# Patient Record
Sex: MALE | Race: WHITE | NOT HISPANIC OR LATINO | URBAN - METROPOLITAN AREA
[De-identification: names, ages, dates, MRNs, and addresses within clinical notes are randomized per-mention and may not be internally consistent; named-entity substitution may affect disease eponyms.]

---

## 2017-02-24 ENCOUNTER — OUTPATIENT (OUTPATIENT)
Dept: OUTPATIENT SERVICES | Facility: HOSPITAL | Age: 72
LOS: 1 days | End: 2017-02-24
Payer: MEDICARE

## 2017-02-24 PROCEDURE — 74183 MRI ABD W/O CNTR FLWD CNTR: CPT

## 2017-02-24 PROCEDURE — 74183 MRI ABD W/O CNTR FLWD CNTR: CPT | Mod: 26

## 2017-02-24 PROCEDURE — A9585: CPT

## 2017-11-06 ENCOUNTER — INPATIENT (INPATIENT)
Facility: HOSPITAL | Age: 72
LOS: 4 days | Discharge: HOME | DRG: 315 | End: 2017-11-11
Attending: INTERNAL MEDICINE | Admitting: INTERNAL MEDICINE
Payer: MEDICARE

## 2017-11-06 VITALS
WEIGHT: 302.92 LBS | HEIGHT: 73 IN | TEMPERATURE: 98 F | OXYGEN SATURATION: 94 % | DIASTOLIC BLOOD PRESSURE: 76 MMHG | HEART RATE: 95 BPM | SYSTOLIC BLOOD PRESSURE: 124 MMHG | RESPIRATION RATE: 18 BRPM

## 2017-11-06 DIAGNOSIS — I31.8 OTHER SPECIFIED DISEASES OF PERICARDIUM: ICD-10-CM

## 2017-11-06 LAB
ALBUMIN SERPL ELPH-MCNC: 3.1 G/DL — LOW (ref 3.3–5)
ALP SERPL-CCNC: 82 U/L — SIGNIFICANT CHANGE UP (ref 40–120)
ALT FLD-CCNC: 12 U/L — SIGNIFICANT CHANGE UP (ref 10–45)
ANION GAP SERPL CALC-SCNC: 18 MMOL/L — HIGH (ref 5–17)
AST SERPL-CCNC: 10 U/L — SIGNIFICANT CHANGE UP (ref 10–40)
BASOPHILS NFR BLD AUTO: 0.1 % — SIGNIFICANT CHANGE UP (ref 0–2)
BILIRUB SERPL-MCNC: 1 MG/DL — SIGNIFICANT CHANGE UP (ref 0.2–1.2)
BUN SERPL-MCNC: 35 MG/DL — HIGH (ref 7–23)
CALCIUM SERPL-MCNC: 9.2 MG/DL — SIGNIFICANT CHANGE UP (ref 8.4–10.5)
CHLORIDE SERPL-SCNC: 95 MMOL/L — LOW (ref 96–108)
CK MB CFR SERPL CALC: 1.9 NG/ML — SIGNIFICANT CHANGE UP (ref 0–6.7)
CK SERPL-CCNC: 58 U/L — SIGNIFICANT CHANGE UP (ref 30–200)
CO2 SERPL-SCNC: 24 MMOL/L — SIGNIFICANT CHANGE UP (ref 22–31)
CREAT SERPL-MCNC: 1.39 MG/DL — HIGH (ref 0.5–1.3)
EOSINOPHIL NFR BLD AUTO: 1.1 % — SIGNIFICANT CHANGE UP (ref 0–6)
GLUCOSE SERPL-MCNC: 108 MG/DL — HIGH (ref 70–99)
HCT VFR BLD CALC: 41.8 % — SIGNIFICANT CHANGE UP (ref 39–50)
HGB BLD-MCNC: 14 G/DL — SIGNIFICANT CHANGE UP (ref 13–17)
LYMPHOCYTES # BLD AUTO: 9.2 % — LOW (ref 13–44)
MCHC RBC-ENTMCNC: 29.7 PG — SIGNIFICANT CHANGE UP (ref 27–34)
MCHC RBC-ENTMCNC: 33.5 G/DL — SIGNIFICANT CHANGE UP (ref 32–36)
MCV RBC AUTO: 88.7 FL — SIGNIFICANT CHANGE UP (ref 80–100)
MONOCYTES NFR BLD AUTO: 9.9 % — SIGNIFICANT CHANGE UP (ref 2–14)
NEUTROPHILS NFR BLD AUTO: 79.7 % — HIGH (ref 43–77)
NT-PROBNP SERPL-SCNC: 1114 PG/ML — HIGH (ref 0–300)
PLATELET # BLD AUTO: 415 K/UL — HIGH (ref 150–400)
POTASSIUM SERPL-MCNC: 4 MMOL/L — SIGNIFICANT CHANGE UP (ref 3.5–5.3)
POTASSIUM SERPL-SCNC: 4 MMOL/L — SIGNIFICANT CHANGE UP (ref 3.5–5.3)
PROT SERPL-MCNC: 6.6 G/DL — SIGNIFICANT CHANGE UP (ref 6–8.3)
RBC # BLD: 4.71 M/UL — SIGNIFICANT CHANGE UP (ref 4.2–5.8)
RBC # FLD: 13.8 % — SIGNIFICANT CHANGE UP (ref 10.3–16.9)
SODIUM SERPL-SCNC: 137 MMOL/L — SIGNIFICANT CHANGE UP (ref 135–145)
TROPONIN T SERPL-MCNC: <0.01 NG/ML — SIGNIFICANT CHANGE UP (ref 0–0.01)
WBC # BLD: 15 K/UL — HIGH (ref 3.8–10.5)
WBC # FLD AUTO: 15 K/UL — HIGH (ref 3.8–10.5)

## 2017-11-06 PROCEDURE — 71010: CPT | Mod: 26

## 2017-11-06 PROCEDURE — 93010 ELECTROCARDIOGRAM REPORT: CPT

## 2017-11-06 PROCEDURE — 99291 CRITICAL CARE FIRST HOUR: CPT | Mod: 25

## 2017-11-06 RX ORDER — APIXABAN 2.5 MG/1
2.5 TABLET, FILM COATED ORAL EVERY 12 HOURS
Qty: 0 | Refills: 0 | Status: DISCONTINUED | OUTPATIENT
Start: 2017-11-06 | End: 2017-11-06

## 2017-11-06 RX ORDER — SODIUM CHLORIDE 9 MG/ML
3 INJECTION INTRAMUSCULAR; INTRAVENOUS; SUBCUTANEOUS ONCE
Qty: 0 | Refills: 0 | Status: COMPLETED | OUTPATIENT
Start: 2017-11-06 | End: 2017-11-06

## 2017-11-06 RX ORDER — FUROSEMIDE 40 MG
40 TABLET ORAL DAILY
Qty: 0 | Refills: 0 | Status: DISCONTINUED | OUTPATIENT
Start: 2017-11-07 | End: 2017-11-08

## 2017-11-06 RX ORDER — CARVEDILOL PHOSPHATE 80 MG/1
12.5 CAPSULE, EXTENDED RELEASE ORAL EVERY 12 HOURS
Qty: 0 | Refills: 0 | Status: DISCONTINUED | OUTPATIENT
Start: 2017-11-06 | End: 2017-11-11

## 2017-11-06 RX ADMIN — SODIUM CHLORIDE 3 MILLILITER(S): 9 INJECTION INTRAMUSCULAR; INTRAVENOUS; SUBCUTANEOUS at 17:25

## 2017-11-06 RX ADMIN — CARVEDILOL PHOSPHATE 12.5 MILLIGRAM(S): 80 CAPSULE, EXTENDED RELEASE ORAL at 23:42

## 2017-11-06 NOTE — ED PROVIDER NOTE - OBJECTIVE STATEMENT
72 m w sob- hx of pericarditis- now w large pericardial effusion on echo- no signs of   tamponade on echo by Dr Ramirez- sent in for admission and removal of fluid by  Dr Sepulveda- some of the fluid was 'goopy' as per Dr Sepulveda  no f/c  no cp now, mild sob- no orthopnea  worse with exertion  no allev factors

## 2017-11-06 NOTE — CONSULT NOTE ADULT - SUBJECTIVE AND OBJECTIVE BOX
71 YO obese male on Eliquis 2.5mg BID for 2015 DVT/PE, hypertension, possible bout of mild CHF 4 mo's  ago resolving with PO furosemide, sleep apnea-had TTE in July in [place]-read as trace/small pericardial effusion.  He had chest pain 2 wks. ago with wbc 17K-sent for chest C-only finding a "significant"  pericardial effusion.  Echo on 10/25 showed moderate pericardial effusion(no tamponade), and exam/EKg -  not showing typical pericarditis.  In interim, effusion has gotten a little larger with more fibrin?blood?debris?, now with audible friction rub,  pulsus of 10mm, left pleural effusion, new atrial flutter with rate 100-but no echo signs for tamponade  Pericardirtis--?? infection??bloody-would like fluid or pericardial tissue  to decide how to treat.  have asked for chest CT report from 10/25 and data from my office to be scanned into his chart(in [facility]).  Dr. Sepulveda to help-already contacted  blood cultures obtained  needs tele bed 5th floor  ??TB -no Hx and denies prior PPD

## 2017-11-06 NOTE — ED PROVIDER NOTE - CRITICAL CARE PROVIDED
documentation/conducted a detailed discussion of DNR status/additional history taking/direct patient care (not related to procedure)/consultation with other physicians

## 2017-11-06 NOTE — ED PROVIDER NOTE - MEDICAL DECISION MAKING DETAILS
pericardial effusion on echo- large pleural effusion- admit to cards- Dr Ramirez has contacted Dr Sepulveda for probable pericardiocentesis

## 2017-11-06 NOTE — ED ADULT NURSE NOTE - CHPI ED SYMPTOMS NEG
no fever/no headache/no hemoptysis/no wheezing/no chills/no body aches/no edema/no shortness of breath/no cough/no diaphoresis/no chest pain

## 2017-11-06 NOTE — ED ADULT NURSE NOTE - OBJECTIVE STATEMENT
pt presents to ED A&Ox3 c/o weakness. pt states he was sent here by his doctor for fluid around the heart. denies cp/sob, fever, cough, n/v/d, dizziness, leg swelling. telemetry monitor in place. noted to be tracing in aflutter.

## 2017-11-06 NOTE — ED PROVIDER NOTE - DIAGNOSTIC INTERPRETATION
ER Physician: Brennan Griggs  CHEST XRAY INTERPRETATION: lungs Large L pleural effusion, heart shadow enlarged, bony structures intact

## 2017-11-06 NOTE — ED PROVIDER NOTE - CARE PLAN
Principal Discharge DX:	Acute pericarditis, unspecified type  Secondary Diagnosis:	Pericardial effusion  Secondary Diagnosis:	Pleural effusion

## 2017-11-06 NOTE — ED ADULT TRIAGE NOTE - CHIEF COMPLAINT QUOTE
Fluid was found around the heart at the MDs office. Patient states that they sent him in due to there being increased amounts of fluid around the heart. Fluid was first detected around 2.5 weeks ago.

## 2017-11-07 DIAGNOSIS — N28.9 DISORDER OF KIDNEY AND URETER, UNSPECIFIED: ICD-10-CM

## 2017-11-07 DIAGNOSIS — I10 ESSENTIAL (PRIMARY) HYPERTENSION: ICD-10-CM

## 2017-11-07 DIAGNOSIS — G47.33 OBSTRUCTIVE SLEEP APNEA (ADULT) (PEDIATRIC): ICD-10-CM

## 2017-11-07 DIAGNOSIS — Z96.649 PRESENCE OF UNSPECIFIED ARTIFICIAL HIP JOINT: Chronic | ICD-10-CM

## 2017-11-07 DIAGNOSIS — I27.82 CHRONIC PULMONARY EMBOLISM: ICD-10-CM

## 2017-11-07 DIAGNOSIS — Z29.9 ENCOUNTER FOR PROPHYLACTIC MEASURES, UNSPECIFIED: ICD-10-CM

## 2017-11-07 DIAGNOSIS — I82.91 CHRONIC EMBOLISM AND THROMBOSIS OF UNSPECIFIED VEIN: ICD-10-CM

## 2017-11-07 DIAGNOSIS — I31.3 PERICARDIAL EFFUSION (NONINFLAMMATORY): ICD-10-CM

## 2017-11-07 DIAGNOSIS — I48.92 UNSPECIFIED ATRIAL FLUTTER: ICD-10-CM

## 2017-11-07 DIAGNOSIS — J90 PLEURAL EFFUSION, NOT ELSEWHERE CLASSIFIED: ICD-10-CM

## 2017-11-07 DIAGNOSIS — R63.8 OTHER SYMPTOMS AND SIGNS CONCERNING FOOD AND FLUID INTAKE: ICD-10-CM

## 2017-11-07 DIAGNOSIS — Z02.9 ENCOUNTER FOR ADMINISTRATIVE EXAMINATIONS, UNSPECIFIED: ICD-10-CM

## 2017-11-07 LAB
ANION GAP SERPL CALC-SCNC: 14 MMOL/L — SIGNIFICANT CHANGE UP (ref 5–17)
APPEARANCE UR: CLEAR — SIGNIFICANT CHANGE UP
APTT BLD: 33.1 SEC — SIGNIFICANT CHANGE UP (ref 27.5–37.4)
APTT BLD: 36 SEC — SIGNIFICANT CHANGE UP (ref 27.5–37.4)
BILIRUB UR-MCNC: NEGATIVE — SIGNIFICANT CHANGE UP
BUN SERPL-MCNC: 32 MG/DL — HIGH (ref 7–23)
CALCIUM SERPL-MCNC: 9 MG/DL — SIGNIFICANT CHANGE UP (ref 8.4–10.5)
CHLORIDE SERPL-SCNC: 99 MMOL/L — SIGNIFICANT CHANGE UP (ref 96–108)
CO2 SERPL-SCNC: 25 MMOL/L — SIGNIFICANT CHANGE UP (ref 22–31)
COLOR SPEC: YELLOW — SIGNIFICANT CHANGE UP
CREAT ?TM UR-MCNC: 186 MG/DL — SIGNIFICANT CHANGE UP
CREAT SERPL-MCNC: 1.21 MG/DL — SIGNIFICANT CHANGE UP (ref 0.5–1.3)
CRP SERPL-MCNC: 9.5 MG/DL — HIGH (ref 0–0.4)
DIFF PNL FLD: NEGATIVE — SIGNIFICANT CHANGE UP
ERYTHROCYTE [SEDIMENTATION RATE] IN BLOOD: 55 MM/HR — HIGH
GLUCOSE SERPL-MCNC: 120 MG/DL — HIGH (ref 70–99)
GLUCOSE UR QL: NEGATIVE — SIGNIFICANT CHANGE UP
HCT VFR BLD CALC: 41.1 % — SIGNIFICANT CHANGE UP (ref 39–50)
HGB BLD-MCNC: 13.6 G/DL — SIGNIFICANT CHANGE UP (ref 13–17)
INR BLD: 1.55 — HIGH (ref 0.88–1.16)
KETONES UR-MCNC: NEGATIVE — SIGNIFICANT CHANGE UP
LEUKOCYTE ESTERASE UR-ACNC: NEGATIVE — SIGNIFICANT CHANGE UP
MAGNESIUM SERPL-MCNC: 2.3 MG/DL — SIGNIFICANT CHANGE UP (ref 1.6–2.6)
MCHC RBC-ENTMCNC: 29.7 PG — SIGNIFICANT CHANGE UP (ref 27–34)
MCHC RBC-ENTMCNC: 33.1 G/DL — SIGNIFICANT CHANGE UP (ref 32–36)
MCV RBC AUTO: 89.7 FL — SIGNIFICANT CHANGE UP (ref 80–100)
NITRITE UR-MCNC: NEGATIVE — SIGNIFICANT CHANGE UP
PH UR: 6 — SIGNIFICANT CHANGE UP (ref 5–8)
PLATELET # BLD AUTO: 341 K/UL — SIGNIFICANT CHANGE UP (ref 150–400)
POTASSIUM SERPL-MCNC: 3.6 MMOL/L — SIGNIFICANT CHANGE UP (ref 3.5–5.3)
POTASSIUM SERPL-SCNC: 3.6 MMOL/L — SIGNIFICANT CHANGE UP (ref 3.5–5.3)
PROT UR-MCNC: 30 MG/DL
PROTHROM AB SERPL-ACNC: 17.4 SEC — HIGH (ref 9.8–12.7)
RBC # BLD: 4.58 M/UL — SIGNIFICANT CHANGE UP (ref 4.2–5.8)
RBC # FLD: 13.9 % — SIGNIFICANT CHANGE UP (ref 10.3–16.9)
RHEUMATOID FACT SERPL-ACNC: 14 IU/ML — HIGH (ref 0–13.9)
SODIUM SERPL-SCNC: 138 MMOL/L — SIGNIFICANT CHANGE UP (ref 135–145)
SODIUM UR-SCNC: 20 MMOL/L — SIGNIFICANT CHANGE UP
SP GR SPEC: 1.02 — SIGNIFICANT CHANGE UP (ref 1–1.03)
TSH SERPL-MCNC: 1.72 UIU/ML — SIGNIFICANT CHANGE UP (ref 0.35–4.94)
UROBILINOGEN FLD QL: 0.2 E.U./DL — SIGNIFICANT CHANGE UP
UUN UR-MCNC: 1414 MG/DL — SIGNIFICANT CHANGE UP
WBC # BLD: 10.9 K/UL — HIGH (ref 3.8–10.5)
WBC # FLD AUTO: 10.9 K/UL — HIGH (ref 3.8–10.5)

## 2017-11-07 PROCEDURE — 73630 X-RAY EXAM OF FOOT: CPT | Mod: 26,RT

## 2017-11-07 PROCEDURE — 93306 TTE W/DOPPLER COMPLETE: CPT | Mod: 26

## 2017-11-07 PROCEDURE — 71260 CT THORAX DX C+: CPT | Mod: 26

## 2017-11-07 PROCEDURE — 74177 CT ABD & PELVIS W/CONTRAST: CPT | Mod: 26

## 2017-11-07 RX ORDER — ACETAMINOPHEN 500 MG
650 TABLET ORAL EVERY 6 HOURS
Qty: 0 | Refills: 0 | Status: DISCONTINUED | OUTPATIENT
Start: 2017-11-07 | End: 2017-11-11

## 2017-11-07 RX ORDER — HEPARIN SODIUM 5000 [USP'U]/ML
2000 INJECTION INTRAVENOUS; SUBCUTANEOUS
Qty: 25000 | Refills: 0 | Status: DISCONTINUED | OUTPATIENT
Start: 2017-11-07 | End: 2017-11-08

## 2017-11-07 RX ORDER — POTASSIUM CHLORIDE 20 MEQ
40 PACKET (EA) ORAL ONCE
Qty: 0 | Refills: 0 | Status: COMPLETED | OUTPATIENT
Start: 2017-11-07 | End: 2017-11-07

## 2017-11-07 RX ORDER — ACETAMINOPHEN 500 MG
650 TABLET ORAL ONCE
Qty: 0 | Refills: 0 | Status: COMPLETED | OUTPATIENT
Start: 2017-11-07 | End: 2017-11-07

## 2017-11-07 RX ORDER — HEPARIN SODIUM 5000 [USP'U]/ML
2400 INJECTION INTRAVENOUS; SUBCUTANEOUS
Qty: 25000 | Refills: 0 | Status: DISCONTINUED | OUTPATIENT
Start: 2017-11-07 | End: 2017-11-07

## 2017-11-07 RX ADMIN — Medication 650 MILLIGRAM(S): at 19:00

## 2017-11-07 RX ADMIN — CARVEDILOL PHOSPHATE 12.5 MILLIGRAM(S): 80 CAPSULE, EXTENDED RELEASE ORAL at 09:42

## 2017-11-07 RX ADMIN — Medication 650 MILLIGRAM(S): at 21:55

## 2017-11-07 RX ADMIN — CARVEDILOL PHOSPHATE 12.5 MILLIGRAM(S): 80 CAPSULE, EXTENDED RELEASE ORAL at 21:44

## 2017-11-07 RX ADMIN — Medication 650 MILLIGRAM(S): at 22:55

## 2017-11-07 RX ADMIN — HEPARIN SODIUM 20 UNIT(S)/HR: 5000 INJECTION INTRAVENOUS; SUBCUTANEOUS at 18:05

## 2017-11-07 RX ADMIN — Medication 650 MILLIGRAM(S): at 18:08

## 2017-11-07 RX ADMIN — Medication 40 MILLIEQUIVALENT(S): at 09:42

## 2017-11-07 RX ADMIN — Medication 40 MILLIGRAM(S): at 06:10

## 2017-11-07 NOTE — PROGRESS NOTE ADULT - PROBLEM SELECTOR PLAN 5
-holding eliquis 2/2 possible taps and concern for possible hemopericardium  -SCD's -holding Eliquis 2/2 possible pericardiocentesis tomorrow, started Heparin drip  -SCD's

## 2017-11-07 NOTE — CONSULT NOTE ADULT - PROBLEM SELECTOR RECOMMENDATION 9
-Per Dr. Sepulveda, will need to obtain images of his previous TTE and CT C/A/P that were done recently.  Once reviewed, he will make further recommendations. -Per Dr. Sepulveda, please obtain TTE and CT C/A/P with IV contrast.  Will make further recs after reviewing these tests. Currently he is hemodynamically stable, and took his last dose of Eliquis yesterday so would not rush to drain the pericardial effusion unless it looks increasingly large in size on today's TTE, or causing any tamponade physiology. Continue to hold Eliquis.  Start heparin gtt.

## 2017-11-07 NOTE — H&P ADULT - PMH
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

## 2017-11-07 NOTE — PROGRESS NOTE ADULT - PROBLEM SELECTOR PLAN 8
-NPO for possible procedures; DASH diet; replete lytes prn N: NPO for MN for possible procedures; DASH/TLC diet  E: replete lytes prn K<4, Mg<2  P: DVT PPX: on Heparin drip  C: FULL CODE

## 2017-11-07 NOTE — CONSULT NOTE ADULT - ASSESSMENT
71 y/o obese male w/ PMH CHF, small pericardial effusion on TTE in July (per Dr. Ramirez), DVT/PE in 2015 on Eliquis, HTN, ADAM (on CPAP at home) presenting w/ dyspnea, fatigue, low grade temp, and cough and found to have an enlargement of the previously seen pericardial effusion.  We are being consulted for possible intervention for his pericardial effusion.

## 2017-11-07 NOTE — H&P ADULT - HISTORY OF PRESENT ILLNESS
Patient is a 73 YO obese male w/ PMH ?CHF (as per Dr. Ramirez's note, possible bout of mild CHF in July; TTE in July w/ trace/small pericardial effusion), DVT/PE (2015), HTN, ADAM presenting w/ moderate pericardial effusion and L pleural effusion. Patient w/ episode of CP 2wks ago, centrally located, dull, lasting 1 day, subsiding w/o intervention. W/u at that time significant for leukocytosis to 17; chest CT w/ significant pericardial effusion. F/u echo on 10/25 w/ moderate pericardial effusion (no tamponade) and ECG/exam w/o indication of pericarditis. On f/u visit, pt now w/ enlargement of pericardial effusion, audible friction rub, L pleural effusion, and new aflutter. Patient denies CP, ab pain, n/v, f/c, d/c, dyspnea, recent illness, LE swelling, sick contacts, hx of trauma or autoimmune dz. In the ED, T: 98.4, P: 95, BP: 124/76, RR: 18, O2: 94% RA. Patient admitted for further w/u. Patient is a 71 YO obese male w/ PMH ?CHF (as per Dr. 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 Patient w/ episode of CP 2wks ago, centrally located, dull, lasting 1 day, subsiding w/o intervention. W/u on Oct 25 w/ leukocytosis to 17; chest CT w/ significant pericardial effusion 1.7x1.4cm. F/u echo on 10/25 w/ moderate pericardial effusion (no tamponade) and ECG w/ NSR and 1st degree AV block; ECG/exam w/o indication of pericarditis. No tx provided. On f/u visit 11/6, pt now w/ dyspnea, fatigue, low grade temp, and occasional dry cough. Pt found w/ enlargement of pericardial effusion on echo (more debris noted), audible friction rub, L pleural effusion, and new aflutter w/ tachy to the 100's. Patient denies CP, ab pain, n/v, f/c, d/c, dyspnea, recent illness, LE swelling, sick contacts, hx of trauma or autoimmune dz. In the ED, T: 98.4, P: 95, BP: 124/76, RR: 18, O2: 94% RA. Patient admitted for further w/u. Patient is a 71 YO obese male w/ PMH ?CHF (as per Dr. 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 Patient w/ episode of CP 2wks ago, centrally located, dull, lasting 1 day, subsiding w/o intervention. W/u on Oct 25 w/ leukocytosis to 17; chest CT w/ significant pericardial effusion 1.7x1.4cm. F/u echo on 10/25 w/ moderate pericardial effusion (no tamponade) and ECG w/ NSR and 1st degree AV block; ECG/exam w/o indication of pericarditis. No tx provided. On f/u visit 11/6, pt now w/ dyspnea, fatigue, low grade temp, and occasional dry cough. Pt found w/ enlargement of pericardial effusion on echo (more debris noted), audible friction rub, L pleural effusion, and new aflutter w/ tachy to the 100's. Patient denies CP, ab pain, n/v, d/c, LE swelling, sick contacts, hx of trauma or known autoimmune dz. In the ED, T: 98.4, P: 95, BP: 124/76, RR: 18, O2: 94% RA. Patient admitted for further w/u.

## 2017-11-07 NOTE — PROGRESS NOTE ADULT - PROBLEM SELECTOR PLAN 9
-SCDs, holding eliquis for now  #Dispo: pending stabilization  FULL CODE D/c pending clinical progress

## 2017-11-07 NOTE — PROGRESS NOTE ADULT - PROBLEM SELECTOR PLAN 3
New onset, likely 2/2 pericardial dz; variable 2:1 and 3:1 typical aflutter on ECG  -c/w coreg 12.5 bid New onset, likely 2/2 pericardial dz; variable 3:1 and 4:1 typical aflutter on ECG  -c/w coreg 12.5 bid  -Eliquis on hold  -Started Heparin drip, monitor PTT/CBC closely

## 2017-11-07 NOTE — PROGRESS NOTE ADULT - PROBLEM SELECTOR PLAN 2
new L pleural effusion  -c/w po lasix 40mg; no need for IV lasix at this time  -patient satting well on room air; titrate supplemental as needed  -AM CXR New L pleural effusion  -c/w po Lasix 40mg; no need for IV lasix at this time does not appear to be acutely fluid overloaded.  -patient satting well on room air  -CXR noted L pleural effusion

## 2017-11-07 NOTE — H&P ADULT - ASSESSMENT
Patient is a 71 YO obese male w/ PMH ?CHF (as per Dr. 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

## 2017-11-07 NOTE — H&P ADULT - PROBLEM SELECTOR PLAN 1
Patient w/ enlarging pericardial effusion over the course of 4 mos w/ assoc dyspnea, fatigue, fevers and cough; no current sx of CP, ECG w/ variable 2:1, 3:1 counterclockwise typical aflutter, diffuse TWI; no ANA; unclear etiology of pericardial dz, possibly viral, autoimmune, or malignant; elevated BUN however pericardial sx typically assoc at higher levels  -f/u blood cx  -patient w/o CP, if pain develops, avoid NSAIDs 2/2 renal insufficiency Patient w/ enlarging pericardial effusion over the course of 4 mos w/ assoc dyspnea, fatigue, fevers and cough; no current sx of CP, ECG w/ variable 2:1, 3:1 counterclockwise typical aflutter, diffuse TWI; no ANA; unclear etiology of pericardial dz, possibly viral, autoimmune, hematologic (2/2 recent change of pradaxa to eliquis), or malignant; elevated BUN however pericardial sx typically assoc at higher levels  -f/u blood cx  -patient w/o CP, if pain develops, avoid NSAIDs 2/2 renal insufficiency  -Dr. Ramirez and Dr. Sepulveda to reassess pt's echo findings; possible tap or surgical pericardial window/biopsy/fluid  -holding eliquis 2/2 possible tap and concern for possible hemopericardium Patient w/ enlarging pericardial effusion over the course of 4 mos w/ assoc dyspnea, fatigue, fevers and cough; no current sx of CP, ECG w/ variable 2:1, 3:1 counterclockwise typical aflutter, diffuse TWI; no ANA; unclear etiology of pericardial dz, possibly viral, autoimmune, hematologic (2/2 recent change of pradaxa to eliquis), or malignant; elevated BUN however pericardial sx typically assoc at higher levels  -f/u blood cx  -patient w/o CP, if pain develops, avoid NSAIDs 2/2 renal insufficiency  -Dr. Ramirez and Dr. Sepulveda to reassess pt's echo findings; possible tap or surgical pericardial window/biopsy/fluid  -holding eliquis 2/2 possible tap and concern for possible hemopericardium  -f/u echocardiogram

## 2017-11-07 NOTE — PROGRESS NOTE ADULT - SUBJECTIVE AND OBJECTIVE BOX
CARDIOLOGY NP PROGRESS NOTE    Subjective: Pt seen and examined at bedside. Reports feeling well. Denies chest pain, sob, lightheadedness or dizziness    Overnight Events: None    TELEMETRY: Heather 80-90s    EKG: Heather w/ variable 3:1 /4:1 block      VITAL SIGNS:  T(C): 36.7 (11-07-17 @ 13:48), Max: 37.2 (11-06-17 @ 18:39)  HR: 80 (11-07-17 @ 16:31) (74 - 94)  BP: 111/69 (11-07-17 @ 16:31) (111/69 - 145/76)  RR: 18 (11-07-17 @ 16:31) (16 - 18)  SpO2: 94% (11-07-17 @ 16:31) (92% - 97%)  Wt(kg): --    I&O's Summary    06 Nov 2017 07:01  -  07 Nov 2017 07:00  --------------------------------------------------------  IN: 100 mL / OUT: 350 mL / NET: -250 mL    07 Nov 2017 07:01  -  07 Nov 2017 18:07  --------------------------------------------------------  IN: 0 mL / OUT: 575 mL / NET: -575 mL          PHYSICAL EXAM:    General: A/ox 3, No acute Distress, Obese  Neck: Supple, NO JVD  Cardiac: S1 S2, No M/R/G, muffled heart sounds  Pulmonary: LLL decreased breath sounds, Breathing unlabored on RA, No Rhonchi/Rales/Wheezing  Abdomen: Soft, Non -tender, +BS x 4 quads  Extremities: No Rashes, No LE edema. C/o R great toe pain s/p slipping into a ditch 2-3 days ago, mildly tender on palpation, no ecchymosis/redness/swelling  Neuro: A/o x 3, No focal deficits          LABS:                          13.6   10.9  )-----------( 341      ( 07 Nov 2017 06:39 )             41.1                              11-07    138  |  99  |  32<H>  ----------------------------<  120<H>  3.6   |  25  |  1.21    Ca    9.0      07 Nov 2017 06:39  Mg     2.3     11-07    TPro  6.6  /  Alb  3.1<L>  /  TBili  1.0  /  DBili  x   /  AST  10  /  ALT  12  /  AlkPhos  82  11-06    LIVER FUNCTIONS - ( 06 Nov 2017 17:36 )  Alb: 3.1 g/dL / Pro: 6.6 g/dL / ALK PHOS: 82 U/L / ALT: 12 U/L / AST: 10 U/L / GGT: x         PT/INR - ( 07 Nov 2017 06:39 )   PT: 17.4 sec;   INR: 1.55          PTT - ( 07 Nov 2017 06:39 )  PTT:33.1 sec  CAPILLARY BLOOD GLUCOSE        CARDIAC MARKERS ( 06 Nov 2017 17:36 )  x     / <0.01 ng/mL / 58 U/L / x     / 1.9 ng/mL            No Known Allergies    MEDICATIONS  (STANDING):  carvedilol 12.5 milliGRAM(s) Oral every 12 hours  furosemide    Tablet 40 milliGRAM(s) Oral daily  heparin  Infusion 2000 Unit(s)/Hr (20 mL/Hr) IV Continuous <Continuous>    MEDICATIONS  (PRN):  acetaminophen   Tablet. 650 milliGRAM(s) Oral every 6 hours PRN Moderate Pain (4 - 6)        DIAGNOSTIC TESTS: CARDIOLOGY NP PROGRESS NOTE    Subjective: Pt seen and examined at bedside. Reports feeling well. Denies chest pain, sob, lightheadedness or dizziness    Overnight Events: None    TELEMETRY: Aflutter 80-90s    EKG: Aflutter w/ variable 3:1 and 4:1 block      VITAL SIGNS:  T(C): 36.7 (11-07-17 @ 13:48), Max: 37.2 (11-06-17 @ 18:39)  HR: 80 (11-07-17 @ 16:31) (74 - 94)  BP: 111/69 (11-07-17 @ 16:31) (111/69 - 145/76)  RR: 18 (11-07-17 @ 16:31) (16 - 18)  SpO2: 94% (11-07-17 @ 16:31) (92% - 97%)  Wt(kg): --    I&O's Summary    06 Nov 2017 07:01  -  07 Nov 2017 07:00  --------------------------------------------------------  IN: 100 mL / OUT: 350 mL / NET: -250 mL    07 Nov 2017 07:01  -  07 Nov 2017 18:07  --------------------------------------------------------  IN: 0 mL / OUT: 575 mL / NET: -575 mL          PHYSICAL EXAM:    General: A/ox 3, No acute Distress, Obese  Neck: Supple, NO JVD  Cardiac: S1 S2, No M/R/G, muffled heart sounds  Pulmonary: LLL decreased breath sounds, Breathing unlabored on RA, No Rhonchi/Rales/Wheezing  Abdomen: Soft, Non -tender, +BS x 4 quads  Extremities: No Rashes, No LE edema. C/o R great toe pain s/p slipping into a ditch 2-3 days ago, mildly tender on palpation, no ecchymosis/redness/swelling  Neuro: A/o x 3, No focal deficits          LABS:                          13.6   10.9  )-----------( 341      ( 07 Nov 2017 06:39 )             41.1                              11-07    138  |  99  |  32<H>  ----------------------------<  120<H>  3.6   |  25  |  1.21    Ca    9.0      07 Nov 2017 06:39  Mg     2.3     11-07    TPro  6.6  /  Alb  3.1<L>  /  TBili  1.0  /  DBili  x   /  AST  10  /  ALT  12  /  AlkPhos  82  11-06    LIVER FUNCTIONS - ( 06 Nov 2017 17:36 )  Alb: 3.1 g/dL / Pro: 6.6 g/dL / ALK PHOS: 82 U/L / ALT: 12 U/L / AST: 10 U/L / GGT: x         PT/INR - ( 07 Nov 2017 06:39 )   PT: 17.4 sec;   INR: 1.55          PTT - ( 07 Nov 2017 06:39 )  PTT:33.1 sec  CAPILLARY BLOOD GLUCOSE        CARDIAC MARKERS ( 06 Nov 2017 17:36 )  x     / <0.01 ng/mL / 58 U/L / x     / 1.9 ng/mL            No Known Allergies    MEDICATIONS  (STANDING):  carvedilol 12.5 milliGRAM(s) Oral every 12 hours  furosemide    Tablet 40 milliGRAM(s) Oral daily  heparin  Infusion 2000 Unit(s)/Hr (20 mL/Hr) IV Continuous <Continuous>    MEDICATIONS  (PRN):  acetaminophen   Tablet. 650 milliGRAM(s) Oral every 6 hours PRN Moderate Pain (4 - 6)        DIAGNOSTIC TESTS:

## 2017-11-07 NOTE — H&P ADULT - NSHPLABSRESULTS_GEN_ALL_CORE
LABS:                        14.0   15.0  )-----------( 415      ( 06 Nov 2017 17:36 )             41.8     11-06    137  |  95<L>  |  35<H>  ----------------------------<  108<H>  4.0   |  24  |  1.39<H>    Ca    9.2      06 Nov 2017 17:36    TPro  6.6  /  Alb  3.1<L>  /  TBili  1.0  /  DBili  x   /  AST  10  /  ALT  12  /  AlkPhos  82  11-06    CARDIAC MARKERS ( 06 Nov 2017 17:36 )  x     / <0.01 ng/mL / 58 U/L / x     / 1.9 ng/mL    Serum Pro-Brain Natriuretic Peptide (11.06.17 @ 17:36)    Serum Pro-Brain Natriuretic Peptide: 1114      RADIOLOGY & ADDITIONAL TESTS:

## 2017-11-07 NOTE — PROGRESS NOTE ADULT - PROBLEM SELECTOR PLAN 1
Patient w/ enlarging pericardial effusion over the course of 4 mos w/ assoc dyspnea, fatigue, fevers and cough; no current sx of CP, ECG w/ variable 2:1, 3:1 counterclockwise typical aflutter, diffuse TWI; no ANA; unclear etiology of pericardial dz, possibly viral, autoimmune, hematologic (2/2 recent change of pradaxa to eliquis), or malignant; elevated BUN however pericardial sx typically assoc at higher levels  -f/u blood cx  -patient w/o CP, if pain develops, avoid NSAIDs 2/2 renal insufficiency  -Dr. Ramirez and Dr. Sepulveda to reassess pt's echo findings; possible tap or surgical pericardial window/biopsy/fluid  -holding eliquis 2/2 possible tap and concern for possible hemopericardium  -f/u echocardiogram Patient w/ enlarging pericardial effusion over the course of 4 mos w/ assoc intermittent dyspnea, fatigue, fevers and cough; no current sx of CP, ECG w/ variable 3:1, 4:1 typical Aflutter, no ST Elevations or signs of pericarditis; unclear etiology of pericardial dz, possibly viral, autoimmune, hematologic (2/2 recent change of pradaxa to eliquis), or malignant; Trop neg. elevated BUN however pericardial sx typically assoc at higher levels  -f/u blood cx  -patient w/o CP, if pain develops, avoid NSAIDs 2/2 renal insufficiency  -Dr. Ramirez and Dr. Sepulveda to reassess pt's echo findings; possible tap or surgical pericardial window/biopsy/fluid  -Holding Eliquis 2/2 possible tap tomorrow (last dose 11/6/17 am). Started Heparin drip this evening. Monitor closely in setting of pericardial effusion, unlikely hemoperitoneum as pt maintaining Hgb >13, SBP >110s, HR 80-90s  -f/u echocardiogram  -Elevate ESR 55, CRP 9.5  -F/u MARCO, rheumatoid factor, PSA, Double strand DNA Ab

## 2017-11-07 NOTE — H&P ADULT - PROBLEM SELECTOR PLAN 3
New onset, likely 2/2 pericardial dz; variable 2:1 and 3:1 typical aflutter on ECG  -c/w coreg 12.5 bid

## 2017-11-07 NOTE — PROGRESS NOTE ADULT - SUBJECTIVE AND OBJECTIVE BOX
WBC count still not accounted for; also he had a renal tumor cryo-ablated here by Dr. Irving(IR)  back in ?2014-could this be mets to pericardium?  we have to decide how to make diagnosis of pericardial fluid-to rule out   infection or cancer  do we tap left pleural effusion via CT guidance?  do we get surgery to do a sub-xyphoid pericardial window-allowing for fluid and tissue  analysis?  Dr. Sepulveda is important here in helping to guide diagnostic approach

## 2017-11-07 NOTE — H&P ADULT - PROBLEM SELECTOR PLAN 2
new L pleural effusion  -c/w po lasix 40mg; no need for IV lasix at this time  -patient satting well on room air; titrate supplemental as needed  -AM CXR

## 2017-11-07 NOTE — DIETITIAN INITIAL EVALUATION ADULT. - OTHER INFO
Patient is a 71 YO obese male w/ PMH ?CHF (as per Dr. 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 Reports a good appetite both PTA & currently, despite NPO for test. No chewing/swallowing difficulty. No BM since admission, no n/v/d/c endorsed. States he cut back on bread, rice, pasta, etc to help him lose weight PTA. Down 14# at present, pt did not state UBW. No pain reported.

## 2017-11-07 NOTE — DIETITIAN INITIAL EVALUATION ADULT. - PROBLEM SELECTOR PLAN 1
Patient w/ enlarging pericardial effusion over the course of 4 mos w/ assoc dyspnea, fatigue, fevers and cough; no current sx of CP, ECG w/ variable 2:1, 3:1 counterclockwise typical aflutter, diffuse TWI; no ANA; unclear etiology of pericardial dz, possibly viral, autoimmune, hematologic (2/2 recent change of pradaxa to eliquis), or malignant; elevated BUN however pericardial sx typically assoc at higher levels  -f/u blood cx  -patient w/o CP, if pain develops, avoid NSAIDs 2/2 renal insufficiency  -Dr. Ramirez and Dr. Sepulveda to reassess pt's echo findings; possible tap or surgical pericardial window/biopsy/fluid  -holding eliquis 2/2 possible tap and concern for possible hemopericardium  -f/u echocardiogram

## 2017-11-07 NOTE — CONSULT NOTE ADULT - SUBJECTIVE AND OBJECTIVE BOX
Attending: Sin Warren    Requesting Physician: Dr. Ramirez    HISTORY OF PRESENT ILLNESS:  This is a 71 y/o obese male w/ PMH CHF, small pericardial effusion on TTE in July (per Dr. Ramirez), DVT/PE in  on Eliquis, HTN, ADAM (on CPAP at home) presenting w/ dyspnea, fatigue, low grade temp, and cough and found to have an enlargement of the previously seen pericardial effusion on out-pt echo done with Dr. Ramirez along with a new left pleural effusion, and new onset aflutter.  Patient reports an episode of CP 2 weeks ago, self-limiting.  On Oct 25 2017, he had WBC 17 and Chest CT showed a significant pericardial effusion 1.7x1.4cm.  F/u echo on 10/25/17 showed moderate pericardial effusion (no tamponade) and ECG revealed NSR with 1st degree AV block,  No indication of pericarditis at that time.  On F/U visit in the office on 17, he c/o dyspnea, fatigue, low grade temp, and occasional dry cough.  Pt found w/ enlargement of pericardial effusion on echo (more debris noted), audible friction rub, left pleural effusion, and new aflutter w/ RVR to rate of 100's.  Currently, he denies active chest pain, SOB, dizziness, fever/chills, N/V/D.     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 650 milliGRAM(s) Oral every 6 hours PRN Moderate Pain (4 - 6)      Allergies    No Known Allergies          SOCIAL HISTORY:  Smoker:  YES / NO        PACK YEARS:                         WHEN QUIT?  ETOH use:  YES / NO               FREQUENCY / QUANTITY:  Ilicit Drug use:  YES / NO  Occupation:  Assisted device use (Cane / Walker):  Live with:    FAMILY HISTORY:  No pertinent family history in first degree relatives      Review of Systems (current ROS)  CONSTITUTIONAL:  Fevers / chills, sweats, fatigue, weight loss, weight gain                                    NEGATIVE  NEURO:  parathesias, seizures, syncope, confusion                                                                               NEGATIVE  EYES:  Blurry vision, discharge, pain, loss of vision                                                                                  NEGATIVE  ENMT:  Difficulty hearing, vertigo, dysphagia, epistaxis, recent dental work                                     NEGATIVE  CV:  Chest pain, palpitations, SAMUEL, orthopnea                                                                                         NEGATIVE  RESPIRATORY:  Wheezing, SOB, cough / sputum, hemoptysis                                                              NEGATIVE  GI:  Nausea, vommiting, diarrhea, constipation, melena                                                                        NEGATIVE  : Hematuria, dysuria, urgency, incontinence                                                                                       NEGATIVE  MUSKULOSKELETAL:  arthritis, joint swelling, muscle weakness                                                           NEGATIVE  SKIN/BREAST:  rash, itching, alexandra loss, masses                                                                                            NEGATIVE  PSYCH:  depresion, anxiety, suicidal ideation                                                                                             NEGATIVE  HEME/LYMPH:  bruises easily, enlarged lymph nodes, tender lymph nodes                                        NEGATIVE  ENDOCRINE:  cold intolerance, heat intolerance, polydipsia                                                                   NEGATIVE  +Chest pain, +fever, +dyspnea, +fatigue, +cough  (previous symptoms)    PHYSICAL EXAM  Vital Signs Last 24 Hrs  T(C): 36.2 (2017 10:00), Max: 37.2 (2017 18:39)  T(F): 97.2 (2017 10:00), Max: 99 (2017 18:39)  HR: 87 (2017 09:27) (74 - 95)  BP: 115/68 (2017 08:37) (115/68 - 145/76)  BP(mean): 94 (2017 08:37) (85 - 107)  RR: 18 (2017 08:37) (16 - 18)  SpO2: 95% (2017 09:27) (92% - 97%)    CONSTITUTIONAL:                                                                          WNL  NEURO:                                                                                             WNL                      EYES:                                                                                                  WNL  ENMT:                                                                                                WNL  CV:                                                                                                      WNL  RESPIRATORY:                                                                                  WNL  GI:                                                                                                       WNL  : KWON + / -                                                                                 WNL  MUSKULOSKELETAL:                                                                       WNL  SKIN / BREAST:                                                                                 WNL                                                          LABS:                        13.6   10.9  )-----------( 341      ( 2017 06:39 )             41.1     11-    138  |  99  |  32<H>  ----------------------------<  120<H>  3.6   |  25  |  1.21    Ca    9.0      2017 06:39  Mg     2.3     11-    TPro  6.6  /  Alb  3.1<L>  /  TBili  1.0  /  DBili  x   /  AST  10  /  ALT  12  /  AlkPhos  82  11-    PT/INR - ( 2017 06:39 )   PT: 17.4 sec;   INR: 1.55          PTT - ( 2017 06:39 )  PTT:33.1 sec  Urinalysis Basic - ( 2017 07:19 )    Color: Yellow / Appearance: Clear / S.025 / pH: x  Gluc: x / Ketone: NEGATIVE  / Bili: Negative / Urobili: 0.2 E.U./dL   Blood: x / Protein: 30 mg/dL / Nitrite: NEGATIVE   Leuk Esterase: NEGATIVE / RBC: < 5 /HPF / WBC < 5 /HPF   Sq Epi: x / Non Sq Epi: 0-5 /HPF / Bacteria: Present /HPF      CARDIAC MARKERS ( 2017 17:36 )  x     / <0.01 ng/mL / 58 U/L / x     / 1.9 ng/mL          RADIOLOGY & ADDITIONAL STUDIES:  < from: Xray Chest 1 View AP/PA (17 @ 17:46) >  Impressions:    Left lower lung consolidation/infiltrate and/or effusion.    < end of copied text > Attending: Sin Warren    Requesting Physician: Dr. Ramirez    HISTORY OF PRESENT ILLNESS:  This is a 73 y/o obese male w/ PMH CHF, small pericardial effusion on TTE in July (per Dr. Ramirez), DVT/PE in  on Eliquis, HTN, ADAM (on CPAP at home) presenting w/ dyspnea, fatigue, low grade temp, and cough and found to have an enlargement of the previously seen pericardial effusion on out-pt echo done with Dr. Ramirez along with a new left pleural effusion, and new onset aflutter.  Patient reports an episode of CP 2 weeks ago, self-limiting.  On Oct 25 2017, he had WBC 17 and Chest CT showed a significant pericardial effusion 1.7x1.4cm.  F/u echo on 10/25/17 showed moderate pericardial effusion (no tamponade) and ECG revealed NSR with 1st degree AV block,  No indication of pericarditis at that time.  On F/U visit in the office on 17, he c/o dyspnea, fatigue, low grade temp, and occasional dry cough.  Pt found w/ enlargement of pericardial effusion on echo (more debris noted), audible friction rub, left pleural effusion, and new aflutter w/ RVR to rate of 100's.  Currently, he denies active chest pain, SOB, dizziness, fever/chills, N/V/D.     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 650 milliGRAM(s) Oral every 6 hours PRN Moderate Pain (4 - 6)      Allergies    No Known Allergies          SOCIAL HISTORY:  Smoker:  YES / NO        PACK YEARS:                         WHEN QUIT?  ETOH use:  YES / NO               FREQUENCY / QUANTITY:  Ilicit Drug use:  YES / NO  Occupation:  Assisted device use (Cane / Walker):  Live with:    FAMILY HISTORY:  No pertinent family history in first degree relatives      Review of Systems (current ROS)  CONSTITUTIONAL:  Fevers / chills, sweats, fatigue, weight loss, weight gain                                    NEGATIVE  NEURO:  parathesias, seizures, syncope, confusion                                                                               NEGATIVE  EYES:  Blurry vision, discharge, pain, loss of vision                                                                                  NEGATIVE  ENMT:  Difficulty hearing, vertigo, dysphagia, epistaxis, recent dental work                                     NEGATIVE  CV:  Chest pain, palpitations, SAMUEL, orthopnea                                                                                         NEGATIVE  RESPIRATORY:  Wheezing, SOB, cough / sputum, hemoptysis                                                              NEGATIVE  GI:  Nausea, vommiting, diarrhea, constipation, melena                                                                        NEGATIVE  : Hematuria, dysuria, urgency, incontinence                                                                                       NEGATIVE  MUSKULOSKELETAL:  arthritis, joint swelling, muscle weakness                                                           NEGATIVE  SKIN/BREAST:  rash, itching, alexandra loss, masses                                                                                            NEGATIVE  PSYCH:  depresion, anxiety, suicidal ideation                                                                                             NEGATIVE  HEME/LYMPH:  bruises easily, enlarged lymph nodes, tender lymph nodes                                        NEGATIVE  ENDOCRINE:  cold intolerance, heat intolerance, polydipsia                                                                   NEGATIVE  +Chest pain, +fever, +dyspnea, +fatigue, +cough  (previous symptoms)    PHYSICAL EXAM  Vital Signs Last 24 Hrs  T(C): 36.2 (2017 10:00), Max: 37.2 (2017 18:39)  T(F): 97.2 (2017 10:00), Max: 99 (2017 18:39)  HR: 87 (2017 09:27) (74 - 95)  BP: 115/68 (2017 08:37) (115/68 - 145/76)  BP(mean): 94 (2017 08:37) (85 - 107)  RR: 18 (2017 08:37) (16 - 18)  SpO2: 95% (2017 09:27) (92% - 97%)    Constitutional: NAD, breathing comfortably, able to converse normally. Obese  Neck: supple  Respiratory: CTA B/L, no wheezing or rales  Cardiac: +S1S2; irregular rhythm friction rub heard at LSB  Gastrointestinal: soft, non-distended.  +central obesity  Extremities: warm and well perfused.  No peripheral edema  Vascular: 2+ DP pulses B/L  Neurologic: AAOx3; No focal deficits    LABS:                        13.6   10.9  )-----------( 341      ( 2017 06:39 )             41.1     11-07    138  |  99  |  32<H>  ----------------------------<  120<H>  3.6   |  25  |  1.21    Ca    9.0      2017 06:39  Mg     2.3     11-    TPro  6.6  /  Alb  3.1<L>  /  TBili  1.0  /  DBili  x   /  AST  10  /  ALT  12  /  AlkPhos  82  11-06    PT/INR - ( 2017 06:39 )   PT: 17.4 sec;   INR: 1.55          PTT - ( 2017 06:39 )  PTT:33.1 sec  Urinalysis Basic - ( 2017 07:19 )    Color: Yellow / Appearance: Clear / S.025 / pH: x  Gluc: x / Ketone: NEGATIVE  / Bili: Negative / Urobili: 0.2 E.U./dL   Blood: x / Protein: 30 mg/dL / Nitrite: NEGATIVE   Leuk Esterase: NEGATIVE / RBC: < 5 /HPF / WBC < 5 /HPF   Sq Epi: x / Non Sq Epi: 0-5 /HPF / Bacteria: Present /HPF      CARDIAC MARKERS ( 2017 17:36 )  x     / <0.01 ng/mL / 58 U/L / x     / 1.9 ng/mL          RADIOLOGY & ADDITIONAL STUDIES:  < from: Xray Chest 1 View AP/PA (17 @ 17:46) >  Impressions:    Left lower lung consolidation/infiltrate and/or effusion.    < end of copied text > Attending: Sin Warren    Requesting Physician: Dr. Ramirez    HISTORY OF PRESENT ILLNESS:  This is a 73 y/o obese male w/ PMH CHF, small pericardial effusion on TTE in July (per Dr. Ramirez), DVT/PE in  on Eliquis, HTN, ADAM (on CPAP at home), BPH s/p prostatectomy, kidney tumor s/p cryoablation, presenting w/ dyspnea, fatigue, low grade temp, and cough and found to have an enlargement of the previously seen pericardial effusion on out-pt echo done with Dr. Ramirez along with a new left pleural effusion, and new onset aflutter.  Patient reports an episode of CP 2 weeks ago, worse when on his side and sitting up, better when lying down.  On Oct 25 2017, he had WBC 17 and Chest CT showed a significant pericardial effusion 1.7x1.4cm.  F/u echo on 10/25/17 showed moderate pericardial effusion (no tamponade) and ECG revealed NSR with 1st degree AV block,  No indication of pericarditis at that time.  On F/U visit in the office on 17, he c/o dyspnea, fatigue, low grade temp, and occasional dry cough.  Pt found w/ enlargement of pericardial effusion on echo (more debris noted), audible friction rub, left pleural effusion, and new aflutter w/ RVR to rate of 100's.  Currently, he denies active chest pain, SOB, dizziness, fever/chills, N/V/D.     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 650 milliGRAM(s) Oral every 6 hours PRN Moderate Pain (4 - 6)      Allergies    No Known Allergies          SOCIAL HISTORY:  Smoker:  YES / NO        PACK YEARS:                         WHEN QUIT?  ETOH use:  YES / NO               FREQUENCY / QUANTITY:  Ilicit Drug use:  YES / NO  Occupation:  Assisted device use (Cane / Walker):  Live with:    FAMILY HISTORY:  No pertinent family history in first degree relatives      Review of Systems (current ROS)  CONSTITUTIONAL:  Fevers / chills, sweats, fatigue, weight loss, weight gain                                    NEGATIVE  NEURO:  parathesias, seizures, syncope, confusion                                                                               NEGATIVE  EYES:  Blurry vision, discharge, pain, loss of vision                                                                                  NEGATIVE  ENMT:  Difficulty hearing, vertigo, dysphagia, epistaxis, recent dental work                                     NEGATIVE  CV:  Chest pain, palpitations, SAMUEL, orthopnea                                                                                         NEGATIVE  RESPIRATORY:  Wheezing, SOB, cough / sputum, hemoptysis                                                              NEGATIVE  GI:  Nausea, vommiting, diarrhea, constipation, melena                                                                        NEGATIVE  : Hematuria, dysuria, urgency, incontinence                                                                                       NEGATIVE  MUSKULOSKELETAL:  arthritis, joint swelling, muscle weakness                                                           NEGATIVE  SKIN/BREAST:  rash, itching, alexandra loss, masses                                                                                            NEGATIVE  PSYCH:  depresion, anxiety, suicidal ideation                                                                                             NEGATIVE  HEME/LYMPH:  bruises easily, enlarged lymph nodes, tender lymph nodes                                        NEGATIVE  ENDOCRINE:  cold intolerance, heat intolerance, polydipsia                                                                   NEGATIVE  +Chest pain, +fever, +dyspnea, +fatigue, +cough  (previous symptoms)    PHYSICAL EXAM  Vital Signs Last 24 Hrs  T(C): 36.2 (2017 10:00), Max: 37.2 (2017 18:39)  T(F): 97.2 (2017 10:00), Max: 99 (2017 18:39)  HR: 87 (2017 09:27) (74 - 95)  BP: 115/68 (2017 08:37) (115/68 - 145/76)  BP(mean): 94 (2017 08:37) (85 - 107)  RR: 18 (2017 08:37) (16 - 18)  SpO2: 95% (2017 09:27) (92% - 97%)    Constitutional: NAD, breathing comfortably, able to converse normally. Obese  Neck: supple  Respiratory: CTA B/L, no wheezing or rales  Cardiac: +S1S2; irregular rhythm friction rub heard at LSB  Gastrointestinal: soft, non-distended.  +central obesity  Extremities: warm and well perfused.  No peripheral edema  Vascular: 2+ DP pulses B/L  Neurologic: AAOx3; No focal deficits    LABS:                        13.6   10.9  )-----------( 341      ( 2017 06:39 )             41.1     11-07    138  |  99  |  32<H>  ----------------------------<  120<H>  3.6   |  25  |  1.21    Ca    9.0      2017 06:39  Mg     2.3         TPro  6.6  /  Alb  3.1<L>  /  TBili  1.0  /  DBili  x   /  AST  10  /  ALT  12  /  AlkPhos  82      PT/INR - ( 2017 06:39 )   PT: 17.4 sec;   INR: 1.55          PTT - ( 2017 06:39 )  PTT:33.1 sec  Urinalysis Basic - ( 2017 07:19 )    Color: Yellow / Appearance: Clear / S.025 / pH: x  Gluc: x / Ketone: NEGATIVE  / Bili: Negative / Urobili: 0.2 E.U./dL   Blood: x / Protein: 30 mg/dL / Nitrite: NEGATIVE   Leuk Esterase: NEGATIVE / RBC: < 5 /HPF / WBC < 5 /HPF   Sq Epi: x / Non Sq Epi: 0-5 /HPF / Bacteria: Present /HPF      CARDIAC MARKERS ( 2017 17:36 )  x     / <0.01 ng/mL / 58 U/L / x     / 1.9 ng/mL          RADIOLOGY & ADDITIONAL STUDIES:  < from: Xray Chest 1 View AP/PA (17 @ 17:46) >  Impressions:    Left lower lung consolidation/infiltrate and/or effusion.    < end of copied text > Attending: Sin Warren    Requesting Physician: Dr. Ramirez    HISTORY OF PRESENT ILLNESS:  This is a 73 y/o obese male w/ PMH CHF, small pericardial effusion on TTE in July (per Dr. Ramirez), DVT/PE in  on Eliquis, HTN, ADAM (on CPAP at home), BPH s/p prostatectomy, kidney tumor s/p cryoablation, presenting w/ dyspnea, fatigue, low grade temp, and cough and found to have an enlargement of the previously seen pericardial effusion on out-pt echo done with Dr. Ramirez along with a new left pleural effusion, and new onset aflutter.  Patient reports an episode of CP 2 weeks ago, worse when on his side and sitting up, better when lying down.  On Oct 25 2017, he had WBC 17 and Chest CT showed a significant pericardial effusion 1.7x1.4cm.  F/u echo on 10/25/17 showed moderate pericardial effusion (no tamponade) and ECG revealed NSR with 1st degree AV block,  No indication of pericarditis at that time.  On F/U visit in the office on 17, he c/o dyspnea, fatigue, low grade temp, and occasional dry cough.  Pt found w/ enlargement of pericardial effusion on echo (more debris noted), audible friction rub, left pleural effusion, and new aflutter w/ RVR to rate of 100's.  Currently, he denies active chest pain, SOB, dizziness, fever/chills, N/V/D.     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 650 milliGRAM(s) Oral every 6 hours PRN Moderate Pain (4 - 6)      Allergies    No Known Allergies          SOCIAL HISTORY:  Smoker:  YES / NO        PACK YEARS:                         WHEN QUIT?  ETOH use:  YES / NO               FREQUENCY / QUANTITY:  Ilicit Drug use:  YES / NO  Occupation:  Assisted device use (Cane / Walker):  Live with:    FAMILY HISTORY:  No pertinent family history in first degree relatives      Review of Systems (current ROS)  CONSTITUTIONAL:  Fevers / chills, sweats, fatigue, weight loss, weight gain                                    NEGATIVE  NEURO:  parathesias, seizures, syncope, confusion                                                                               NEGATIVE  EYES:  Blurry vision, discharge, pain, loss of vision                                                                                  NEGATIVE  ENMT:  Difficulty hearing, vertigo, dysphagia, epistaxis, recent dental work                                     NEGATIVE  CV:  Chest pain, palpitations, SAMUEL, orthopnea                                                                                         NEGATIVE  RESPIRATORY:  Wheezing, SOB, cough / sputum, hemoptysis                                                              NEGATIVE  GI:  Nausea, vommiting, diarrhea, constipation, melena                                                                        NEGATIVE  : Hematuria, dysuria, urgency, incontinence                                                                                       NEGATIVE  MUSKULOSKELETAL:  arthritis, joint swelling, muscle weakness                                                           NEGATIVE  SKIN/BREAST:  rash, itching, alexandra loss, masses                                                                                            NEGATIVE  PSYCH:  depresion, anxiety, suicidal ideation                                                                                             NEGATIVE  HEME/LYMPH:  bruises easily, enlarged lymph nodes, tender lymph nodes                                        NEGATIVE  ENDOCRINE:  cold intolerance, heat intolerance, polydipsia                                                                   NEGATIVE  +Chest pain, +fever, +dyspnea, +fatigue, +cough  (previous symptoms)    PHYSICAL EXAM  Vital Signs Last 24 Hrs  T(C): 36.2 (2017 10:00), Max: 37.2 (2017 18:39)  T(F): 97.2 (2017 10:00), Max: 99 (2017 18:39)  HR: 87 (2017 09:27) (74 - 95)  BP: 115/68 (2017 08:37) (115/68 - 145/76)  BP(mean): 94 (2017 08:37) (85 - 107)  RR: 18 (2017 08:37) (16 - 18)  SpO2: 95% (2017 09:27) (92% - 97%)    Constitutional: NAD, breathing comfortably, able to converse normally. Obese  Neck: supple  Respiratory: CTA B/L, no wheezing or rales  Cardiac: +S1S2; irregular rhythm.  I could not appreciate a friction rub or murmur  Gastrointestinal: soft, non-distended.  +central obesity  Extremities: warm and well perfused.  No peripheral edema  Vascular: 2+ DP pulses B/L  Neurologic: AAOx3; No focal deficits    LABS:                        13.6   10.9  )-----------( 341      ( 2017 06:39 )             41.1         138  |  99  |  32<H>  ----------------------------<  120<H>  3.6   |  25  |  1.21    Ca    9.0      2017 06:39  Mg     2.3         TPro  6.6  /  Alb  3.1<L>  /  TBili  1.0  /  DBili  x   /  AST  10  /  ALT  12  /  AlkPhos  82  11-06    PT/INR - ( 2017 06:39 )   PT: 17.4 sec;   INR: 1.55          PTT - ( 2017 06:39 )  PTT:33.1 sec  Urinalysis Basic - ( 2017 07:19 )    Color: Yellow / Appearance: Clear / S.025 / pH: x  Gluc: x / Ketone: NEGATIVE  / Bili: Negative / Urobili: 0.2 E.U./dL   Blood: x / Protein: 30 mg/dL / Nitrite: NEGATIVE   Leuk Esterase: NEGATIVE / RBC: < 5 /HPF / WBC < 5 /HPF   Sq Epi: x / Non Sq Epi: 0-5 /HPF / Bacteria: Present /HPF      CARDIAC MARKERS ( 2017 17:36 )  x     / <0.01 ng/mL / 58 U/L / x     / 1.9 ng/mL          RADIOLOGY & ADDITIONAL STUDIES:  < from: Xray Chest 1 View AP/PA (17 @ 17:46) >  Impressions:    Left lower lung consolidation/infiltrate and/or effusion.    < end of copied text >

## 2017-11-07 NOTE — H&P ADULT - NSHPPHYSICALEXAM_GEN_ALL_CORE
VITAL SIGNS:  T(C): 36.2 (11-06-17 @ 22:37), Max: 37.2 (11-06-17 @ 18:39)  T(F): 97.2 (11-06-17 @ 22:37), Max: 99 (11-06-17 @ 18:39)  HR: 92 (11-07-17 @ 00:01) (86 - 95)  BP: 137/73 (11-07-17 @ 00:01) (124/76 - 145/76)  BP(mean): 107 (11-07-17 @ 00:01) (95 - 107)  RR: 17 (11-07-17 @ 00:01) (16 - 18)  SpO2: 93% (11-07-17 @ 00:01) (93% - 97%)  Wt(kg): --    PHYSICAL EXAM:    Constitutional: obese, NAD  Head: NC/AT  Eyes: PERRL, EOMI, anicteric sclera  ENT: no nasal discharge; uvula midline, no oropharyngeal erythema or exudates; MMM  Neck: supple; no JVD or thyromegaly  Respiratory: CTA B/L; no W/R/R, no retractions  Cardiac: +S1/S2; RRR; no M/R/G; PMI non-displaced  Gastrointestinal: soft, NT/ND; no rebound or guarding; +BSx4  Genitourinary: normal external genitalia  Back: spine midline, no bony tenderness or step-offs; no CVAT B/L  Extremities: WWP, no clubbing or cyanosis; no peripheral edema  Musculoskeletal: NROM x4; no joint swelling, tenderness or erythema  Vascular: 2+ radial, femoral, DP/PT pulses B/L  Dermatologic: skin warm, dry and intact; no rashes, wounds, or scars  Lymphatic: no submandibular or cervical LAD  Neurologic: AAOx3; CNII-XII grossly intact; no focal deficits  Psychiatric: affect and characteristics of appearance, verbalizations, behaviors are appropriate VITAL SIGNS:  T(C): 36.2 (11-06-17 @ 22:37), Max: 37.2 (11-06-17 @ 18:39)  T(F): 97.2 (11-06-17 @ 22:37), Max: 99 (11-06-17 @ 18:39)  HR: 92 (11-07-17 @ 00:01) (86 - 95)  BP: 137/73 (11-07-17 @ 00:01) (124/76 - 145/76)  BP(mean): 107 (11-07-17 @ 00:01) (95 - 107)  RR: 17 (11-07-17 @ 00:01) (16 - 18)  SpO2: 93% (11-07-17 @ 00:01) (93% - 97%)  Wt(kg): --    PHYSICAL EXAM:    Constitutional: obese, NAD  Head: NC/AT  Eyes: PERRL, EOMI, anicteric sclera  ENT: no nasal discharge; uvula midline, no oropharyngeal erythema or exudates; MMM  Neck: supple; no JVD or thyromegaly  Respiratory: CTA B/L; no W/R/R, no retractions  Cardiac: +S1/S2; irregular rhythm; no M/G; friction rub audible at LLSB  Gastrointestinal: obese, soft, NT/ND; no rebound or guarding; +BSx4  Back: spine midline, no bony tenderness or step-offs; no CVAT B/L  Extremities: WWP, no clubbing or cyanosis; no peripheral edema  Musculoskeletal: NROM x4; no joint swelling, tenderness or erythema  Vascular: 2+ DP pulses B/L  Dermatologic: skin warm, dry and intact; no rashes, wounds, or scars  Lymphatic: no submandibular or cervical LAD  Neurologic: AAOx3; CNII-XII grossly intact; no focal deficits

## 2017-11-07 NOTE — CHART NOTE - NSCHARTNOTEFT_GEN_A_CORE
Upon Nutritional Assessment by the Registered Dietitian your patient was determined to meet criteria / has evidence of the following diagnosis/diagnoses:          [ ]  Mild Protein Calorie Malnutrition        [ ]  Moderate Protein Calorie Malnutrition        [ ] Severe Protein Calorie Malnutrition        [ ] Unspecified Protein Calorie Malnutrition        [ ] Underweight / BMI <19        [X ] Morbid Obesity / BMI > 40      Findings as based on:  •  Comprehensive nutrition assessment and consultation  •  Calorie counts (nutrient intake analysis)  •  Food acceptance and intake status from observations by staff  •  Follow up  •  Patient education  •  Intervention secondary to interdisciplinary rounds  •   concerns      Treatment:    The following diet has been recommended:  -DASH diet as feasible  -Referral to outpatient nutrition      PROVIDER Section:     By signing this assessment you are acknowledging and agree with the diagnosis/diagnoses assigned by the Registered Dietitian    Comments:

## 2017-11-08 ENCOUNTER — RESULT REVIEW (OUTPATIENT)
Age: 72
End: 2017-11-08

## 2017-11-08 DIAGNOSIS — Z29.9 ENCOUNTER FOR PROPHYLACTIC MEASURES, UNSPECIFIED: ICD-10-CM

## 2017-11-08 DIAGNOSIS — M79.674 PAIN IN RIGHT TOE(S): ICD-10-CM

## 2017-11-08 LAB
ALBUMIN FLD-MCNC: 2.6 G/DL — SIGNIFICANT CHANGE UP
ALBUMIN FLD-MCNC: 2.7 G/DL — SIGNIFICANT CHANGE UP
ALBUMIN SERPL ELPH-MCNC: 3.3 G/DL — SIGNIFICANT CHANGE UP (ref 3.3–5)
ALP SERPL-CCNC: 73 U/L — SIGNIFICANT CHANGE UP (ref 40–120)
ALT FLD-CCNC: 12 U/L — SIGNIFICANT CHANGE UP (ref 10–45)
ANA TITR SER: NEGATIVE — SIGNIFICANT CHANGE UP
ANION GAP SERPL CALC-SCNC: 15 MMOL/L — SIGNIFICANT CHANGE UP (ref 5–17)
APTT BLD: 43.3 SEC — HIGH (ref 27.5–37.4)
APTT BLD: 48.8 SEC — HIGH (ref 27.5–37.4)
AST SERPL-CCNC: 12 U/L — SIGNIFICANT CHANGE UP (ref 10–40)
B PERT IGG+IGM PNL SER: SIGNIFICANT CHANGE UP
B PERT IGG+IGM PNL SER: SIGNIFICANT CHANGE UP
BASOPHILS NFR BLD AUTO: 0.2 % — SIGNIFICANT CHANGE UP (ref 0–2)
BILIRUB DIRECT SERPL-MCNC: <0.2 MG/DL — SIGNIFICANT CHANGE UP (ref 0–0.2)
BILIRUB INDIRECT FLD-MCNC: >0.3 MG/DL — SIGNIFICANT CHANGE UP (ref 0.2–1)
BILIRUB SERPL-MCNC: 0.5 MG/DL — SIGNIFICANT CHANGE UP (ref 0.2–1.2)
BLD GP AB SCN SERPL QL: NEGATIVE — SIGNIFICANT CHANGE UP
BUN SERPL-MCNC: 31 MG/DL — HIGH (ref 7–23)
CALCIUM SERPL-MCNC: 8.9 MG/DL — SIGNIFICANT CHANGE UP (ref 8.4–10.5)
CHLORIDE FLD-MCNC: 103 MMOL/L — SIGNIFICANT CHANGE UP
CHLORIDE FLD-MCNC: 104 MMOL/L — SIGNIFICANT CHANGE UP
CHLORIDE SERPL-SCNC: 98 MMOL/L — SIGNIFICANT CHANGE UP (ref 96–108)
CHOLEST FLD-MCNC: 92 MG/DL — SIGNIFICANT CHANGE UP
CHOLEST FLD-MCNC: 93 MG/DL — SIGNIFICANT CHANGE UP
CO2 SERPL-SCNC: 25 MMOL/L — SIGNIFICANT CHANGE UP (ref 22–31)
COLOR FLD: SIGNIFICANT CHANGE UP
COLOR FLD: SIGNIFICANT CHANGE UP
COMMENT - FLUIDS: SIGNIFICANT CHANGE UP
COMMENT - FLUIDS: SIGNIFICANT CHANGE UP
CREAT SERPL-MCNC: 1.14 MG/DL — SIGNIFICANT CHANGE UP (ref 0.5–1.3)
DSDNA AB SER-ACNC: 78 IU/ML — HIGH
EOSINOPHIL # FLD: 1 % — SIGNIFICANT CHANGE UP
EOSINOPHIL # FLD: 5 % — SIGNIFICANT CHANGE UP
EOSINOPHIL NFR BLD AUTO: 2 % — SIGNIFICANT CHANGE UP (ref 0–6)
FLUID INTAKE SUBSTANCE CLASS: SIGNIFICANT CHANGE UP
FLUID INTAKE SUBSTANCE CLASS: SIGNIFICANT CHANGE UP
FLUID SEGMENTED GRANULOCYTES: 12 % — SIGNIFICANT CHANGE UP
FLUID SEGMENTED GRANULOCYTES: 35 % — SIGNIFICANT CHANGE UP
GLUCOSE SERPL-MCNC: 116 MG/DL — HIGH (ref 70–99)
GRAM STN FLD: SIGNIFICANT CHANGE UP
GRAM STN FLD: SIGNIFICANT CHANGE UP
HCT VFR BLD CALC: 40.5 % — SIGNIFICANT CHANGE UP (ref 39–50)
HGB BLD-MCNC: 13.5 G/DL — SIGNIFICANT CHANGE UP (ref 13–17)
HIV 1+2 AB+HIV1 P24 AG SERPL QL IA: SIGNIFICANT CHANGE UP
INR BLD: 1.51 — HIGH (ref 0.88–1.16)
LDH SERPL L TO P-CCNC: 231 U/L — SIGNIFICANT CHANGE UP (ref 50–242)
LYMPHOCYTES # BLD AUTO: 15.6 % — SIGNIFICANT CHANGE UP (ref 13–44)
LYMPHOCYTES # FLD: 18 % — SIGNIFICANT CHANGE UP
LYMPHOCYTES # FLD: 19 % — SIGNIFICANT CHANGE UP
MAGNESIUM SERPL-MCNC: 2.1 MG/DL — SIGNIFICANT CHANGE UP (ref 1.6–2.6)
MCHC RBC-ENTMCNC: 30 PG — SIGNIFICANT CHANGE UP (ref 27–34)
MCHC RBC-ENTMCNC: 33.3 G/DL — SIGNIFICANT CHANGE UP (ref 32–36)
MCV RBC AUTO: 90 FL — SIGNIFICANT CHANGE UP (ref 80–100)
MESOTHL CELL # FLD: 19 % — SIGNIFICANT CHANGE UP
MESOTHL CELL # FLD: 31 % — SIGNIFICANT CHANGE UP
MONOCYTES NFR BLD AUTO: 7.8 % — SIGNIFICANT CHANGE UP (ref 2–14)
MONOS+MACROS # FLD: 22 % — SIGNIFICANT CHANGE UP
MONOS+MACROS # FLD: 38 % — SIGNIFICANT CHANGE UP
NEUTROPHILS NFR BLD AUTO: 74.4 % — SIGNIFICANT CHANGE UP (ref 43–77)
PLATELET # BLD AUTO: 369 K/UL — SIGNIFICANT CHANGE UP (ref 150–400)
POTASSIUM FLD-SCNC: 4.1 MMOL/L — SIGNIFICANT CHANGE UP
POTASSIUM FLD-SCNC: 4.2 MMOL/L — SIGNIFICANT CHANGE UP
POTASSIUM SERPL-MCNC: 3.9 MMOL/L — SIGNIFICANT CHANGE UP (ref 3.5–5.3)
POTASSIUM SERPL-SCNC: 3.9 MMOL/L — SIGNIFICANT CHANGE UP (ref 3.5–5.3)
PROT FLD-MCNC: 4.3 G/DL — SIGNIFICANT CHANGE UP
PROT FLD-MCNC: 4.3 G/DL — SIGNIFICANT CHANGE UP
PROT SERPL-MCNC: 6.5 G/DL — SIGNIFICANT CHANGE UP (ref 6–8.3)
PROTHROM AB SERPL-ACNC: 16.9 SEC — HIGH (ref 9.8–12.7)
PSA FLD-MCNC: 9.21 NG/ML — HIGH (ref 0–4)
RAPID RVP RESULT: SIGNIFICANT CHANGE UP
RBC # BLD: 4.5 M/UL — SIGNIFICANT CHANGE UP (ref 4.2–5.8)
RBC # FLD: 13.9 % — SIGNIFICANT CHANGE UP (ref 10.3–16.9)
RCV VOL RI: HIGH /UL (ref 0–5)
RCV VOL RI: HIGH /UL (ref 0–5)
RH IG SCN BLD-IMP: POSITIVE — SIGNIFICANT CHANGE UP
SODIUM FLD-SCNC: 140 MMOL/L — SIGNIFICANT CHANGE UP
SODIUM FLD-SCNC: 142 MMOL/L — SIGNIFICANT CHANGE UP
SODIUM SERPL-SCNC: 138 MMOL/L — SIGNIFICANT CHANGE UP (ref 135–145)
SPECIMEN SOURCE FLD: SIGNIFICANT CHANGE UP
SPECIMEN SOURCE: SIGNIFICANT CHANGE UP
SPECIMEN SOURCE: SIGNIFICANT CHANGE UP
TOTAL NUCLEATED CELL COUNT, BODY FLUID: 1293 /UL — HIGH (ref 0–5)
TOTAL NUCLEATED CELL COUNT, BODY FLUID: 1550 /UL — HIGH (ref 0–5)
TUBE TYPE: SIGNIFICANT CHANGE UP
TUBE TYPE: SIGNIFICANT CHANGE UP
URATE SERPL-MCNC: 11 — SIGNIFICANT CHANGE UP
URATE SERPL-MCNC: 12.6 — SIGNIFICANT CHANGE UP
WBC # BLD: 11.2 K/UL — HIGH (ref 3.8–10.5)
WBC # FLD AUTO: 11.2 K/UL — HIGH (ref 3.8–10.5)

## 2017-11-08 PROCEDURE — 32557 INSERT CATH PLEURA W/ IMAGE: CPT | Mod: LT

## 2017-11-08 PROCEDURE — 99223 1ST HOSP IP/OBS HIGH 75: CPT | Mod: GC

## 2017-11-08 PROCEDURE — 93010 ELECTROCARDIOGRAM REPORT: CPT

## 2017-11-08 PROCEDURE — 33015: CPT

## 2017-11-08 PROCEDURE — 99291 CRITICAL CARE FIRST HOUR: CPT

## 2017-11-08 PROCEDURE — 77012 CT SCAN FOR NEEDLE BIOPSY: CPT | Mod: 26,XU

## 2017-11-08 PROCEDURE — 71010: CPT | Mod: 26

## 2017-11-08 PROCEDURE — 99152 MOD SED SAME PHYS/QHP 5/>YRS: CPT

## 2017-11-08 RX ORDER — HEPARIN SODIUM 5000 [USP'U]/ML
2750 INJECTION INTRAVENOUS; SUBCUTANEOUS
Qty: 25000 | Refills: 0 | Status: DISCONTINUED | OUTPATIENT
Start: 2017-11-08 | End: 2017-11-09

## 2017-11-08 RX ORDER — HEPARIN SODIUM 5000 [USP'U]/ML
2500 INJECTION INTRAVENOUS; SUBCUTANEOUS
Qty: 25000 | Refills: 0 | Status: DISCONTINUED | OUTPATIENT
Start: 2017-11-08 | End: 2017-11-08

## 2017-11-08 RX ORDER — COLCHICINE 0.6 MG
0.6 TABLET ORAL EVERY 12 HOURS
Qty: 0 | Refills: 0 | Status: DISCONTINUED | OUTPATIENT
Start: 2017-11-08 | End: 2017-11-11

## 2017-11-08 RX ADMIN — Medication 0.6 MILLIGRAM(S): at 20:15

## 2017-11-08 RX ADMIN — HEPARIN SODIUM 27.5 UNIT(S)/HR: 5000 INJECTION INTRAVENOUS; SUBCUTANEOUS at 21:00

## 2017-11-08 RX ADMIN — Medication 650 MILLIGRAM(S): at 21:33

## 2017-11-08 RX ADMIN — CARVEDILOL PHOSPHATE 12.5 MILLIGRAM(S): 80 CAPSULE, EXTENDED RELEASE ORAL at 22:04

## 2017-11-08 RX ADMIN — HEPARIN SODIUM 25 UNIT(S)/HR: 5000 INJECTION INTRAVENOUS; SUBCUTANEOUS at 00:17

## 2017-11-08 RX ADMIN — Medication 650 MILLIGRAM(S): at 20:39

## 2017-11-08 RX ADMIN — CARVEDILOL PHOSPHATE 12.5 MILLIGRAM(S): 80 CAPSULE, EXTENDED RELEASE ORAL at 10:10

## 2017-11-08 RX ADMIN — HEPARIN SODIUM 25 UNIT(S)/HR: 5000 INJECTION INTRAVENOUS; SUBCUTANEOUS at 15:00

## 2017-11-08 NOTE — PROGRESS NOTE ADULT - PROBLEM SELECTOR PLAN 9
N: NPO for MN for possible procedures; DASH/TLC diet  E: replete lytes prn K<4, Mg<2  P: DVT PPX: on Heparin drip  C: FULL CODE

## 2017-11-08 NOTE — PROGRESS NOTE ADULT - PROBLEM SELECTOR PLAN 2
New L pleural effusion  -c/w po Lasix 40mg; no need for IV lasix at this time does not appear to be acutely fluid overloaded.  -patient satting well on room air  -CXR noted L pleural effusion New L pleural effusion undergoing chest tube placement by IR.  -Karl d/c;ed per Dr Ramirez in setting of possible gout (uric acid elevated 12.6), pt does not appear to be acutely fluid overloaded.  -patient satting well on room air  -CXR noted large L pleural effusion

## 2017-11-08 NOTE — PROGRESS NOTE ADULT - PROBLEM SELECTOR PLAN 6
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

## 2017-11-08 NOTE — PROGRESS NOTE ADULT - PROBLEM SELECTOR PLAN 10
DVT PPX: on Heparin drip    Code Status: FULL CODE    Dispo: CCU for HD monitoring while with pericardial drain

## 2017-11-08 NOTE — PROGRESS NOTE ADULT - PROBLEM SELECTOR PLAN 3
New onset, likely 2/2 pericardial dz; variable 3:1 and 4:1 typical aflutter on ECG  -c/w coreg 12.5 bid  -Eliquis on hold  -Started Heparin drip, monitor PTT/CBC closely New onset, likely 2/2 pericardial dz; variable 3:1 and 4:1 typical aflutter on ECG  -c/w coreg 12.5 bid  -Eliquis on hold  -Started Heparin drip, currently on hold for procedure, resume when ok with IR, monitor PTT/CBC closely

## 2017-11-08 NOTE — PROGRESS NOTE ADULT - PROBLEM SELECTOR PLAN 5
-holding Eliquis 2/2 possible pericardiocentesis tomorrow, started Heparin drip  -SCD's unclear baseline Cr, however pt w/o hx of CKD as per records  -trend BUN/Cr  -f/u renal studies

## 2017-11-08 NOTE — PROGRESS NOTE ADULT - SUBJECTIVE AND OBJECTIVE BOX
stable-no apparent fever  pericardial effusion size on echo yesterday-appears  smaller as compared to recent outpt. study-but  maybe more organized-will review CT  plan as per Dr. Sepulveda is tapping of large left pleural effusion by IR  today-??significance of +DS-DNA not sure-we need rhematology input  Re: ID-he does take care of a dozen chickens -?? diseases associated with  that-need ID input  definite gout in big toe-but hold off on treatment until we sort out  chest-pericardial/pleural pathology

## 2017-11-08 NOTE — PROGRESS NOTE ADULT - PROBLEM SELECTOR PLAN 5
- elevated sCr on admission, unclear baseline sCr  - FeUrea 28% suggestive of pre-renal etiology  - sCr downtrending  - monitor BUN/Cr  - avoid nephrotoxic agents  - strict I&Os

## 2017-11-08 NOTE — PROGRESS NOTE ADULT - PROBLEM SELECTOR PLAN 4
- patient c/o R big toe pain, found to have erythematous and swollen 1st digit. Reports h/o trauma 3days prior when he slipped in a ditch.  - Likely gout given elevated uric acid and negative imaging  - Xray negative for fracture-dislocation  - Uric acid elevated 12.6  - Tylenol PRN for pain (hold NSAIDs in setting of ARF)  - hold colchicine for now  - rheumatology consulted

## 2017-11-08 NOTE — PROGRESS NOTE ADULT - PROBLEM SELECTOR PLAN 1
Patient w/ enlarging pericardial effusion over the course of 4 mos w/ assoc intermittent dyspnea, fatigue, fevers and cough; no current sx of CP, ECG w/ variable 3:1, 4:1 typical Aflutter, no ST Elevations or signs of pericarditis; unclear etiology of pericardial dz, possibly viral, autoimmune, hematologic (2/2 recent change of pradaxa to eliquis), or malignant; Trop neg. elevated BUN however pericardial sx typically assoc at higher levels  -f/u blood cx, NGTD  -patient w/o CP, if pain develops, avoid NSAIDs 2/2 renal insufficiency  -Holding Eliquis 2/2 tap today (last dose 11/6/17 am). Started Heparin drip yesterday, held prior to procedures today. Monitor PTT closely in setting of pericardial effusion, unlikely hemoperitoneum as pt maintaining Hgb >13, SBP >110s, HR 80-90s  -Echo performed w/ EF 55-60%, mild AI, trace MR/TR, moderate circumferential pericardial effusion, no echocardiographic evidence of tamponade. Fibrinous material consistent with inflammatory process vs coagulant is seen in the pericardial space.  -Elevate ESR 55, CRP 9.5, rheumatoid factor, PSA, Double strand DNA Ab  -F/u MARCO. 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

## 2017-11-08 NOTE — CONSULT NOTE ADULT - SUBJECTIVE AND OBJECTIVE BOX
HPI:  Patient is a 73 YO obese male w/ PMH ?CHF (as per Dr. Ramirez's note, possible bout of mild CHF in July w/ dyspnea and ankle swelling, resolved w/ lasix; TTE in July w/ trace/small pericardial effusion), unprovoked DVT/PE (; switched from pradaxa to eliquis 1mo ago), HTN, ADAM (CPAP) presenting w/ dyspnea, fatigue, low grade temp, and cough and found w/ enlargement of pericardial effusion, new L pleural effusion, and new onset aflutter. Patient w/ episode of CP 2wks ago, centrally located, dull, lasting 1 day, subsiding w/o intervention. W/u on Oct 25 w/ leukocytosis to 17; chest CT w/ significant pericardial effusion 1.7x1.4cm. F/u echo on 10/25 w/ moderate pericardial effusion (no tamponade) and ECG w/ NSR and 1st degree AV block; ECG/exam w/o indication of pericarditis. No tx provided. On f/u visit , pt now w/ dyspnea, fatigue, low grade temp, and occasional dry cough. Pt found w/ enlargement of pericardial effusion on echo (more debris noted), audible friction rub, L pleural effusion, and new aflutter w/ tachy to the 100's. Patient denies CP, ab pain, n/v, d/c, LE swelling, sick contacts, hx of trauma or known autoimmune dz. In the ED, T: 98.4, P: 95, BP: 124/76, RR: 18, O2: 94% RA. Patient admitted for further w/u. (2017 04:55)      PAST MEDICAL & SURGICAL HISTORY:  Essential hypertension  Other chronic pulmonary embolism without acute cor pulmonale  Chronic deep vein thrombosis (DVT) of non-extremity vein  ADAM (obstructive sleep apnea)  Obesity, unspecified classification, unspecified obesity type, unspecified whether serious comorbidity present  Pericarditis  HTN (hypertension)  History of hip replacement, unspecified laterality          MEDICATIONS  (STANDING):  carvedilol 12.5 milliGRAM(s) Oral every 12 hours  heparin  Infusion 2500 Unit(s)/Hr (25 mL/Hr) IV Continuous <Continuous>    MEDICATIONS  (PRN):  acetaminophen   Tablet. 650 milliGRAM(s) Oral every 6 hours PRN Moderate Pain (4 - 6)      Allergies    No Known Allergies    Intolerances        SOCIAL HISTORY:    FAMILY HISTORY:  No pertinent family history in first degree relatives      Vital Signs Last 24 Hrs  T(C): 37.1 (2017 05:00), Max: 37.1 (2017 05:00)  T(F): 98.7 (2017 05:00), Max: 98.7 (2017 05:00)  HR: 92 (2017 15:00) (71 - 94)  BP: 110/58 (2017 15:00) (110/52 - 140/62)  BP(mean): 82 (2017 15:00) (78 - 99)  RR: 28 (2017 15:00) (14 - 28)  SpO2: 98% (2017 15:) (92% - 98%)    PE:  WDWN in no distress  HEENT:  NC, PERRL, sclerae anicteric, conjunctivae clear, EOMI.  Sinuses nontender, no nasal exudate.  No buccal or pharyngeal lesions, erythema or exudate  Neck:  Supple, no adenopathy  Lungs:  Clear to auscultation  Cor:  RRR, S1, S2, no murmur appreciated  Abd:  Symmetric, normoactive BS.  Soft, nontender, no masses, guarding or rebound.  Liver and spleen not enlarged  Extrem:  No cyanosis or edema  Skin:  No rashes.    LABS:                        13.5   11.2  )-----------( 369      ( 2017 06:54 )             40.5     11-    138  |  98  |  31<H>  ----------------------------<  116<H>  3.9   |  25  |  1.14    Ca    8.9      2017 06:54  Mg     2.1     -08    TPro  6.5  /  Alb  3.3  /  TBili  0.5  /  DBili  <0.2  /  AST  12  /  ALT  12  /  AlkPhos  73  11-08    Urinalysis Basic - ( 2017 07:19 )    Color: Yellow / Appearance: Clear / S.025 / pH: x  Gluc: x / Ketone: NEGATIVE  / Bili: Negative / Urobili: 0.2 E.U./dL   Blood: x / Protein: 30 mg/dL / Nitrite: NEGATIVE   Leuk Esterase: NEGATIVE / RBC: < 5 /HPF / WBC < 5 /HPF   Sq Epi: x / Non Sq Epi: 0-5 /HPF / Bacteria: Present /HPF    MICROBIOLOGY:  Pleural fluid  - gram stain mod WBCs, no organisms seen  Pericardial fluid  - gram stain mod WBCs, no organisms seen    RADIOLOGY & ADDITIONAL STUDIES: HPI:  Patient is a 71 y/o obese male w/ PMH ?CHF (as per Dr. Ramirez's note, possible bout of mild CHF in July w/ dyspnea and ankle swelling, resolved w/ lasix; TTE in July w/ trace/small pericardial effusion), unprovoked DVT/PE (; switched from pradaxa to eliquis 1mo ago), HTN, ADAM (CPAP), RCC s/p cryoablation 3-4 years ago, presenting w/ dull, upper chest/neck pain 2 weeks ago. Patient saw Dr. Ramirez and found a small pericardial effusion via TTE. Pt went to Dr. Gonzalez a few days later and noted also to have leukocytosis to 17. Pt had CT chest which showed a pericardial effusion 1.7x1.4cm. He has a repeat FAREED 1 week ago which showed worsening pericardial effusion without echo findings of tamponade. EKG at that time was NSR with 1st degree AV block. On , patient presented to Dr. Ramirez's office for worsening chest/neck pain, dyspnea, and fatigue. TTE at that time showed enlargement of pericardial effusion and patient was sent to the ED. Pt denies fever or chills, nausea/vomiting, abdominal pain, diarrhea, dysuria. 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 He last traveled to [place] in May. He went to the [facility] and [place] years ago, never to [place]. Pt was afebrile in the ED, found to have a large left PLEURAL effusion in addition to the large PERICARDIAL effusion and was in a.flutter. Patient admitted to cardiac tele. Repeat TTE showing EF 55-60%, moderate circumferential pericardial effusion, no echocardiographic evidence of tamponade, fibrinous material c/w inflammatory process vs coagulant. CT Chest/abd/pelvis performed noted large exudative pericardial effusion, moderate left pleural effusion with associated near complete collapse of the left lower lobe; no recurrence of L renal mass however new prominent left retroperitoneal lymph node. Rheumatologic labs ordered showing elevated RF, dsDNA, PSA, CRP, ESR. ID consulted for possible infectious etiology of pericardial/pleural effusions. Of note, patient also complained of R great toe pain on day of admission, with elevated uric acid levels likely representing gout. On , tt underwent CT guided pericardial drain and L sided chest tube placed by IR on 17 and transferred to CCU for further monitoring. ID consulted for workup of infectious etiology.      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 650 milliGRAM(s) Oral every 6 hours PRN Moderate Pain (4 - 6)      Allergies    No Known Allergies    Intolerances        SOCIAL HISTORY:    FAMILY HISTORY:  No pertinent family history in first degree relatives      Vital Signs Last 24 Hrs  T(C): 37.1 (2017 05:00), Max: 37.1 (2017 05:00)  T(F): 98.7 (2017 05:00), Max: 98.7 (2017 05:00)  HR: 92 (2017 15:00) (71 - 94)  BP: 110/58 (2017 15:00) (110/52 - 140/62)  BP(mean): 82 (2017 15:00) (78 - 99)  RR: 28 (2017 15:00) (14 - 28)  SpO2: 98% (2017 15:00) (92% - 98%)    Physical Exam  General: well-appearing, well-developed, obese, NAD, speaking in full sentences  HEENT: PERRL, EOMI, conjunctival clear, MMM, no oral lesions  Neck: no lymphadenopathy, supple, no JVD  Lungs: decreased breath sounds at the Left base with egophony  CVS: distant heart sounds, no rub  Abd: soft, NTND, BS normal  Extremities: no edema or cyanosis; right great toe with mild tenderness to palpation, mild edema and erythema  Vascular: DP 2+ b/l  Skin: warm, dry    LABS:                        13.5   11.2  )-----------( 369      ( 2017 06:54 )             40.5         138  |  98  |  31<H>  ----------------------------<  116<H>  3.9   |  25  |  1.14    Ca    8.9      2017 06:54  Mg     2.1         TPro  6.5  /  Alb  3.3  /  TBili  0.5  /  DBili  <0.2  /  AST  12  /  ALT  12  /  AlkPhos  73      Urinalysis Basic - ( 2017 07:19 )    Color: Yellow / Appearance: Clear / S.025 / pH: x  Gluc: x / Ketone: NEGATIVE  / Bili: Negative / Urobili: 0.2 E.U./dL   Blood: x / Protein: 30 mg/dL / Nitrite: NEGATIVE   Leuk Esterase: NEGATIVE / RBC: < 5 /HPF / WBC < 5 /HPF   Sq Epi: x / Non Sq Epi: 0-5 /HPF / Bacteria: Present /HPF    MICROBIOLOGY:  Pleural fluid  - gram stain mod WBCs, no organisms seen  Pericardial fluid  - gram stain mod WBCs, no organisms seen  Blood cultures  NGTD    RADIOLOGY & ADDITIONAL STUDIES:  < from: Echocardiogram (17 @ 13:21) >  Patient was in atrial flutter during the procedure. The left ventricular   wall   motion is normal.The overall left ventricular ejection fraction is   probably   normal.The left ventricular ejection fraction is estimated to be   55-60%The   right ventricle is normal in size and function.The left atrial size is   normal.Right atrial size is normal.Structurally normal aortic   valve.There is   mild aortic regurgitation.Structurally normal mitral valve.There is trace   mitral regurgitation.Structurally normal tricuspid valve.There is trace   tricuspid regurgitation.The pulmonary artery systolic pressure is   estimated to   be 25 mmHg.Structurally normal pulmonic valve.No pulmonic regurgitation   noted.Moderate circumferential pericardial effusion. No echocardiographic   evidence of tamponade. Fibrinous material consistent with inflammatory   process   vs coagulant is seen in the pericardial space.  Procedure Details  A complete two-dimensional transthoracic echocardiogram was performed (2D,  M-mode, spectral and color flow doppler).  Study Quality: Good.  Left Ventricle  The left ventricle is mildly dilated.  The left ventricular wall motion is normal.  The overall left ventricular ejection fraction is probably normal.  The left ventricular ejection fraction is estimated to be 55-60%  Left Atrium  The left atrial size is normal.  Right Atrium  Right atrial size is normal.  Right Ventricle  The right ventricle is normal in size and function.  Aortic Valve  Structurally normal aortic valve.  There is mild aortic regurgitation.  Mitral Valve  Structurally normal mitral valve.  There is trace mitral regurgitation.  Tricuspid Valve  Structurally normal tricuspid valve.  There is trace tricuspid regurgitation.  The pulmonary artery systolic pressure is estimated to be 25 mmHg.  Pulmonic Valve  Structurally normal pulmonic valve.  No pulmonic regurgitation noted.  Arteries and Venous System  No aortic root dilatation.  Normal size aortic arch with no hemodynamic evidence for coarctation  The inferior vena cava is normal in size (<2.1 cm) with normal inspiratory  collapse (>50%) consistent with normal right atrial pressure.  Pericardium / Pleura  Moderate circumferential pericardial effusion. No echocardiographic   evidence  of tamponade. Fibrinous material consistent with inflammatory process vs  coagulant is seen in the pericardial space.    < end of copied text >      < from: CT Chest w/ IV Cont (17 @ 18:09) >  FINDINGS: There is a large pericardial effusion, with enhancement of the   surrounding parietal visceral pericardium, suggesting an exudative   effusion. The heart is enlarged. There is severe coronary artery   calcification. The pulmonary artery measures 3.5 cm, suggestive of   pulmonary artery hypertension. The aortic root is ectatic measuring 4.2   cm, unchanged. The ascending aorta is ectatic measuring 4.0 cm,   unchanged. There is a mild amount of calcified plaque along the thoracic   aorta. No mediastinal, hilar or axillary lymphadenopathy is seen. There   is a 8 mm calcified nodule within the left lobe of the thyroid gland.    There is a moderate size left pleural effusion with associated near   complete collapse of the left lower lobe. Mild right basilar atelectatic   change. There is a 2 mm solid nodule in the right upper lobe image 87   series 11. There is an additional 2 mm solid nodule in the right lower   lobe seen on image 185 series 11. These are nonspecific.    Images of the upper abdomen demonstrate a 2.7 cm cyst within the left   lobe of liver. The liver is enlarged measuring 20.4 cm in craniocaudal   dimension. Trace calcifications are noted along the gallbladderwall. No   radiopaque stones are seen in the gallbladder.  The pancreas is normal in   appearance.  No splenic abnormalities are seen.    The adrenal glands are unremarkable.     There is a 6 mm stone at the lower pole the right kidney. There is no   associated hydronephrosis. There are multiple cysts in the right kidney,   the largest measures 5.0 cm in the lower pole. There are a few cysts in   the left kidney, largest measures 2.2 cm in the midportion of the kidney.   There is a 1.1 x 3.1 x 3.2 cm ill-defined focus of fat stranding near the   lower pole of the left kidney which is the site of prior cryoablation.     No aortic aneurysm is seen. There is a mild amount of calcified plaque   along the abdominal aorta. A few retroperitoneal lymph nodes are noted   measuring up to 1.1 cm in short axis; and is new from the comparison MRI   exam.    Evaluation of the bowel demonstrates marked diastases of the rectus   abdominis muscles. There is colonic diverticulosis. Streak artifact   limitsevaluation of the pelvis. Within that limitation, Images of the   pelvis demonstrate the prostate is markedly enlarged measuring   approximately 6.5 x 7.1 x 6.9 cm. No filling defects are seen in the   urinary bladder. Small bilateral fat-containing inguinal hernias.    Evaluation of the osseous structures demonstrates the patient is status   post right total hip arthroplasty. Mild degenerative changes in the left   hip. There is a 8 mm sclerotic lesion in the T10 vertebral body,   unchanged since 2015. Moderate degenerative changes of the spine.   Osteopenia.      IMPRESSION:  1. Large exudative pericardial effusion.    2. Moderate left pleural effusion with associated near complete collapse   of the left lower lobe.    3. History of left renal malignancy status post cryoablation. No evidence   of recurrent or residual disease.     4. A prominent left retroperitoneal lymph node is noted, which is new   from the comparison MRI exam. Recommend continued surveillance with CT or   MR imaging.    5. Prostatomegaly.    6. Marked diastases of the rectus abdominis.    < end of copied text > HPI:  Patient is a 73 y/o obese male w/ PMH ?CHF (as per Dr. Ramirez's note, possible bout of mild CHF in July w/ dyspnea and ankle swelling, resolved w/ lasix; TTE in July w/ trace/small pericardial effusion), unprovoked DVT/PE (; switched from pradaxa to eliquis 1mo ago), HTN, ADAM (CPAP), RCC s/p cryoablation 3-4 years ago, presenting w/ dull, upper chest/neck pain 2 weeks ago. Patient saw Dr. Ramirez and found a small pericardial effusion via TTE. Pt went to Dr. Gonzalez a few days later and noted also to have leukocytosis to 17. Pt had CT chest which showed a pericardial effusion 1.7x1.4cm. He has a repeat FAREED 1 week ago which showed worsening pericardial effusion without echo findings of tamponade. EKG at that time was NSR with 1st degree AV block. On , patient presented to Dr. Ramirez's office for worsening chest/neck pain, dyspnea, and fatigue. TTE at that time showed enlargement of pericardial effusion and patient was sent to the ED. Pt denies fever or chills, nausea/vomiting, abdominal pain, diarrhea, dysuria. 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 He last traveled to [place] in May. He went to the [facility] and [place] years ago, never to [place]. Pt was afebrile in the ED, found to have a large left PLEURAL effusion in addition to the large PERICARDIAL effusion and was in a.flutter. Patient admitted to cardiac tele. Repeat TTE showing EF 55-60%, moderate circumferential pericardial effusion, no echocardiographic evidence of tamponade, fibrinous material c/w inflammatory process vs coagulant. CT Chest/abd/pelvis performed noted large exudative pericardial effusion, moderate left pleural effusion with associated near complete collapse of the left lower lobe; no recurrence of L renal mass however new prominent left retroperitoneal lymph node. Rheumatologic labs ordered showing elevated RF, dsDNA, PSA, CRP, ESR. ID consulted for possible infectious etiology of pericardial/pleural effusions. Of note, patient also complained of R great toe pain on day of admission, with elevated uric acid levels likely representing gout. On , tt underwent CT guided pericardial drain and L sided chest tube placed by IR on 17 and transferred to CCU for further monitoring. ID consulted for workup of infectious etiology.      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 650 milliGRAM(s) Oral every 6 hours PRN Moderate Pain (4 - 6)      Allergies    No Known Allergies    Intolerances        SOCIAL HISTORY:    FAMILY HISTORY:  No pertinent family history in first degree relatives      Vital Signs Last 24 Hrs  T(C): 37.1 (2017 05:00), Max: 37.1 (2017 05:00)  T(F): 98.7 (2017 05:00), Max: 98.7 (2017 05:00)  HR: 92 (2017 15:00) (71 - 94)  BP: 110/58 (2017 15:00) (110/52 - 140/62)  BP(mean): 82 (2017 15:00) (78 - 99)  RR: 28 (2017 15:00) (14 - 28)  SpO2: 98% (2017 15:00) (92% - 98%)    Physical Exam  General: well-appearing, well-developed, obese, NAD, speaking in full sentences  HEENT: PERRL, EOMI, conjunctival clear, MMM, no oral lesions  Neck: no lymphadenopathy, supple, no JVD  Lungs: decreased breath sounds at the Left base with egophony  CVS: distant heart sounds, no rub  Abd: soft, NTND, BS normal  Extremities: no edema or cyanosis; right great toe with mild tenderness to palpation, mild edema and erythema  Vascular: DP 2+ b/l  Skin: warm, dry    LABS:                        13.5   11.2  )-----------( 369      ( 2017 06:54 )             40.5         138  |  98  |  31<H>  ----------------------------<  116<H>  3.9   |  25  |  1.14    Ca    8.9      2017 06:54  Mg     2.1         TPro  6.5  /  Alb  3.3  /  TBili  0.5  /  DBili  <0.2  /  AST  12  /  ALT  12  /  AlkPhos  73      Urinalysis Basic - ( 2017 07:19 )    Color: Yellow / Appearance: Clear / S.025 / pH: x  Gluc: x / Ketone: NEGATIVE  / Bili: Negative / Urobili: 0.2 E.U./dL   Blood: x / Protein: 30 mg/dL / Nitrite: NEGATIVE   Leuk Esterase: NEGATIVE / RBC: < 5 /HPF / WBC < 5 /HPF   Sq Epi: x / Non Sq Epi: 0-5 /HPF / Bacteria: Present /HPF    MICROBIOLOGY:  Pleural fluid  - gram stain mod WBCs, no organisms seen  Pericardial fluid  - gram stain mod WBCs, no organisms seen  Blood cultures  NGTD    RADIOLOGY & ADDITIONAL STUDIES:  < from: Echocardiogram (17 @ 13:21) >  Patient was in atrial flutter during the procedure. The left ventricular   wall   motion is normal.The overall left ventricular ejection fraction is   probably   normal.The left ventricular ejection fraction is estimated to be   55-60%The   right ventricle is normal in size and function.The left atrial size is   normal.Right atrial size is normal.Structurally normal aortic   valve.There is   mild aortic regurgitation.Structurally normal mitral valve.There is trace   mitral regurgitation.Structurally normal tricuspid valve.There is trace   tricuspid regurgitation.The pulmonary artery systolic pressure is   estimated to   be 25 mmHg.Structurally normal pulmonic valve.No pulmonic regurgitation   noted.Moderate circumferential pericardial effusion. No echocardiographic   evidence of tamponade. Fibrinous material consistent with inflammatory   process   vs coagulant is seen in the pericardial space.  Procedure Details  A complete two-dimensional transthoracic echocardiogram was performed (2D,  M-mode, spectral and color flow doppler).  Study Quality: Good.  Left Ventricle  The left ventricle is mildly dilated.  The left ventricular wall motion is normal.  The overall left ventricular ejection fraction is probably normal.  The left ventricular ejection fraction is estimated to be 55-60%  Left Atrium  The left atrial size is normal.  Right Atrium  Right atrial size is normal.  Right Ventricle  The right ventricle is normal in size and function.  Aortic Valve  Structurally normal aortic valve.  There is mild aortic regurgitation.  Mitral Valve  Structurally normal mitral valve.  There is trace mitral regurgitation.  Tricuspid Valve  Structurally normal tricuspid valve.  There is trace tricuspid regurgitation.  The pulmonary artery systolic pressure is estimated to be 25 mmHg.  Pulmonic Valve  Structurally normal pulmonic valve.  No pulmonic regurgitation noted.  Arteries and Venous System  No aortic root dilatation.  Normal size aortic arch with no hemodynamic evidence for coarctation  The inferior vena cava is normal in size (<2.1 cm) with normal inspiratory  collapse (>50%) consistent with normal right atrial pressure.  Pericardium / Pleura  Moderate circumferential pericardial effusion. No echocardiographic   evidence  of tamponade. Fibrinous material consistent with inflammatory process vs  coagulant is seen in the pericardial space.    < end of copied text >      < from: CT Chest w/ IV Cont (17 @ 18:09) >  FINDINGS: There is a large pericardial effusion, with enhancement of the   surrounding parietal visceral pericardium, suggesting an exudative   effusion. The heart is enlarged. There is severe coronary artery   calcification. The pulmonary artery measures 3.5 cm, suggestive of   pulmonary artery hypertension. The aortic root is ectatic measuring 4.2   cm, unchanged. The ascending aorta is ectatic measuring 4.0 cm,   unchanged. There is a mild amount of calcified plaque along the thoracic   aorta. No mediastinal, hilar or axillary lymphadenopathy is seen. There   is a 8 mm calcified nodule within the left lobe of the thyroid gland.    There is a moderate size left pleural effusion with associated near   complete collapse of the left lower lobe. Mild right basilar atelectatic   change. There is a 2 mm solid nodule in the right upper lobe image 87   series 11. There is an additional 2 mm solid nodule in the right lower   lobe seen on image 185 series 11. These are nonspecific.    Images of the upper abdomen demonstrate a 2.7 cm cyst within the left   lobe of liver. The liver is enlarged measuring 20.4 cm in craniocaudal   dimension. Trace calcifications are noted along the gallbladderwall. No   radiopaque stones are seen in the gallbladder.  The pancreas is normal in   appearance.  No splenic abnormalities are seen.    The adrenal glands are unremarkable.     There is a 6 mm stone at the lower pole the right kidney. There is no   associated hydronephrosis. There are multiple cysts in the right kidney,   the largest measures 5.0 cm in the lower pole. There are a few cysts in   the left kidney, largest measures 2.2 cm in the midportion of the kidney.   There is a 1.1 x 3.1 x 3.2 cm ill-defined focus of fat stranding near the   lower pole of the left kidney which is the site of prior cryoablation.     No aortic aneurysm is seen. There is a mild amount of calcified plaque   along the abdominal aorta. A few retroperitoneal lymph nodes are noted   measuring up to 1.1 cm in short axis; and is new from the comparison MRI   exam.    Evaluation of the bowel demonstrates marked diastases of the rectus   abdominis muscles. There is colonic diverticulosis. Streak artifact   limitsevaluation of the pelvis. Within that limitation, Images of the   pelvis demonstrate the prostate is markedly enlarged measuring   approximately 6.5 x 7.1 x 6.9 cm. No filling defects are seen in the   urinary bladder. Small bilateral fat-containing inguinal hernias.    Evaluation of the osseous structures demonstrates the patient is status   post right total hip arthroplasty. Mild degenerative changes in the left   hip. There is a 8 mm sclerotic lesion in the T10 vertebral body,   unchanged since 2015. Moderate degenerative changes of the spine.   Osteopenia.      IMPRESSION:  1. Large exudative pericardial effusion.    2. Moderate left pleural effusion with associated near complete collapse   of the left lower lobe.    3. History of left renal malignancy status post cryoablation. No evidence   of recurrent or residual disease.     4. A prominent left retroperitoneal lymph node is noted, which is new   from the comparison MRI exam. Recommend continued surveillance with CT or   MR imaging.    5. Prostatomegaly.    6. Marked diastases of the rectus abdominis.    < end of copied text > HPI:  Patient is a 71 y/o obese male w/ PMH ?CHF (as per Dr. Ramirez's note, possible bout of mild CHF in July w/ dyspnea and ankle swelling, resolved w/ lasix; TTE in July w/ trace/small pericardial effusion), unprovoked DVT/PE (; switched from pradaxa to eliquis 1mo ago), HTN, ADAM (CPAP), RCC s/p cryoablation 3-4 years ago, presenting w/ dull, upper chest/neck pain 2 weeks ago. Patient saw Dr. Ramirez and found a small pericardial effusion via TTE. Pt went to Dr. Gonzalez a few days later and noted also to have leukocytosis to 17. Pt had CT chest which showed a pericardial effusion 1.7x1.4cm. He has a repeat FAREED 1 week ago which showed worsening pericardial effusion without echo findings of tamponade. EKG at that time was NSR with 1st degree AV block. On , patient presented to Dr. Ramirez's office for worsening chest/neck pain, dyspnea, and fatigue. TTE at that time showed enlargement of pericardial effusion and patient was sent to the ED. Pt denies fever or chills, nausea/vomiting, abdominal pain, diarrhea, dysuria. 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 He last traveled to [place] in May. He went to the [facility] and [place] years ago, never to [place]. Pt was afebrile in the ED, found to have a large left PLEURAL effusion in addition to the large PERICARDIAL effusion and was in a.flutter. Patient admitted to cardiac tele. Repeat TTE showing EF 55-60%, moderate circumferential pericardial effusion, no echocardiographic evidence of tamponade, fibrinous material c/w inflammatory process vs coagulant. CT Chest/abd/pelvis performed noted large exudative pericardial effusion, moderate left pleural effusion with associated near complete collapse of the left lower lobe; no recurrence of L renal mass however new prominent left retroperitoneal lymph node. Rheumatologic labs ordered showing elevated RF, dsDNA, PSA, CRP, ESR. ID consulted for possible infectious etiology of pericardial/pleural effusions. Of note, patient also complained of R great toe pain on day of admission, with elevated uric acid levels likely representing gout. On , tt underwent CT guided pericardial drain and L sided chest tube placed by IR on 17 and transferred to CCU for further monitoring. ID consulted for workup of infectious etiology.      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 650 milliGRAM(s) Oral every 6 hours PRN Moderate Pain (4 - 6)      Allergies    No Known Allergies    Intolerances        SOCIAL HISTORY:  See HPI.  Never smoke cigarettes.  Occ smokes a cigar. Rare alcohol, no recreational drug use.    FAMILY HISTORY:  No pertinent family history in first degree relatives      Vital Signs Last 24 Hrs  T(C): 37.1 (2017 05:00), Max: 37.1 (2017 05:00)  T(F): 98.7 (2017 05:00), Max: 98.7 (2017 05:00)  HR: 92 (2017 15:00) (71 - 94)  BP: 110/58 (2017 15:00) (110/52 - 140/62)  BP(mean): 82 (2017 15:00) (78 - 99)  RR: 28 (2017 15:00) (14 - 28)  SpO2: 98% (2017 15:00) (92% - 98%)    Physical Exam  General: well-appearing, well-developed, obese, NAD, speaking in full sentences  HEENT: PERRL, EOMI, conjunctival clear, MMM, no oral lesions  Neck: no lymphadenopathy, supple, no JVD  Lungs: decreased breath sounds at the Left base with egophony  CVS: distant heart sounds, no rub  Abd: soft, NTND, BS normal  Extremities: no edema or cyanosis; right great toe with mild tenderness to palpation, mild edema and erythema  Vascular: DP 2+ b/l  Skin: warm, dry    LABS:                        13.5   11.2  )-----------( 369      ( 2017 06:54 )             40.5     -    138  |  98  |  31<H>  ----------------------------<  116<H>  3.9   |  25  |  1.14    Ca    8.9      2017 06:54  Mg     2.1         TPro  6.5  /  Alb  3.3  /  TBili  0.5  /  DBili  <0.2  /  AST  12  /  ALT  12  /  AlkPhos  73      Urinalysis Basic - ( 2017 07:19 )    Color: Yellow / Appearance: Clear / S.025 / pH: x  Gluc: x / Ketone: NEGATIVE  / Bili: Negative / Urobili: 0.2 E.U./dL   Blood: x / Protein: 30 mg/dL / Nitrite: NEGATIVE   Leuk Esterase: NEGATIVE / RBC: < 5 /HPF / WBC < 5 /HPF   Sq Epi: x / Non Sq Epi: 0-5 /HPF / Bacteria: Present /HPF    MICROBIOLOGY:  Pleural fluid  - gram stain mod WBCs, no organisms seen  Pericardial fluid  - gram stain mod WBCs, no organisms seen  Blood cultures  NGTD    RADIOLOGY & ADDITIONAL STUDIES:  < from: Echocardiogram (17 @ 13:21) >  Patient was in atrial flutter during the procedure. The left ventricular   wall   motion is normal.The overall left ventricular ejection fraction is   probably   normal.The left ventricular ejection fraction is estimated to be   55-60%The   right ventricle is normal in size and function.The left atrial size is   normal.Right atrial size is normal.Structurally normal aortic   valve.There is   mild aortic regurgitation.Structurally normal mitral valve.There is trace   mitral regurgitation.Structurally normal tricuspid valve.There is trace   tricuspid regurgitation.The pulmonary artery systolic pressure is   estimated to   be 25 mmHg.Structurally normal pulmonic valve.No pulmonic regurgitation   noted.Moderate circumferential pericardial effusion. No echocardiographic   evidence of tamponade. Fibrinous material consistent with inflammatory   process   vs coagulant is seen in the pericardial space.  Procedure Details  A complete two-dimensional transthoracic echocardiogram was performed (2D,  M-mode, spectral and color flow doppler).  Study Quality: Good.  Left Ventricle  The left ventricle is mildly dilated.  The left ventricular wall motion is normal.  The overall left ventricular ejection fraction is probably normal.  The left ventricular ejection fraction is estimated to be 55-60%  Left Atrium  The left atrial size is normal.  Right Atrium  Right atrial size is normal.  Right Ventricle  The right ventricle is normal in size and function.  Aortic Valve  Structurally normal aortic valve.  There is mild aortic regurgitation.  Mitral Valve  Structurally normal mitral valve.  There is trace mitral regurgitation.  Tricuspid Valve  Structurally normal tricuspid valve.  There is trace tricuspid regurgitation.  The pulmonary artery systolic pressure is estimated to be 25 mmHg.  Pulmonic Valve  Structurally normal pulmonic valve.  No pulmonic regurgitation noted.  Arteries and Venous System  No aortic root dilatation.  Normal size aortic arch with no hemodynamic evidence for coarctation  The inferior vena cava is normal in size (<2.1 cm) with normal inspiratory  collapse (>50%) consistent with normal right atrial pressure.  Pericardium / Pleura  Moderate circumferential pericardial effusion. No echocardiographic   evidence  of tamponade. Fibrinous material consistent with inflammatory process vs  coagulant is seen in the pericardial space.    < end of copied text >      < from: CT Chest w/ IV Cont (17 @ 18:09) >  FINDINGS: There is a large pericardial effusion, with enhancement of the   surrounding parietal visceral pericardium, suggesting an exudative   effusion. The heart is enlarged. There is severe coronary artery   calcification. The pulmonary artery measures 3.5 cm, suggestive of   pulmonary artery hypertension. The aortic root is ectatic measuring 4.2   cm, unchanged. The ascending aorta is ectatic measuring 4.0 cm,   unchanged. There is a mild amount of calcified plaque along the thoracic   aorta. No mediastinal, hilar or axillary lymphadenopathy is seen. There   is a 8 mm calcified nodule within the left lobe of the thyroid gland.    There is a moderate size left pleural effusion with associated near   complete collapse of the left lower lobe. Mild right basilar atelectatic   change. There is a 2 mm solid nodule in the right upper lobe image 87   series 11. There is an additional 2 mm solid nodule in the right lower   lobe seen on image 185 series 11. These are nonspecific.    Images of the upper abdomen demonstrate a 2.7 cm cyst within the left   lobe of liver. The liver is enlarged measuring 20.4 cm in craniocaudal   dimension. Trace calcifications are noted along the gallbladderwall. No   radiopaque stones are seen in the gallbladder.  The pancreas is normal in   appearance.  No splenic abnormalities are seen.    The adrenal glands are unremarkable.     There is a 6 mm stone at the lower pole the right kidney. There is no   associated hydronephrosis. There are multiple cysts in the right kidney,   the largest measures 5.0 cm in the lower pole. There are a few cysts in   the left kidney, largest measures 2.2 cm in the midportion of the kidney.   There is a 1.1 x 3.1 x 3.2 cm ill-defined focus of fat stranding near the   lower pole of the left kidney which is the site of prior cryoablation.     No aortic aneurysm is seen. There is a mild amount of calcified plaque   along the abdominal aorta. A few retroperitoneal lymph nodes are noted   measuring up to 1.1 cm in short axis; and is new from the comparison MRI   exam.    Evaluation of the bowel demonstrates marked diastases of the rectus   abdominis muscles. There is colonic diverticulosis. Streak artifact   limitsevaluation of the pelvis. Within that limitation, Images of the   pelvis demonstrate the prostate is markedly enlarged measuring   approximately 6.5 x 7.1 x 6.9 cm. No filling defects are seen in the   urinary bladder. Small bilateral fat-containing inguinal hernias.    Evaluation of the osseous structures demonstrates the patient is status   post right total hip arthroplasty. Mild degenerative changes in the left   hip. There is a 8 mm sclerotic lesion in the T10 vertebral body,   unchanged since 2015. Moderate degenerative changes of the spine.   Osteopenia.      IMPRESSION:  1. Large exudative pericardial effusion.    2. Moderate left pleural effusion with associated near complete collapse   of the left lower lobe.    3. History of left renal malignancy status post cryoablation. No evidence   of recurrent or residual disease.     4. A prominent left retroperitoneal lymph node is noted, which is new   from the comparison MRI exam. Recommend continued surveillance with CT or   MR imaging.    5. Prostatomegaly.    6. Marked diastases of the rectus abdominis.    < end of copied text >

## 2017-11-08 NOTE — PROGRESS NOTE ADULT - ASSESSMENT
Patient is a 71 YO obese male w/ PMH ?CHF (as per Dr. 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 Patient is a 71 YO obese male w/ PMH ?CHF (as per Dr. Ramirez's note, possible bout of mild CHF in July w/ dyspnea and ankle swelling, resolved w/ lasix; TTE in July w/ trace/small pericardial effusion), unprovoked DVT/PE (2015; switched from pradaxa to eliquis 1mo ago), HTN, ADAM (CPAP) presenting w/ dyspnea, fatigue, low grade temp, and cough and found w/ enlargement of pericardial effusion, new L pleural effusion, and new onset aflutter now s/p L sided chest tube and pericardial drain placement by IR

## 2017-11-08 NOTE — PROGRESS NOTE ADULT - PROBLEM SELECTOR PLAN 1
Patient w/ enlarging pericardial effusion over the course of 4 mos w/ assoc intermittent dyspnea, fatigue, fevers and cough; no current sx of CP, ECG w/ variable 3:1, 4:1 typical Aflutter, no ST Elevations or signs of pericarditis; unclear etiology of pericardial dz, possibly viral, autoimmune, hematologic (2/2 recent change of pradaxa to eliquis), or malignant; Trop neg. elevated BUN however pericardial sx typically assoc at higher levels  -f/u blood cx  -patient w/o CP, if pain develops, avoid NSAIDs 2/2 renal insufficiency  -Dr. Ramirez and Dr. Sepulveda to reassess pt's echo findings; possible tap or surgical pericardial window/biopsy/fluid  -Holding Eliquis 2/2 possible tap tomorrow (last dose 11/6/17 am). Started Heparin drip this evening. Monitor closely in setting of pericardial effusion, unlikely hemoperitoneum as pt maintaining Hgb >13, SBP >110s, HR 80-90s  -f/u echocardiogram  -Elevate ESR 55, CRP 9.5  -F/u MARCO, rheumatoid factor, PSA, Double strand DNA Ab Patient w/ enlarging pericardial effusion over the course of 4 mos w/ assoc intermittent dyspnea, fatigue, fevers and cough; no current sx of CP, ECG w/ variable 3:1, 4:1 typical Aflutter, no ST Elevations or signs of pericarditis; unclear etiology of pericardial dz, possibly viral, autoimmune, hematologic (2/2 recent change of pradaxa to eliquis), or malignant; Trop neg. elevated BUN however pericardial sx typically assoc at higher levels  -f/u blood cx, NGTD  -patient w/o CP, if pain develops, avoid NSAIDs 2/2 renal insufficiency  -Holding Eliquis 2/2 tap today (last dose 11/6/17 am). Started Heparin drip yesterday, held prior to procedures today. Monitor PTT closely in setting of pericardial effusion, unlikely hemoperitoneum as pt maintaining Hgb >13, SBP >110s, HR 80-90s  -Echo performed w/ EF 55-60%, mild AI, trace MR/TR, moderate circumferential pericardial effusion, no echocardiographic evidence of tamponade. Fibrinous material consistent with inflammatory process vs coagulant is seen in the pericardial space.  -Elevate ESR 55, CRP 9.5, rheumatoid factor, PSA, Double strand DNA Ab  -F/u MARCO. 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

## 2017-11-08 NOTE — PROGRESS NOTE ADULT - SUBJECTIVE AND OBJECTIVE BOX
CARDIOLOGY NP PROGRESS NOTE    Subjective: Pt seen and examined at bedside. Reports feeling well. R great toe pain improving w/ Tylenol. Denies chest pain, sob.    Overnight Events: C/o R big toe pain, Xray neg Fx, Uric acid elevated -likely gout but hold off gout Tx (NSAID/steroid) for now. Lasix d/c'ed per Dr Ramirez.    TELEMETRY: [id number]        VITAL SIGNS:  T(C): 37.1 (11-08-17 @ 05:00), Max: 37.1 (11-08-17 @ 05:00)  HR: 83 (11-08-17 @ 09:10) (71 - 94)  BP: 118/65 (11-08-17 @ 08:30) (110/52 - 140/55)  RR: 18 (11-08-17 @ 09:10) (16 - 19)  SpO2: 94% (11-08-17 @ 09:10) (92% - 95%)  Wt(kg): --    I&O's Summary    07 Nov 2017 07:01  -  08 Nov 2017 07:00  --------------------------------------------------------  IN: 175 mL / OUT: 775 mL / NET: -600 mL          PHYSICAL EXAM:    General: A/ox 3, No acute Distress, Obese  Neck: Supple, NO JVD  Cardiac: S1 S2, No M/R/G, muffled heart sounds  Pulmonary: LLL decreased breath sounds, Breathing unlabored on RA, No Rhonchi/Rales/Wheezing  Abdomen: Soft, Non -tender, +BS x 4 quads  Extremities: No Rashes, No LE edema. C/o R great toe pain s/p slipping into a ditch 2-3 days ago, mildly tender on palpation, mild erythema to lateral R great toe. no ecchymosis/swelling  Neuro: A/o x 3, No focal deficits        LABS:                          13.5   11.2  )-----------( 369      ( 08 Nov 2017 06:54 )             40.5                              11-08    138  |  98  |  31<H>  ----------------------------<  116<H>  3.9   |  25  |  1.14    Ca    8.9      08 Nov 2017 06:54  Mg     2.1     11-08    TPro  6.5  /  Alb  3.3  /  TBili  0.5  /  DBili  <0.2  /  AST  12  /  ALT  12  /  AlkPhos  73  11-08    LIVER FUNCTIONS - ( 08 Nov 2017 06:54 )  Alb: 3.3 g/dL / Pro: 6.5 g/dL / ALK PHOS: 73 U/L / ALT: 12 U/L / AST: 12 U/L / GGT: x         PT/INR - ( 08 Nov 2017 06:54 )   PT: 16.9 sec;   INR: 1.51          PTT - ( 08 Nov 2017 06:54 )  PTT:48.8 sec  CAPILLARY BLOOD GLUCOSE        CARDIAC MARKERS ( 06 Nov 2017 17:36 )  x     / <0.01 ng/mL / 58 U/L / x     / 1.9 ng/mL            No Known Allergies    MEDICATIONS  (STANDING):  carvedilol 12.5 milliGRAM(s) Oral every 12 hours    MEDICATIONS  (PRN):  acetaminophen   Tablet. 650 milliGRAM(s) Oral every 6 hours PRN Moderate Pain (4 - 6)        DIAGNOSTIC TESTS:     Echo 11/7/17: Patient was in atrial flutter during the procedure. The left ventricular wall motion is normal.The overall left ventricular ejection fraction is probably normal.The left ventricular ejection fraction is estimated to be 55-60%The right ventricle is normal in size and function.The left atrial size is normal.Right atrial size is normal. Structurally normal aortic valve.There is mild aortic regurgitation. Structurally normal mitral valve.There is trace mitral regurgitation. Structurally normal tricuspid valve.There is trace tricuspid regurgitation. The pulmonary artery systolic pressure is estimated to be 25 mmHg. Structurally normal pulmonic valve.No pulmonic regurgitation noted. Moderate circumferential pericardial effusion. No echocardiographic evidence of tamponade. Fibrinous material consistent with inflammatory process vs coagulant is seen in the pericardial space. CARDIOLOGY NP TRANSFER OFF SERVICE NOTE    HOSPITAL COURSE:  73 YO obese male w/ PMH ?CHF (as per Dr. 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 Patient w/ episode of CP 2wks ago, centrally located, dull, lasting 1 day, subsiding w/o intervention. W/u on Oct 25 w/ leukocytosis to 17; chest CT w/ significant pericardial effusion 1.7x1.4cm. F/u echo on 10/25 w/ moderate pericardial effusion (no tamponade) and ECG w/ NSR and 1st degree AV block; ECG/exam w/o indication of pericarditis. No tx provided. On f/u visit 11/6, pt now w/ dyspnea, fatigue, low grade temp, and occasional dry cough. Pt found w/ enlargement of pericardial effusion on echo (more debris noted), audible friction rub, L pleural effusion, and new aflutter w/ tachy to the 100's. Patient denies CP, ab pain, n/v, d/c, LE swelling, sick contacts, hx of trauma or known autoimmune dz. In the ED, T: 98.4, P: 95, BP: 124/76, RR: 18, O2: 94% RA. Patient admitted for further w/u.     Subjective: Pt seen and examined at bedside. Reports feeling well. R great toe pain improving w/ Tylenol. Denies chest pain, sob.    Overnight Events: C/o R big toe pain, Xray neg Fx, Uric acid elevated -likely gout but hold off gout Tx (NSAID/steroid) for now. Lasix d/c'ed per Dr Ramirez.    TELEMETRY: [id number]        VITAL SIGNS:  T(C): 37.1 (11-08-17 @ 05:00), Max: 37.1 (11-08-17 @ 05:00)  HR: 83 (11-08-17 @ 09:10) (71 - 94)  BP: 118/65 (11-08-17 @ 08:30) (110/52 - 140/55)  RR: 18 (11-08-17 @ 09:10) (16 - 19)  SpO2: 94% (11-08-17 @ 09:10) (92% - 95%)  Wt(kg): --    I&O's Summary    07 Nov 2017 07:01  -  08 Nov 2017 07:00  --------------------------------------------------------  IN: 175 mL / OUT: 775 mL / NET: -600 mL          PHYSICAL EXAM:    General: A/ox 3, No acute Distress, Obese  Neck: Supple, NO JVD  Cardiac: S1 S2, No M/R/G, muffled heart sounds  Pulmonary: LLL decreased breath sounds, Breathing unlabored on RA, No Rhonchi/Rales/Wheezing  Abdomen: Soft, Non -tender, +BS x 4 quads  Extremities: No Rashes, No LE edema. C/o R great toe pain s/p slipping into a ditch 2-3 days ago, mildly tender on palpation, mild erythema to lateral R great toe. no ecchymosis/swelling  Neuro: A/o x 3, No focal deficits        LABS:                          13.5   11.2  )-----------( 369      ( 08 Nov 2017 06:54 )             40.5                              11-08    138  |  98  |  31<H>  ----------------------------<  116<H>  3.9   |  25  |  1.14    Ca    8.9      08 Nov 2017 06:54  Mg     2.1     11-08    TPro  6.5  /  Alb  3.3  /  TBili  0.5  /  DBili  <0.2  /  AST  12  /  ALT  12  /  AlkPhos  73  11-08    LIVER FUNCTIONS - ( 08 Nov 2017 06:54 )  Alb: 3.3 g/dL / Pro: 6.5 g/dL / ALK PHOS: 73 U/L / ALT: 12 U/L / AST: 12 U/L / GGT: x         PT/INR - ( 08 Nov 2017 06:54 )   PT: 16.9 sec;   INR: 1.51          PTT - ( 08 Nov 2017 06:54 )  PTT:48.8 sec  CAPILLARY BLOOD GLUCOSE        CARDIAC MARKERS ( 06 Nov 2017 17:36 )  x     / <0.01 ng/mL / 58 U/L / x     / 1.9 ng/mL            No Known Allergies    MEDICATIONS  (STANDING):  carvedilol 12.5 milliGRAM(s) Oral every 12 hours    MEDICATIONS  (PRN):  acetaminophen   Tablet. 650 milliGRAM(s) Oral every 6 hours PRN Moderate Pain (4 - 6)        DIAGNOSTIC TESTS:     Echo 11/7/17: Patient was in atrial flutter during the procedure. The left ventricular wall motion is normal.The overall left ventricular ejection fraction is probably normal.The left ventricular ejection fraction is estimated to be 55-60%The right ventricle is normal in size and function.The left atrial size is normal.Right atrial size is normal. Structurally normal aortic valve.There is mild aortic regurgitation. Structurally normal mitral valve.There is trace mitral regurgitation. Structurally normal tricuspid valve.There is trace tricuspid regurgitation. The pulmonary artery systolic pressure is estimated to be 25 mmHg. Structurally normal pulmonic valve.No pulmonic regurgitation noted. Moderate circumferential pericardial effusion. No echocardiographic evidence of tamponade. Fibrinous material consistent with inflammatory process vs coagulant is seen in the pericardial space. 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 Pt was noted with trace/small pericardial effusion on TTE in July. Pt was seen by Dr Ramirez 2 weeks ago w/ episode of CP 2wks ago, centrally located, dull, lasting 1 day, subsiding w/o intervention. W/u noted leukocytosis to 17; CT chest w/ significant pericardial effusion 1.7x1.4cm. F/u echo on 10/25 w/ moderate pericardial effusion (no tamponade) and ECG w/ NSR and 1st degree AV block; ECG/exam w/o indication of pericarditis. No tx provided. On f/u visit 11/6, pt now w/ dyspnea, fatigue, low grade temp, and occasional dry cough. Pt found w/ enlargement of pericardial effusion on echo (more debris noted), audible friction rub, L pleural effusion, and new aflutter w/ tachy to the 100's. Patient denies CP, ab pain, n/v, d/c, LE swelling, sick contacts, hx of trauma or known autoimmune dz. In the ED, T: 98.4, P: 95, BP: 124/76, RR: 18, O2: 94% RA.   Patient admitted to [facility] for further w/u. CT surgery Dr Ace was consulted. Repeat echo performed noted EF 55-60%, moderate circumferential pericardial effusion, no echocardiographic evidence of tamponade, fibrinous material c/w inflammatory process vs coagulant. CT Chest/abd/pelvis performed noted large exudative pericardial effusion, moderate left pleural effusion with associated near complete collapse of the left lower lobe. Rheum factor, DS DNA Ab, PSA were elevated. Rheum consult on hold per Dr Ramirez. 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 Of note, pt c/o R big toe pain w/ mild erythema/pain on palpation started on day of admission, Uric acid elevated 12.6. Pt underwent CT guided pericardial drain and L sided chest tube placed by IR on 11/8/17 and transferred to CCU for further monitoring.       Subjective: Pt seen and examined at bedside. Reports feeling well. R great toe pain improving w/ Tylenol. Denies chest pain, sob.    Overnight Events: C/o R big toe pain, Xray neg Fx, Uric acid elevated -likely gout but hold off gout Tx (NSAID/steroid) for now. Lasix d/c'ed per Dr Ramirez.    TELEMETRY: [id number]        VITAL SIGNS:  T(C): 37.1 (11-08-17 @ 05:00), Max: 37.1 (11-08-17 @ 05:00)  HR: 83 (11-08-17 @ 09:10) (71 - 94)  BP: 118/65 (11-08-17 @ 08:30) (110/52 - 140/55)  RR: 18 (11-08-17 @ 09:10) (16 - 19)  SpO2: 94% (11-08-17 @ 09:10) (92% - 95%)  Wt(kg): --    I&O's Summary    07 Nov 2017 07:01  -  08 Nov 2017 07:00  --------------------------------------------------------  IN: 175 mL / OUT: 775 mL / NET: -600 mL          PHYSICAL EXAM:    General: A/ox 3, No acute Distress, Obese  Neck: Supple, NO JVD  Cardiac: S1 S2, No M/R/G, muffled heart sounds  Pulmonary: LLL decreased breath sounds, Breathing unlabored on RA, No Rhonchi/Rales/Wheezing  Abdomen: Soft, Non -tender, +BS x 4 quads  Extremities: No Rashes, No LE edema. C/o R great toe pain s/p slipping into a ditch 2-3 days ago, mildly tender on palpation, mild erythema to lateral R great toe. no ecchymosis/swelling  Neuro: A/o x 3, No focal deficits        LABS:                          13.5   11.2  )-----------( 369      ( 08 Nov 2017 06:54 )             40.5                              11-08    138  |  98  |  31<H>  ----------------------------<  116<H>  3.9   |  25  |  1.14    Ca    8.9      08 Nov 2017 06:54  Mg     2.1     11-08    TPro  6.5  /  Alb  3.3  /  TBili  0.5  /  DBili  <0.2  /  AST  12  /  ALT  12  /  AlkPhos  73  11-08    LIVER FUNCTIONS - ( 08 Nov 2017 06:54 )  Alb: 3.3 g/dL / Pro: 6.5 g/dL / ALK PHOS: 73 U/L / ALT: 12 U/L / AST: 12 U/L / GGT: x         PT/INR - ( 08 Nov 2017 06:54 )   PT: 16.9 sec;   INR: 1.51          PTT - ( 08 Nov 2017 06:54 )  PTT:48.8 sec  CAPILLARY BLOOD GLUCOSE        CARDIAC MARKERS ( 06 Nov 2017 17:36 )  x     / <0.01 ng/mL / 58 U/L / x     / 1.9 ng/mL            No Known Allergies    MEDICATIONS  (STANDING):  carvedilol 12.5 milliGRAM(s) Oral every 12 hours    MEDICATIONS  (PRN):  acetaminophen   Tablet. 650 milliGRAM(s) Oral every 6 hours PRN Moderate Pain (4 - 6)        DIAGNOSTIC TESTS:     Echo 11/7/17: Patient was in atrial flutter during the procedure. The left ventricular wall motion is normal.The overall left ventricular ejection fraction is probably normal.The left ventricular ejection fraction is estimated to be 55-60%The right ventricle is normal in size and function.The left atrial size is normal.Right atrial size is normal. Structurally normal aortic valve.There is mild aortic regurgitation. Structurally normal mitral valve.There is trace mitral regurgitation. Structurally normal tricuspid valve.There is trace tricuspid regurgitation. The pulmonary artery systolic pressure is estimated to be 25 mmHg. Structurally normal pulmonic valve.No pulmonic regurgitation noted. Moderate circumferential pericardial effusion. No echocardiographic evidence of tamponade. Fibrinous material consistent with inflammatory process vs coagulant is seen in the pericardial space.     CT Chest/Abd/pelvis w/ IV contrast 11/7/17:   1. Large exudative pericardial effusion.  2. Moderate left pleural effusion with associated near complete collapse of the left lower lobe.  3. History of left renal malignancy status post cryoablation. No evidence of recurrent or residual disease.   4. A prominent left retroperitoneal lymph node is noted, which is new from the comparison MRI exam. Recommend continued surveillance with CT or   MR imaging.  5. Prostatomegaly.  6. Marked diastases of the rectus abdominis.

## 2017-11-08 NOTE — PROGRESS NOTE ADULT - SUBJECTIVE AND OBJECTIVE BOX
PGY1 CCU TRANSFER ACCEPTANCE NOTE:    HOSPITAL COURSE: 73 yo M with PMH morbid obesity, diastolic CHF (EF 55-60), unprovoked DVT/PE (; switched from Pradaxa to Eliquis 1mo ago), HTN, ADAM (CPAP), renal tumor cryoablation by IR in , recent diagnosis of small pericardial effusion by TTE 2weeks prior (by outpatient cardiologist, Dr. Ramirez) who was sent to ED after being found to have enlarged, now moderately sized, pericardial effusion. Patient saw Dr. Ramirez 2 weeks prior for dull, centrally located and positional chest pain and found to have small pericardial effusion by TTE. Noted also to have leukocytosis to 17; CT chest w/ significant pericardial effusion 1.7x1.4cm.  F/u TTE one week later with moderate pericardial effusion (no tamponade) and ECG w/ NSR and 1st degree AV block. Patient went back to Dr. Ramirez on Monday () for worsened symptoms and generalized malaise. Repeat TTE with enlargement of pericardial effusion and patient sent to ED. On admission, T: 98.4, P: 95, BP: 124/76, RR: 18, O2: 94% RA. Patient found to have large L pleural effusion and EKG with AFlutter to HR 110s. Patient admitted to tele unit for further w/u. CT surgery Dr Ace was consulted. Repeat ECHO performed with EF 55-60%, moderate circumferential pericardial effusion, no echocardiographic evidence of tamponade, fibrinous material c/w inflammatory process vs coagulant. CT Chest/abd/pelvis performed noted large exudative pericardial effusion, moderate left pleural effusion with associated near complete collapse of the left lower lobe; no recurrence of L renal mass however new prominent left retroperitoneal lymph node. Rheumatologic labs ordered showing elevated RF, dsDNA, PSA, CRP, ESR. ID consulted for possible infectious etiology of pericardial/pleural effusions. Of note, pt c/o R big toe pain w/ mild erythema/pain on palpation started on day of admission, with elevated uric acid levels likely representing gout. Pt underwent CT guided pericardial drain and L sided chest tube placed by IR on 17 and transferred to CCU for further monitoring.     SUBJECTIVE / INTERVAL HPI: Patient s/p IR pericardiocentesis draining 400cc serosanguinous fluid and chest tube placement, draining 1000cc fluid now set to wall suction. Patient seen and examined at bedside. Patient with minimal chest discomfort which is positional and worse when taking a deep breath. Otherwise     VITAL SIGNS:  Vital Signs Last 24 Hrs  T(C): 37.1 (2017 05:00), Max: 37.1 (2017 05:00)  T(F): 98.7 (2017 05:00), Max: 98.7 (2017 05:00)  HR: 92 (2017 14:00) (71 - 94)  BP: 140/62 (2017 14:00) (110/52 - 140/62)  BP(mean): 99 (2017 14:00) (78 - 99)  RR: 14 (2017 14:00) (14 - 19)  SpO2: 98% (2017 14:00) (92% - 98%)    PHYSICAL EXAM:  General: morbidly obese, resting comfortable, NAD  HEENT: NC/AT, PERRL, anicteric sclera, MMM, no pharyngeal erythema  Neck: supple, no LAD, no JVD  Cardiovascular: distant heart sounds 2/2 body habitus, +S1/S2, regular rhythm, no murmurs, no rubs, drain in place without surrounding erythema, edema or fluctuance  Respiratory: normal respiratory effort, shallow breaths 2/2 pain, decreased breath sounds at L lung base, no W/R/R, no retractions, able to speak in complete sentences, L drain in place draining red tinged fluid, no surrounding erythema, edema or fluctuance  Gastrointestinal: obese, soft, NT/ND; +BSx4  Extremities: WWP; warm, erythematous and edematous R 1st digit, +TTP  and pain with movement, no LE edema, no clubbing or cyanosis  Vascular: 2+ radial, DP/PT pulses B/L  Neurological: AAOx3; no focal deficits    MEDICATIONS:  MEDICATIONS  (STANDING):  carvedilol 12.5 milliGRAM(s) Oral every 12 hours    MEDICATIONS  (PRN):  acetaminophen   Tablet. 650 milliGRAM(s) Oral every 6 hours PRN Moderate Pain (4 - 6)      ALLERGIES:  No Known Allergies      LABS:                        13.5   11.2  )-----------( 369      ( 2017 06:54 )             40.5     11-08    138  |  98  |  31<H>  ----------------------------<  116<H>  3.9   |  25  |  1.14    Ca    8.9      2017 06:54  Mg     2.1         TPro  6.5  /  Alb  3.3  /  TBili  0.5  /  DBili  <0.2  /  AST  12  /  ALT  12  /  AlkPhos  73      PT/INR - ( 2017 06:54 )   PT: 16.9 sec;   INR: 1.51       PTT - ( 2017 06:54 )  PTT:48.8 sec        17 @ 07:01  -  17 @ 07:00  --------------------------------------------------------  IN: 175 mL / OUT: 775 mL / NET: -600 mL        Urinalysis Basic - ( 2017 07:19 )  Color: Yellow / Appearance: Clear / S.025 / pH: x  Gluc: x / Ketone: NEGATIVE  / Bili: Negative / Urobili: 0.2 E.U./dL   Blood: x / Protein: 30 mg/dL / Nitrite: NEGATIVE   Leuk Esterase: NEGATIVE / RBC: < 5 /HPF / WBC < 5 /HPF   Sq Epi: x / Non Sq Epi: 0-5 /HPF / Bacteria: Present /HPF        RADIOLOGY & ADDITIONAL TESTS: Reviewed.    ECHO: The left ventricular wall motion is normal. The overall left ventricular ejection fraction is probably normal. The left ventricular ejection fraction is estimated to be 55-60% The right ventricle is normal in size and function. The left atrial size is normal. Right atrial size is normal. Structurally normal aortic valve. There is mild aortic regurgitation. Structurally normal mitral valve. There is trace mitral regurgitation. Structurally normal tricuspid valve. There is trace tricuspid regurgitation. The pulmonary artery systolic pressure is estimated to be 25 mmHg. Structurally normal pulmonic valve. No pulmonic regurgitation noted. Moderate circumferential pericardial effusion. No echocardiographic evidence of tamponade. Fibrinous material consistent with inflammatory process vs coagulant is seen in the pericardial space.    CT chest/Abdomen/Pelvis w contrast: Large exudative pericardial effusion, Moderate left pleural effusion with associated near complete collapse of the left lower lobe. History of left renal malignancy status post cryoablation. No evidence of recurrent or residual disease. A prominent left retroperitoneal lymph node is noted, which is new from the comparison MRI exam. Recommend continued surveillance with CT or MR imaging. Prostatomegaly. Marked diastases of the rectus abdominis.

## 2017-11-08 NOTE — PROGRESS NOTE ADULT - SUBJECTIVE AND OBJECTIVE BOX
71 YO obese male new episode of pericarditis, pleurisy and polyarticular gout mostly effecting his  Lt MTP  with pain erythema and swelling that started on 11/6/17 but no prior hx or renal colic. His brother has a hx for gout that requires treqtment. He also has abnormal serologies with a + RF, + DNA and Hx for unprovoked DVT/PE (2015; switched from pradaxa to eliquis 1mo ago), HTN, ADAM (CPAP), Renal tumor cryoablation by IR in 2014 presenting w/ dyspnea, fatigue, low grade temp, and cough and found w/ enlargement of pericardial effusion, new L pleural effusion, and new onset aflutter. Pt was noted with trace/small pericardial effusion on TTE in July. Pt was seen by Dr Ramirez 2 weeks ago w/ episode of CP 2wks ago, centrally located, dull, lasting 1 day, subsiding w/o intervention. W/u noted leukocytosis to 17; CT chest w/ significant pericardial effusion 1.7x1.4cm. F/u echo on 10/25 w/ moderate pericardial effusion (no tamponade) and ECG w/ NSR and 1st degree AV block; ECG/exam w/o indication of pericarditis. No tx provided. On f/u visit 11/6, pt now w/ dyspnea, fatigue, low grade temp, and occasional dry cough. Pt found w/ enlargement of pericardial effusion on echo (more debris noted), audible friction rub, L pleural effusion, and new aflutter w/ tachy to the 100's. Patient denies CP, ab pain, n/v, d/c, LE swelling, sick contacts, hx of trauma or known autoimmune dz. In the ED, T: 98.4, P: 95, BP: 124/76, RR: 18, O2: 94% RA.      Evaluation included CT surgery Dr Ace was consulted. Repeat echo performed noted EF 55-60%, moderate circumferential pericardial effusion, no echocardiographic evidence of tamponade, fibrinous material c/w inflammatory process vs coagulant. CT Chest/abd/pelvis performed noted large exudative pericardial effusion, moderate left pleural effusion with associated near complete collapse of the left lower lobe. Rheum factor, DS DNA Ab, PSA were elevated.  Dr Ramirez. 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 Of note, pt c/o R big toe pain w/ mild erythema/pain on palpation started on day of admission, Uric acid elevated 12.6. Pt underwent CT guided pericardial drain and L sided chest tube placed by IR on 11/8/17 and transferred to CCU for further monitoring and then had LT pleural fluid drainage under guidance with sample sent for analysis and culture      Subjective: Pt seen and examined at bedside. Reports feeling well. R great toe pain improving w/ Tylenol. Denies chest pain, sob.    Overnight Events: C/o R big toe pain, Xray neg Fx, Uric acid elevated -likely gout but hold off gout Tx (NSAID/steroid) for now. Lasix d/c'ed per Dr Ramirez.    TELEMETRY: [id number]    ICU Vital Signs Last 24 Hrs  T(C): 36.8 (08 Nov 2017 16:49), Max: 37.1 (08 Nov 2017 05:00)  T(F): 98.3 (08 Nov 2017 16:49), Max: 98.7 (08 Nov 2017 05:00)  HR: 90 (08 Nov 2017 18:00) (71 - 92)  BP: 125/61 (08 Nov 2017 18:00) (110/52 - 145/53)  BP(mean): 83 (08 Nov 2017 18:00) (78 - 103)  ABP: --  ABP(mean): --  RR: 23 (08 Nov 2017 18:00) (14 - 28)  SpO2: 94% (08 Nov 2017 18:00) (92% - 98%)    PHYSICAL EXAM:    General: A/ox 3, No acute Distress, Obese  Neck: Supple, NO JVD  Cardiac: S1 S2, No M/R/G, muffled heart sounds  Pulmonary: LLL decreased breath sounds, Breathing unlabored on RA, No Rhonchi/Rales/Wheezing  Abdomen: Soft, Non -tender, +BS x 4 quads  Extremities: No Rashes, No LE edema. C/o R great toe pain s/p slipping into a ditch 2-3 days ago, mildly tender on palpation, mild erythema to lateral R great toe. family with hx for gout   Neuro: A/o x 3, No focal deficits        LABS:                                     13.5   11.2  )-----------( 369      ( 08 Nov 2017 06:54 )             40.5                                11-08    138  |  98  |  31<H>  ----------------------------<  116<H>  3.9   |  25  |  1.14    Ca    8.9      08 Nov 2017 06:54  Mg     2.1     11-08    TPro  6.5  /  Alb  3.3  /  TBili  0.5  /  DBili  <0.2  /  AST  12  /  ALT  12  /  AlkPhos  73  11-08    LIVER FUNCTIONS - ( 08 Nov 2017 06:54 )  Alb: 3.3 g/dL / Pro: 6.5 g/dL / ALK PHOS: 73 U/L / ALT: 12 U/L / AST: 12 U/L / GGT: x         PT/INR - ( 08 Nov 2017 06:54 )   PT: 16.9 sec;   INR: 1.51          PTT - ( 08 Nov 2017 06:54 )  PTT:48.8 sec  CAPILLARY BLOOD GLUCOSE        CARDIAC MARKERS ( 06 Nov 2017 17:36 )  x     / <0.01 ng/mL / 58 U/L / x     / 1.9 ng/mL            No Known Allergies    MEDICATIONS  (STANDING):  carvedilol 12.5 milliGRAM(s) Oral every 12 hours    MEDICATIONS  (PRN):  acetaminophen   Tablet. 650 milliGRAM(s) Oral every 6 hours PRN Moderate Pain (4 - 6)        DIAGNOSTIC TESTS:     Echo 11/7/17: Patient was in atrial flutter during the procedure. The left ventricular wall motion is normal.The overall left ventricular ejection fraction is probably normal.The left ventricular ejection fraction is estimated to be 55-60%The right ventricle is normal in size and function.The left atrial size is normal.Right atrial size is normal. Structurally normal aortic valve.There is mild aortic regurgitation. Structurally normal mitral valve.There is trace mitral regurgitation. Structurally normal tricuspid valve.There is trace tricuspid regurgitation. The pulmonary artery systolic pressure is estimated to be 25 mmHg. Structurally normal pulmonic valve.No pulmonic regurgitation noted. Moderate circumferential pericardial effusion. No echocardiographic evidence of tamponade. Fibrinous material consistent with inflammatory process vs coagulant is seen in the pericardial space.     CT Chest/Abd/pelvis w/ IV contrast 11/7/17:   1. Large exudative pericardial effusion.  2. Moderate left pleural effusion with associated near complete collapse of the left lower lobe.  3. History of left renal malignancy status post cryoablation. No evidence of recurrent or residual disease.   4. A prominent left retroperitoneal lymph node is noted, which is new from the comparison MRI exam. Recommend continued surveillance with CT or   MR imaging.  5. Prostatomegaly.  6. Marked diastases of the rectus abdominis.

## 2017-11-08 NOTE — CONSULT NOTE ADULT - ASSESSMENT
Patient is a 71 y/o obese male w/ PMH ?CHF (as per Dr. Ramirez's note, possible bout of mild CHF in July w/ dyspnea and ankle swelling, resolved w/ lasix; TTE in July w/ trace/small pericardial effusion), unprovoked DVT/PE (2015; switched from pradaxa to eliquis 1mo ago), HTN, ADAM (CPAP), RCC s/p cryoablation 3-4 years ago, presenting w/ dull, upper chest/neck pain 2 weeks ago. Found to have enlarging pericardial effusion over the past 2 weeks without signs of tamponade, and large left sided pleural effusion. s/p pericardial drain and L sided chest tube 11/8. Patient is a 73 y/o obese male w/ PMH ?CHF (as per Dr. Ramirez's note, possible bout of mild CHF in July w/ dyspnea and ankle swelling, resolved w/ lasix; TTE in July w/ trace/small pericardial effusion), unprovoked DVT/PE (2015; switched from pradaxa to eliquis 1mo ago), HTN, ADAM (CPAP), RCC s/p cryoablation 3-4 years ago, presenting w/ dull, upper chest/neck pain 2 weeks ago. Found to have enlarging pericardial effusion over the past 2 weeks without signs of tamponade, and large left sided pleural effusion. s/p pericardial drain and L sided chest tube 11/8.    #Pericardial and pleural effusion. Etiology unclear at this time, differential is broad. Given subacute progression, not likely an acute bacterial infection. Pt also without TB risk factors. 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 Viral etiology is also possible. Pericardial and pleural effusions showing an exudative picture, gram stains are negative. Unclear if right joint pain is associated with acute process.  - Hold off on antibiotics for now as gram stains are negative  - Please obtain: RVP, HIV, EBV serology, CMV IgM/IgG, Coxsackie IgM/IgG, Brucella Ab screen, Q fever Ab screen, Lyme LAZARUS, Urine Histoplasma antigen, Urine Legionella Antigen, Coccidiodes IgG, Chlamydia serology, Quantiferon, Toxoplasmosis IgM/IgG, Stool enteric pathogens    Will follow with you.    Above discussed with attending and primary team. Please also see attending attestation below for further recs. Patient is a 73 y/o obese male w/ PMH ?CHF (as per Dr. Ramirez's note, possible bout of mild CHF in July w/ dyspnea and ankle swelling, resolved w/ lasix; TTE in July w/ trace/small pericardial effusion), unprovoked DVT/PE (2015; switched from pradaxa to eliquis 1mo ago), HTN, ADAM (CPAP), RCC s/p cryoablation 3-4 years ago, presenting w/ dull, upper chest/neck pain 2 weeks ago. Found to have enlarging pericardial effusion over the past 2 weeks without signs of tamponade, and large left sided pleural effusion. s/p pericardial drain and L sided chest tube 11/8.    #Pericardial and pleural effusion. Etiology unclear at this time, differential is broad. Given subacute progression, not likely an acute bacterial infection. Pt also without TB risk factors. 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 Viral and fungal etiologies also are possible. Pericardial and pleural effusions showing an exudative picture, gram stains are negative. Unclear if right joint pain is associated with acute process.  Chlamydia psittasi,infection is associated with chickens but does not typically cause this clinical picture.  - Hold off on antibiotics for now as gram stains are negative  - Please obtain: RVP, HIV, EBV serology, CMV IgM/IgG, Coxsackie IgM/IgG, Brucella Ab screen, Q fever Ab screen, Lyme LAZARUS, Urine Histoplasma antigen, Urine Legionella Antigen, Coccidiodes IgG, Chlamydia serology, Quantiferon, Toxoplasmosis IgM/IgG, Stool culture for enteric pathogens    Will follow with you.    Above discussed with attending and primary team. Please also see attending attestation below for further recs. Patient is a 73 y/o obese male w/ PMH ?CHF (as per Dr. Ramirez's note, possible bout of mild CHF in July w/ dyspnea and ankle swelling, resolved w/ lasix; TTE in July w/ trace/small pericardial effusion), unprovoked DVT/PE (2015; switched from pradaxa to eliquis 1mo ago), HTN, ADAM (CPAP), RCC s/p cryoablation 3-4 years ago, presenting w/ dull, upper chest/neck pain 2 weeks ago. Found to have enlarging pericardial effusion over the past 2 weeks without signs of tamponade, and large left sided pleural effusion. s/p pericardial drain and L sided chest tube 11/8.    #Pericardial and pleural effusion. Etiology unclear at this time, differential is broad. Given subacute progression, not likely an acute bacterial infection. Pt also without specific TB risk factors, but would exclude. 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 Viral and fungal etiologies also are possible. Pericardial and pleural effusions showing an exudative picture, gram stains are negative. Unclear if right joint pain is associated with acute process.  Chlamydia psittasi,infection is associated with chickens but does not typically cause this clinical picture.  - F/U cultures from IR procedure from today - bacterial, fungal and AFB have been received by lab, I do not see viral, as requested this AM   - Hold off on antibiotics for now as gram stains are negative  - Please obtain: RVP, HIV, EBV serology, CMV IgM/IgG, Coxsackie IgM/IgG, Brucella Ab screen, Q fever Ab screen, Lyme LAZARUS, Urine Histoplasma antigen, Urine Legionella Antigen, Coccidiodes IgG, Chlamydia serology, Quantiferon, Toxoplasmosis IgM/IgG, Stool culture for enteric pathogens    Will follow with you.    Above discussed with attending and primary team. Please also see attending attestation below for further recs.

## 2017-11-08 NOTE — PROGRESS NOTE ADULT - PROBLEM SELECTOR PLAN 2
- New L pleural effusion on CT imaging s/p chest tube placement by IR draining 1000cc fluid during procedure and 2200cc post procedure.  - Unclear etiology, likely autoimmune vs. infectious vs. malignant  - drain set to wall suction, limit drainage to 1L/12hrs  - hold Lasix in setting of gout - New L pleural effusion on CT imaging s/p chest tube placement by IR draining 1000cc fluid during procedure and 2200cc post procedure.  - Unclear etiology, likely autoimmune vs. infectious vs. malignant  - drain set to wall suction, limit drainage to 1L/12hrs  - Pleural fluid- gram stain mod WBCs, no organisms seen  - hold Lasix in setting of gout - New L pleural effusion on CT imaging s/p chest tube placement by IR draining 1000cc fluid during procedure and 2200cc post procedure.  - Unclear etiology, likely autoimmune vs. infectious vs. malignant  - drain set to wall suction, limit drainage to 1L/12hrs  - Pleural fluid- gram stain mod WBCs, no organisms seen, exudative  - hold Lasix in setting of gout

## 2017-11-08 NOTE — PROGRESS NOTE ADULT - PROBLEM SELECTOR PLAN 1
- Patient w/ enlarging pericardial effusion over the course of 2-3 weeks w/ associated intermittent chest pain, fatigue, and cough.  Found to have moderate pericardial effusion without e/o tamponade as well as large L pleural effusion.   - Unclear etiology, likely autoimmune given elevated inflammatory markers (RF, dsDNA, PSA, ESR, CRP). Also likely malignant effusion given h/o renal tumor with new findings of LAD on CT. Less likely infectious as patient without s/s of infection.   - Echo performed w/ EF 55-60%, mild AI, trace MR/TR, moderate circumferential pericardial effusion, no echocardiographic evidence of tamponade. Fibrinous material consistent with inflammatory process vs coagulant is seen in the pericardial space.  - s/p pericardiocentesis draining 400cc serosanguinous fluid, now w bulb suction  - pericardial fluid sent for bacterial cx, fungal cx, AFB, cytology, gram stain, LDH, CHO, protein  - blood cxs with NGTD  - ID and rheumatology consulted  - IR following  - Dr. Sepulveda to see patient tomorrow; possible drain removal tomorrow  - HD monitoring in CCU - Patient w/ enlarging pericardial effusion over the course of 2-3 weeks w/ associated intermittent chest pain, fatigue, and cough.  Found to have moderate pericardial effusion without e/o tamponade as well as large L pleural effusion.   - Unclear etiology, likely autoimmune given elevated inflammatory markers (RF, dsDNA, PSA, ESR, CRP). Also likely malignant effusion given h/o renal tumor with new findings of LAD on CT. Less likely infectious as patient without s/s of infection.   - Echo performed w/ EF 55-60%, mild AI, trace MR/TR, moderate circumferential pericardial effusion, no echocardiographic evidence of tamponade. Fibrinous material consistent with inflammatory process vs coagulant is seen in the pericardial space.  - s/p pericardiocentesis draining 400cc serosanguinous fluid, now w bulb suction  - pericardial fluid sent for bacterial cx, fungal cx, AFB, cytology, gram stain, LDH, CHO, protein  - Pericardial fluid - gram stain mod WBCs, no organisms seen  - blood cxs with NGTD  - ID and rheumatology consulted  - IR following  - Dr. Sepulveda to see patient tomorrow; possible drain removal tomorrow  - HD monitoring in CCU - Patient w/ enlarging pericardial effusion over the course of 2-3 weeks w/ associated intermittent chest pain, fatigue, and cough.  Found to have moderate pericardial effusion without e/o tamponade as well as large L pleural effusion.   - Unclear etiology, likely autoimmune given elevated inflammatory markers (RF, dsDNA, PSA, ESR, CRP). Also likely malignant effusion given h/o renal tumor with new findings of LAD on CT. Less likely infectious as patient without s/s of infection, however cannot r/o.    - Echo performed w/ EF 55-60%, mild AI, trace MR/TR, moderate circumferential pericardial effusion, no echocardiographic evidence of tamponade. Fibrinous material consistent with inflammatory process vs coagulant is seen in the pericardial space.  - s/p pericardiocentesis draining 400cc serosanguinous fluid, now w bulb suction  - pericardial fluid sent for bacterial cx, fungal cx, AFB, cytology, gram stain, LDH, CHO, protein  - Pericardial fluid - gram stain mod WBCs, no organisms seen, exudative   - blood cxs with NGTD  - f/u serum, urine and stool studies for infectious etiologies  - ID and rheumatology consulted  - IR following  - Dr. Sepulveda to see patient tomorrow; possible drain removal tomorrow  - HD monitoring in CCU

## 2017-11-08 NOTE — PROGRESS NOTE ADULT - PROBLEM SELECTOR PLAN 4
C/o R big toe pain on day of admission. Reports slipping into a ditch 3 days ago. On exam, toe mildly erythematous, tender on palpation/movement, mildly swollen.  -Xray neg for Fx  -Uric acid elevated 12.6  -Pain controlled with Tylenol  -To start Colchicine this evening

## 2017-11-08 NOTE — PROGRESS NOTE ADULT - ASSESSMENT
71 yo M with PMH of morbid obesity, ?CHF, unprovoked DVT/PE (2015; switched from pradaxa to eliquis 1mo ago), HTN, ADAM (CPAP) and recent ECHO findings of pericardial effusion 2 weeks ago (with Dr. Ramirez) who presented to outpatient cardiologist (Dr. Ramirez) w/ dyspnea, fatigue, and cough, found to have enlargement of pericardial effusion, sent to ED, found to have new L pleural effusion, new onset Aflutter now s/p L sided chest tube and pericardial drain placement by IR.

## 2017-11-08 NOTE — PROGRESS NOTE ADULT - PROBLEM SELECTOR PLAN 2
New L pleural effusion undergoing chest tube placement by IR.  -Karl d/c;ed per Dr Ramirez in setting of possible gout (uric acid elevated 12.6), pt does not appear to be acutely fluid overloaded.  -patient satting well on room air  -CXR noted large L pleural effusion

## 2017-11-08 NOTE — PROGRESS NOTE ADULT - PROBLEM SELECTOR PLAN 6
-c/w coreg 12.5mg bid -holding Eliquis 2/2 possible pericardiocentesis tomorrow, started Heparin drip  -SCD's

## 2017-11-08 NOTE — PROGRESS NOTE ADULT - PROBLEM SELECTOR PLAN 3
- New onset AFlutter likely in setting of pericardial effusion  - EKG with variable 3:1 and 4:1 Aflutter with HR 110s  - c/w Carvedilol 12.5mg BID for rate control  - restarted heparin gtt post procedure; resume Eliquis in the future  - daily EKGs

## 2017-11-08 NOTE — PROGRESS NOTE ADULT - PROBLEM SELECTOR PLAN 8
N: NPO for MN for possible procedures; DASH/TLC diet  E: replete lytes prn K<4, Mg<2  P: DVT PPX: on Heparin drip  C: FULL CODE -CPAP o/n

## 2017-11-08 NOTE — CONSULT NOTE ADULT - ATTENDING COMMENTS
I have reviewed the medical record, including laboratory and radiographic studies, examined the patient and discussed the plan with Dr. Gracia, the ID Resident.  Agree with above.  The etiology for his pericardial and pleural effusions is not obvious.  Will follow with you – ID service.

## 2017-11-08 NOTE — PROGRESS NOTE ADULT - ASSESSMENT
Patient is a 71 YO obese male who has an autoimmune process with + RF, + DNA + CRP and high uric acid along with new episode of gout in RT 1 st MTP He has significant pericardial effusion which has been sent for analysis and cultures.  He has PMH ?CHF (as per Dr. Ramirez's note, possible bout of mild CHF in July w/ dyspnea and ankle swelling, resolved w/ lasix; TTE in July w/ trace/small pericardial effusion), unprovoked DVT/PE (2015; switched from pradaxa to eliquis 1mo ago), HTN, ADAM (CPAP) presenting w/ dyspnea, fatigue, low grade temp, and cough and found w/ enlargement of pericardial effusion, new L pleural effusion, and new onset aflutter now s/p L sided chest tube and pericardial drain placement by IR       Reviewed with HO as repeat serologies will be drawn and start him on Colchicine  0.6 mg bid as renal function has improved

## 2017-11-08 NOTE — PROGRESS NOTE ADULT - PROBLEM SELECTOR PLAN 9
D/c pending clinical progress N: NPO for MN for possible procedures; DASH/TLC diet  E: replete lytes prn K<4, Mg<2  P: DVT PPX: on Heparin drip  C: FULL CODE

## 2017-11-08 NOTE — PROGRESS NOTE ADULT - PROBLEM SELECTOR PLAN 3
New onset, likely 2/2 pericardial dz; variable 3:1 and 4:1 typical aflutter on ECG  -c/w coreg 12.5 bid  -Eliquis on hold  -Started Heparin drip, currently on hold for procedure, resume when ok with IR, monitor PTT/CBC closely

## 2017-11-09 LAB
ANION GAP SERPL CALC-SCNC: 15 MMOL/L — SIGNIFICANT CHANGE UP (ref 5–17)
APTT BLD: 50.6 SEC — HIGH (ref 27.5–37.4)
APTT BLD: 70.9 SEC — HIGH (ref 27.5–37.4)
APTT BLD: 93.9 SEC — HIGH (ref 27.5–37.4)
BUN SERPL-MCNC: 24 MG/DL — HIGH (ref 7–23)
C3 SERPL-MCNC: 132 MG/DL — SIGNIFICANT CHANGE UP (ref 80–180)
C4 SERPL-MCNC: 28 MG/DL — SIGNIFICANT CHANGE UP (ref 10–45)
CALCIUM SERPL-MCNC: 8.6 MG/DL — SIGNIFICANT CHANGE UP (ref 8.4–10.5)
CHLORIDE SERPL-SCNC: 98 MMOL/L — SIGNIFICANT CHANGE UP (ref 96–108)
CMV IGG FLD QL: <0.2 U/ML — SIGNIFICANT CHANGE UP
CMV IGG SERPL-IMP: NEGATIVE — SIGNIFICANT CHANGE UP
CMV IGM FLD-ACNC: <8 AU/ML — SIGNIFICANT CHANGE UP
CMV IGM SERPL QL: NEGATIVE — SIGNIFICANT CHANGE UP
CO2 SERPL-SCNC: 23 MMOL/L — SIGNIFICANT CHANGE UP (ref 22–31)
CREAT SERPL-MCNC: 1.11 MG/DL — SIGNIFICANT CHANGE UP (ref 0.5–1.3)
EBV EA AB SER IA-ACNC: 18.8 U/ML — HIGH
EBV EA AB TITR SER IF: POSITIVE
EBV EA IGG SER-ACNC: POSITIVE
EBV NA IGG SER IA-ACNC: >600 U/ML — HIGH
EBV PATRN SPEC IB-IMP: SIGNIFICANT CHANGE UP
EBV VCA IGG AVIDITY SER QL IA: POSITIVE
EBV VCA IGM SER IA-ACNC: <10 U/ML — SIGNIFICANT CHANGE UP
EBV VCA IGM SER IA-ACNC: >750 U/ML — HIGH
EBV VCA IGM TITR FLD: NEGATIVE — SIGNIFICANT CHANGE UP
GLUCOSE SERPL-MCNC: 123 MG/DL — HIGH (ref 70–99)
HCT VFR BLD CALC: 39.8 % — SIGNIFICANT CHANGE UP (ref 39–50)
HGB BLD-MCNC: 13.2 G/DL — SIGNIFICANT CHANGE UP (ref 13–17)
LDH SERPL L TO P-CCNC: 299 U/L — SIGNIFICANT CHANGE UP
LEGIONELLA AG UR QL: NEGATIVE — SIGNIFICANT CHANGE UP
LYME C6 AB IGG/IGM EIA REFLEX WESTERN BL: SIGNIFICANT CHANGE UP
MAGNESIUM SERPL-MCNC: 2.1 MG/DL — SIGNIFICANT CHANGE UP (ref 1.6–2.6)
MCHC RBC-ENTMCNC: 29.8 PG — SIGNIFICANT CHANGE UP (ref 27–34)
MCHC RBC-ENTMCNC: 33.2 G/DL — SIGNIFICANT CHANGE UP (ref 32–36)
MCV RBC AUTO: 89.8 FL — SIGNIFICANT CHANGE UP (ref 80–100)
NIGHT BLUE STAIN TISS: SIGNIFICANT CHANGE UP
NON-GYNECOLOGICAL CYTOLOGY STUDY: SIGNIFICANT CHANGE UP
NON-GYNECOLOGICAL CYTOLOGY STUDY: SIGNIFICANT CHANGE UP
PLATELET # BLD AUTO: 373 K/UL — SIGNIFICANT CHANGE UP (ref 150–400)
POTASSIUM SERPL-MCNC: 4 MMOL/L — SIGNIFICANT CHANGE UP (ref 3.5–5.3)
POTASSIUM SERPL-SCNC: 4 MMOL/L — SIGNIFICANT CHANGE UP (ref 3.5–5.3)
RBC # BLD: 4.43 M/UL — SIGNIFICANT CHANGE UP (ref 4.2–5.8)
RBC # FLD: 13.8 % — SIGNIFICANT CHANGE UP (ref 10.3–16.9)
SODIUM SERPL-SCNC: 136 MMOL/L — SIGNIFICANT CHANGE UP (ref 135–145)
SPECIMEN SOURCE: SIGNIFICANT CHANGE UP
T GONDII IGG SER QL: <3 IU/ML — SIGNIFICANT CHANGE UP
T GONDII IGG SER QL: NEGATIVE — SIGNIFICANT CHANGE UP
T GONDII IGM SER QL: <3 AU/ML — SIGNIFICANT CHANGE UP
T GONDII IGM SER QL: NEGATIVE — SIGNIFICANT CHANGE UP
WBC # BLD: 10.8 K/UL — HIGH (ref 3.8–10.5)
WBC # FLD AUTO: 10.8 K/UL — HIGH (ref 3.8–10.5)

## 2017-11-09 PROCEDURE — 93010 ELECTROCARDIOGRAM REPORT: CPT

## 2017-11-09 PROCEDURE — 99291 CRITICAL CARE FIRST HOUR: CPT

## 2017-11-09 PROCEDURE — 99232 SBSQ HOSP IP/OBS MODERATE 35: CPT | Mod: GC

## 2017-11-09 PROCEDURE — 71010: CPT | Mod: 26

## 2017-11-09 RX ORDER — HEPARIN SODIUM 5000 [USP'U]/ML
2850 INJECTION INTRAVENOUS; SUBCUTANEOUS
Qty: 25000 | Refills: 0 | Status: DISCONTINUED | OUTPATIENT
Start: 2017-11-09 | End: 2017-11-10

## 2017-11-09 RX ORDER — HEPARIN SODIUM 5000 [USP'U]/ML
2950 INJECTION INTRAVENOUS; SUBCUTANEOUS
Qty: 25000 | Refills: 0 | Status: DISCONTINUED | OUTPATIENT
Start: 2017-11-09 | End: 2017-11-09

## 2017-11-09 RX ADMIN — Medication 650 MILLIGRAM(S): at 03:00

## 2017-11-09 RX ADMIN — CARVEDILOL PHOSPHATE 12.5 MILLIGRAM(S): 80 CAPSULE, EXTENDED RELEASE ORAL at 21:48

## 2017-11-09 RX ADMIN — Medication 0.6 MILLIGRAM(S): at 05:26

## 2017-11-09 RX ADMIN — HEPARIN SODIUM 28.5 UNIT(S)/HR: 5000 INJECTION INTRAVENOUS; SUBCUTANEOUS at 20:19

## 2017-11-09 RX ADMIN — HEPARIN SODIUM 29.5 UNIT(S)/HR: 5000 INJECTION INTRAVENOUS; SUBCUTANEOUS at 13:05

## 2017-11-09 RX ADMIN — Medication 650 MILLIGRAM(S): at 02:20

## 2017-11-09 RX ADMIN — HEPARIN SODIUM 29.5 UNIT(S)/HR: 5000 INJECTION INTRAVENOUS; SUBCUTANEOUS at 18:37

## 2017-11-09 RX ADMIN — HEPARIN SODIUM 27.5 UNIT(S)/HR: 5000 INJECTION INTRAVENOUS; SUBCUTANEOUS at 00:30

## 2017-11-09 RX ADMIN — CARVEDILOL PHOSPHATE 12.5 MILLIGRAM(S): 80 CAPSULE, EXTENDED RELEASE ORAL at 10:39

## 2017-11-09 RX ADMIN — Medication 0.6 MILLIGRAM(S): at 18:13

## 2017-11-09 NOTE — PROGRESS NOTE ADULT - SUBJECTIVE AND OBJECTIVE BOX
73 YO obese male  who is noticing improvement since starting Colchicine in some of his symptoms but evaluation for SLE continues  with patiient having more of a malar rash and +DNA at high titers along with  an episode of pericarditis, pleurisy. His gout mostly effecting his  Lt MTP  that started on 11/6/17 is getting better  His brother has a hx for gout that requires treqtment. He also has abnormal serologies with a + RF, + DNA and Hx for unprovoked DVT/PE (2015; switched from pradaxa to eliquis 1mo ago), HTN, ADAM (CPAP), Renal tumor cryoablation by IR in 2014 presenting w/ dyspnea, fatigue, low grade temp, and cough and found w/ enlargement of pericardial effusion, new L pleural effusion, and new onset aflutter. Pt was noted with trace/small pericardial effusion on TTE in July. Pt was seen by Dr Ramirez 2 weeks ago w/ episode of CP 2wks ago, centrally located, dull, lasting 1 day, subsiding w/o intervention. W/u noted leukocytosis to 17; CT chest w/ significant pericardial effusion 1.7x1.4cm. F/u echo on 10/25 w/ moderate pericardial effusion (no tamponade) and ECG w/ NSR and 1st degree AV block; ECG/exam w/o indication of pericarditis. No tx provided. On f/u visit 11/6, pt now w/ dyspnea, fatigue, low grade temp, and occasional dry cough. Pt found w/ enlargement of pericardial effusion on echo (more debris noted), audible friction rub, L pleural effusion, and new aflutter w/ tachy to the 100's. Patient denies CP, ab pain, n/v, d/c, LE swelling, sick contacts, hx of trauma or known autoimmune dz. In the ED, T: 98.4, P: 95, BP: 124/76, RR: 18, O2: 94% RA.      Evaluation included CT surgery Dr Ace was consulted. Repeat echo performed noted EF 55-60%, moderate circumferential pericardial effusion, no echocardiographic evidence of tamponade, fibrinous material c/w inflammatory process vs coagulant. CT Chest/abd/pelvis performed noted large exudative pericardial effusion, moderate left pleural effusion with associated near complete collapse of the left lower lobe. Rheum factor, DS DNA Ab, PSA were elevated.  Dr Ramirez. 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 Of note, pt c/o R big toe pain w/ mild erythema/pain on palpation started on day of admission, Uric acid elevated 12.6. Pt underwent CT guided pericardial drain and L sided chest tube placed by IR on 11/8/17 and transferred to CCU for further monitoring and then had LT pleural fluid drainage under guidance with sample sent for analysis and culture      Subjective: Pt seen and examined at bedside. Reports feeling well. R great toe pain improving w/ Tylenol. Denies chest pain, sob.    Overnight Events: C/o R big toe pain, Xray neg Fx, Uric acid elevated -likely gout but hold off gout Tx (NSAID/steroid) for now. Lasix d/c'ed per Dr Ramirez.    ICU Vital Signs Last 24 Hrs  T(C): 36.7 (09 Nov 2017 17:08), Max: 37.2 (09 Nov 2017 06:34)  T(F): 98.1 (09 Nov 2017 17:08), Max: 98.9 (09 Nov 2017 06:34)  HR: 88 (09 Nov 2017 18:00) (68 - 88)  BP: 141/61 (09 Nov 2017 18:00) (95/66 - 141/61)  BP(mean): 96 (09 Nov 2017 18:00) (66 - 96)  ABP: --  ABP(mean): --  RR: 29 (09 Nov 2017 18:00) (19 - 29)  SpO2: 97% (09 Nov 2017 18:00) (92% - 99%)    PHYSICAL EXAM:    General: A/ox 3, No acute Distress, Obese with malar rash  Neck: Supple, NO JVD  Cardiac: S1 S2, No M/R/G, muffled heart sounds  Pulmonary: LLL decreased breath sounds, Breathing unlabored on RA, No Rhonchi/Rales/Wheezing  Abdomen: Soft, Non -tender, +BS x 4 quads  Extremities: No Rashes, No LE edema. C/o R great toe pain s/p slipping into a ditch 2-3 days ago, less tender on palpation,  lateral R great toe. family with hx for gout   Neuro: A/o x 3, No focal deficits        LABS:                          13.2   10.8  )-----------( 373      ( 09 Nov 2017 04:24 )             39.8     11-09    136  |  98  |  24<H>  ----------------------------<  123<H>  4.0   |  23  |  1.11    Ca    8.6      09 Nov 2017 04:24  Mg     2.1     11-09    TPro  6.5  /  Alb  3.3  /  TBili  0.5  /  DBili  <0.2  /  AST  12  /  ALT  12  /  AlkPhos  73  11-08      LIVER FUNCTIONS - ( 08 Nov 2017 06:54 )  Alb: 3.3 g/dL / Pro: 6.5 g/dL / ALK PHOS: 73 U/L / ALT: 12 U/L / AST: 12 U/L / GGT: x         PT/INR - ( 08 Nov 2017 06:54 )   PT: 16.9 sec;   INR: 1.51       No Known Allergies  MEDICATIONS  (STANDING):  carvedilol 12.5 milliGRAM(s) Oral every 12 hours  colchicine 0.6 milliGRAM(s) Oral every 12 hours  heparin  Infusion 2950 Unit(s)/Hr (29.5 mL/Hr) IV Continuous <Continuous>    MEDICATIONS  (PRN):  acetaminophen   Tablet. 650 milliGRAM(s) Oral every 6 hours PRN Moderate Pain (4 - 6)      DIAGNOSTIC TESTS:     Echo 11/7/17: Patient was in atrial flutter during the procedure. The left ventricular wall motion is normal.The overall left ventricular ejection fraction is probably normal.The left ventricular ejection fraction is estimated to be 55-60%The right ventricle is normal in size and function.The left atrial size is normal.Right atrial size is normal. Structurally normal aortic valve.There is mild aortic regurgitation. Structurally normal mitral valve.There is trace mitral regurgitation. Structurally normal tricuspid valve.There is trace tricuspid regurgitation. The pulmonary artery systolic pressure is estimated to be 25 mmHg. Structurally normal pulmonic valve.No pulmonic regurgitation noted. Moderate circumferential pericardial effusion. No echocardiographic evidence of tamponade. Fibrinous material consistent with inflammatory process vs coagulant is seen in the pericardial space.     CT Chest/Abd/pelvis w/ IV contrast 11/7/17:   1. Large exudative pericardial effusion.  2. Moderate left pleural effusion with associated near complete collapse of the left lower lobe.  3. History of left renal malignancy status post cryoablation. No evidence of recurrent or residual disease.   4. A prominent left retroperitoneal lymph node is noted, which is new from the comparison MRI exam. Recommend continued surveillance with CT or   MR imaging.  5. Prostatomegaly.  6. Marked diastases of the rectus abdominis.

## 2017-11-09 NOTE — PROGRESS NOTE ADULT - PROBLEM SELECTOR PLAN 4
- patient c/o R big toe pain, found to have erythematous and swollen 1st digit. Reports h/o trauma 3days prior when he slipped in a ditch.  - Likely gout given elevated uric acid and negative imaging  - Xray negative for fracture-dislocation  - Uric acid elevated 12.6  - Tylenol PRN for pain (hold NSAIDs in setting of ARF)  - Colchicine 0.6mg BID  - rheumatology consulted

## 2017-11-09 NOTE — PROGRESS NOTE ADULT - PROBLEM SELECTOR PLAN 1
- Patient w/ enlarging pericardial effusion over the course of 2-3 weeks w/ associated intermittent chest pain, fatigue, and cough.  Found to have moderate pericardial effusion without e/o tamponade as well as large L pleural effusion.   - Unclear etiology, likely autoimmune given elevated inflammatory markers (RF, dsDNA, PSA, ESR, CRP). Also likely malignant effusion given h/o renal tumor with new findings of LAD on CT. Less likely infectious as patient without s/s of infection, however cannot r/o.    - Echo performed w/ EF 55-60%, mild AI, trace MR/TR, moderate circumferential pericardial effusion, no echocardiographic evidence of tamponade. Fibrinous material consistent with inflammatory process vs coagulant is seen in the pericardial space.  - s/p pericardiocentesis draining 400cc serosanguinous fluid, now w bulb suction  - Pericardial fluid - gram stain mod WBCs, no organisms seen, no growth to date, exudative based on Light's criteria  - f/u serum, urine and stool studies for infectious etiologies  - Infectious labs thus far: HIV negative, RVP negative  - blood cxs with NGTD  - ID and rheumatology consulted  - IR following  - Dr. Sepulveda to see patient tomorrow; possible drain removal tomorrow  - HD monitoring in CCU - Patient w/ enlarging pericardial effusion over the course of 2-3 weeks w/ associated intermittent chest pain, fatigue, and cough.  Found to have moderate pericardial effusion without e/o tamponade as well as large L pleural effusion.   - Unclear etiology, likely autoimmune given elevated inflammatory markers (RF, dsDNA, PSA, ESR, CRP). Also likely malignant effusion given h/o renal tumor with new findings of LAD on CT. Less likely infectious as patient without s/s of infection, however cannot r/o.    - Echo performed w/ EF 55-60%, mild AI, trace MR/TR, moderate circumferential pericardial effusion, no echocardiographic evidence of tamponade. Fibrinous material consistent with inflammatory process vs coagulant is seen in the pericardial space.  - s/p pericardiocentesis draining 400cc serosanguinous fluid, now w bulb suction total fluid 85cc since procedure  - Pericardial fluid - gram stain mod WBCs, no organisms seen, no growth to date, exudative based on Light's criteria  - f/u serum, urine and stool studies for infectious etiologies  - Infectious labs thus far: HIV negative, RVP negative  - blood cxs with NGTD  - ID and rheumatology consulted  - IR following  - Dr. Sepulveda to see patient tomorrow; possible drain removal tomorrow  - HD monitoring in CCU - Patient w/ enlarging pericardial effusion over the course of 2-3 weeks w/ associated intermittent chest pain, fatigue, and cough.  Found to have moderate pericardial effusion without e/o tamponade as well as large L pleural effusion.   - Unclear etiology, likely autoimmune given elevated inflammatory markers (RF, dsDNA, PSA, ESR, CRP). Other etiologies including malignant effusion given h/o renal tumor with new findings of LAD on CT. Also likely infectious although without s/s of infection, however cannot r/o.    - Echo performed w/ EF 55-60%, mild AI, trace MR/TR, moderate circumferential pericardial effusion, no echocardiographic evidence of tamponade. Fibrinous material consistent with inflammatory process vs coagulant is seen in the pericardial space.  - s/p pericardiocentesis draining 400cc serosanguinous fluid, now w bulb suction total fluid 85cc since procedure  - Fluid analysis: gram stain mod WBCs, no organisms seen, no growth to date, exudative based on Light's criteria  - Cytopathology: negative for malignant cells  - f/u serum, urine and stool studies for infectious etiologies  - Infectious labs thus far: HIV negative, RVP negative, EBV IgG positive, IgM negative, Toxo negative, CMV negative, Legionella negative  - Autoimmune labs thus far: C3/C4 nl  - blood cxs with NGTD  - ID and rheumatology following  - IR following  - Dr. Sepulveda to see patient tomorrow; possible drain removal tomorrow  - HD monitoring in CCU

## 2017-11-09 NOTE — PROGRESS NOTE ADULT - PROBLEM SELECTOR PLAN 2
- New L pleural effusion on CT imaging s/p chest tube placement by IR draining 1000cc fluid during procedure and 2200cc post procedure.  - Unclear etiology, likely autoimmune vs. infectious vs. malignant  - drain set to wall suction, limit drainage to 1L/12hrs, drained 2L thus far  - Pleural fluid- gram stain mod WBCs, no organisms seen, no growth to date, exudative based on Light's criteria  - hold Lasix in setting of gout - New L pleural effusion on CT imaging s/p chest tube placement by IR draining 1000cc fluid during procedure and 2200cc post procedure.  - Unclear etiology, likely autoimmune vs. infectious vs. malignant  - drain set to wall suction, limit drainage to 1L/12hrs, drained 2L thus far  - Fluid analysis- gram stain mod WBCs, no organisms seen, no growth to date, exudative based on Light's criteria  - Fluid Cytopathology: no malignant cells  - hold Lasix in setting of gout  - f/u fluid, serum, unine and stool studies for infectious/autoimmune etiologies

## 2017-11-09 NOTE — PROGRESS NOTE ADULT - SUBJECTIVE AND OBJECTIVE BOX
seems OK  afebrile BP OK without diuretics  pleural tube unclamped during nite and drained 1 liter over 7 minutes then re-clamped  pericardial tube total of 40 cc's over last 12 hours-Is pleural fluid coming from pericardial space-ie spillover???  Yue fuentes/savannah to handle tubes  would try and get OOB  await cultures/serologies  clinicallly doubt that this is being driven by a collagen vascular disease-but would like to know what  rheumatology thinks-can't find that in his note  colchicine for gout

## 2017-11-09 NOTE — PROGRESS NOTE ADULT - PROBLEM SELECTOR PLAN 6
- patient with h/o unprovoked DVT/PE in 2015 on Eliquis (recently switched from Pradaxa 1mo ago)  - c/w heparin gtt   - SCDs    #History of Renal Tumor  - patient with 2mm nodule in Left kidney s/p cryoablation  with yearly follow-up surveillance studies  - CT imaging this admission with no recurrence or residual disease, but with prominent left retroperitoneal lymph node, which is new from comparison MRI  - continue with outpatient surveillance studies

## 2017-11-09 NOTE — PROGRESS NOTE ADULT - ASSESSMENT
Patient is a 71 YO obese male who has an autoimmune process with + RF, + DNA + CRP and high uric acid along with new episode of gout in RT 1 st MTP  improving with colchicine He has significant pericardial effusion which has been sent for analysis and cultures.  He has PMH ?CHF (as per Dr. Ramirez's note, possible bout of mild CHF in July w/ dyspnea and ankle swelling, resolved w/ lasix; TTE in July w/ trace/small pericardial effusion), unprovoked DVT/PE (2015; switched from pradaxa to eliquis 1mo ago), HTN, ADAM (CPAP) presenting w/ dyspnea, fatigue, low grade temp, and cough and found w/ enlargement of pericardial effusion, new L pleural effusion, and new onset aflutter now s/p L sided chest tube and pericardial drain placement by IR       Reviewed with HO as repeat serologies will be drawn awaiting C3,C4 studies with consideration of starting Plaquenil 200 mg bid and other remitive agent if needed. continue  Colchicine  0.6 mg bid as renal function has improved

## 2017-11-09 NOTE — PROGRESS NOTE ADULT - ASSESSMENT
71 y/o obese male w/ PMH ?CHF (as per Dr. Ramirez's note, possible bout of mild CHF in July w/ dyspnea and ankle swelling, resolved w/ lasix; TTE in July w/ trace/small pericardial effusion), unprovoked DVT/PE (2015; switched from pradaxa to eliquis 1mo ago), HTN, ADAM (CPAP), RCC s/p cryoablation 3-4 years ago, presenting w/ dull, upper chest/neck pain 2 weeks ago. Found to have enlarging pericardial effusion over the past 2 weeks without signs of tamponade, and large left sided pleural effusion. s/p pericardial drain and L sided chest tube 11/8.    #Pericardial and pleural effusion. Etiology unclear at this time, differential is broad. Given subacute progression, not likely an acute bacterial infection. Pt also without specific TB risk factors, but would exclude. 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 Viral and fungal etiologies also are possible. Pericardial and pleural effusions showing an exudative picture, gram stains are negative. Unclear if right joint pain is associated with acute process. Chlamydia psittasi infection is associated with chickens but does not typically cause this clinical picture.  - F/U cultures from IR procedure- bacterial, fungal, AFB, cyopathology have been received by lab, please ensure viral culture is specimen received as well (I called lab they have the add on)  - Hold off on antibiotics for now as gram stains are negative  - RVP negative, HIV negative, please follow up: EBV serology, CMV IgM/IgG, Coxsackie IgM/IgG, Brucella Ab screen, Q fever Ab screen, Lyme LAZARUS, Urine Histoplasma antigen, Urine Legionella Antigen, Coccidiodes IgG, Chlamydia serology, Quantiferon, Toxoplasmosis IgM/IgG, Stool culture for enteric pathogens    Above discussed with attending and primary team. Please also see attending attestation below for further recs.

## 2017-11-09 NOTE — PROGRESS NOTE ADULT - PROBLEM SELECTOR PLAN 3
- New onset AFlutter likely in setting of pericardial effusion  - EKG with variable 3:1 and 4:1 Aflutter with HR 110s  - c/w Carvedilol 12.5mg BID for rate control  - c/w heparin gtt with plan for transition back to Eliquis once stable  - daily EKGs

## 2017-11-09 NOTE — PROGRESS NOTE ADULT - SUBJECTIVE AND OBJECTIVE BOX
PGY1 CCU TRANSFER ACCEPTANCE NOTE:    HOSPITAL COURSE: 73 yo M with PMH morbid obesity, diastolic CHF (EF 55-60), unprovoked DVT/PE (2015; switched from Pradaxa to Eliquis 1mo ago), HTN, ADAM (CPAP), renal tumor cryoablation by IR in 2014, recent diagnosis of small pericardial effusion by TTE 2weeks prior (by outpatient cardiologist, Dr. Ramirez) who was sent to ED after being found to have enlarged, now moderately sized, pericardial effusion. Patient saw Dr. Ramirez 2 weeks prior for dull, centrally located and positional chest pain and found to have small pericardial effusion by TTE. Noted also to have leukocytosis to 17; CT chest w/ significant pericardial effusion 1.7x1.4cm.  F/u TTE one week later with moderate pericardial effusion (no tamponade) and ECG w/ NSR and 1st degree AV block. Patient went back to Dr. Ramirez on Monday (11/6) for worsened symptoms and generalized malaise. Repeat TTE with enlargement of pericardial effusion and patient sent to ED. On admission, T: 98.4, P: 95, BP: 124/76, RR: 18, O2: 94% RA. Patient found to have large L pleural effusion and EKG with AFlutter to HR 110s. Patient admitted to tele unit for further w/u. CT surgery Dr Ace was consulted. Repeat ECHO performed with EF 55-60%, moderate circumferential pericardial effusion, no echocardiographic evidence of tamponade, fibrinous material c/w inflammatory process vs coagulant. CT Chest/abd/pelvis performed noted large exudative pericardial effusion, moderate left pleural effusion with associated near complete collapse of the left lower lobe; no recurrence of L renal mass however new prominent left retroperitoneal lymph node. Rheumatologic labs ordered showing elevated RF, dsDNA, PSA, CRP, ESR. ID consulted for possible infectious etiology of pericardial/pleural effusions. Of note, pt c/o R big toe pain w/ mild erythema/pain on palpation started on day of admission, with elevated uric acid levels likely representing gout. Pt underwent CT guided pericardial drain and L sided chest tube placed by IR on 11/8/17 and transferred to CCU for further monitoring.     OVERNIGHT EVENTS: RVP negative. Increased heparin rate based on subtherapeutic PTT. Chest tube was unclamped at 2:15AM and drained 1L immediately, reclamped at that time. Repeat PTT therapeutic.       SUBJECTIVE / INTERVAL HPI: Patient seen and examined at bedside.     VITAL SIGNS:  Vital Signs Last 24 Hrs  T(C): 36.9 (09 Nov 2017 01:29), Max: 36.9 (09 Nov 2017 01:29)  T(F): 98.5 (09 Nov 2017 01:29), Max: 98.5 (09 Nov 2017 01:29)  HR: 78 (09 Nov 2017 06:00) (68 - 92)  BP: 128/60 (09 Nov 2017 06:00) (103/54 - 145/53)  BP(mean): 92 (09 Nov 2017 06:00) (70 - 103)  RR: 20 (09 Nov 2017 06:00) (14 - 28)  SpO2: 94% (09 Nov 2017 06:00) (92% - 98%)    PHYSICAL EXAM:  General: morbidly obese, resting comfortable, NAD  HEENT: NC/AT, PERRL, anicteric sclera, MMM, no pharyngeal erythema  Neck: supple, no LAD, no JVD  Cardiovascular: distant heart sounds 2/2 body habitus, +S1/S2, regular rhythm, no murmurs, no rubs, drain in place without surrounding erythema, edema or fluctuance  Respiratory: normal respiratory effort, shallow breaths 2/2 pain, decreased breath sounds at L lung base, no W/R/R, no retractions, able to speak in complete sentences, L drain in place draining red tinged fluid, no surrounding erythema, edema or fluctuance  Gastrointestinal: obese, soft, NT/ND; +BSx4  Extremities: WWP; warm, erythematous and edematous R 1st digit, +TTP  and pain with movement, no LE edema, no clubbing or cyanosis  Vascular: 2+ radial, DP/PT pulses B/L  Neurological: AAOx3; no focal deficits    MEDICATIONS:  MEDICATIONS  (STANDING):  carvedilol 12.5 milliGRAM(s) Oral every 12 hours  colchicine 0.6 milliGRAM(s) Oral every 12 hours  heparin  Infusion 2750 Unit(s)/Hr (27.5 mL/Hr) IV Continuous <Continuous>    MEDICATIONS  (PRN):  acetaminophen   Tablet. 650 milliGRAM(s) Oral every 6 hours PRN Moderate Pain (4 - 6)    ALLERGIES:  No Known Allergies      LABS:                                    13.2   10.8  )-----------( 373      ( 09 Nov 2017 04:24 )             39.8     11-09    136  |  98  |  24<H>  ----------------------------<  123<H>  4.0   |  23  |  1.11    Ca    8.6      09 Nov 2017 04:24  Mg     2.1     11-09    TPro  6.5  /  Alb  3.3  /  TBili  0.5  /  DBili  <0.2  /  AST  12  /  ALT  12  /  AlkPhos  73  11-08 11-07-17 @ 07:01  -  11-08-17 @ 07:00  --------------------------------------------------------  IN: 175 mL / OUT: 775 mL / NET: -600 mL    11-08-17 @ 07:01  -  11-09-17 @ 06:22  --------------------------------------------------------  IN: 1342 mL / OUT: 1925 mL / NET: -583 mL        RADIOLOGY & ADDITIONAL TESTS: Reviewed.    ECHO: The left ventricular wall motion is normal. The overall left ventricular ejection fraction is probably normal. The left ventricular ejection fraction is estimated to be 55-60% The right ventricle is normal in size and function. The left atrial size is normal. Right atrial size is normal. Structurally normal aortic valve. There is mild aortic regurgitation. Structurally normal mitral valve. There is trace mitral regurgitation. Structurally normal tricuspid valve. There is trace tricuspid regurgitation. The pulmonary artery systolic pressure is estimated to be 25 mmHg. Structurally normal pulmonic valve. No pulmonic regurgitation noted. Moderate circumferential pericardial effusion. No echocardiographic evidence of tamponade. Fibrinous material consistent with inflammatory process vs coagulant is seen in the pericardial space.    CT chest/Abdomen/Pelvis w contrast: Large exudative pericardial effusion, Moderate left pleural effusion with associated near complete collapse of the left lower lobe. History of left renal malignancy status post cryoablation. No evidence of recurrent or residual disease. A prominent left retroperitoneal lymph node is noted, which is new from the comparison MRI exam. Recommend continued surveillance with CT or MR imaging. Prostatomegaly. Marked diastases of the rectus abdominis. PGY1 CCU TRANSFER ACCEPTANCE NOTE:    HOSPITAL COURSE: 71 yo M with PMH morbid obesity, diastolic CHF (EF 55-60), unprovoked DVT/PE (2015; switched from Pradaxa to Eliquis 1mo ago), HTN, ADAM (CPAP), renal tumor cryoablation by IR in 2014, recent diagnosis of small pericardial effusion by TTE 2weeks prior (by outpatient cardiologist, Dr. Ramirez) who was sent to ED after being found to have enlarged, now moderately sized, pericardial effusion. Patient saw Dr. Ramirez 2 weeks prior for dull, centrally located and positional chest pain and found to have small pericardial effusion by TTE. Noted also to have leukocytosis to 17; CT chest w/ significant pericardial effusion 1.7x1.4cm.  F/u TTE one week later with moderate pericardial effusion (no tamponade) and ECG w/ NSR and 1st degree AV block. Patient went back to Dr. Ramirez on Monday (11/6) for worsened symptoms and generalized malaise. Repeat TTE with enlargement of pericardial effusion and patient sent to ED. On admission, T: 98.4, P: 95, BP: 124/76, RR: 18, O2: 94% RA. Patient found to have large L pleural effusion and EKG with AFlutter to HR 110s. Patient admitted to tele unit for further w/u. CT surgery Dr Ace was consulted. Repeat ECHO performed with EF 55-60%, moderate circumferential pericardial effusion, no echocardiographic evidence of tamponade, fibrinous material c/w inflammatory process vs coagulant. CT Chest/abd/pelvis performed noted large exudative pericardial effusion, moderate left pleural effusion with associated near complete collapse of the left lower lobe; no recurrence of L renal mass however new prominent left retroperitoneal lymph node. Rheumatologic labs ordered showing elevated RF, dsDNA, PSA, CRP, ESR. ID consulted for possible infectious etiology of pericardial/pleural effusions. Of note, pt c/o R big toe pain w/ mild erythema/pain on palpation started on day of admission, with elevated uric acid levels likely representing gout. Pt underwent CT guided pericardial drain and L sided chest tube placed by IR on 11/8/17 and transferred to CCU for further monitoring.     OVERNIGHT EVENTS: RVP negative. Increased heparin rate based on subtherapeutic PTT. Chest tube was unclamped at 2:15AM and drained 1L immediately, reclamped at that time. Repeat PTT therapeutic.       SUBJECTIVE / INTERVAL HPI: Pericardial drain with 85cc total over last 24 hrs. Pleural drain with 2000cc since Patient seen and examined at bedside. Patient states that chest discomfort has resolved since yesterday. Patient still with right big toe pain on movement. States that the toe pain woke him up a few times overnight. Patient without other complaints today. Denies chest pain, chest tightness, SOB, abdominal pain, n/v/d/c, dizziness, lightheadedness.     VITAL SIGNS:  Vital Signs Last 24 Hrs  T(C): 36.9 (09 Nov 2017 01:29), Max: 36.9 (09 Nov 2017 01:29)  T(F): 98.5 (09 Nov 2017 01:29), Max: 98.5 (09 Nov 2017 01:29)  HR: 78 (09 Nov 2017 06:00) (68 - 92)  BP: 128/60 (09 Nov 2017 06:00) (103/54 - 145/53)  BP(mean): 92 (09 Nov 2017 06:00) (70 - 103)  RR: 20 (09 Nov 2017 06:00) (14 - 28)  SpO2: 94% (09 Nov 2017 06:00) (92% - 98%)    PHYSICAL EXAM:  General: morbidly obese, resting comfortable, NAD  HEENT: NC/AT, PERRL, anicteric sclera, MMM, no pharyngeal erythema  Neck: supple, no LAD, no JVD  Cardiovascular: distant heart sounds 2/2 body habitus, +S1/S2, regular rhythm, no murmurs, no rubs, drain in place without surrounding erythema, edema or fluctuance  Respiratory: normal respiratory effort, shallow breaths 2/2 pain, decreased breath sounds at L lung base, no W/R/R, no retractions, able to speak in complete sentences, L drain in place draining red tinged fluid, no surrounding erythema, edema or fluctuance  Gastrointestinal: obese, soft, NT/ND; +BSx4  Extremities: WWP; warm, erythematous and edematous R 1st digit, +TTP  and pain with movement, no LE edema, no clubbing or cyanosis  Vascular: 2+ radial, DP/PT pulses B/L  Neurological: AAOx3; no focal deficits    MEDICATIONS:  MEDICATIONS  (STANDING):  carvedilol 12.5 milliGRAM(s) Oral every 12 hours  colchicine 0.6 milliGRAM(s) Oral every 12 hours  heparin  Infusion 2750 Unit(s)/Hr (27.5 mL/Hr) IV Continuous <Continuous>    MEDICATIONS  (PRN):  acetaminophen   Tablet. 650 milliGRAM(s) Oral every 6 hours PRN Moderate Pain (4 - 6)    ALLERGIES:  No Known Allergies      LABS:                                    13.2   10.8  )-----------( 373      ( 09 Nov 2017 04:24 )             39.8     11-09    136  |  98  |  24<H>  ----------------------------<  123<H>  4.0   |  23  |  1.11    Ca    8.6      09 Nov 2017 04:24  Mg     2.1     11-09    TPro  6.5  /  Alb  3.3  /  TBili  0.5  /  DBili  <0.2  /  AST  12  /  ALT  12  /  AlkPhos  73  11-08 11-07-17 @ 07:01  -  11-08-17 @ 07:00  --------------------------------------------------------  IN: 175 mL / OUT: 775 mL / NET: -600 mL    11-08-17 @ 07:01  -  11-09-17 @ 06:22  --------------------------------------------------------  IN: 1342 mL / OUT: 1925 mL / NET: -583 mL        RADIOLOGY & ADDITIONAL TESTS: Reviewed.    ECHO: The left ventricular wall motion is normal. The overall left ventricular ejection fraction is probably normal. The left ventricular ejection fraction is estimated to be 55-60% The right ventricle is normal in size and function. The left atrial size is normal. Right atrial size is normal. Structurally normal aortic valve. There is mild aortic regurgitation. Structurally normal mitral valve. There is trace mitral regurgitation. Structurally normal tricuspid valve. There is trace tricuspid regurgitation. The pulmonary artery systolic pressure is estimated to be 25 mmHg. Structurally normal pulmonic valve. No pulmonic regurgitation noted. Moderate circumferential pericardial effusion. No echocardiographic evidence of tamponade. Fibrinous material consistent with inflammatory process vs coagulant is seen in the pericardial space.    CT chest/Abdomen/Pelvis w contrast: Large exudative pericardial effusion, Moderate left pleural effusion with associated near complete collapse of the left lower lobe. History of left renal malignancy status post cryoablation. No evidence of recurrent or residual disease. A prominent left retroperitoneal lymph node is noted, which is new from the comparison MRI exam. Recommend continued surveillance with CT or MR imaging. Prostatomegaly. Marked diastases of the rectus abdominis. PGY1 CCU TRANSFER ACCEPTANCE NOTE:    HOSPITAL COURSE: 73 yo M with PMH morbid obesity, diastolic CHF (EF 55-60), unprovoked DVT/PE (2015; switched from Pradaxa to Eliquis 1mo ago), HTN, ADAM (CPAP), renal tumor cryoablation by IR in 2014, recent diagnosis of small pericardial effusion by TTE 2weeks prior (by outpatient cardiologist, Dr. Ramirez) who was sent to ED after being found to have enlarged, now moderately sized, pericardial effusion. Patient saw Dr. Ramirez 2 weeks prior for dull, centrally located and positional chest pain and found to have small pericardial effusion by TTE. Noted also to have leukocytosis to 17; CT chest w/ significant pericardial effusion 1.7x1.4cm.  F/u TTE one week later with moderate pericardial effusion (no tamponade) and ECG w/ NSR and 1st degree AV block. Patient went back to Dr. Ramirez on Monday (11/6) for worsened symptoms and generalized malaise. Repeat TTE with enlargement of pericardial effusion and patient sent to ED. On admission, T: 98.4, P: 95, BP: 124/76, RR: 18, O2: 94% RA. Patient found to have large L pleural effusion and EKG with AFlutter to HR 110s. Patient admitted to tele unit for further w/u. CT surgery Dr Ace was consulted. Repeat ECHO performed with EF 55-60%, moderate circumferential pericardial effusion, no echocardiographic evidence of tamponade, fibrinous material c/w inflammatory process vs coagulant. CT Chest/abd/pelvis performed noted large exudative pericardial effusion, moderate left pleural effusion with associated near complete collapse of the left lower lobe; no recurrence of L renal mass however new prominent left retroperitoneal lymph node. Rheumatologic labs ordered showing elevated RF, dsDNA, PSA, CRP, ESR. ID and rheumatology consulted for possible infectious/autoimmune etiology of pericardial/pleural effusions. Pt also developed new onset R big toe pain w/ mild erythema/pain on palpation, found to have elevated uric acid levels likely representing gout. Pt underwent CT guided pericardial drain and L sided chest tube placed by IR on 11/8/17 and transferred to CCU for further monitoring. Patient started on heparin gtt for AFlutter and h/o DVT/PE with plan to switch to Eliquis once tubes removed. Pleural and pericardial fluid exudative based on Light's criteria. Rheumatologic and infectious disease labs were sent. Patient started on Colchicine for gout and pleurisy. Patient with improvement in pleurisy and HD stable, still in AFlutter with HR 80-90s. Pericardial drain with 85cc and chest tube with 2500cc over 48hrs, will minimal drainage at the moment. Labs thus far with positive EBV titers.     OVERNIGHT EVENTS: RVP negative. Increased heparin rate based on subtherapeutic PTT. Chest tube was unclamped at 2:15AM and drained 1L immediately, reclamped at that time. Repeat PTT therapeutic.       SUBJECTIVE / INTERVAL HPI: Pericardial drain with 85cc total over last 24 hrs. Pleural drain with 2000cc since Patient seen and examined at bedside. Patient states that chest discomfort has resolved since yesterday. Patient still with right big toe pain on movement. States that the toe pain woke him up a few times overnight. Patient without other complaints today. Denies chest pain, chest tightness, SOB, abdominal pain, n/v/d/c, dizziness, lightheadedness.     VITAL SIGNS:  Vital Signs Last 24 Hrs  T(C): 36.9 (09 Nov 2017 01:29), Max: 36.9 (09 Nov 2017 01:29)  T(F): 98.5 (09 Nov 2017 01:29), Max: 98.5 (09 Nov 2017 01:29)  HR: 78 (09 Nov 2017 06:00) (68 - 92)  BP: 128/60 (09 Nov 2017 06:00) (103/54 - 145/53)  BP(mean): 92 (09 Nov 2017 06:00) (70 - 103)  RR: 20 (09 Nov 2017 06:00) (14 - 28)  SpO2: 94% (09 Nov 2017 06:00) (92% - 98%)    PHYSICAL EXAM:  General: morbidly obese, resting comfortable, NAD  HEENT: NC/AT, PERRL, anicteric sclera, MMM, no pharyngeal erythema  Neck: supple, no LAD, no JVD  Cardiovascular: distant heart sounds 2/2 body habitus, +S1/S2, regular rhythm, no murmurs, no rubs, drain in place without surrounding erythema, edema or fluctuance  Respiratory: normal respiratory effort, shallow breaths 2/2 pain, decreased breath sounds at L lung base, no W/R/R, no retractions, able to speak in complete sentences, L drain in place draining red tinged fluid, no surrounding erythema, edema or fluctuance  Gastrointestinal: obese, soft, NT/ND; +BSx4  Extremities: WWP; warm, erythematous and edematous R 1st digit, +TTP  and pain with movement, no LE edema, no clubbing or cyanosis  Vascular: 2+ radial, DP/PT pulses B/L  Neurological: AAOx3; no focal deficits    MEDICATIONS:  MEDICATIONS  (STANDING):  carvedilol 12.5 milliGRAM(s) Oral every 12 hours  colchicine 0.6 milliGRAM(s) Oral every 12 hours  heparin  Infusion 2750 Unit(s)/Hr (27.5 mL/Hr) IV Continuous <Continuous>    MEDICATIONS  (PRN):  acetaminophen   Tablet. 650 milliGRAM(s) Oral every 6 hours PRN Moderate Pain (4 - 6)    ALLERGIES:  No Known Allergies      LABS:                                    13.2   10.8  )-----------( 373      ( 09 Nov 2017 04:24 )             39.8     11-09    136  |  98  |  24<H>  ----------------------------<  123<H>  4.0   |  23  |  1.11    Ca    8.6      09 Nov 2017 04:24  Mg     2.1     11-09    TPro  6.5  /  Alb  3.3  /  TBili  0.5  /  DBili  <0.2  /  AST  12  /  ALT  12  /  AlkPhos  73  11-08 11-07-17 @ 07:01  -  11-08-17 @ 07:00  --------------------------------------------------------  IN: 175 mL / OUT: 775 mL / NET: -600 mL    11-08-17 @ 07:01  -  11-09-17 @ 06:22  --------------------------------------------------------  IN: 1342 mL / OUT: 1925 mL / NET: -583 mL        RADIOLOGY & ADDITIONAL TESTS: Reviewed.    ECHO: The left ventricular wall motion is normal. The overall left ventricular ejection fraction is probably normal. The left ventricular ejection fraction is estimated to be 55-60% The right ventricle is normal in size and function. The left atrial size is normal. Right atrial size is normal. Structurally normal aortic valve. There is mild aortic regurgitation. Structurally normal mitral valve. There is trace mitral regurgitation. Structurally normal tricuspid valve. There is trace tricuspid regurgitation. The pulmonary artery systolic pressure is estimated to be 25 mmHg. Structurally normal pulmonic valve. No pulmonic regurgitation noted. Moderate circumferential pericardial effusion. No echocardiographic evidence of tamponade. Fibrinous material consistent with inflammatory process vs coagulant is seen in the pericardial space.    CT chest/Abdomen/Pelvis w contrast: Large exudative pericardial effusion, Moderate left pleural effusion with associated near complete collapse of the left lower lobe. History of left renal malignancy status post cryoablation. No evidence of recurrent or residual disease. A prominent left retroperitoneal lymph node is noted, which is new from the comparison MRI exam. Recommend continued surveillance with CT or MR imaging. Prostatomegaly. Marked diastases of the rectus abdominis.

## 2017-11-09 NOTE — PROGRESS NOTE ADULT - SUBJECTIVE AND OBJECTIVE BOX
INTERVAL HPI/OVERNIGHT EVENTS:    CONSTITUTIONAL:  Negative fever or chills, feels well, good appetite  EYES:  Negative  blurry vision or double vision  CARDIOVASCULAR:  Negative for chest pain or palpitations  RESPIRATORY:  Negative for cough, wheezing, or SOB   GASTROINTESTINAL:  Negative for nausea, vomiting, diarrhea, constipation, or abdominal pain  GENITOURINARY:  Negative frequency, urgency or dysuria  NEUROLOGIC:  No headache, confusion, dizziness, lightheadedness      ANTIBIOTICS/RELEVANT:    MEDICATIONS  (STANDING):  carvedilol 12.5 milliGRAM(s) Oral every 12 hours  colchicine 0.6 milliGRAM(s) Oral every 12 hours  heparin  Infusion 2750 Unit(s)/Hr (27.5 mL/Hr) IV Continuous <Continuous>    MEDICATIONS  (PRN):  acetaminophen   Tablet. 650 milliGRAM(s) Oral every 6 hours PRN Moderate Pain (4 - 6)        Vital Signs Last 24 Hrs  T(C): 36.4 (09 Nov 2017 09:00), Max: 37.2 (09 Nov 2017 06:34)  T(F): 97.5 (09 Nov 2017 09:00), Max: 98.9 (09 Nov 2017 06:34)  HR: 80 (09 Nov 2017 12:00) (68 - 92)  BP: 131/59 (09 Nov 2017 12:00) (95/66 - 145/53)  BP(mean): 83 (09 Nov 2017 12:00) (70 - 103)  RR: 29 (09 Nov 2017 12:00) (14 - 29)  SpO2: 94% (09 Nov 2017 12:00) (92% - 98%)    11-08-17 @ 07:01  -  11-09-17 @ 07:00  --------------------------------------------------------  IN: 1369.5 mL / OUT: 1925 mL / NET: -555.5 mL    11-09-17 @ 07:01  -  11-09-17 @ 12:21  --------------------------------------------------------  IN: 240 mL / OUT: 0 mL / NET: 240 mL      PHYSICAL EXAM:  Constitutional:Well-developed, well nourished  Eyes:ENRRIQUE, EOMI  Ear/Nose/Throat: no oral lesion, no sinus tenderness on percussion	  Neck:no JVD, no lymphadenopathy, supple  Respiratory: CTA ghada  Cardiovascular: S1S2 RRR, no murmurs  Gastrointestinal:soft, (+) BS, no HSM  Extremities:no e/e/c  Vascular: DP Pulse:	right normal; left normal      LABS:                        13.2   10.8  )-----------( 373      ( 09 Nov 2017 04:24 )             39.8     11-09    136  |  98  |  24<H>  ----------------------------<  123<H>  4.0   |  23  |  1.11    Ca    8.6      09 Nov 2017 04:24  Mg     2.1     11-09    TPro  6.5  /  Alb  3.3  /  TBili  0.5  /  DBili  <0.2  /  AST  12  /  ALT  12  /  AlkPhos  73  11-08    PT/INR - ( 08 Nov 2017 06:54 )   PT: 16.9 sec;   INR: 1.51          PTT - ( 09 Nov 2017 04:24 )  PTT:70.9 sec      MICROBIOLOGY:    RADIOLOGY & ADDITIONAL STUDIES: INTERVAL HPI/OVERNIGHT EVENTS: No acute events overnight. States he has no chest pain, denies shortness of breath    CONSTITUTIONAL:  Negative fever or chills, feels well, good appetite  EYES:  Negative  blurry vision or double vision  CARDIOVASCULAR:  Negative for chest pain or palpitations  RESPIRATORY:  Negative for cough, wheezing, or SOB   GASTROINTESTINAL:  Negative for nausea, vomiting, diarrhea, constipation, or abdominal pain  GENITOURINARY:  Negative frequency, urgency or dysuria  NEUROLOGIC:  No headache, confusion, dizziness, lightheadedness      ANTIBIOTICS/RELEVANT:    MEDICATIONS  (STANDING):  carvedilol 12.5 milliGRAM(s) Oral every 12 hours  colchicine 0.6 milliGRAM(s) Oral every 12 hours  heparin  Infusion 2750 Unit(s)/Hr (27.5 mL/Hr) IV Continuous <Continuous>    MEDICATIONS  (PRN):  acetaminophen   Tablet. 650 milliGRAM(s) Oral every 6 hours PRN Moderate Pain (4 - 6)        Vital Signs Last 24 Hrs  T(C): 36.4 (09 Nov 2017 09:00), Max: 37.2 (09 Nov 2017 06:34)  T(F): 97.5 (09 Nov 2017 09:00), Max: 98.9 (09 Nov 2017 06:34)  HR: 80 (09 Nov 2017 12:00) (68 - 92)  BP: 131/59 (09 Nov 2017 12:00) (95/66 - 145/53)  BP(mean): 83 (09 Nov 2017 12:00) (70 - 103)  RR: 29 (09 Nov 2017 12:00) (14 - 29)  SpO2: 94% (09 Nov 2017 12:00) (92% - 98%)    11-08-17 @ 07:01  -  11-09-17 @ 07:00  --------------------------------------------------------  IN: 1369.5 mL / OUT: 1925 mL / NET: -555.5 mL    11-09-17 @ 07:01  -  11-09-17 @ 12:21  --------------------------------------------------------  IN: 240 mL / OUT: 0 mL / NET: 240 mL      PHYSICAL EXAM:  Constitutional:Well-developed, well nourished  Eyes:ENRRIQUE, EOMI  Ear/Nose/Throat: no oral lesion, no sinus tenderness on percussion	  Neck:no JVD, no lymphadenopathy, supple  Respiratory: CTA ghada  Cardiovascular: S1S2 RRR, no murmurs  Gastrointestinal:soft, (+) BS, no HSM  Extremities:no e/e/c  Vascular: DP Pulse:	right normal; left normal      LABS:                        13.2   10.8  )-----------( 373      ( 09 Nov 2017 04:24 )             39.8     11-09    136  |  98  |  24<H>  ----------------------------<  123<H>  4.0   |  23  |  1.11    Ca    8.6      09 Nov 2017 04:24  Mg     2.1     11-09    TPro  6.5  /  Alb  3.3  /  TBili  0.5  /  DBili  <0.2  /  AST  12  /  ALT  12  /  AlkPhos  73  11-08    PT/INR - ( 08 Nov 2017 06:54 )   PT: 16.9 sec;   INR: 1.51          PTT - ( 09 Nov 2017 04:24 )  PTT:70.9 sec      MICROBIOLOGY:    RADIOLOGY & ADDITIONAL STUDIES: INTERVAL HPI/OVERNIGHT EVENTS: No acute events overnight. States he has no chest pain, denies shortness of breath. Mild inspiratory pain at left chest tube site. Denies fever/chills, nausea/vomiting, abdominal pain, diarrhea, dysuria.    ANTIBIOTICS/RELEVANT: None    MEDICATIONS  (STANDING):  carvedilol 12.5 milliGRAM(s) Oral every 12 hours  colchicine 0.6 milliGRAM(s) Oral every 12 hours  heparin  Infusion 2750 Unit(s)/Hr (27.5 mL/Hr) IV Continuous <Continuous>    MEDICATIONS  (PRN):  acetaminophen   Tablet. 650 milliGRAM(s) Oral every 6 hours PRN Moderate Pain (4 - 6)        Vital Signs Last 24 Hrs  T(C): 36.4 (09 Nov 2017 09:00), Max: 37.2 (09 Nov 2017 06:34)  T(F): 97.5 (09 Nov 2017 09:00), Max: 98.9 (09 Nov 2017 06:34)  HR: 80 (09 Nov 2017 12:00) (68 - 92)  BP: 131/59 (09 Nov 2017 12:00) (95/66 - 145/53)  BP(mean): 83 (09 Nov 2017 12:00) (70 - 103)  RR: 29 (09 Nov 2017 12:00) (14 - 29)  SpO2: 94% (09 Nov 2017 12:00) (92% - 98%)    11-08-17 @ 07:01  -  11-09-17 @ 07:00  --------------------------------------------------------  IN: 1369.5 mL / OUT: 1925 mL / NET: -555.5 mL    11-09-17 @ 07:01  -  11-09-17 @ 12:21  --------------------------------------------------------  IN: 240 mL / OUT: 0 mL / NET: 240 mL      PHYSICAL EXAM:  General: well-appearing, well-developed, obese, NAD, speaking in full sentences  HEENT: PERRL, EOMI, conjunctival clear, MMM, no oral lesions  Neck: no lymphadenopathy, supple, no JVD  Lungs: decreased breath sounds at the Left base with egophony  CVS: irregular rhythm, normal rate, less distant, no rub; +pericardial drain below left breast dressing c/d/i; left sided chest tube dressing c/d/i  Abd: soft, mildly distended, nontender, BS normal  Extremities: no edema or cyanosis; right great toe with mild tenderness to palpation, mild edema and erythema  Vascular: DP 2+ b/l  Skin: warm, dry      LABS:                        13.2   10.8  )-----------( 373      ( 09 Nov 2017 04:24 )             39.8     11-09    136  |  98  |  24<H>  ----------------------------<  123<H>  4.0   |  23  |  1.11    Ca    8.6      09 Nov 2017 04:24  Mg     2.1     11-09    TPro  6.5  /  Alb  3.3  /  TBili  0.5  /  DBili  <0.2  /  AST  12  /  ALT  12  /  AlkPhos  73  11-08    PT/INR - ( 08 Nov 2017 06:54 )   PT: 16.9 sec;   INR: 1.51          PTT - ( 09 Nov 2017 04:24 )  PTT:70.9 sec      MICROBIOLOGY:  Pleural fluid 11/8 - gram stain mod WBCs, no organisms seen  Pericardial fluid 11/8 - gram stain mod WBCs, no organisms seen  Blood cultures 11/6 NGTD    RADIOLOGY & ADDITIONAL STUDIES:  < from: Echocardiogram (11.07.17 @ 13:21) >  Patient was in atrial flutter during the procedure. The left ventricular   wall   motion is normal.The overall left ventricular ejection fraction is   probably   normal.The left ventricular ejection fraction is estimated to be   55-60%The   right ventricle is normal in size and function.The left atrial size is   normal.Right atrial size is normal.Structurally normal aortic   valve.There is   mild aortic regurgitation.Structurally normal mitral valve.There is trace   mitral regurgitation.Structurally normal tricuspid valve.There is trace   tricuspid regurgitation.The pulmonary artery systolic pressure is   estimated to   be 25 mmHg.Structurally normal pulmonic valve.No pulmonic regurgitation   noted.Moderate circumferential pericardial effusion. No echocardiographic   evidence of tamponade. Fibrinous material consistent with inflammatory   process   vs coagulant is seen in the pericardial space.  Procedure Details  A complete two-dimensional transthoracic echocardiogram was performed (2D,  M-mode, spectral and color flow doppler).  Study Quality: Good.  Left Ventricle  The left ventricle is mildly dilated.  The left ventricular wall motion is normal.  The overall left ventricular ejection fraction is probably normal.  The left ventricular ejection fraction is estimated to be 55-60%  Left Atrium  The left atrial size is normal.  Right Atrium  Right atrial size is normal.  Right Ventricle  The right ventricle is normal in size and function.  Aortic Valve  Structurally normal aortic valve.  There is mild aortic regurgitation.  Mitral Valve  Structurally normal mitral valve.  There is trace mitral regurgitation.  Tricuspid Valve  Structurally normal tricuspid valve.  There is trace tricuspid regurgitation.  The pulmonary artery systolic pressure is estimated to be 25 mmHg.  Pulmonic Valve  Structurally normal pulmonic valve.  No pulmonic regurgitation noted.  Arteries and Venous System  No aortic root dilatation.  Normal size aortic arch with no hemodynamic evidence for coarctation  The inferior vena cava is normal in size (<2.1 cm) with normal inspiratory  collapse (>50%) consistent with normal right atrial pressure.  Pericardium / Pleura  Moderate circumferential pericardial effusion. No echocardiographic   evidence  of tamponade. Fibrinous material consistent with inflammatory process vs  coagulant is seen in the pericardial space.    < end of copied text >      < from: CT Chest w/ IV Cont (11.07.17 @ 18:09) >  FINDINGS: There is a large pericardial effusion, with enhancement of the   surrounding parietal visceral pericardium, suggesting an exudative   effusion. The heart is enlarged. There is severe coronary artery   calcification. The pulmonary artery measures 3.5 cm, suggestive of   pulmonary artery hypertension. The aortic root is ectatic measuring 4.2   cm, unchanged. The ascending aorta is ectatic measuring 4.0 cm,   unchanged. There is a mild amount of calcified plaque along the thoracic   aorta. No mediastinal, hilar or axillary lymphadenopathy is seen. There   is a 8 mm calcified nodule within the left lobe of the thyroid gland.    There is a moderate size left pleural effusion with associated near   complete collapse of the left lower lobe. Mild right basilar atelectatic   change. There is a 2 mm solid nodule in the right upper lobe image 87   series 11. There is an additional 2 mm solid nodule in the right lower   lobe seen on image 185 series 11. These are nonspecific.    Images of the upper abdomen demonstrate a 2.7 cm cyst within the left   lobe of liver. The liver is enlarged measuring 20.4 cm in craniocaudal   dimension. Trace calcifications are noted along the gallbladderwall. No   radiopaque stones are seen in the gallbladder.  The pancreas is normal in   appearance.  No splenic abnormalities are seen.    The adrenal glands are unremarkable.     There is a 6 mm stone at the lower pole the right kidney. There is no   associated hydronephrosis. There are multiple cysts in the right kidney,   the largest measures 5.0 cm in the lower pole. There are a few cysts in   the left kidney, largest measures 2.2 cm in the midportion of the kidney.   There is a 1.1 x 3.1 x 3.2 cm ill-defined focus of fat stranding near the   lower pole of the left kidney which is the site of prior cryoablation.     No aortic aneurysm is seen. There is a mild amount of calcified plaque   along the abdominal aorta. A few retroperitoneal lymph nodes are noted   measuring up to 1.1 cm in short axis; and is new from the comparison MRI   exam.    Evaluation of the bowel demonstrates marked diastases of the rectus   abdominis muscles. There is colonic diverticulosis. Streak artifact   limitsevaluation of the pelvis. Within that limitation, Images of the   pelvis demonstrate the prostate is markedly enlarged measuring   approximately 6.5 x 7.1 x 6.9 cm. No filling defects are seen in the   urinary bladder. Small bilateral fat-containing inguinal hernias.    Evaluation of the osseous structures demonstrates the patient is status   post right total hip arthroplasty. Mild degenerative changes in the left   hip. There is a 8 mm sclerotic lesion in the T10 vertebral body,   unchanged since 9/30/2015. Moderate degenerative changes of the spine.   Osteopenia.      IMPRESSION:  1. Large exudative pericardial effusion.    2. Moderate left pleural effusion with associated near complete collapse   of the left lower lobe.    3. History of left renal malignancy status post cryoablation. No evidence   of recurrent or residual disease.     4. A prominent left retroperitoneal lymph node is noted, which is new   from the comparison MRI exam. Recommend continued surveillance with CT or   MR imaging.    5. Prostatomegaly.    6. Marked diastases of the rectus abdominis.    < end of copied text >

## 2017-11-10 DIAGNOSIS — M32.9 SYSTEMIC LUPUS ERYTHEMATOSUS, UNSPECIFIED: ICD-10-CM

## 2017-11-10 LAB
ANION GAP SERPL CALC-SCNC: 12 MMOL/L — SIGNIFICANT CHANGE UP (ref 5–17)
APTT BLD: 49.9 SEC — HIGH (ref 27.5–37.4)
APTT BLD: 73.1 SEC — HIGH (ref 27.5–37.4)
APTT BLD: 86.4 SEC — HIGH (ref 27.5–37.4)
APTT BLD: 90.1 SEC — HIGH (ref 27.5–37.4)
BASOPHILS NFR BLD AUTO: 0.1 % — SIGNIFICANT CHANGE UP (ref 0–2)
BUN SERPL-MCNC: 21 MG/DL — SIGNIFICANT CHANGE UP (ref 7–23)
C-ANCA SER-ACNC: NEGATIVE — SIGNIFICANT CHANGE UP
CALCIUM SERPL-MCNC: 8.6 MG/DL — SIGNIFICANT CHANGE UP (ref 8.4–10.5)
CARDIOLIPIN AB SER-ACNC: NEGATIVE — SIGNIFICANT CHANGE UP
CHLORIDE SERPL-SCNC: 98 MMOL/L — SIGNIFICANT CHANGE UP (ref 96–108)
CO2 SERPL-SCNC: 25 MMOL/L — SIGNIFICANT CHANGE UP (ref 22–31)
CREAT SERPL-MCNC: 1.09 MG/DL — SIGNIFICANT CHANGE UP (ref 0.5–1.3)
DSDNA AB FLD-ACNC: <0.2 AI — SIGNIFICANT CHANGE UP
ENA SS-A AB FLD IA-ACNC: <0.2 AI — SIGNIFICANT CHANGE UP
EOSINOPHIL NFR BLD AUTO: 3.3 % — SIGNIFICANT CHANGE UP (ref 0–6)
GLUCOSE SERPL-MCNC: 123 MG/DL — HIGH (ref 70–99)
HCT VFR BLD CALC: 39.2 % — SIGNIFICANT CHANGE UP (ref 39–50)
HGB BLD-MCNC: 13.3 G/DL — SIGNIFICANT CHANGE UP (ref 13–17)
LYMPHOCYTES # BLD AUTO: 23.7 % — SIGNIFICANT CHANGE UP (ref 13–44)
M TB TUBERC IFN-G BLD QL: (no result)
M TB TUBERC IFN-G BLD QL: 0 IU/ML — SIGNIFICANT CHANGE UP
M TB TUBERC IFN-G BLD QL: 0.02 IU/ML — SIGNIFICANT CHANGE UP
MAGNESIUM SERPL-MCNC: 2 MG/DL — SIGNIFICANT CHANGE UP (ref 1.6–2.6)
MCHC RBC-ENTMCNC: 30.2 PG — SIGNIFICANT CHANGE UP (ref 27–34)
MCHC RBC-ENTMCNC: 33.9 G/DL — SIGNIFICANT CHANGE UP (ref 32–36)
MCV RBC AUTO: 88.9 FL — SIGNIFICANT CHANGE UP (ref 80–100)
MITOGEN IGNF BCKGRD COR BLD-ACNC: 0.01 IU/ML — SIGNIFICANT CHANGE UP
MONOCYTES NFR BLD AUTO: 7.4 % — SIGNIFICANT CHANGE UP (ref 2–14)
NEUTROPHILS NFR BLD AUTO: 65.5 % — SIGNIFICANT CHANGE UP (ref 43–77)
P-ANCA SER-ACNC: NEGATIVE — SIGNIFICANT CHANGE UP
PLATELET # BLD AUTO: 370 K/UL — SIGNIFICANT CHANGE UP (ref 150–400)
POTASSIUM SERPL-MCNC: 3.8 MMOL/L — SIGNIFICANT CHANGE UP (ref 3.5–5.3)
POTASSIUM SERPL-SCNC: 3.8 MMOL/L — SIGNIFICANT CHANGE UP (ref 3.5–5.3)
RBC # BLD: 4.41 M/UL — SIGNIFICANT CHANGE UP (ref 4.2–5.8)
RBC # FLD: 13.7 % — SIGNIFICANT CHANGE UP (ref 10.3–16.9)
SODIUM SERPL-SCNC: 135 MMOL/L — SIGNIFICANT CHANGE UP (ref 135–145)
WBC # BLD: 10.4 K/UL — SIGNIFICANT CHANGE UP (ref 3.8–10.5)
WBC # FLD AUTO: 10.4 K/UL — SIGNIFICANT CHANGE UP (ref 3.8–10.5)

## 2017-11-10 PROCEDURE — 93306 TTE W/DOPPLER COMPLETE: CPT | Mod: 26

## 2017-11-10 PROCEDURE — 99233 SBSQ HOSP IP/OBS HIGH 50: CPT | Mod: GC

## 2017-11-10 PROCEDURE — 99233 SBSQ HOSP IP/OBS HIGH 50: CPT

## 2017-11-10 PROCEDURE — 71010: CPT | Mod: 26,76

## 2017-11-10 PROCEDURE — 71250 CT THORAX DX C-: CPT | Mod: 26

## 2017-11-10 RX ORDER — HYDROXYCHLOROQUINE SULFATE 200 MG
200 TABLET ORAL EVERY 12 HOURS
Qty: 0 | Refills: 0 | Status: DISCONTINUED | OUTPATIENT
Start: 2017-11-10 | End: 2017-11-11

## 2017-11-10 RX ORDER — HEPARIN SODIUM 5000 [USP'U]/ML
2750 INJECTION INTRAVENOUS; SUBCUTANEOUS
Qty: 25000 | Refills: 0 | Status: DISCONTINUED | OUTPATIENT
Start: 2017-11-10 | End: 2017-11-10

## 2017-11-10 RX ORDER — POTASSIUM CHLORIDE 20 MEQ
40 PACKET (EA) ORAL ONCE
Qty: 0 | Refills: 0 | Status: COMPLETED | OUTPATIENT
Start: 2017-11-10 | End: 2017-11-10

## 2017-11-10 RX ORDER — HEPARIN SODIUM 5000 [USP'U]/ML
2600 INJECTION INTRAVENOUS; SUBCUTANEOUS
Qty: 25000 | Refills: 0 | Status: DISCONTINUED | OUTPATIENT
Start: 2017-11-10 | End: 2017-11-10

## 2017-11-10 RX ORDER — HEPARIN SODIUM 5000 [USP'U]/ML
2900 INJECTION INTRAVENOUS; SUBCUTANEOUS
Qty: 25000 | Refills: 0 | Status: DISCONTINUED | OUTPATIENT
Start: 2017-11-10 | End: 2017-11-11

## 2017-11-10 RX ADMIN — HEPARIN SODIUM 26 UNIT(S)/HR: 5000 INJECTION INTRAVENOUS; SUBCUTANEOUS at 10:26

## 2017-11-10 RX ADMIN — HEPARIN SODIUM 29 UNIT(S)/HR: 5000 INJECTION INTRAVENOUS; SUBCUTANEOUS at 20:30

## 2017-11-10 RX ADMIN — CARVEDILOL PHOSPHATE 12.5 MILLIGRAM(S): 80 CAPSULE, EXTENDED RELEASE ORAL at 10:37

## 2017-11-10 RX ADMIN — Medication 0.6 MILLIGRAM(S): at 06:52

## 2017-11-10 RX ADMIN — Medication 40 MILLIEQUIVALENT(S): at 08:52

## 2017-11-10 RX ADMIN — Medication 0.6 MILLIGRAM(S): at 17:41

## 2017-11-10 RX ADMIN — HEPARIN SODIUM 26 UNIT(S)/HR: 5000 INJECTION INTRAVENOUS; SUBCUTANEOUS at 07:43

## 2017-11-10 RX ADMIN — Medication 200 MILLIGRAM(S): at 13:06

## 2017-11-10 RX ADMIN — CARVEDILOL PHOSPHATE 12.5 MILLIGRAM(S): 80 CAPSULE, EXTENDED RELEASE ORAL at 21:30

## 2017-11-10 NOTE — PROVIDER CONTACT NOTE (OTHER) - SITUATION
pt had chest tube removed at 10:45 AM, dressing was mildly  saturated with sanguinous fluid, notified MD. and MD stated to recheck it 30 mns later.

## 2017-11-10 NOTE — PROGRESS NOTE ADULT - SUBJECTIVE AND OBJECTIVE BOX
73 y/o male s/p chest tube and pericardial drain placement. Both flushed with 10 cc NS. Chest CT is recommended to asses for loculated pleural fluid. Call IR for any concern.    Output: CT- 460cc/24 hrs              Pericadrial- 48cc/24 hrs

## 2017-11-10 NOTE — PROGRESS NOTE ADULT - PROBLEM SELECTOR PLAN 2
- New L pleural effusion on CT imaging s/p chest tube placement by IR draining 1000cc fluid during procedure and 2200cc post procedure.  - Unclear etiology, likely autoimmune vs. infectious vs. malignant  - drain set to wall suction, limit drainage to 1L/12hrs, drained 2.5L thus far  - drain removal when ~100-200cc over 24hr period  - Fluid analysis- gram stain mod WBCs, no organisms seen, no growth to date, exudative based on Light's criteria  - Fluid Cytopathology: no malignant cells  - hold Lasix in setting of gout  - f/u fluid, serum, unine and stool studies for infectious/autoimmune etiologies - New L pleural effusion on CT imaging s/p chest tube placement by IR draining 1000cc fluid during procedure and 2200cc post procedure.  - Unclear etiology, likely autoimmune vs. infectious vs. malignant  - drain set to wall suction, limit drainage to 1L/12hrs, drained 2.5L thus far  - drain removal when ~100-200cc over 24hr period  - CT chest non-contrast to r/o fluid collections vs atelectasis in LLL  - Fluid analysis- gram stain mod WBCs, no organisms seen, no growth to date, exudative based on Light's criteria  - Fluid Cytopathology: no malignant cells  - hold Lasix in setting of gout  - f/u fluid, serum, unine and stool studies for infectious/autoimmune etiologies

## 2017-11-10 NOTE — PROGRESS NOTE ADULT - PROBLEM SELECTOR PLAN 7
- currently normotensive  - c/w home Carvedilol 12.5mg BID  - monitor BPs Remains normotensive with home carvedilol 12.5mg PO BID.   - Will continue with carvedilol and monitor blood pressure.

## 2017-11-10 NOTE — PROGRESS NOTE ADULT - PROBLEM SELECTOR PLAN 1
- Patient w/ enlarging pericardial effusion over the course of 2-3 weeks w/ associated intermittent chest pain, fatigue, and cough.  Found to have moderate pericardial effusion without e/o tamponade as well as large L pleural effusion.   - Unclear etiology, likely autoimmune given elevated inflammatory markers (RF, dsDNA, PSA, ESR, CRP). Other etiologies including malignant effusion given h/o renal tumor with new findings of LAD on CT. Also likely infectious although without s/s of infection, however cannot r/o.    - Echo performed w/ EF 55-60%, mild AI, trace MR/TR, moderate circumferential pericardial effusion, no echocardiographic evidence of tamponade. Fibrinous material consistent with inflammatory process vs coagulant is seen in the pericardial space.  - s/p pericardiocentesis draining 400cc serosanguinous fluid, now w bulb suction total fluid ~100cc since procedure  - drain removal today  - Fluid analysis: gram stain mod WBCs, no organisms seen, no growth to date, exudative based on Light's criteria  - Cytopathology: negative for malignant cells  - f/u serum, urine and stool studies for infectious etiologies  - Infectious labs thus far: HIV negative, RVP negative, EBV IgG positive, IgM negative, Toxo negative, CMV negative, Legionella negative  - Autoimmune labs thus far: C3/C4 nl  - blood cxs with NGTD  - ID and rheumatology following  - IR following  - HD monitoring in CCU

## 2017-11-10 NOTE — PROGRESS NOTE ADULT - PROBLEM SELECTOR PLAN 6
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

## 2017-11-10 NOTE — PROGRESS NOTE ADULT - PROBLEM SELECTOR PLAN 9
DVT PPX: on Heparin drip    Code Status: FULL CODE    Dispo: CCU for HD monitoring while with pericardial drain F: no IVF  E: replete lytes prn K<4, Mg<2  N: DASH/TLC diet

## 2017-11-10 NOTE — PROGRESS NOTE ADULT - PROBLEM SELECTOR PROBLEM 10
Discharge planning issues
Discharge planning issues
Need for prophylactic measure
Discharge planning issues
Need for prophylactic measure

## 2017-11-10 NOTE — PROGRESS NOTE ADULT - PROBLEM SELECTOR PLAN 6
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 On eliquis at home (recently switched from pradaxa about 1 mo ago).   - c/w heparin gtt and follow up PTT. Plan to restart eliquis tomorrow.     #History of Renal Tumor  - 2mm nodule on Left kidney s/p cryoablation for which patient yearly f/u for surveillance.   - CT imaging has not demonstrated recurrence but of note patient has new prominent retroperitoneal lymph node (compared to previous MRI). To have patient follow up as outpatient.

## 2017-11-10 NOTE — PROGRESS NOTE ADULT - SUBJECTIVE AND OBJECTIVE BOX
OK  afebrile  no chest pain  tubes not draining much  plan, I assume, is to take out both tubes this am  woukld do echo later in day-preferably >4 hours after tubes come out  also CXR  if OK and ID doesn't have a treatable infectious diesease  diagnosis-maybe he can go home tomorrow

## 2017-11-10 NOTE — PROGRESS NOTE ADULT - PROBLEM SELECTOR PROBLEM 2
Pleural effusion

## 2017-11-10 NOTE — PROGRESS NOTE ADULT - NSHPATTENDINGPLANDISCUSS_GEN_ALL_CORE
HO and family
HO and family
the patient and ccu care team
the patient and CCU care team
ccu care team

## 2017-11-10 NOTE — PROGRESS NOTE ADULT - PROBLEM SELECTOR PLAN 3
- New onset AFlutter likely in setting of pericardial effusion  - EKG with variable 3:1 and 4:1 Aflutter with HR 110s  - c/w Carvedilol 12.5mg BID for rate control  - c/w heparin gtt with plan for transition back to Eliquis once stable  - daily EKGs - POSSIBLE SLE based on malar rash with nasolabial fold sparing, pericarditis, pericardial and pleural effusions, elevated inflammatory markers  - started on Hydroxychloroquine 200mg BID

## 2017-11-10 NOTE — PROGRESS NOTE ADULT - SUBJECTIVE AND OBJECTIVE BOX
INTERVAL HPI/OVERNIGHT EVENTS:    CONSTITUTIONAL:  Negative fever or chills, feels well, good appetite  EYES:  Negative  blurry vision or double vision  CARDIOVASCULAR:  Negative for chest pain or palpitations  RESPIRATORY:  Negative for cough, wheezing, or SOB   GASTROINTESTINAL:  Negative for nausea, vomiting, diarrhea, constipation, or abdominal pain  GENITOURINARY:  Negative frequency, urgency or dysuria  NEUROLOGIC:  No headache, confusion, dizziness, lightheadedness      ANTIBIOTICS/RELEVANT:    MEDICATIONS  (STANDING):  carvedilol 12.5 milliGRAM(s) Oral every 12 hours  colchicine 0.6 milliGRAM(s) Oral every 12 hours  heparin  Infusion 2600 Unit(s)/Hr (26 mL/Hr) IV Continuous <Continuous>  hydroxychloroquine 200 milliGRAM(s) Oral every 12 hours    MEDICATIONS  (PRN):  acetaminophen   Tablet. 650 milliGRAM(s) Oral every 6 hours PRN Moderate Pain (4 - 6)        Vital Signs Last 24 Hrs  T(C): 36.3 (10 Nov 2017 12:00), Max: 36.8 (10 Nov 2017 02:00)  T(F): 97.3 (10 Nov 2017 12:00), Max: 98.3 (10 Nov 2017 02:00)  HR: 58 (10 Nov 2017 14:00) (58 - 90)  BP: 108/55 (10 Nov 2017 14:00) (96/50 - 141/61)  BP(mean): 80 (10 Nov 2017 14:00) (66 - 107)  RR: 27 (10 Nov 2017 14:00) (16 - 29)  SpO2: 97% (10 Nov 2017 14:00) (92% - 97%)    11-09-17 @ 07:01  -  11-10-17 @ 07:00  --------------------------------------------------------  IN: 2805.5 mL / OUT: 2380 mL / NET: 425.5 mL    11-10-17 @ 07:01  -  11-10-17 @ 14:42  --------------------------------------------------------  IN: 156 mL / OUT: 410 mL / NET: -254 mL      PHYSICAL EXAM:  Constitutional:Well-developed, well nourished  Eyes:ENRRIQUE, EOMI  Ear/Nose/Throat: no oral lesion, no sinus tenderness on percussion	  Neck:no JVD, no lymphadenopathy, supple  Respiratory: CTA ghada  Cardiovascular: S1S2 RRR, no murmurs  Gastrointestinal:soft, (+) BS, no HSM  Extremities:no e/e/c  Vascular: DP Pulse:	right normal; left normal      LABS:                        13.3   10.4  )-----------( 370      ( 10 Nov 2017 05:43 )             39.2     11-10    135  |  98  |  21  ----------------------------<  123<H>  3.8   |  25  |  1.09    Ca    8.6      10 Nov 2017 05:43  Mg     2.0     11-10      PTT - ( 10 Nov 2017 12:19 )  PTT:73.1 sec      MICROBIOLOGY:    RADIOLOGY & ADDITIONAL STUDIES: INTERVAL HPI/OVERNIGHT EVENTS: No acute events overnight. Afebrile.    CONSTITUTIONAL:  Negative fever or chills, feels well, good appetite  CARDIOVASCULAR:  Negative for chest pain or palpitations  RESPIRATORY:  Negative for cough, wheezing, or SOB   GASTROINTESTINAL:  Negative for nausea, vomiting, diarrhea, constipation, or abdominal pain  GENITOURINARY:  Negative frequency, urgency or dysuria  NEUROLOGIC:  No headache, confusion, dizziness, lightheadedness      ANTIBIOTICS/RELEVANT: None    MEDICATIONS  (STANDING):  carvedilol 12.5 milliGRAM(s) Oral every 12 hours  colchicine 0.6 milliGRAM(s) Oral every 12 hours  heparin  Infusion 2600 Unit(s)/Hr (26 mL/Hr) IV Continuous <Continuous>  hydroxychloroquine 200 milliGRAM(s) Oral every 12 hours    MEDICATIONS  (PRN):  acetaminophen   Tablet. 650 milliGRAM(s) Oral every 6 hours PRN Moderate Pain (4 - 6)        Vital Signs Last 24 Hrs  T(C): 36.3 (10 Nov 2017 12:00), Max: 36.8 (10 Nov 2017 02:00)  T(F): 97.3 (10 Nov 2017 12:00), Max: 98.3 (10 Nov 2017 02:00)  HR: 58 (10 Nov 2017 14:00) (58 - 90)  BP: 108/55 (10 Nov 2017 14:00) (96/50 - 141/61)  BP(mean): 80 (10 Nov 2017 14:00) (66 - 107)  RR: 27 (10 Nov 2017 14:00) (16 - 29)  SpO2: 97% (10 Nov 2017 14:00) (92% - 97%)    11-09-17 @ 07:01  -  11-10-17 @ 07:00  --------------------------------------------------------  IN: 2805.5 mL / OUT: 2380 mL / NET: 425.5 mL    11-10-17 @ 07:01  -  11-10-17 @ 14:42  --------------------------------------------------------  IN: 156 mL / OUT: 410 mL / NET: -254 mL      PHYSICAL EXAM:  General: well-appearing, well-developed, obese, NAD, speaking in full sentences  HEENT: PERRL, EOMI, conjunctival clear, MMM, no oral lesions  Neck: no lymphadenopathy, supple, no JVD  Lungs: decreased breath sounds at the left base with mild crackles superior to area  CVS: irregular rhythm, normal rate, less distant, no rub; pericardial drain removed, left breast gauze area c/d/i, left sided chest tube dressing c/d/i  Abd: soft, mildly distended, nontender, BS normal  Extremities: no edema or cyanosis; right great toe with mild tenderness to palpation  Vascular: DP 2+ b/l  Skin: warm, dry      LABS:                        13.3   10.4  )-----------( 370      ( 10 Nov 2017 05:43 )             39.2     11-10    135  |  98  |  21  ----------------------------<  123<H>  3.8   |  25  |  1.09    Ca    8.6      10 Nov 2017 05:43  Mg     2.0     11-10      PTT - ( 10 Nov 2017 12:19 )  PTT:73.1 sec      MICROBIOLOGY:  Pleural fluid 11/8 - gram stain mod WBCs, no organisms seen  Pericardial fluid 11/8 - gram stain mod WBCs, no organisms seen  Blood cultures 11/6 NGTD    RADIOLOGY & ADDITIONAL STUDIES:  < from: CT Chest No Cont (11.10.17 @ 10:14) >  FINDINGS:    LUNGS: Persistent small right-sided pleural effusion with interval   decrease in the size of a moderate-sized left pleural effusion which is   now small in extent post interval placement of a percutaneous left sided   pigtail catheter. There has been improvement in aeration of   atelectasis/consolidation of the left lower lobe with some minimal   crescentic posterior lateral basal segment compressive atelectasis.   Improvement in compressive atelectatic changes involving the inferior   aspect of the lingula. The central airways are patent. There is a new   minute left apical pneumothorax.Redemonstrated is a large airway   inflammation characterized by mild cylindrical and traction   bronchiectasis involving predominantly the right lower lobe. Scattered   solid as well as calcified micronodules are reidentified measuring less   than 3mm in size unchanged. Some of them have a tubular configuration   possibly representing areas of mucous plugging.     MEDIASTINUM: The heart is normal in size.  There is been interval   placement of a pigtail pericardial drainage catheter along the left   anterior aspect of the pericardium. Interval decrease in the extent of   pericardial effusion which is now mild. Previously noted thickening of   the visceral parietal pericardium cannot be assessed in this examination   due to the absence of intravenous contrast. There is no evidence of   pneumopericardium. 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 Mild calcified atheromatous plaque formation is noted. Can noted   are increased number of nonenlarged mediastinal lymph nodes unchanged.   Again noted are benign coarse calcifications within the upper pole of the   left thyroid gland. There is mild coronary artery calcification as well   as aortic valve calcification.    UPPER ABDOMEN, SOFT TISSUES AND BONES: Limited evaluation of the upper   abdomen demonstrates no change in hepatic cysts involving segment 2.   Evaluation of the osseous structures demonstrates degenerative changes.  Appearing bone island involving vertebral body of T10.    IMPRESSION:    1. Bilateral solid micronodules measuring lessthan 3 mm unchanged from   prior examination dated 11/7/2017. In view the patient's history of renal   cell carcinoma periodic short interval surveillance to confirm stability   and to exclude early metastatic disease.  2. Persistent small right-sided pleural effusion with interval placement   of a left-sided percutaneous pigtail catheter and interval decrease in   the size of a left-sided pleural effusion. Interval reexpansion of the   left lower lobe with some residual compressive atelectasis involving its   posterior basal segments.  3. Continued evidence of large airway inflammation with areas of   cylindrical bronchiectasis and bronchial wall thickening.  4. Interval decrease in the size of a pericardial effusion which is now   small post pericardial drainage catheter placement.  5. 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    < end of copied text >        < from: Echocardiogram (11.07.17 @ 13:21) >  Patient was in atrial flutter during the procedure. The left ventricular   wall   motion is normal.The overall left ventricular ejection fraction is   probably   normal.The left ventricular ejection fraction is estimated to be   55-60%The   right ventricle is normal in size and function.The left atrial size is   normal.Right atrial size is normal.Structurally normal aortic   valve.There is   mild aortic regurgitation.Structurally normal mitral valve.There is trace   mitral regurgitation.Structurally normal tricuspid valve.There is trace   tricuspid regurgitation.The pulmonary artery systolic pressure is   estimated to   be 25 mmHg.Structurally normal pulmonic valve.No pulmonic regurgitation   noted.Moderate circumferential pericardial effusion. No echocardiographic   evidence of tamponade. Fibrinous material consistent with inflammatory   process   vs coagulant is seen in the pericardial space.  Procedure Details  A complete two-dimensional transthoracic echocardiogram was performed (2D,  M-mode, spectral and color flow doppler).  Study Quality: Good.  Left Ventricle  The left ventricle is mildly dilated.  The left ventricular wall motion is normal.  The overall left ventricular ejection fraction is probably normal.  The left ventricular ejection fraction is estimated to be 55-60%  Left Atrium  The left atrial size is normal.  Right Atrium  Right atrial size is normal.  Right Ventricle  The right ventricle is normal in size and function.  Aortic Valve  Structurally normal aortic valve.  There is mild aortic regurgitation.  Mitral Valve  Structurally normal mitral valve.  There is trace mitral regurgitation.  Tricuspid Valve  Structurally normal tricuspid valve.  There is trace tricuspid regurgitation.  The pulmonary artery systolic pressure is estimated to be 25 mmHg.  Pulmonic Valve  Structurally normal pulmonic valve.  No pulmonic regurgitation noted.  Arteries and Venous System  No aortic root dilatation.  Normal size aortic arch with no hemodynamic evidence for coarctation  The inferior vena cava is normal in size (<2.1 cm) with normal inspiratory  collapse (>50%) consistent with normal right atrial pressure.  Pericardium / Pleura  Moderate circumferential pericardial effusion. No echocardiographic   evidence  of tamponade. Fibrinous material consistent with inflammatory process vs  coagulant is seen in the pericardial space.    < end of copied text >      < from: CT Chest w/ IV Cont (11.07.17 @ 18:09) >  FINDINGS: There is a large pericardial effusion, with enhancement of the   surrounding parietal visceral pericardium, suggesting an exudative   effusion. The heart is enlarged. There is severe coronary artery   calcification. The pulmonary artery measures 3.5 cm, suggestive of   pulmonary artery hypertension. The aortic root is ectatic measuring 4.2   cm, unchanged. The ascending aorta is ectatic measuring 4.0 cm,   unchanged. There is a mild amount of calcified plaque along the thoracic   aorta. No mediastinal, hilar or axillary lymphadenopathy is seen. There   is a 8 mm calcified nodule within the left lobe of the thyroid gland.    There is a moderate size left pleural effusion with associated near   complete collapse of the left lower lobe. Mild right basilar atelectatic   change. There is a 2 mm solid nodule in the right upper lobe image 87   series 11. There is an additional 2 mm solid nodule in the right lower   lobe seen on image 185 series 11. These are nonspecific.    Images of the upper abdomen demonstrate a 2.7 cm cyst within the left   lobe of liver. The liver is enlarged measuring 20.4 cm in craniocaudal   dimension. Trace calcifications are noted along the gallbladderwall. No   radiopaque stones are seen in the gallbladder.  The pancreas is normal in   appearance.  No splenic abnormalities are seen.    The adrenal glands are unremarkable.     There is a 6 mm stone at the lower pole the right kidney. There is no   associated hydronephrosis. There are multiple cysts in the right kidney,   the largest measures 5.0 cm in the lower pole. There are a few cysts in   the left kidney, largest measures 2.2 cm in the midportion of the kidney.   There is a 1.1 x 3.1 x 3.2 cm ill-defined focus of fat stranding near the   lower pole of the left kidney which is the site of prior cryoablation.     No aortic aneurysm is seen. There is a mild amount of calcified plaque   along the abdominal aorta. A few retroperitoneal lymph nodes are noted   measuring up to 1.1 cm in short axis; and is new from the comparison MRI   exam.    Evaluation of the bowel demonstrates marked diastases of the rectus   abdominis muscles. There is colonic diverticulosis. Streak artifact   limitsevaluation of the pelvis. Within that limitation, Images of the   pelvis demonstrate the prostate is markedly enlarged measuring   approximately 6.5 x 7.1 x 6.9 cm. No filling defects are seen in the   urinary bladder. Small bilateral fat-containing inguinal hernias.    Evaluation of the osseous structures demonstrates the patient is status   post right total hip arthroplasty. Mild degenerative changes in the left   hip. There is a 8 mm sclerotic lesion in the T10 vertebral body,   unchanged since 9/30/2015. Moderate degenerative changes of the spine.   Osteopenia.      IMPRESSION:  1. Large exudative pericardial effusion.    2. Moderate left pleural effusion with associated near complete collapse   of the left lower lobe.    3. History of left renal malignancy status post cryoablation. No evidence   of recurrent or residual disease.     4. A prominent left retroperitoneal lymph node is noted, which is new   from the comparison MRI exam. Recommend continued surveillance with CT or   MR imaging.    5. Prostatomegaly.    6. Marked diastases of the rectus abdominis.    < end of copied text > INTERVAL HPI/OVERNIGHT EVENTS: No acute events overnight. Afebrile. Patient seen and examined at beside this morning. ROS as below.    CONSTITUTIONAL:  Negative fever or chills, feels well, good appetite  CARDIOVASCULAR:  Negative for chest pain or palpitations  RESPIRATORY:  Negative for cough, wheezing, or SOB   GASTROINTESTINAL:  Negative for nausea, vomiting, diarrhea, constipation, or abdominal pain  GENITOURINARY:  Negative frequency, urgency or dysuria  NEUROLOGIC:  No headache, confusion, dizziness, lightheadedness      ANTIBIOTICS/RELEVANT: None    MEDICATIONS  (STANDING):  carvedilol 12.5 milliGRAM(s) Oral every 12 hours  colchicine 0.6 milliGRAM(s) Oral every 12 hours  heparin  Infusion 2600 Unit(s)/Hr (26 mL/Hr) IV Continuous <Continuous>  hydroxychloroquine 200 milliGRAM(s) Oral every 12 hours    MEDICATIONS  (PRN):  acetaminophen   Tablet. 650 milliGRAM(s) Oral every 6 hours PRN Moderate Pain (4 - 6)        Vital Signs Last 24 Hrs  T(C): 36.3 (10 Nov 2017 12:00), Max: 36.8 (10 Nov 2017 02:00)  T(F): 97.3 (10 Nov 2017 12:00), Max: 98.3 (10 Nov 2017 02:00)  HR: 58 (10 Nov 2017 14:00) (58 - 90)  BP: 108/55 (10 Nov 2017 14:00) (96/50 - 141/61)  BP(mean): 80 (10 Nov 2017 14:00) (66 - 107)  RR: 27 (10 Nov 2017 14:00) (16 - 29)  SpO2: 97% (10 Nov 2017 14:00) (92% - 97%)    11-09-17 @ 07:01  -  11-10-17 @ 07:00  --------------------------------------------------------  IN: 2805.5 mL / OUT: 2380 mL / NET: 425.5 mL    11-10-17 @ 07:01  -  11-10-17 @ 14:42  --------------------------------------------------------  IN: 156 mL / OUT: 410 mL / NET: -254 mL      PHYSICAL EXAM:  General: well-appearing, well-developed, obese, NAD, speaking in full sentences  HEENT: PERRL, EOMI, conjunctival clear, MMM, no oral lesions  Neck: no lymphadenopathy, supple, no JVD  Lungs: decreased breath sounds at the left base with mild crackles superior to area  CVS: irregular rhythm, normal rate, less distant, no rub; pericardial drain removed, left breast gauze area c/d/i, left sided chest tube dressing c/d/i  Abd: soft, mildly distended, nontender, BS normal  Extremities: no edema or cyanosis; right great toe with mild tenderness to palpation  Vascular: DP 2+ b/l  Skin: warm, dry      LABS:                        13.3   10.4  )-----------( 370      ( 10 Nov 2017 05:43 )             39.2     11-10    135  |  98  |  21  ----------------------------<  123<H>  3.8   |  25  |  1.09    Ca    8.6      10 Nov 2017 05:43  Mg     2.0     11-10      PTT - ( 10 Nov 2017 12:19 )  PTT:73.1 sec      MICROBIOLOGY:  Pleural fluid 11/8 - gram stain mod WBCs, no organisms seen  Pericardial fluid 11/8 - gram stain mod WBCs, no organisms seen  Blood cultures 11/6 NGTD    RADIOLOGY & ADDITIONAL STUDIES:  < from: CT Chest No Cont (11.10.17 @ 10:14) >  FINDINGS:    LUNGS: Persistent small right-sided pleural effusion with interval   decrease in the size of a moderate-sized left pleural effusion which is   now small in extent post interval placement of a percutaneous left sided   pigtail catheter. There has been improvement in aeration of   atelectasis/consolidation of the left lower lobe with some minimal   crescentic posterior lateral basal segment compressive atelectasis.   Improvement in compressive atelectatic changes involving the inferior   aspect of the lingula. The central airways are patent. There is a new   minute left apical pneumothorax.Redemonstrated is a large airway   inflammation characterized by mild cylindrical and traction   bronchiectasis involving predominantly the right lower lobe. Scattered   solid as well as calcified micronodules are reidentified measuring less   than 3mm in size unchanged. Some of them have a tubular configuration   possibly representing areas of mucous plugging.     MEDIASTINUM: The heart is normal in size.  There is been interval   placement of a pigtail pericardial drainage catheter along the left   anterior aspect of the pericardium. Interval decrease in the extent of   pericardial effusion which is now mild. Previously noted thickening of   the visceral parietal pericardium cannot be assessed in this examination   due to the absence of intravenous contrast. There is no evidence of   pneumopericardium. 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 Mild calcified atheromatous plaque formation is noted. Can noted   are increased number of nonenlarged mediastinal lymph nodes unchanged.   Again noted are benign coarse calcifications within the upper pole of the   left thyroid gland. There is mild coronary artery calcification as well   as aortic valve calcification.    UPPER ABDOMEN, SOFT TISSUES AND BONES: Limited evaluation of the upper   abdomen demonstrates no change in hepatic cysts involving segment 2.   Evaluation of the osseous structures demonstrates degenerative changes.  Appearing bone island involving vertebral body of T10.    IMPRESSION:    1. Bilateral solid micronodules measuring lessthan 3 mm unchanged from   prior examination dated 11/7/2017. In view the patient's history of renal   cell carcinoma periodic short interval surveillance to confirm stability   and to exclude early metastatic disease.  2. Persistent small right-sided pleural effusion with interval placement   of a left-sided percutaneous pigtail catheter and interval decrease in   the size of a left-sided pleural effusion. Interval reexpansion of the   left lower lobe with some residual compressive atelectasis involving its   posterior basal segments.  3. Continued evidence of large airway inflammation with areas of   cylindrical bronchiectasis and bronchial wall thickening.  4. Interval decrease in the size of a pericardial effusion which is now   small post pericardial drainage catheter placement.  5. 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    < end of copied text >        < from: Echocardiogram (11.07.17 @ 13:21) >  Patient was in atrial flutter during the procedure. The left ventricular   wall   motion is normal.The overall left ventricular ejection fraction is   probably   normal.The left ventricular ejection fraction is estimated to be   55-60%The   right ventricle is normal in size and function.The left atrial size is   normal.Right atrial size is normal.Structurally normal aortic   valve.There is   mild aortic regurgitation.Structurally normal mitral valve.There is trace   mitral regurgitation.Structurally normal tricuspid valve.There is trace   tricuspid regurgitation.The pulmonary artery systolic pressure is   estimated to   be 25 mmHg.Structurally normal pulmonic valve.No pulmonic regurgitation   noted.Moderate circumferential pericardial effusion. No echocardiographic   evidence of tamponade. Fibrinous material consistent with inflammatory   process   vs coagulant is seen in the pericardial space.  Procedure Details  A complete two-dimensional transthoracic echocardiogram was performed (2D,  M-mode, spectral and color flow doppler).  Study Quality: Good.  Left Ventricle  The left ventricle is mildly dilated.  The left ventricular wall motion is normal.  The overall left ventricular ejection fraction is probably normal.  The left ventricular ejection fraction is estimated to be 55-60%  Left Atrium  The left atrial size is normal.  Right Atrium  Right atrial size is normal.  Right Ventricle  The right ventricle is normal in size and function.  Aortic Valve  Structurally normal aortic valve.  There is mild aortic regurgitation.  Mitral Valve  Structurally normal mitral valve.  There is trace mitral regurgitation.  Tricuspid Valve  Structurally normal tricuspid valve.  There is trace tricuspid regurgitation.  The pulmonary artery systolic pressure is estimated to be 25 mmHg.  Pulmonic Valve  Structurally normal pulmonic valve.  No pulmonic regurgitation noted.  Arteries and Venous System  No aortic root dilatation.  Normal size aortic arch with no hemodynamic evidence for coarctation  The inferior vena cava is normal in size (<2.1 cm) with normal inspiratory  collapse (>50%) consistent with normal right atrial pressure.  Pericardium / Pleura  Moderate circumferential pericardial effusion. No echocardiographic   evidence  of tamponade. Fibrinous material consistent with inflammatory process vs  coagulant is seen in the pericardial space.    < end of copied text >      < from: CT Chest w/ IV Cont (11.07.17 @ 18:09) >  FINDINGS: There is a large pericardial effusion, with enhancement of the   surrounding parietal visceral pericardium, suggesting an exudative   effusion. The heart is enlarged. There is severe coronary artery   calcification. The pulmonary artery measures 3.5 cm, suggestive of   pulmonary artery hypertension. The aortic root is ectatic measuring 4.2   cm, unchanged. The ascending aorta is ectatic measuring 4.0 cm,   unchanged. There is a mild amount of calcified plaque along the thoracic   aorta. No mediastinal, hilar or axillary lymphadenopathy is seen. There   is a 8 mm calcified nodule within the left lobe of the thyroid gland.    There is a moderate size left pleural effusion with associated near   complete collapse of the left lower lobe. Mild right basilar atelectatic   change. There is a 2 mm solid nodule in the right upper lobe image 87   series 11. There is an additional 2 mm solid nodule in the right lower   lobe seen on image 185 series 11. These are nonspecific.    Images of the upper abdomen demonstrate a 2.7 cm cyst within the left   lobe of liver. The liver is enlarged measuring 20.4 cm in craniocaudal   dimension. Trace calcifications are noted along the gallbladderwall. No   radiopaque stones are seen in the gallbladder.  The pancreas is normal in   appearance.  No splenic abnormalities are seen.    The adrenal glands are unremarkable.     There is a 6 mm stone at the lower pole the right kidney. There is no   associated hydronephrosis. There are multiple cysts in the right kidney,   the largest measures 5.0 cm in the lower pole. There are a few cysts in   the left kidney, largest measures 2.2 cm in the midportion of the kidney.   There is a 1.1 x 3.1 x 3.2 cm ill-defined focus of fat stranding near the   lower pole of the left kidney which is the site of prior cryoablation.     No aortic aneurysm is seen. There is a mild amount of calcified plaque   along the abdominal aorta. A few retroperitoneal lymph nodes are noted   measuring up to 1.1 cm in short axis; and is new from the comparison MRI   exam.    Evaluation of the bowel demonstrates marked diastases of the rectus   abdominis muscles. There is colonic diverticulosis. Streak artifact   limitsevaluation of the pelvis. Within that limitation, Images of the   pelvis demonstrate the prostate is markedly enlarged measuring   approximately 6.5 x 7.1 x 6.9 cm. No filling defects are seen in the   urinary bladder. Small bilateral fat-containing inguinal hernias.    Evaluation of the osseous structures demonstrates the patient is status   post right total hip arthroplasty. Mild degenerative changes in the left   hip. There is a 8 mm sclerotic lesion in the T10 vertebral body,   unchanged since 9/30/2015. Moderate degenerative changes of the spine.   Osteopenia.      IMPRESSION:  1. Large exudative pericardial effusion.    2. Moderate left pleural effusion with associated near complete collapse   of the left lower lobe.    3. History of left renal malignancy status post cryoablation. No evidence   of recurrent or residual disease.     4. A prominent left retroperitoneal lymph node is noted, which is new   from the comparison MRI exam. Recommend continued surveillance with CT or   MR imaging.    5. Prostatomegaly.    6. Marked diastases of the rectus abdominis.    < end of copied text >

## 2017-11-10 NOTE — PROGRESS NOTE ADULT - PROBLEM SELECTOR PLAN 5
- patient c/o R big toe pain, found to have erythematous and swollen 1st digit. Reports h/o trauma 3days prior when he slipped in a ditch.  - Likely gout given elevated uric acid and negative imaging  - Xray negative for fracture-dislocation  - Uric acid elevated 12.6  - Tylenol PRN for pain (hold NSAIDs in setting of ARF)  - Colchicine 0.6mg BID  - consider starting Plaquenil (Hydroxychloroquine) 200mg BID per rheum recs   - rheumatology consulted During course patient developed toe pain with erythema and swollen 1st digit. Patient did report trauma to foot 3 days prior but elevated uric acid (12.6) with negative imaging for acute fx, dislocation.  - c/w Colchicine 0.6mg BID and Tylenol for pain.  - patient initiated on Plaquenil today.

## 2017-11-10 NOTE — PROGRESS NOTE ADULT - PROBLEM SELECTOR PLAN 1
- Patient w/ enlarging pericardial effusion over the course of 2-3 weeks w/ associated intermittent chest pain, fatigue, and cough.  Found to have moderate pericardial effusion without e/o tamponade as well as large L pleural effusion.   - Unclear etiology, likely autoimmune given elevated inflammatory markers (RF, dsDNA, PSA, ESR, CRP). Other etiologies including malignant effusion given h/o renal tumor with new findings of LAD on CT. Also likely infectious although without s/s of infection, however cannot r/o.    - Echo performed w/ EF 55-60%, mild AI, trace MR/TR, moderate circumferential pericardial effusion, no echocardiographic evidence of tamponade. Fibrinous material consistent with inflammatory process vs coagulant is seen in the pericardial space.  - s/p pericardiocentesis draining 400cc serosanguinous fluid, now w bulb suction total fluid ~100cc since procedure  - drain removal today  - Fluid analysis: gram stain mod WBCs, no organisms seen, no growth to date, exudative based on Light's criteria  - Cytopathology: negative for malignant cells  - f/u serum, urine and stool studies for infectious etiologies  - Infectious labs thus far: HIV negative, RVP negative, EBV IgG positive, IgM negative, Toxo negative, CMV negative, Legionella negative  - Autoimmune labs thus far: C3/C4 nl  - blood cxs with NGTD  - ID and rheumatology following  - IR following  - HD monitoring in CCU Progressively enlarging pericardial effusion with symptoms including fatigue, dry cough and dyspnea for about 2 weeks prior to presentation. Echo on admission demonstrated moderate pericardial effusion with no evidence of tamponade as well as L pleural effusion. S/p Pericardial drain placed by IR and now removed today. Etiology remains unclear but high concern for SLE as etiology/autoimmune with elevation of RF, dsDNA, PSA, ESR, CRP. Still pending additional work up with concern for etiology of malignant effusion given hx of renal tumor with new findings of LAD on CT. Infectious less likely at this time.   Initial echocardiogram demonstrated EF 55-60%, mild AI, trace MR/TR, moderate circumferential pericardial effusion, no echocardiographic evidence of tamponade. Fibrinous material consistent with inflammatory process vs coagulant is seen in the pericardial space.  - s/p pericardiocentesis draining 400cc serosanguinous fluid, with bulb suction total fluid ~100cc s/p procedure. Drain removed today by IR. Repeat echocardiogram   - Fluid analysis: gram stain mod WBCs, no organisms seen, no growth to date, exudative based on Light's criteria  - Cytopathology: negative for malignant cells  - f/u serum, urine and stool studies for infectious etiologies  - Infectious labs thus far: HIV negative, RVP negative, EBV IgG positive- Pending PCR at this time. IgM negative, Toxo negative, CMV negative, Legionella negative  - Autoimmune labs thus far: C3/C4 nl  - ID, IR and rheumatology following patient. Rheum recommended initiated plaquenil for concerns of SLE etiology given malar rash, elevated RF/DSDNA/CRP/ESR and pericarditis.

## 2017-11-10 NOTE — PROGRESS NOTE ADULT - PROBLEM SELECTOR PLAN 7
- patient with ADAM on CPAP qhs  - c/w CPAP qhs - currently normotensive  - c/w home Carvedilol 12.5mg BID  - monitor BPs

## 2017-11-10 NOTE — PROGRESS NOTE ADULT - ATTENDING COMMENTS
I have reviewed the medical record, including laboratory and radiographic studies, interviewed and examined the patient and discussed the plan with Dr. Gracia, the ID Resident.  Agree with above.  So far, we do not have a treatable infectious etiology.  Would still send stool for enteric pathogens as effusions from enteric pathogens (eg Campylobacter) may be reactive.  EBV serologies are c/c recent (not current) or reactivation and his titers are very high.  He would not be the typical host for reactivation as he has no (known) immune compromising condition but reactivation can occur in normal hosts.  Would send EBV PCR.  He is being started on plaquenil for SLE per Rheum.  Will continue to follow with you – ID service.
I have reviewed the medical record, including laboratory and radiographic studies, interviewed and examined the patient and discussed the plan with Dr. Gracia, the ID Resident.  Agree with above.  Will continue to follow with you – ID service.
Please see resident CCU progress note for additional details    Mr. Carmona is a 71 yo M with PMH of morbid obesity, Unprovoked DVT/PE (2015; who has been switched from pradaxa to eliquis 1mo ago), Essential HTN, ADAM (CPAP) and recent ECHO findings of pericardial effusion who presents with marked enlargement of pericardial effusion and new left pleural effusion, new onset Aflutter now s/p L sided chest tube and pericardial drain placement by IR.  Patient admitted to CCU 11/8 post IR pericardial drain placement.     This morning, patient evaluated while sitting in bedside chair. Pt reported pleurisy. No SOB. Total output from pericardial drain 85cc since placement. Last 12hrs, 65cc output.  Initially pleural drain with 1L output. Clamped x 12hrs, @ 02:00 pleural drain unclamped with 1L output in <10minutes. Pleural drain reclamped for next 12 hours.     Vitals reviewed 11/9  Exam notable for elderly male sitting in chair in NAD, flat neck veins, RRR, I/VI systolic murmur, no GR, lungs with diminished basilar breath sounds and scattered crackles, left sided CT in place with ~2.2L in collection system, subcostal pericardial drain in place with ~45cc sanguainous fluid in collection system, trace non pitting PAULETTE, skin WWP, AO&x3  Labs reviewed 11/9, notable for ESR 55, CRP 9.5, RF 14, PSA 9.21, ds DNA 78. HIV (-), RVP (-), Cultures NGTD  EKG 11/9: Aflutter with variable block, LAD  CXR 11/9: vascular congestion, pleural effusion  TTE 11/8: LVEF 55-60%, no residural pericardial effusion  CT C/A/P Scan 11/7: ONEAL, left pleural effusion, no e/o recurrent RCC    -q12 unclamping of CT and drain max 1L at a time  -Pericardial drain with <100cc total output - favor discontinuation today  -Serial TTE for evaluation of pericardial effusion post drain dc  -Awaiting additional numerous infectious and rheumatologic studies  -Cont Coreg 12.5 BID  -Initiated Colchicine 0.6mg BID for pericarditis   -Avoiding NSAIDS given recent TOBIAS, currently resolving  -CT surgery Dr Sepulveda, Cards Dr. Ramirez, Rheumatology and ID following  -Pt OOB daily, PT evaluation  Patient medically appropriate to transfer to stepdown today pending pericardial drain removal    MD Fior  CCU Attending
Please see resident CCU accept note for additional details    Mr. Carmona is a 71 yo M with PMH of morbid obesity, Unprovoked DVT/PE (2015; who has been switched from pradaxa to eliquis 1mo ago), Essential HTN, ADAM (CPAP) and recent ECHO findings of pericardial effusion who presents with marked enlargement of pericardial effusion and new left pleural effusion, new onset Aflutter now s/p L sided chest tube and pericardial drain placement by IR.  Patient admitted to CCU post IR pericardial drain placement.     Upon arrival to CCU, pt reports mild cp with inspiration. No SOB, no LH, no dizziness. No infectious symptoms. No marked weight loss of gain.   Vitals reviewed 11/8  Exam notable for elderly male sitting up in bed in NAD, alert and conversant, RRR, I/VI systolic murmur, no GR, lungs with scattered crackles, CT in place, subcostal pericardial drain in place with ~40cc in collection system, trace PAULETTE, skin WWP, AO&x3  Labs reviewed 11/8, notable for ESR 55, CRP 9.5, RF 14, PSA 9.21, ds DNA 78  TTE: LVEF 55-60%  CT Scan 11/7: no e/o recurrent RCC    -Resume heparin gtt per IR timing  -q12 unclamping of CT and drain max 1L at a time  -Monitor output from pericardial drain, currently with minimal output - possible dc in AM  -Hold Lasix as pt euvolemic and using CT for pleural fluid drainage at this time  -Cont Coreg 12.5 BID  -Avoid NSAIDS given renal function  -Obtain outpt collateral from PCP regarding baseline Cr  -Initiate Colchicine if pt with persistent pleurisy  Pt to remain overnight in CCU for close monitoring post pericardial drain placement and IR pleural tap  MD Fior  CCU Attending
See resident CCU progress note for additional details    71 yo M with PMH of morbid obesity, Unprovoked DVT/PE (2015; who has been switched from pradaxa to eliquis 1mo ago), Essential HTN, ADAM (CPAP) and recent ECHO findings of pericardial effusion who presents with marked enlargement of pericardial effusion and new left pleural effusion, new onset Aflutter now s/p L sided chest tube and pericardial drain placement by IR.  Patient admitted to CCU 11/8 post IR pericardial drain placement.     Output last 24hours, 48cc from pericardial drain, 450cc from CT  CT Chest 11/10 with no e/o loculated fluid     Vitals reviewed 11/10  Labs reviewed 11/10  EKG 11/10: Rate controlled Aflutter with variable block, LAD  CXR 11/10: left pleural effusion  TTE 11/8: LVEF 55-60%, no residural pericardial effusion  CT C/A/P Scan 11/7: ONEAL, left pleural effusion, no e/o recurrent RCC    -IR to discontinue pericardial and pleural drains today  -TTE 4hrs post pericardial drain pull  -Plan to initiate Plaquenil 200 BID for suspected SLE, per Rheum recs  -Plan for FAREED and DCCV for Aflutter this Monday 11/13, with EPS  -Transition from Heparin gtt to Eliquis per IR timing  -Cont Coreg 12.5 BID  -Cont Colchicine 0.6mg BID gout and pleurisy   -Avoiding NSAIDS given recent TOBIAS, currently resolving  -Cont nocturnal CPAP for ADAM  -Pt OOB daily, PT evaluation  Patient remains medically appropriate to transfer to stepdown today     MD Fior  CCU Attending

## 2017-11-10 NOTE — PROGRESS NOTE ADULT - SUBJECTIVE AND OBJECTIVE BOX
PGY1 CCU TRANSFER ACCEPTANCE NOTE:    HOSPITAL COURSE: 73 yo M with PMH morbid obesity, diastolic CHF (EF 55-60), unprovoked DVT/PE (2015; switched from Pradaxa to Eliquis 1mo ago), HTN, ADAM (CPAP), renal tumor cryoablation by IR in 2014, recent diagnosis of small pericardial effusion by TTE 2weeks prior (by outpatient cardiologist, Dr. Ramirez) who was sent to ED after being found to have enlarged, now moderately sized, pericardial effusion. Patient saw Dr. Ramirez 2 weeks prior for dull, centrally located and positional chest pain and found to have small pericardial effusion by TTE. Noted also to have leukocytosis to 17; CT chest w/ significant pericardial effusion 1.7x1.4cm.  F/u TTE one week later with moderate pericardial effusion (no tamponade) and ECG w/ NSR and 1st degree AV block. Patient went back to Dr. Ramirez on Monday (11/6) for worsened symptoms and generalized malaise. Repeat TTE with enlargement of pericardial effusion and patient sent to ED. On admission, T: 98.4, P: 95, BP: 124/76, RR: 18, O2: 94% RA. Patient found to have large L pleural effusion and EKG with AFlutter to HR 110s. Patient admitted to tele unit for further w/u. CT surgery Dr Ace was consulted. Repeat ECHO performed with EF 55-60%, moderate circumferential pericardial effusion, no echocardiographic evidence of tamponade, fibrinous material c/w inflammatory process vs coagulant. CT Chest/abd/pelvis performed noted large exudative pericardial effusion, moderate left pleural effusion with associated near complete collapse of the left lower lobe; no recurrence of L renal mass however new prominent left retroperitoneal lymph node. Rheumatologic labs ordered showing elevated RF, dsDNA, PSA, CRP, ESR. ID and rheumatology consulted for possible infectious/autoimmune etiology of pericardial/pleural effusions. Pt also developed new onset R big toe pain w/ mild erythema/pain on palpation, found to have elevated uric acid levels likely representing gout. Pt underwent CT guided pericardial drain and L sided chest tube placed by IR on 11/8/17 and transferred to CCU for further monitoring. Patient started on heparin gtt for AFlutter and h/o DVT/PE with plan to switch to Eliquis once tubes removed. Pleural and pericardial fluid exudative based on Light's criteria. Rheumatologic and infectious disease labs were sent. Patient started on Colchicine for gout and pleurisy. Patient with improvement in pleurisy and HD stable, still in AFlutter with HR 80-90s. Pericardial drain with 85cc and chest tube with 2500cc over 48hrs, will minimal drainage at the moment. Labs thus far with positive EBV titers.     OVERNIGHT EVENTS: No acute overnight events.     SUBJECTIVE / INTERVAL HPI: Pericardial drain with 10cc total and pleural drain with 450cc over the last 24hrs. Patient seen and examined at bedside. Patient awaiting tube removal and upset that they were not removed yesterday. Otherwise without complaints. Denies chest pain, chest tightness, SOB, abdominal pain, n/v/d/c, dizziness, lightheadedness.     VITAL SIGNS:  Vital Signs Last 24 Hrs  T(C): 36.8 (10 Nov 2017 02:00), Max: 37.2 (09 Nov 2017 06:34)  T(F): 98.3 (10 Nov 2017 02:00), Max: 98.9 (09 Nov 2017 06:34)  HR: 68 (10 Nov 2017 05:00) (64 - 88)  BP: 113/54 (10 Nov 2017 05:00) (95/66 - 141/61)  BP(mean): 85 (10 Nov 2017 05:00) (66 - 96)  RR: 19 (10 Nov 2017 05:00) (18 - 29)  SpO2: 92% (10 Nov 2017 05:00) (92% - 99%)    PHYSICAL EXAM:  General: morbidly obese, resting comfortable, NAD  HEENT: NC/AT, PERRL, anicteric sclera, MMM, no pharyngeal erythema  Neck: supple, no LAD, no JVD  Cardiovascular: distant heart sounds 2/2 body habitus, +S1/S2, regular rhythm, no murmurs, no rubs, drain in place without surrounding erythema, edema or fluctuance  Respiratory: normal respiratory effort, shallow breaths 2/2 pain, decreased breath sounds at L lung base, no W/R/R, no retractions, able to speak in complete sentences, L drain in place draining red tinged fluid, no surrounding erythema, edema or fluctuance  Gastrointestinal: obese, soft, NT/ND; +BSx4  Extremities: WWP; warm, erythematous and edematous R 1st digit, +TTP  and pain with movement, no LE edema, no clubbing or cyanosis  Vascular: 2+ radial, DP/PT pulses B/L  Neurological: AAOx3; no focal deficits    MEDICATIONS:  MEDICATIONS  (STANDING):  carvedilol 12.5 milliGRAM(s) Oral every 12 hours  colchicine 0.6 milliGRAM(s) Oral every 12 hours  heparin  Infusion 2750 Unit(s)/Hr (27.5 mL/Hr) IV Continuous <Continuous>    MEDICATIONS  (PRN):  acetaminophen   Tablet. 650 milliGRAM(s) Oral every 6 hours PRN Moderate Pain (4 - 6)    ALLERGIES:  No Known Allergies      LABS:                             11-08-17 @ 07:01  -  11-09-17 @ 07:00  --------------------------------------------------------  IN: 1369.5 mL / OUT: 1925 mL / NET: -555.5 mL    11-09-17 @ 07:01  -  11-10-17 @ 06:26  --------------------------------------------------------  IN: 2571 mL / OUT: 2052 mL / NET: 519 mL        RADIOLOGY & ADDITIONAL TESTS: Reviewed.    ECHO: The left ventricular wall motion is normal. The overall left ventricular ejection fraction is probably normal. The left ventricular ejection fraction is estimated to be 55-60% The right ventricle is normal in size and function. The left atrial size is normal. Right atrial size is normal. Structurally normal aortic valve. There is mild aortic regurgitation. Structurally normal mitral valve. There is trace mitral regurgitation. Structurally normal tricuspid valve. There is trace tricuspid regurgitation. The pulmonary artery systolic pressure is estimated to be 25 mmHg. Structurally normal pulmonic valve. No pulmonic regurgitation noted. Moderate circumferential pericardial effusion. No echocardiographic evidence of tamponade. Fibrinous material consistent with inflammatory process vs coagulant is seen in the pericardial space.    CT chest/Abdomen/Pelvis w contrast: Large exudative pericardial effusion, Moderate left pleural effusion with associated near complete collapse of the left lower lobe. History of left renal malignancy status post cryoablation. No evidence of recurrent or residual disease. A prominent left retroperitoneal lymph node is noted, which is new from the comparison MRI exam. Recommend continued surveillance with CT or MR imaging. Prostatomegaly. Marked diastases of the rectus abdominis. PGY1 CCU TRANSFER ACCEPTANCE NOTE:    HOSPITAL COURSE: 73 yo M with PMH morbid obesity, diastolic CHF (EF 55-60), unprovoked DVT/PE (2015; switched from Pradaxa to Eliquis 1mo ago), HTN, ADAM (CPAP), renal tumor cryoablation by IR in 2014, recent diagnosis of small pericardial effusion by TTE 2weeks prior (by outpatient cardiologist, Dr. Ramirez) who was sent to ED after being found to have enlarged, now moderately sized, pericardial effusion. Patient saw Dr. Ramirez 2 weeks prior for dull, centrally located and positional chest pain and found to have small pericardial effusion by TTE. Noted also to have leukocytosis to 17; CT chest w/ significant pericardial effusion 1.7x1.4cm.  F/u TTE one week later with moderate pericardial effusion (no tamponade) and ECG w/ NSR and 1st degree AV block. Patient went back to Dr. Ramirez on Monday (11/6) for worsened symptoms and generalized malaise. Repeat TTE with enlargement of pericardial effusion and patient sent to ED. On admission, T: 98.4, P: 95, BP: 124/76, RR: 18, O2: 94% RA. Patient found to have large L pleural effusion and EKG with AFlutter to HR 110s. Patient admitted to tele unit for further w/u. CT surgery Dr Ace was consulted. Repeat ECHO performed with EF 55-60%, moderate circumferential pericardial effusion, no echocardiographic evidence of tamponade, fibrinous material c/w inflammatory process vs coagulant. CT Chest/abd/pelvis performed noted large exudative pericardial effusion, moderate left pleural effusion with associated near complete collapse of the left lower lobe; no recurrence of L renal mass however new prominent left retroperitoneal lymph node. Rheumatologic labs ordered showing elevated RF, dsDNA, PSA, CRP, ESR. ID and rheumatology consulted for possible infectious/autoimmune etiology of pericardial/pleural effusions. Pt also developed new onset R big toe pain w/ mild erythema/pain on palpation, found to have elevated uric acid levels likely representing gout. Pt underwent CT guided pericardial drain and L sided chest tube placed by IR on 11/8/17 and transferred to CCU for further monitoring. Patient started on heparin gtt for AFlutter and h/o DVT/PE with plan to switch to Eliquis once tubes removed. Pleural and pericardial fluid exudative based on Light's criteria. Rheumatologic and infectious disease labs were sent. Patient started on Colchicine for gout and pleurisy. Patient with improvement in pleurisy and HD stable, still in AFlutter with HR 80-90s. Pericardial drain with 85cc and chest tube with 2500cc over 48hrs, will minimal drainage at the moment. Labs thus far with positive EBV titers.     OVERNIGHT EVENTS: No acute overnight events.     SUBJECTIVE / INTERVAL HPI: Pericardial drain with 20cc total and pleural drain with 450cc over the last 24hrs. Patient seen and examined at bedside. Patient awaiting tube removal and upset that they were not removed yesterday. Otherwise without complaints. Denies chest pain, chest tightness, SOB, abdominal pain, n/v/d/c, dizziness, lightheadedness.     VITAL SIGNS:  Vital Signs Last 24 Hrs  T(C): 36.8 (10 Nov 2017 02:00), Max: 37.2 (09 Nov 2017 06:34)  T(F): 98.3 (10 Nov 2017 02:00), Max: 98.9 (09 Nov 2017 06:34)  HR: 68 (10 Nov 2017 05:00) (64 - 88)  BP: 113/54 (10 Nov 2017 05:00) (95/66 - 141/61)  BP(mean): 85 (10 Nov 2017 05:00) (66 - 96)  RR: 19 (10 Nov 2017 05:00) (18 - 29)  SpO2: 92% (10 Nov 2017 05:00) (92% - 99%)    PHYSICAL EXAM:  General: morbidly obese, resting comfortable, NAD  HEENT: NC/AT, PERRL, anicteric sclera, MMM, no pharyngeal erythema  Neck: supple, no LAD, no JVD  Cardiovascular: distant heart sounds 2/2 body habitus, +S1/S2, regular rhythm, no murmurs, no rubs, drain in place without surrounding erythema, edema or fluctuance  Respiratory: normal respiratory effort, shallow breaths 2/2 pain, decreased breath sounds at L lung base, no W/R/R, no retractions, able to speak in complete sentences, L drain in place draining red tinged fluid, no surrounding erythema, edema or fluctuance  Gastrointestinal: obese, soft, NT/ND; +BSx4  Extremities: WWP; warm, erythematous and edematous R 1st digit, +TTP  and pain with movement, no LE edema, no clubbing or cyanosis  Vascular: 2+ radial, DP/PT pulses B/L  Neurological: AAOx3; no focal deficits    MEDICATIONS:  MEDICATIONS  (STANDING):  carvedilol 12.5 milliGRAM(s) Oral every 12 hours  colchicine 0.6 milliGRAM(s) Oral every 12 hours  heparin  Infusion 2750 Unit(s)/Hr (27.5 mL/Hr) IV Continuous <Continuous>    MEDICATIONS  (PRN):  acetaminophen   Tablet. 650 milliGRAM(s) Oral every 6 hours PRN Moderate Pain (4 - 6)    ALLERGIES:  No Known Allergies      LABS:                             11-08-17 @ 07:01  -  11-09-17 @ 07:00  --------------------------------------------------------  IN: 1369.5 mL / OUT: 1925 mL / NET: -555.5 mL    11-09-17 @ 07:01  -  11-10-17 @ 06:26  --------------------------------------------------------  IN: 2571 mL / OUT: 2052 mL / NET: 519 mL        RADIOLOGY & ADDITIONAL TESTS: Reviewed.    ECHO: The left ventricular wall motion is normal. The overall left ventricular ejection fraction is probably normal. The left ventricular ejection fraction is estimated to be 55-60% The right ventricle is normal in size and function. The left atrial size is normal. Right atrial size is normal. Structurally normal aortic valve. There is mild aortic regurgitation. Structurally normal mitral valve. There is trace mitral regurgitation. Structurally normal tricuspid valve. There is trace tricuspid regurgitation. The pulmonary artery systolic pressure is estimated to be 25 mmHg. Structurally normal pulmonic valve. No pulmonic regurgitation noted. Moderate circumferential pericardial effusion. No echocardiographic evidence of tamponade. Fibrinous material consistent with inflammatory process vs coagulant is seen in the pericardial space.    CT chest/Abdomen/Pelvis w contrast: Large exudative pericardial effusion, Moderate left pleural effusion with associated near complete collapse of the left lower lobe. History of left renal malignancy status post cryoablation. No evidence of recurrent or residual disease. A prominent left retroperitoneal lymph node is noted, which is new from the comparison MRI exam. Recommend continued surveillance with CT or MR imaging. Prostatomegaly. Marked diastases of the rectus abdominis. PGY1 CCU TRANSFER ACCEPTANCE NOTE:    HOSPITAL COURSE: 71 yo M with PMH morbid obesity, diastolic CHF (EF 55-60), unprovoked DVT/PE (2015; switched from Pradaxa to Eliquis 1mo ago), HTN, ADAM (CPAP), renal tumor cryoablation by IR in 2014, recent diagnosis of small pericardial effusion by TTE 2weeks prior (by outpatient cardiologist, Dr. Ramirez) who was sent to ED after being found to have enlarged, now moderately sized, pericardial effusion. Patient saw Dr. Ramirez 2 weeks prior for dull, centrally located and positional chest pain and found to have small pericardial effusion by TTE. Noted also to have leukocytosis to 17; CT chest w/ significant pericardial effusion 1.7x1.4cm.  F/u TTE one week later with moderate pericardial effusion (no tamponade) and ECG w/ NSR and 1st degree AV block. Patient went back to Dr. Ramirez on Monday (11/6) for worsened symptoms and generalized malaise. Repeat TTE with enlargement of pericardial effusion and patient sent to ED. On admission, T: 98.4, P: 95, BP: 124/76, RR: 18, O2: 94% RA. Patient found to have large L pleural effusion and EKG with AFlutter to HR 110s. Patient admitted to tele unit for further w/u. CT surgery Dr Ace was consulted. Repeat ECHO performed with EF 55-60%, moderate circumferential pericardial effusion, no echocardiographic evidence of tamponade, fibrinous material c/w inflammatory process vs coagulant. CT Chest/abd/pelvis performed noted large exudative pericardial effusion, moderate left pleural effusion with associated near complete collapse of the left lower lobe; no recurrence of L renal mass however new prominent left retroperitoneal lymph node. Rheumatologic labs ordered showing elevated RF, dsDNA, PSA, CRP, ESR. ID and rheumatology consulted for possible infectious/autoimmune etiology of pericardial/pleural effusions. Pt also developed new onset R big toe pain w/ mild erythema/pain on palpation, found to have elevated uric acid levels likely representing gout. Pt underwent CT guided pericardial drain and L sided chest tube placed by IR on 11/8/17 and transferred to CCU for further monitoring. Patient started on heparin gtt for AFlutter and h/o DVT/PE with plan to switch to Eliquis once tubes removed. Pleural and pericardial fluid exudative based on Light's criteria. Rheumatologic and infectious disease labs were sent. Patient started on Colchicine for gout and pleurisy. Patient with improvement in pleurisy and HD stable, still in AFlutter with HR 80-90s. Pericardial drain with 85cc and chest tube with 2500cc over 48hrs, will minimal drainage at the moment. Labs thus far with positive EBV titers. CXR with LLL effusion vs atelectasis. Patient ordered for CT chest non-contrast for further evaluation, prior to drain removal.     OVERNIGHT EVENTS: No acute overnight events.     SUBJECTIVE / INTERVAL HPI: Pericardial drain with 20cc total and pleural drain with 450cc over the last 24hrs. Patient seen and examined at bedside. Patient awaiting tube removal and upset that they were not removed yesterday. Otherwise without complaints. Denies chest pain, chest tightness, SOB, abdominal pain, n/v/d/c, dizziness, lightheadedness.     VITAL SIGNS:  Vital Signs Last 24 Hrs  T(C): 36.8 (10 Nov 2017 02:00), Max: 37.2 (09 Nov 2017 06:34)  T(F): 98.3 (10 Nov 2017 02:00), Max: 98.9 (09 Nov 2017 06:34)  HR: 68 (10 Nov 2017 05:00) (64 - 88)  BP: 113/54 (10 Nov 2017 05:00) (95/66 - 141/61)  BP(mean): 85 (10 Nov 2017 05:00) (66 - 96)  RR: 19 (10 Nov 2017 05:00) (18 - 29)  SpO2: 92% (10 Nov 2017 05:00) (92% - 99%)    PHYSICAL EXAM:  General: morbidly obese, resting comfortable, NAD  HEENT: NC/AT, PERRL, anicteric sclera, MMM, no pharyngeal erythema  Neck: supple, no LAD, no JVD  Cardiovascular: distant heart sounds 2/2 body habitus, +S1/S2, regular rhythm, no murmurs, no rubs, drain in place without surrounding erythema, edema or fluctuance  Respiratory: normal respiratory effort, shallow breaths 2/2 pain, decreased breath sounds at L lung base, no W/R/R, no retractions, able to speak in complete sentences, L drain in place draining red tinged fluid, no surrounding erythema, edema or fluctuance  Gastrointestinal: obese, soft, NT/ND; +BSx4  Extremities: WWP; warm, erythematous and edematous R 1st digit, +TTP  and pain with movement, no LE edema, no clubbing or cyanosis  Vascular: 2+ radial, DP/PT pulses B/L  Neurological: AAOx3; no focal deficits    MEDICATIONS:  MEDICATIONS  (STANDING):  carvedilol 12.5 milliGRAM(s) Oral every 12 hours  colchicine 0.6 milliGRAM(s) Oral every 12 hours  heparin  Infusion 2750 Unit(s)/Hr (27.5 mL/Hr) IV Continuous <Continuous>    MEDICATIONS  (PRN):  acetaminophen   Tablet. 650 milliGRAM(s) Oral every 6 hours PRN Moderate Pain (4 - 6)    ALLERGIES:  No Known Allergies      LABS:                             11-08-17 @ 07:01  -  11-09-17 @ 07:00  --------------------------------------------------------  IN: 1369.5 mL / OUT: 1925 mL / NET: -555.5 mL    11-09-17 @ 07:01  -  11-10-17 @ 06:26  --------------------------------------------------------  IN: 2571 mL / OUT: 2052 mL / NET: 519 mL        RADIOLOGY & ADDITIONAL TESTS: Reviewed.    ECHO: The left ventricular wall motion is normal. The overall left ventricular ejection fraction is probably normal. The left ventricular ejection fraction is estimated to be 55-60% The right ventricle is normal in size and function. The left atrial size is normal. Right atrial size is normal. Structurally normal aortic valve. There is mild aortic regurgitation. Structurally normal mitral valve. There is trace mitral regurgitation. Structurally normal tricuspid valve. There is trace tricuspid regurgitation. The pulmonary artery systolic pressure is estimated to be 25 mmHg. Structurally normal pulmonic valve. No pulmonic regurgitation noted. Moderate circumferential pericardial effusion. No echocardiographic evidence of tamponade. Fibrinous material consistent with inflammatory process vs coagulant is seen in the pericardial space.    CT chest/Abdomen/Pelvis w contrast: Large exudative pericardial effusion, Moderate left pleural effusion with associated near complete collapse of the left lower lobe. History of left renal malignancy status post cryoablation. No evidence of recurrent or residual disease. A prominent left retroperitoneal lymph node is noted, which is new from the comparison MRI exam. Recommend continued surveillance with CT or MR imaging. Prostatomegaly. Marked diastases of the rectus abdominis. PGY1 CCU TRANSFER ACCEPTANCE NOTE:    HOSPITAL COURSE: 73 yo M with PMH morbid obesity, diastolic CHF (EF 55-60), unprovoked DVT/PE (2015; switched from Pradaxa to Eliquis 1mo ago), HTN, ADAM (CPAP), renal tumor cryoablation by IR in 2014, recent diagnosis of small pericardial effusion by TTE 2weeks prior (by outpatient cardiologist, Dr. Ramirez) who was sent to ED after being found to have enlarged, now moderately sized, pericardial effusion. Patient saw Dr. Ramirez 2 weeks prior for dull, centrally located and positional chest pain and found to have small pericardial effusion by TTE. Noted also to have leukocytosis to 17; CT chest w/ significant pericardial effusion 1.7x1.4cm.  F/u TTE one week later with moderate pericardial effusion (no tamponade) and ECG w/ NSR and 1st degree AV block. Patient went back to Dr. Ramirez on Monday (11/6) for worsened symptoms and generalized malaise. Repeat TTE with enlargement of pericardial effusion and patient sent to ED. On admission, T: 98.4, P: 95, BP: 124/76, RR: 18, O2: 94% RA. Patient found to have large L pleural effusion and EKG with AFlutter to HR 110s. Patient admitted to tele unit for further w/u. CT surgery Dr Ace was consulted. Repeat ECHO performed with EF 55-60%, moderate circumferential pericardial effusion, no echocardiographic evidence of tamponade, fibrinous material c/w inflammatory process vs coagulant. CT Chest/abd/pelvis performed noted large exudative pericardial effusion, moderate left pleural effusion with associated near complete collapse of the left lower lobe; no recurrence of L renal mass however new prominent left retroperitoneal lymph node. Rheumatologic labs ordered showing elevated RF, dsDNA, PSA, CRP, ESR. ID and rheumatology consulted for possible infectious/autoimmune etiology of pericardial/pleural effusions. Pt also developed new onset R big toe pain w/ mild erythema/pain on palpation, found to have elevated uric acid levels likely representing gout. Pt underwent CT guided pericardial drain and L sided chest tube placed by IR on 11/8/17 and transferred to CCU for further monitoring. Patient started on heparin gtt for AFlutter and h/o DVT/PE with plan to switch to Eliquis once tubes removed. Pleural and pericardial fluid exudative based on Light's criteria. Rheumatologic and infectious disease labs were sent. Patient started on Colchicine for gout and pleurisy. Patient with improvement in pleurisy and HD stable, still in AFlutter with HR 80-90s. Pericardial drain with 85cc and chest tube with 2500cc over 48hrs, will minimal drainage at the moment. Labs thus far with positive EBV titers. CXR with LLL effusion vs atelectasis. Patient ordered for CT chest non-contrast for further evaluation, prior to drain removal.     OVERNIGHT EVENTS: No acute overnight events.     SUBJECTIVE / INTERVAL HPI: Pericardial drain with 20cc total and pleural drain with 450cc over the last 24hrs. Patient seen and examined at bedside. Patient awaiting tube removal and upset that they were not removed yesterday. Otherwise without complaints. Denies chest pain, chest tightness, SOB, abdominal pain, n/v/d/c, dizziness, lightheadedness.     VITAL SIGNS:  Vital Signs Last 24 Hrs  T(C): 36.8 (10 Nov 2017 02:00), Max: 37.2 (09 Nov 2017 06:34)  T(F): 98.3 (10 Nov 2017 02:00), Max: 98.9 (09 Nov 2017 06:34)  HR: 68 (10 Nov 2017 05:00) (64 - 88)  BP: 113/54 (10 Nov 2017 05:00) (95/66 - 141/61)  BP(mean): 85 (10 Nov 2017 05:00) (66 - 96)  RR: 19 (10 Nov 2017 05:00) (18 - 29)  SpO2: 92% (10 Nov 2017 05:00) (92% - 99%)    PHYSICAL EXAM:  General: morbidly obese, resting comfortable, NAD  HEENT: NC/AT, PERRL, anicteric sclera, MMM, no pharyngeal erythema  Neck: supple, no LAD, no JVD  Cardiovascular: distant heart sounds 2/2 body habitus, +S1/S2, regular rhythm, no murmurs, no rubs, drain in place without surrounding erythema, edema or fluctuance  Respiratory: normal respiratory effort, shallow breaths 2/2 pain, decreased breath sounds at L lung base, no W/R/R, no retractions, able to speak in complete sentences, L drain in place draining red tinged fluid, no surrounding erythema, edema or fluctuance  Gastrointestinal: obese, soft, NT/ND; +BSx4  Extremities: WWP; warm, erythematous and edematous R 1st digit, +TTP  and pain with movement, no LE edema, no clubbing or cyanosis  Vascular: 2+ radial, DP/PT pulses B/L  Neurological: AAOx3; no focal deficits    MEDICATIONS:  MEDICATIONS  (STANDING):  carvedilol 12.5 milliGRAM(s) Oral every 12 hours  colchicine 0.6 milliGRAM(s) Oral every 12 hours  heparin  Infusion 2750 Unit(s)/Hr (27.5 mL/Hr) IV Continuous <Continuous>    MEDICATIONS  (PRN):  acetaminophen   Tablet. 650 milliGRAM(s) Oral every 6 hours PRN Moderate Pain (4 - 6)    ALLERGIES:  No Known Allergies      LABS:                                     13.3   10.4  )-----------( 370      ( 10 Nov 2017 05:43 )             39.2     11-10    135  |  98  |  21  ----------------------------<  123<H>  3.8   |  25  |  1.09    Ca    8.6      10 Nov 2017 05:43  Mg     2.0     11-10      11-08-17 @ 07:01  -  11-09-17 @ 07:00  --------------------------------------------------------  IN: 1369.5 mL / OUT: 1925 mL / NET: -555.5 mL    11-09-17 @ 07:01  -  11-10-17 @ 06:26  --------------------------------------------------------  IN: 2571 mL / OUT: 2052 mL / NET: 519 mL        RADIOLOGY & ADDITIONAL TESTS: Reviewed.    ECHO: The left ventricular wall motion is normal. The overall left ventricular ejection fraction is probably normal. The left ventricular ejection fraction is estimated to be 55-60% The right ventricle is normal in size and function. The left atrial size is normal. Right atrial size is normal. Structurally normal aortic valve. There is mild aortic regurgitation. Structurally normal mitral valve. There is trace mitral regurgitation. Structurally normal tricuspid valve. There is trace tricuspid regurgitation. The pulmonary artery systolic pressure is estimated to be 25 mmHg. Structurally normal pulmonic valve. No pulmonic regurgitation noted. Moderate circumferential pericardial effusion. No echocardiographic evidence of tamponade. Fibrinous material consistent with inflammatory process vs coagulant is seen in the pericardial space.    CT chest/Abdomen/Pelvis w contrast: Large exudative pericardial effusion, Moderate left pleural effusion with associated near complete collapse of the left lower lobe. History of left renal malignancy status post cryoablation. No evidence of recurrent or residual disease. A prominent left retroperitoneal lymph node is noted, which is new from the comparison MRI exam. Recommend continued surveillance with CT or MR imaging. Prostatomegaly. Marked diastases of the rectus abdominis. PGY1 CCU TRANSFER ACCEPTANCE NOTE:    HOSPITAL COURSE: 71 yo M with PMH morbid obesity, diastolic CHF (EF 55-60), unprovoked DVT/PE (2015; switched from Pradaxa to Eliquis 1mo ago), HTN, ADAM (CPAP), renal tumor cryoablation by IR in 2014, recent diagnosis of small pericardial effusion by TTE 2weeks prior (by outpatient cardiologist, Dr. Ramirez) who was sent to ED after being found to have enlarged, now moderately sized, pericardial effusion. Patient saw Dr. Ramirez 2 weeks prior for dull, centrally located and positional chest pain and found to have small pericardial effusion by TTE. Noted also to have leukocytosis to 17; CT chest w/ significant pericardial effusion 1.7x1.4cm.  F/u TTE one week later with moderate pericardial effusion (no tamponade) and ECG w/ NSR and 1st degree AV block. Patient went back to Dr. Ramirez on Monday (11/6) for worsened symptoms and generalized malaise. Repeat TTE with enlargement of pericardial effusion and patient sent to ED. On admission, T: 98.4, P: 95, BP: 124/76, RR: 18, O2: 94% RA. Patient found to have large L pleural effusion and EKG with AFlutter to HR 110s. Patient admitted to tele unit for further w/u. CT surgery Dr Ace was consulted. Repeat ECHO performed with EF 55-60%, moderate circumferential pericardial effusion, no echocardiographic evidence of tamponade, fibrinous material c/w inflammatory process vs coagulant. CT Chest/abd/pelvis performed noted large exudative pericardial effusion, moderate left pleural effusion with associated near complete collapse of the left lower lobe; no recurrence of L renal mass however new prominent left retroperitoneal lymph node. Rheumatologic labs ordered showing elevated RF, dsDNA, PSA, CRP, ESR. ID and rheumatology consulted for possible infectious/autoimmune etiology of pericardial/pleural effusions. Pt also developed new onset R big toe pain w/ mild erythema/pain on palpation, found to have elevated uric acid levels likely representing gout. Pt underwent CT guided pericardial drain and L sided chest tube placed by IR on 11/8/17 and transferred to CCU for further monitoring. Patient started on heparin gtt for AFlutter and h/o DVT/PE with plan to switch to Eliquis once tubes removed. Pleural and pericardial fluid exudative based on Light's criteria. Rheumatologic and infectious disease labs were sent. Patient started on Colchicine for gout and pleurisy. Patient with improvement in pleurisy and HD stable, still in AFlutter with HR 80-90s. Pericardial drain with 85cc and chest tube with 2500cc over 48hrs, will minimal drainage at the moment. Labs thus far with positive EBV titers. CXR with LLL effusion vs atelectasis. Patient ordered for CT chest non-contrast for further evaluation, r/o loculated effusion prior to drain removal.     OVERNIGHT EVENTS: No acute overnight events.     SUBJECTIVE / INTERVAL HPI: Pericardial drain with 20cc total and pleural drain with 450cc over the last 24hrs. Patient seen and examined at bedside. Patient awaiting tube removal and upset that they were not removed yesterday. Otherwise without complaints. Denies chest pain, chest tightness, SOB, abdominal pain, n/v/d/c, dizziness, lightheadedness.     VITAL SIGNS:  Vital Signs Last 24 Hrs  T(C): 36.8 (10 Nov 2017 02:00), Max: 37.2 (09 Nov 2017 06:34)  T(F): 98.3 (10 Nov 2017 02:00), Max: 98.9 (09 Nov 2017 06:34)  HR: 68 (10 Nov 2017 05:00) (64 - 88)  BP: 113/54 (10 Nov 2017 05:00) (95/66 - 141/61)  BP(mean): 85 (10 Nov 2017 05:00) (66 - 96)  RR: 19 (10 Nov 2017 05:00) (18 - 29)  SpO2: 92% (10 Nov 2017 05:00) (92% - 99%)    PHYSICAL EXAM:  General: morbidly obese, resting comfortable, NAD  HEENT: NC/AT, PERRL, anicteric sclera, MMM, no pharyngeal erythema  Neck: supple, no LAD, no JVD  Cardiovascular: distant heart sounds 2/2 body habitus, +S1/S2, regular rhythm, no murmurs, no rubs, drain in place without surrounding erythema, edema or fluctuance  Respiratory: normal respiratory effort, shallow breaths 2/2 pain, decreased breath sounds at L lung base, no W/R/R, no retractions, able to speak in complete sentences, L drain in place draining red tinged fluid, no surrounding erythema, edema or fluctuance  Gastrointestinal: obese, soft, NT/ND; +BSx4  Extremities: WWP; warm, erythematous and edematous R 1st digit, +TTP  and pain with movement, no LE edema, no clubbing or cyanosis  Vascular: 2+ radial, DP/PT pulses B/L  Neurological: AAOx3; no focal deficits    MEDICATIONS:  MEDICATIONS  (STANDING):  carvedilol 12.5 milliGRAM(s) Oral every 12 hours  colchicine 0.6 milliGRAM(s) Oral every 12 hours  heparin  Infusion 2750 Unit(s)/Hr (27.5 mL/Hr) IV Continuous <Continuous>    MEDICATIONS  (PRN):  acetaminophen   Tablet. 650 milliGRAM(s) Oral every 6 hours PRN Moderate Pain (4 - 6)    ALLERGIES:  No Known Allergies      LABS:                                     13.3   10.4  )-----------( 370      ( 10 Nov 2017 05:43 )             39.2     11-10    135  |  98  |  21  ----------------------------<  123<H>  3.8   |  25  |  1.09    Ca    8.6      10 Nov 2017 05:43  Mg     2.0     11-10      11-08-17 @ 07:01  -  11-09-17 @ 07:00  --------------------------------------------------------  IN: 1369.5 mL / OUT: 1925 mL / NET: -555.5 mL    11-09-17 @ 07:01  -  11-10-17 @ 06:26  --------------------------------------------------------  IN: 2571 mL / OUT: 2052 mL / NET: 519 mL        RADIOLOGY & ADDITIONAL TESTS: Reviewed.    ECHO: The left ventricular wall motion is normal. The overall left ventricular ejection fraction is probably normal. The left ventricular ejection fraction is estimated to be 55-60% The right ventricle is normal in size and function. The left atrial size is normal. Right atrial size is normal. Structurally normal aortic valve. There is mild aortic regurgitation. Structurally normal mitral valve. There is trace mitral regurgitation. Structurally normal tricuspid valve. There is trace tricuspid regurgitation. The pulmonary artery systolic pressure is estimated to be 25 mmHg. Structurally normal pulmonic valve. No pulmonic regurgitation noted. Moderate circumferential pericardial effusion. No echocardiographic evidence of tamponade. Fibrinous material consistent with inflammatory process vs coagulant is seen in the pericardial space.    CT chest/Abdomen/Pelvis w contrast: Large exudative pericardial effusion, Moderate left pleural effusion with associated near complete collapse of the left lower lobe. History of left renal malignancy status post cryoablation. No evidence of recurrent or residual disease. A prominent left retroperitoneal lymph node is noted, which is new from the comparison MRI exam. Recommend continued surveillance with CT or MR imaging. Prostatomegaly. Marked diastases of the rectus abdominis. PGY1 CCU TRANSFER ACCEPTANCE NOTE:    HOSPITAL COURSE: 71 yo M with PMH morbid obesity, diastolic CHF (EF 55-60), unprovoked DVT/PE (2015; switched from Pradaxa to Eliquis 1mo ago), HTN, ADAM (CPAP), renal tumor cryoablation by IR in 2014, recent diagnosis of small pericardial effusion by TTE 2weeks prior (by outpatient cardiologist, Dr. Ramirez) who was sent to ED after being found to have enlarged, now moderately sized, pericardial effusion. Patient saw Dr. Ramirez 2 weeks prior for dull, centrally located and positional chest pain and found to have small pericardial effusion by TTE. Noted also to have leukocytosis to 17; CT chest w/ significant pericardial effusion 1.7x1.4cm.  F/u TTE one week later with moderate pericardial effusion (no tamponade) and ECG w/ NSR and 1st degree AV block. Patient went back to Dr. Ramirez on Monday (11/6) for worsened symptoms and generalized malaise. Repeat TTE with enlargement of pericardial effusion and patient sent to ED. On admission, T: 98.4, P: 95, BP: 124/76, RR: 18, O2: 94% RA. Patient found to have large L pleural effusion and EKG with AFlutter to HR 110s. Patient admitted to tele unit for further w/u. CT surgery Dr Ace was consulted. Repeat ECHO performed with EF 55-60%, moderate circumferential pericardial effusion, no echocardiographic evidence of tamponade, fibrinous material c/w inflammatory process vs coagulant. CT Chest/abd/pelvis performed noted large exudative pericardial effusion, moderate left pleural effusion with associated near complete collapse of the left lower lobe; no recurrence of L renal mass however new prominent left retroperitoneal lymph node. Rheumatologic labs ordered showing elevated RF, dsDNA, PSA, CRP, ESR. ID and rheumatology consulted for possible infectious/autoimmune etiology of pericardial/pleural effusions. Pt also developed new onset R big toe pain w/ mild erythema/pain on palpation, found to have elevated uric acid levels likely representing gout. Pt underwent CT guided pericardial drain and L sided chest tube placed by IR on 11/8/17 and transferred to CCU for further monitoring. Patient started on heparin gtt for AFlutter and h/o DVT/PE with plan to switch to Eliquis once tubes removed. Pleural and pericardial fluid exudative based on Light's criteria. Rheumatologic and infectious disease labs were sent. Patient started on Colchicine for gout and pleurisy. Patient with improvement in pleurisy and HD stable, still in AFlutter with HR 80-90s. Pericardial drain with 85cc and chest tube with 2500cc over 48hrs, will minimal drainage at the moment. Labs thus far with positive EBV titers. CXR with LLL effusion vs atelectasis. Patient ordered for CT chest non-contrast for further evaluation, r/o loculated effusion prior to drain removal. Both drains were removed.    OVERNIGHT EVENTS: No acute overnight events.     SUBJECTIVE / INTERVAL HPI: Pericardial drain with 20cc total and pleural drain with 450cc over the last 24hrs. Patient seen and examined at bedside. Patient awaiting tube removal and upset that they were not removed yesterday. Otherwise without complaints. Denies chest pain, chest tightness, SOB, abdominal pain, n/v/d/c, dizziness, lightheadedness.     VITAL SIGNS:  Vital Signs Last 24 Hrs  T(C): 36.8 (10 Nov 2017 02:00), Max: 37.2 (09 Nov 2017 06:34)  T(F): 98.3 (10 Nov 2017 02:00), Max: 98.9 (09 Nov 2017 06:34)  HR: 68 (10 Nov 2017 05:00) (64 - 88)  BP: 113/54 (10 Nov 2017 05:00) (95/66 - 141/61)  BP(mean): 85 (10 Nov 2017 05:00) (66 - 96)  RR: 19 (10 Nov 2017 05:00) (18 - 29)  SpO2: 92% (10 Nov 2017 05:00) (92% - 99%)    PHYSICAL EXAM:  General: morbidly obese, resting comfortable, NAD  HEENT: NC/AT, PERRL, anicteric sclera, MMM, no pharyngeal erythema  Neck: supple, no LAD, no JVD  Cardiovascular: distant heart sounds 2/2 body habitus, +S1/S2, regular rhythm, no murmurs, no rubs, drain in place without surrounding erythema, edema or fluctuance  Respiratory: normal respiratory effort, shallow breaths 2/2 pain, decreased breath sounds at L lung base, no W/R/R, no retractions, able to speak in complete sentences, L drain in place draining red tinged fluid, no surrounding erythema, edema or fluctuance  Gastrointestinal: obese, soft, NT/ND; +BSx4  Extremities: WWP; warm, erythematous and edematous R 1st digit, +TTP  and pain with movement, no LE edema, no clubbing or cyanosis  Vascular: 2+ radial, DP/PT pulses B/L  Neurological: AAOx3; no focal deficits    MEDICATIONS:  MEDICATIONS  (STANDING):  carvedilol 12.5 milliGRAM(s) Oral every 12 hours  colchicine 0.6 milliGRAM(s) Oral every 12 hours  heparin  Infusion 2750 Unit(s)/Hr (27.5 mL/Hr) IV Continuous <Continuous>    MEDICATIONS  (PRN):  acetaminophen   Tablet. 650 milliGRAM(s) Oral every 6 hours PRN Moderate Pain (4 - 6)    ALLERGIES:  No Known Allergies      LABS:                                     13.3   10.4  )-----------( 370      ( 10 Nov 2017 05:43 )             39.2     11-10    135  |  98  |  21  ----------------------------<  123<H>  3.8   |  25  |  1.09    Ca    8.6      10 Nov 2017 05:43  Mg     2.0     11-10      11-08-17 @ 07:01  -  11-09-17 @ 07:00  --------------------------------------------------------  IN: 1369.5 mL / OUT: 1925 mL / NET: -555.5 mL    11-09-17 @ 07:01  -  11-10-17 @ 06:26  --------------------------------------------------------  IN: 2571 mL / OUT: 2052 mL / NET: 519 mL        RADIOLOGY & ADDITIONAL TESTS: Reviewed.    ECHO: The left ventricular wall motion is normal. The overall left ventricular ejection fraction is probably normal. The left ventricular ejection fraction is estimated to be 55-60% The right ventricle is normal in size and function. The left atrial size is normal. Right atrial size is normal. Structurally normal aortic valve. There is mild aortic regurgitation. Structurally normal mitral valve. There is trace mitral regurgitation. Structurally normal tricuspid valve. There is trace tricuspid regurgitation. The pulmonary artery systolic pressure is estimated to be 25 mmHg. Structurally normal pulmonic valve. No pulmonic regurgitation noted. Moderate circumferential pericardial effusion. No echocardiographic evidence of tamponade. Fibrinous material consistent with inflammatory process vs coagulant is seen in the pericardial space.    CT chest/Abdomen/Pelvis w contrast: Large exudative pericardial effusion, Moderate left pleural effusion with associated near complete collapse of the left lower lobe. History of left renal malignancy status post cryoablation. No evidence of recurrent or residual disease. A prominent left retroperitoneal lymph node is noted, which is new from the comparison MRI exam. Recommend continued surveillance with CT or MR imaging. Prostatomegaly. Marked diastases of the rectus abdominis. PGY1 CCU TRANSFER ACCEPTANCE NOTE:    HOSPITAL COURSE: 71 yo M with PMH morbid obesity, diastolic CHF (EF 55-60), unprovoked DVT/PE (2015; switched from Pradaxa to Eliquis 1mo ago), HTN, ADAM (CPAP), renal tumor cryoablation by IR in 2014, recent diagnosis of small pericardial effusion by TTE 2weeks prior (by outpatient cardiologist, Dr. Ramirez) who was sent to ED after being found to have enlarged, now moderately sized, pericardial effusion. Patient saw Dr. Ramirez 2 weeks prior for dull, centrally located and positional chest pain and found to have small pericardial effusion by TTE. Noted also to have leukocytosis to 17; CT chest w/ significant pericardial effusion 1.7x1.4cm.  F/u TTE one week later with moderate pericardial effusion (no tamponade) and ECG w/ NSR and 1st degree AV block. Patient went back to Dr. Ramirez on Monday (11/6) for worsened symptoms and generalized malaise. Repeat TTE with enlargement of pericardial effusion and patient sent to ED. On admission, T: 98.4, P: 95, BP: 124/76, RR: 18, O2: 94% RA. Patient found to have large L pleural effusion and EKG with AFlutter to HR 110s. Patient admitted to tele unit for further w/u. CT surgery Dr cAe was consulted. Repeat ECHO performed with EF 55-60%, moderate circumferential pericardial effusion, no echocardiographic evidence of tamponade, fibrinous material c/w inflammatory process vs coagulant. CT Chest/abd/pelvis performed noted large exudative pericardial effusion, moderate left pleural effusion with associated near complete collapse of the left lower lobe; no recurrence of L renal mass however new prominent left retroperitoneal lymph node. Rheumatologic labs ordered showing elevated RF, dsDNA, PSA, CRP, ESR. ID and rheumatology consulted for possible infectious/autoimmune etiology of pericardial/pleural effusions. Pt also developed new onset R big toe pain w/ mild erythema/pain on palpation, found to have elevated uric acid levels likely representing gout. Pt underwent CT guided pericardial drain and L sided chest tube placed by IR on 11/8/17 and transferred to CCU for further monitoring. Patient started on heparin gtt for AFlutter and h/o DVT/PE with plan to switch to Eliquis once tubes removed. Pleural and pericardial fluid exudative based on Light's criteria. Rheumatologic and infectious disease labs were sent. Patient started on Colchicine for gout and pleurisy. Also started on Hydroxychloroquine 200mg BID for suspected SLE (malar rash, elevated inflammatory markers, pericarditis).  Pericardial and pleural drains with minimal drainage after 48hrs. Serial CXR with persistent LLL effusion vs atelectasis. CT chest non-contrast performed for further evaluation. Both drains were removed on 11/10 at around noon. Labs thus far showing positive EBV, ordered for EBV PCR. Patient HD stable, still in Aflutter with HR 80-90s, and ready for stepdown to tele unit. Heparin gtt to be changed to Eliquis 5mg BID tomorrow. Plan for cardioversion on Monday with EP.    OVERNIGHT EVENTS: No acute overnight events.     SUBJECTIVE / INTERVAL HPI: Pericardial drain with 20cc total and pleural drain with 450cc over the last 24hrs. Patient seen and examined at bedside. Patient awaiting tube removal and upset that they were not removed yesterday. Otherwise without complaints. Denies chest pain, chest tightness, SOB, abdominal pain, n/v/d/c, dizziness, lightheadedness.     VITAL SIGNS:  Vital Signs Last 24 Hrs  T(C): 36.8 (10 Nov 2017 02:00), Max: 37.2 (09 Nov 2017 06:34)  T(F): 98.3 (10 Nov 2017 02:00), Max: 98.9 (09 Nov 2017 06:34)  HR: 68 (10 Nov 2017 05:00) (64 - 88)  BP: 113/54 (10 Nov 2017 05:00) (95/66 - 141/61)  BP(mean): 85 (10 Nov 2017 05:00) (66 - 96)  RR: 19 (10 Nov 2017 05:00) (18 - 29)  SpO2: 92% (10 Nov 2017 05:00) (92% - 99%)    PHYSICAL EXAM:  General: morbidly obese, resting comfortable, NAD  HEENT: NC/AT, PERRL, anicteric sclera, MMM, no pharyngeal erythema  Neck: supple, no LAD, no JVD  Cardiovascular: distant heart sounds 2/2 body habitus, +S1/S2, regular rhythm, no murmurs, no rubs, drain in place without surrounding erythema, edema or fluctuance  Respiratory: normal respiratory effort, shallow breaths 2/2 pain, decreased breath sounds at L lung base, no W/R/R, no retractions, able to speak in complete sentences, L drain in place draining red tinged fluid, no surrounding erythema, edema or fluctuance  Gastrointestinal: obese, soft, NT/ND; +BSx4  Extremities: WWP; warm, erythematous and edematous R 1st digit, +TTP  and pain with movement, no LE edema, no clubbing or cyanosis  Vascular: 2+ radial, DP/PT pulses B/L  Neurological: AAOx3; no focal deficits    MEDICATIONS:  MEDICATIONS  (STANDING):  carvedilol 12.5 milliGRAM(s) Oral every 12 hours  colchicine 0.6 milliGRAM(s) Oral every 12 hours  heparin  Infusion 2750 Unit(s)/Hr (27.5 mL/Hr) IV Continuous <Continuous>    MEDICATIONS  (PRN):  acetaminophen   Tablet. 650 milliGRAM(s) Oral every 6 hours PRN Moderate Pain (4 - 6)    ALLERGIES:  No Known Allergies      LABS:                                     13.3   10.4  )-----------( 370      ( 10 Nov 2017 05:43 )             39.2     11-10    135  |  98  |  21  ----------------------------<  123<H>  3.8   |  25  |  1.09    Ca    8.6      10 Nov 2017 05:43  Mg     2.0     11-10      11-08-17 @ 07:01  -  11-09-17 @ 07:00  --------------------------------------------------------  IN: 1369.5 mL / OUT: 1925 mL / NET: -555.5 mL    11-09-17 @ 07:01  -  11-10-17 @ 06:26  --------------------------------------------------------  IN: 2571 mL / OUT: 2052 mL / NET: 519 mL        RADIOLOGY & ADDITIONAL TESTS: Reviewed.    ECHO: The left ventricular wall motion is normal. The overall left ventricular ejection fraction is probably normal. The left ventricular ejection fraction is estimated to be 55-60% The right ventricle is normal in size and function. The left atrial size is normal. Right atrial size is normal. Structurally normal aortic valve. There is mild aortic regurgitation. Structurally normal mitral valve. There is trace mitral regurgitation. Structurally normal tricuspid valve. There is trace tricuspid regurgitation. The pulmonary artery systolic pressure is estimated to be 25 mmHg. Structurally normal pulmonic valve. No pulmonic regurgitation noted. Moderate circumferential pericardial effusion. No echocardiographic evidence of tamponade. Fibrinous material consistent with inflammatory process vs coagulant is seen in the pericardial space.    CT chest/Abdomen/Pelvis w contrast: Large exudative pericardial effusion, Moderate left pleural effusion with associated near complete collapse of the left lower lobe. History of left renal malignancy status post cryoablation. No evidence of recurrent or residual disease. A prominent left retroperitoneal lymph node is noted, which is new from the comparison MRI exam. Recommend continued surveillance with CT or MR imaging. Prostatomegaly. Marked diastases of the rectus abdominis.

## 2017-11-10 NOTE — PROGRESS NOTE ADULT - PROBLEM SELECTOR PLAN 4
- New onset AFlutter likely in setting of pericardial effusion  - EKG with variable 3:1 and 4:1 Aflutter with HR 110s  - c/w Carvedilol 12.5mg BID for rate control  - c/w heparin gtt with plan for transition back to Eliquis 5mg BID tomorrow (11/10)  - plan for cardioversion with EP on Monday, NPO after midnight Sunday  - daily EKGs Found to have new onset Aflutter on presentation with HR up to 110.  EKG has shown variable 3:1 and 4:1 Aflutter with HR up to 110s  - c/w Carvedilol 12.5mg BID for rate control  - c/w heparin gtt and following PTT (next at 6PM, 2AM 11/11, 5:30 AM). Plan to transition to eliquis tomorrow.   - Patient is planned for cardioversion with EP Monday 11/13, NPO after midnight Sunday 11/12

## 2017-11-10 NOTE — PROGRESS NOTE ADULT - PROBLEM SELECTOR PLAN 9
F: no IVF  E: replete lytes prn K<4, Mg<2  N: DASH/TLC diet F: no IVF, PO intake.  E: Replete electrolytes to maintain K<4, Mg<2  N: DASH/TLC diet

## 2017-11-10 NOTE — PROGRESS NOTE ADULT - PROBLEM SELECTOR PLAN 8
F: no IVF  E: replete lytes prn K<4, Mg<2  N: DASH/TLC diet - patient with ADAM on CPAP qhs  - c/w CPAP qhs

## 2017-11-10 NOTE — PROGRESS NOTE ADULT - ASSESSMENT
73 yo M with PMH of morbid obesity, diastolic CF, DVT/PE on Eliquis (1mo ago), HTN, ADAM (CPAP) and recent ECHO findings of pericardial effusion 2 weeks ago (with Dr. Ramirez) who presented initially to outpatient cardiologist (Dr. Ramirez) w/ dyspnea, fatigue, and cough, found to have enlargement of pericardial effusion, admitted to [facility] for enlarging pericardial effusion with new onset Aflutter. Patient transferred to CCU for management s/p L sided chest tube and pericardial drain placement by IR. Drains removed and determined clinically stable for step down to [facility]. 71 yo M with PMH of morbid obesity, diastolic CF, DVT/PE on Eliquis (1mo ago), HTN, ADAM (CPAP) and recent ECHO findings of pericardial effusion 2 weeks ago (with Dr. Ramirez) who presented initially to outpatient cardiologist Dr. Ramirez with dyspnea, fatigue, and cough and found to have enlargement of pericardial effusion. Patient initially admitted to [facility] for enlarging pericardial effusion with new onset Aflutter. Patient transferred to CCU for management s/p L sided chest tube and pericardial drain placement by IR. Drains removed and determined clinically stable for step down to [facility].

## 2017-11-10 NOTE — PROGRESS NOTE ADULT - ASSESSMENT
73 y/o obese male w/ PMH ?CHF (as per Dr. Raimrez's note, possible bout of mild CHF in July w/ dyspnea and ankle swelling, resolved w/ lasix; TTE in July w/ trace/small pericardial effusion), unprovoked DVT/PE (2015; switched from pradaxa to eliquis 1mo ago), HTN, ADAM (CPAP), RCC s/p cryoablation 3-4 years ago, presenting w/ dull, upper chest/neck pain 2 weeks ago. Found to have enlarging pericardial effusion over the past 2 weeks without signs of tamponade, and large left sided pleural effusion. s/p pericardial drain and L sided chest tube 11/8; s/p removal.    #Pericardial and pleural effusion. Etiology unclear at this time, differential is broad; viral, bacterial, parasitic workup as below. Rheum on board, suspect SLE?  NEGATIVE RESULTS: RVP negative, HIV negative, CMV negative, Toxo negative, Lyme negative, Legionella negative, cytopath no malignant cells.  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  SPECIMEN RECEIVED: Quantiferon, Brucella, Chlamydia, Coccidiodes, Coxsackie A/B, viral cultures, Q fever, Urine histoplasma.  NOT SENT: stool enteric pathogens  - Please send for EBV PCR as patient has recent vs. reactivated EBV infection per antibody screen  - Continue to hold off on antibiotics  - Follow up infectious workup as above    Above discussed with attending and primary team. Please also see attending attestation below for further recs.

## 2017-11-10 NOTE — PROGRESS NOTE ADULT - PROBLEM SELECTOR PLAN 3
- POSSIBLE SLE based on malar rash with nasolabial fold sparing, pericarditis, pericardial and pleural effusions, elevated inflammatory markers  - started on Hydroxychloroquine 200mg BID Rheumatology following and concern for diagnosis of Lupus as patient has malar rash with nasolabial fold sparing (though patient had attributed to CPAP use), pericarditis, pericardial and pleural effusions, elevated inflammatory markers  - Continue with Plaquenil 200mg BID

## 2017-11-10 NOTE — PROGRESS NOTE ADULT - PROBLEM SELECTOR PLAN 8
- patient with ADAM on CPAP qhs  - c/w CPAP qhs Hx of obstructive sleep apnea for which patient uses CPAP at night.  -c/w CPAP.

## 2017-11-10 NOTE — PROGRESS NOTE ADULT - PROBLEM SELECTOR PLAN 10
DVT PPX: on Heparin drip    Code Status: FULL CODE    Dispo: CCU for HD monitoring while with pericardial drain DVT PPX: on Heparin drip    Code Status: FULL CODE    Dispo: Pericardial drain removed today and determined clinically stable for step down to [facility] from CCU for further cardiac telemetry monitoring in pericardial and pleural effusion in setting of new onset aflutter.

## 2017-11-10 NOTE — PROGRESS NOTE ADULT - SUBJECTIVE AND OBJECTIVE BOX
Transfer Acceptance Note [facility] PGY-1    Hospital Course    73 yo M with PMH morbid obesity, diastolic CHF (EF 55-60%), unprovoked DVT/PE (2015; switched from Pradaxa to Eliquis 1mo ago), HTN, ADAM (CPAP), renal tumor cryoablation by IR in 2014, recently dx'ed with small pericardial effusion by TTE 2weeks prior (by outpatient cardiologist, Dr. Ramirez) who was sent to [facility] ED for enlarging effusion found to be moderately sized. Patient saw Dr. Ramirez 2 weeks prior for dull, centrally located and positional chest pain and initial echocardiogram demonstrated small pericardial effusion. Outpatient labs signficant for leukocytosis significant to WBC of 17; CT chest w/ significant pericardial effusion 1.7x1.4cm.  F/u TTE one week later with moderate pericardial effusion (no tamponade) and ECG w/ NSR and 1st degree AV block. Patient again presented to Dr. Ramirez as outpatient on Monday (11/6) for worsening symptoms and generalized malaise. FAREED was repeated demonstrating enlarging pericardial effusion and patient sent to [facility]. Vitals in the ED, T: 98.4, P: 95, BP: 124/76, RR: 18, O2: 94% RA. Patient found to have large L pleural effusion and EKG with new onset AFlutter to HR 110s. Patient admitted to [facility] for cardiac telemetry  for further w/u. CT surgery Dr Sepulveda was consulted. Echocardiogram repeated on presentation demonstrated with EF 55-60%, moderate circumferential pericardial effusion, no echocardiographic evidence of tamponade, fibrinous material c/w inflammatory process vs coagulant. CT Chest/abd/pelvis performed noted large exudative pericardial effusion, moderate left pleural effusion with associated near complete collapse of the left lower lobe; no recurrence of L renal mass however new prominent left retroperitoneal lymph node. Rheumatologic labs ordered showing elevated RF, dsDNA, PSA, CRP, ESR. ID and rheumatology consulted for possible infectious/autoimmune etiology of pericardial/pleural effusions. Pt also developed new onset R big toe pain w/ mild erythema/pain on palpation, found to have elevated uric acid levels likely representing gout. Pt underwent CT guided pericardial drain and L sided chest tube placed by IR on 11/8/17 and transferred to CCU for further monitoring. Patient started on heparin gtt for AFlutter and h/o DVT/PE with plan to switch to Eliquis once tubes removed. Pleural and pericardial fluid exudative based on Light's criteria. Rheumatologic and infectious disease labs were sent. Patient started on Colchicine for gout and pleurisy. Also started on Hydroxychloroquine 200mg BID for suspected SLE (malar rash, elevated inflammatory markers, pericarditis).  Pericardial and pleural drains with minimal drainage after 48hrs. Serial CXR with persistent LLL effusion vs atelectasis. CT chest non-contrast performed for further evaluation. Both drains were removed on 11/10 at around noon. Labs thus far showing positive EBV, ordered for EBV PCR. Patient HD stable, still in Aflutter with HR 80-90s, and ready for stepdown to tele unit. Heparin gtt to be changed to Eliquis 5mg BID tomorrow. Plan for cardioversion on Monday with EP.    Interval History:   ROS:    OBJECTIVE  PHYSICAL EXAM:  T(C): 36.3 (11-10-17 @ 12:00), Max: 36.8 (11-10-17 @ 02:00)  HR: 62 (11-10-17 @ 15:00) (58 - 90)  BP: 135/53 (11-10-17 @ 15:00) (96/50 - 141/61)  RR: 16 (11-10-17 @ 15:00) (16 - 29)  SpO2: 97% (11-10-17 @ 15:00) (92% - 97%)  Wt(kg): --    I&O's Summary    09 Nov 2017 07:01  -  10 Nov 2017 07:00  --------------------------------------------------------  IN: 2805.5 mL / OUT: 2380 mL / NET: 425.5 mL    10 Nov 2017 07:01  -  10 Nov 2017 16:24  --------------------------------------------------------  IN: 156 mL / OUT: 410 mL / NET: -254 mL        Appearance: NAD. Speaking in full sentences.   HEENT:   Conjunctiva clear b/l. Moist oral mucosa.  Cardiovascular: RRR with no murmurs.  Respiratory: Lungs CTAB.   Gastrointestinal:  Soft, nontender. Non-distended. Non-rigid.	  Extremities: No edema b/l. No erythema b/l. LE WWP b/l.  Vascular: DP 2+ b/l.  Neurologic:  Alert and awake. Moving all extremities. Following commands. Making eye contact.  	  LABS:                        13.3   10.4  )-----------( 370      ( 10 Nov 2017 05:43 )             39.2     11-10    135  |  98  |  21  ----------------------------<  123<H>  3.8   |  25  |  1.09    Ca    8.6      10 Nov 2017 05:43  Mg     2.0     11-10      PTT - ( 10 Nov 2017 12:19 )  PTT:73.1 sec      RADIOLOGY & ADDITIONAL TESTS:  Reviewed by myself and with medical team.    MEDICATIONS  (STANDING):  carvedilol 12.5 milliGRAM(s) Oral every 12 hours  colchicine 0.6 milliGRAM(s) Oral every 12 hours  heparin  Infusion 2600 Unit(s)/Hr (26 mL/Hr) IV Continuous <Continuous>  hydroxychloroquine 200 milliGRAM(s) Oral every 12 hours    MEDICATIONS  (PRN):  acetaminophen   Tablet. 650 milliGRAM(s) Oral every 6 hours PRN Moderate Pain (4 - 6)      ASSESSMENT/PLAN: Transfer Acceptance Note [facility] PGY-1    Hospital Course    71 yo M with PMH morbid obesity, diastolic CHF (EF 55-60%), unprovoked DVT/PE (2015; switched from Pradaxa to Eliquis 1mo ago), HTN, ADAM (CPAP), renal tumor cryoablation by IR in 2014, recently dx'ed with small pericardial effusion by TTE 2weeks prior (by outpatient cardiologist, Dr. Ramirez) who was sent to [facility] ED for enlarging effusion found to be moderately sized. Patient saw Dr. Ramirez 2 weeks prior for dull, centrally located and positional chest pain and initial echocardiogram demonstrated small pericardial effusion. Outpatient labs signficant for leukocytosis significant to WBC of 17; CT chest w/ significant pericardial effusion 1.7x1.4cm.  F/u TTE one week later with moderate pericardial effusion (no tamponade) and ECG w/ NSR and 1st degree AV block. Patient again presented to Dr. Ramirez as outpatient on Monday (11/6) for worsening symptoms and generalized malaise. FAREED was repeated demonstrating enlarging pericardial effusion and patient sent to [facility]. Vitals in the ED, T: 98.4, P: 95, BP: 124/76, RR: 18, O2: 94% RA. Patient found to have large L pleural effusion and EKG with new onset AFlutter to HR 110s. Patient admitted to [facility] for cardiac telemetry for management of pericardial effusion with work up. Patient evaluated by Dr. Sepulveda from CT surgery. Echocardiogram repeated on presentation demonstrated with EF 55-60%, moderate circumferential pericardial effusion, no echocardiographic evidence of tamponade, fibrinous material c/w inflammatory process vs coagulant. CT Chest/abd/pelvis performed noted large exudative pericardial effusion, moderate left pleural effusion with associated near complete collapse of the left lower lobe; no recurrence of L renal mass however new prominent left retroperitoneal lymph node. Rheumatologic labs ordered showing elevated RF, dsDNA, PSA, CRP, ESR. ID and rheumatology consulted for possible infectious/autoimmune etiology of pericardial/pleural effusions. Pt also developed new onset R big toe pain w/ mild erythema/pain on palpation, found to have elevated uric acid levels likely representing gout. Pt underwent CT guided pericardial drain and L sided chest tube placed by IR on 11/8/17 and transferred to CCU for further monitoring. Anticoagulation with heparin gtt for AFlutter and h/o DVT/PE. Pleural and pericardial fluid exudative based on Light's criteria. Rheumatologic and infectious disease labs were sent. Patient started on Colchicine for gout and pleurisy. Also started on Hydroxychloroquine 200mg BID for suspected SLE (malar rash, elevated inflammatory markers, pericarditis).  Pericardial and pleural drains with minimal drainage after 48hrs. Serial CXR with persistent LLL effusion vs atelectasis. CT chest non-contrast performed for further evaluation. Both drains were removed on 11/10 at around noon. Labs thus far showing positive EBV, ordered for EBV PCR. Patient HD stable, still in Aflutter with HR 80-90s, and patient determined stable for step down to 5lACH for cardiac telemetry. Patient to be medically managed s/p pericardial and pleural chest tubes.    Interval History:   ROS:    OBJECTIVE  PHYSICAL EXAM:  T(C): 36.3 (11-10-17 @ 12:00), Max: 36.8 (11-10-17 @ 02:00)  HR: 62 (11-10-17 @ 15:00) (58 - 90)  BP: 135/53 (11-10-17 @ 15:00) (96/50 - 141/61)  RR: 16 (11-10-17 @ 15:00) (16 - 29)  SpO2: 97% (11-10-17 @ 15:00) (92% - 97%)  Wt(kg): --    I&O's Summary    09 Nov 2017 07:01  -  10 Nov 2017 07:00  --------------------------------------------------------  IN: 2805.5 mL / OUT: 2380 mL / NET: 425.5 mL    10 Nov 2017 07:01  -  10 Nov 2017 16:24  --------------------------------------------------------  IN: 156 mL / OUT: 410 mL / NET: -254 mL        Appearance: NAD. No respiratory distress. Speaking in full sentences.   HEENT: PERRL. EOMI. No pallor noted. Anicteric scleral  Conjunctiva clear b/l. Moist oral mucosa.  Cardiovascular: RRR S1, S2 appreciated though distant. No murmurs, rubs or gallops appreciated.  Respiratory: Lungs with decreased breath sounds on L side. Otherwise clear to auscultation. L chest with bandage on lateral chest wall/flank- clean, nontender.   Gastrointestinal:  Soft, nontender. Non-distended, though obese abdomen. Non-rigid. + Bowel sounds in all 4 quadrants.	  Extremities: No edema b/l. No erythema b/l. LE WWP b/l.  Vascular: DP 2+ b/l.  Neurologic:  Alert and awake Oriented x3. Moving all extremities. Following commands. Making eye contact.  	  LABS:                        13.3   10.4  )-----------( 370      ( 10 Nov 2017 05:43 )             39.2     11-10    135  |  98  |  21  ----------------------------<  123<H>  3.8   |  25  |  1.09    Ca    8.6      10 Nov 2017 05:43  Mg     2.0     11-10      PTT - ( 10 Nov 2017 12:19 )  PTT:73.1 sec      RADIOLOGY & ADDITIONAL TESTS:  Reviewed by myself and with medical team.    MEDICATIONS  (STANDING):  carvedilol 12.5 milliGRAM(s) Oral every 12 hours  colchicine 0.6 milliGRAM(s) Oral every 12 hours  heparin  Infusion 2600 Unit(s)/Hr (26 mL/Hr) IV Continuous <Continuous>  hydroxychloroquine 200 milliGRAM(s) Oral every 12 hours    MEDICATIONS  (PRN):  acetaminophen   Tablet. 650 milliGRAM(s) Oral every 6 hours PRN Moderate Pain (4 - 6)      ASSESSMENT/PLAN: Transfer Acceptance Note [facility] PGY-1    Hospital Course    73 yo M with PMH morbid obesity, diastolic CHF (EF 55-60%), unprovoked DVT/PE (2015;on Eliquis), HTN, ADAM (CPAP), renal tumor cryoablation by IR in 2014, recently dx'ed with small pericardial effusion by TTE 2weeks prior to presentation by outpatient cardiologist, Dr. Ramirez, for which patient was sent to [facility] ED for enlarging pericardial effusion. Patient initially presented to Dr. Ramirez 2 weeks prior for dull, centrally located and positional chest pain and for which echocardiogram performed and demonstrated small pericardial effusion. Outpatient labs significant for leukocytosis significant to WBC of 17; CT chest w/ significant pericardial effusion 1.7x1.4cm.  F/u TTE one week later demonstrated moderate pericardial effusion (no tamponade) and ECG w/ NSR and 1st degree AV block. Patient again presented to Dr. Ramirez as outpatient on Monday (11/6) for worsening symptoms with dyspnea, dry cough, low grade fever and generalized malaise and patient sent to [facility]. Vitals in the ED significant for tachycardia with HR to 100s otherwise stable. EKG demonstrated new onset Aflutter with HR to 100s. Bedside echocardiogram demonstrated enlargement of pericardial effusion with debri and  with no evidence of tamponade. CXR significant for large pericardial effusion. Patient admitted to [facility] for cardiac telemetry for management of pericardial effusion with work up. Patient evaluated by Dr. Sepulveda from CT surgery. Echocardiogram repeated on presentation demonstrated with EF 55-60%, moderate circumferential pericardial effusion, no echocardiographic evidence of tamponade, fibrinous material c/w inflammatory process vs coagulant. CT Chest/abd/pelvis performed noted large exudative pericardial effusion, moderate left pleural effusion with associated near complete collapse of the left lower lobe; no recurrence of L renal mass however new prominent left retroperitoneal lymph node. Rheumatologic labs ordered showing elevated RF, dsDNA, PSA, CRP, ESR. ID and rheumatology consulted for possible infectious/autoimmune etiology of pericardial/pleural effusions. Pt also developed new onset R big toe pain w/ mild erythema/pain on palpation, found to have elevated uric acid levels likely representing gout. Pt underwent CT guided pericardial drain and L sided chest tube placed by IR on 11/8/17 and transferred to CCU for further monitoring. Anticoagulation with heparin gtt for AFlutter and h/o DVT/PE. Pleural and pericardial fluid exudative based on Light's criteria. Rheumatologic and infectious disease labs were sent. Patient started on Colchicine for gout and pleurisy. Also started on Hydroxychloroquine 200mg BID for suspected SLE (malar rash, elevated inflammatory markers, pericarditis).  Pericardial and pleural drains with minimal drainage after 48hrs. Serial CXR with persistent LLL effusion vs atelectasis. CT chest non-contrast performed for further evaluation. Both drains were removed on 11/10 at around noon. Labs thus far showing positive EBV, ordered for EBV PCR. Patient HD stable, still in Aflutter with HR 80-90s, and patient determined stable for step down to 5lACH for cardiac telemetry. Patient to be medically managed s/p pericardial and pleural chest tubes.    Interval History:   ROS:    OBJECTIVE  PHYSICAL EXAM:  T(C): 36.3 (11-10-17 @ 12:00), Max: 36.8 (11-10-17 @ 02:00)  HR: 62 (11-10-17 @ 15:00) (58 - 90)  BP: 135/53 (11-10-17 @ 15:00) (96/50 - 141/61)  RR: 16 (11-10-17 @ 15:00) (16 - 29)  SpO2: 97% (11-10-17 @ 15:00) (92% - 97%)  Wt(kg): --    I&O's Summary    09 Nov 2017 07:01  -  10 Nov 2017 07:00  --------------------------------------------------------  IN: 2805.5 mL / OUT: 2380 mL / NET: 425.5 mL    10 Nov 2017 07:01  -  10 Nov 2017 16:24  --------------------------------------------------------  IN: 156 mL / OUT: 410 mL / NET: -254 mL        Appearance: NAD. No respiratory distress. Speaking in full sentences.   HEENT: PERRL. EOMI. No pallor noted. Anicteric scleral  Conjunctiva clear b/l. Moist oral mucosa.  Cardiovascular: RRR S1, S2 appreciated though distant. No murmurs, rubs or gallops appreciated.  Respiratory: Lungs with decreased breath sounds on L side. Otherwise clear to auscultation. L chest with bandage on lateral chest wall/flank- clean, nontender.   Gastrointestinal:  Soft, nontender. Non-distended, though obese abdomen. Non-rigid. + Bowel sounds in all 4 quadrants.	  Extremities: No edema b/l. No erythema b/l. LE WWP b/l.  Vascular: DP 2+ b/l.  Neurologic:  Alert and awake Oriented x3. Moving all extremities. Following commands. Making eye contact.  	  LABS:                        13.3   10.4  )-----------( 370      ( 10 Nov 2017 05:43 )             39.2     11-10    135  |  98  |  21  ----------------------------<  123<H>  3.8   |  25  |  1.09    Ca    8.6      10 Nov 2017 05:43  Mg     2.0     11-10      PTT - ( 10 Nov 2017 12:19 )  PTT:73.1 sec      RADIOLOGY & ADDITIONAL TESTS:  Reviewed by myself and with medical team.    MEDICATIONS  (STANDING):  carvedilol 12.5 milliGRAM(s) Oral every 12 hours  colchicine 0.6 milliGRAM(s) Oral every 12 hours  heparin  Infusion 2600 Unit(s)/Hr (26 mL/Hr) IV Continuous <Continuous>  hydroxychloroquine 200 milliGRAM(s) Oral every 12 hours    MEDICATIONS  (PRN):  acetaminophen   Tablet. 650 milliGRAM(s) Oral every 6 hours PRN Moderate Pain (4 - 6)      ASSESSMENT/PLAN: Transfer Acceptance Note [facility] PGY-1    Hospital Course    73 yo M with PMH morbid obesity, diastolic CHF (EF 55-60%), unprovoked DVT/PE (2015;on Eliquis), HTN, ADAM (CPAP), renal tumor cryoablation by IR in 2014, recently dx'ed with small pericardial effusion by TTE 2weeks prior to presentation by outpatient cardiologist, Dr. Ramirez, for which patient was sent to [facility] ED for enlarging pericardial effusion. Patient initially presented to Dr. Ramirez 2 weeks prior for dull, centrally located and positional chest pain and for which echocardiogram performed and demonstrated small pericardial effusion. Outpatient labs significant for leukocytosis significant to WBC of 17; CT chest w/ significant pericardial effusion 1.7x1.4cm.  F/u TTE one week later demonstrated moderate pericardial effusion (no tamponade) and ECG w/ NSR and 1st degree AV block. Patient again presented to Dr. Ramirez as outpatient on Monday (11/6) for worsening symptoms with dyspnea, dry cough, low grade fever and generalized malaise and patient sent to [facility]. Vitals in the ED significant for tachycardia with HR to 100s otherwise stable. EKG demonstrated new onset Aflutter with HR to 100s. Bedside echocardiogram demonstrated enlargement of pericardial effusion with debri and  with no evidence of tamponade. CXR significant for large pericardial effusion. Patient admitted to [facility] for cardiac telemetry for management of pericardial effusion with work up. Echocardiogram repeated on presentation demonstrated with EF 55-60%, moderate circumferential pericardial effusion, no echocardiographic evidence of tamponade, fibrinous material c/w inflammatory process vs coagulant. CT Chest/abd/pelvis performed noted large exudative pericardial effusion, moderate left pleural effusion with associated near complete collapse of the left lower lobe; no recurrence of L renal mass however new prominent left retroperitoneal lymph node.  Patient evaluated by Dr. Sepulveda from CT surgery and then  underwent CT guided pericardial drain and L sided chest tube placed by IR on 11/8/17. Patient then transferred to CCU for further monitoring in setting of pericardial drain. Pleural and pericardial fluid exudative based on Light's criteria. Rheumatologic and infectious disease labs were sent. Rheumatologic labs ordered showing elevated RF, dsDNA, PSA, CRP, ESR. ID and rheumatology consulted for possible infectious/autoimmune etiology of pericardial/pleural effusions. Pt also developed new onset R big toe pain w/ mild erythema/pain on palpation, found to have elevated uric acid levels likely representing gout.  Anticoagulation with heparin gtt for AFlutter and h/o DVT/PE. Patient started on Colchicine for gout and pleurisy. Per rheumatology evaluation concern for SLE with malar rash, elevated inflammatory markers and pericarditis. Patient initiated on therapy with Hydroxychloroquine 200mg BID.  Pericardial and pleural drains with minimal drainage after 48hrs. Serial CXR with persistent LLL effusion vs atelectasis. CT chest non-contrast performed for further evaluation. Both drains were removed on 11/10 at around noon. Labs thus far showing positive EBV, ordered for EBV PCR. Patient HD stable, still in Aflutter with HR 80-90s, and patient determined stable for step down to 5lACH for cardiac telemetry. Patient to be medically managed s/p pericardial and pleural chest tubes.    Interval History:   ROS:    OBJECTIVE  PHYSICAL EXAM:  T(C): 36.3 (11-10-17 @ 12:00), Max: 36.8 (11-10-17 @ 02:00)  HR: 62 (11-10-17 @ 15:00) (58 - 90)  BP: 135/53 (11-10-17 @ 15:00) (96/50 - 141/61)  RR: 16 (11-10-17 @ 15:00) (16 - 29)  SpO2: 97% (11-10-17 @ 15:00) (92% - 97%)  Wt(kg): --    I&O's Summary    09 Nov 2017 07:01  -  10 Nov 2017 07:00  --------------------------------------------------------  IN: 2805.5 mL / OUT: 2380 mL / NET: 425.5 mL    10 Nov 2017 07:01  -  10 Nov 2017 16:24  --------------------------------------------------------  IN: 156 mL / OUT: 410 mL / NET: -254 mL        Appearance: NAD. No respiratory distress. Speaking in full sentences.   HEENT: PERRL. EOMI. No pallor noted. Anicteric scleral  Conjunctiva clear b/l. Moist oral mucosa.  Cardiovascular: RRR S1, S2 appreciated though distant. No murmurs, rubs or gallops appreciated.  Respiratory: Lungs with decreased breath sounds on L side. Otherwise clear to auscultation. L chest with bandage on lateral chest wall/flank- clean, nontender.   Gastrointestinal:  Soft, nontender. Non-distended, though obese abdomen. Non-rigid. + Bowel sounds in all 4 quadrants.	  Extremities: No edema b/l. No erythema b/l. LE WWP b/l.  Vascular: DP 2+ b/l.  Neurologic:  Alert and awake Oriented x3. Moving all extremities. Following commands. Making eye contact.  	  LABS:                        13.3   10.4  )-----------( 370      ( 10 Nov 2017 05:43 )             39.2     11-10    135  |  98  |  21  ----------------------------<  123<H>  3.8   |  25  |  1.09    Ca    8.6      10 Nov 2017 05:43  Mg     2.0     11-10      PTT - ( 10 Nov 2017 12:19 )  PTT:73.1 sec      RADIOLOGY & ADDITIONAL TESTS:  Reviewed by myself and with medical team.    MEDICATIONS  (STANDING):  carvedilol 12.5 milliGRAM(s) Oral every 12 hours  colchicine 0.6 milliGRAM(s) Oral every 12 hours  heparin  Infusion 2600 Unit(s)/Hr (26 mL/Hr) IV Continuous <Continuous>  hydroxychloroquine 200 milliGRAM(s) Oral every 12 hours    MEDICATIONS  (PRN):  acetaminophen   Tablet. 650 milliGRAM(s) Oral every 6 hours PRN Moderate Pain (4 - 6)      ASSESSMENT/PLAN: Transfer Acceptance Note [facility] PGY-1    Hospital Course    71 yo M with PMH morbid obesity, diastolic CHF (EF 55-60%), unprovoked DVT/PE (2015;on Eliquis), HTN, ADAM (CPAP), renal tumor cryoablation by IR in 2014, recently dx'ed with small pericardial effusion by TTE 2weeks prior to presentation by outpatient cardiologist, Dr. Ramirez, for which patient was sent to [facility] ED for enlarging pericardial effusion. Patient initially presented to Dr. Ramirez 2 weeks prior for dull, centrally located and positional chest pain and for which echocardiogram performed and demonstrated small pericardial effusion. Outpatient labs significant for leukocytosis significant to WBC of 17; CT chest w/ significant pericardial effusion 1.7x1.4cm.  F/u TTE one week later demonstrated moderate pericardial effusion (no tamponade) and ECG w/ NSR and 1st degree AV block. Patient again presented to Dr. Ramirez as outpatient on Monday (11/6) for worsening symptoms with dyspnea, dry cough, low grade fever and generalized malaise and patient sent to [facility]. Vitals in the ED significant for tachycardia with HR to 100s otherwise stable. EKG demonstrated new onset Aflutter with HR to 100s. Bedside echocardiogram demonstrated enlargement of pericardial effusion with debri and  with no evidence of tamponade. CXR significant for large pericardial effusion. Patient admitted to [facility] for cardiac telemetry for management of pericardial effusion with work up. Echocardiogram repeated on presentation demonstrated with EF 55-60%, moderate circumferential pericardial effusion, no echocardiographic evidence of tamponade, fibrinous material c/w inflammatory process vs coagulant. CT Chest/abd/pelvis performed noted large exudative pericardial effusion, moderate left pleural effusion with associated near complete collapse of the left lower lobe; no recurrence of L renal mass however new prominent left retroperitoneal lymph node.  Patient evaluated by Dr. Sepulveda from CT surgery and then  underwent CT guided pericardial drain and L sided chest tube placed by IR on 11/8/17. Patient then transferred to CCU for further monitoring in setting of pericardial drain. Pleural and pericardial fluid exudative based on Light's criteria. Rheumatologic and infectious disease labs were sent. Rheumatologic labs ordered showing elevated RF, dsDNA, PSA, CRP, ESR. ID and rheumatology consulted for possible infectious/autoimmune etiology of pericardial/pleural effusions. Pt also developed new onset R big toe pain w/ mild erythema/pain on palpation, found to have elevated uric acid levels likely representing gout.  Anticoagulation with heparin gtt for AFlutter and h/o DVT/PE. Patient started on Colchicine for gout and pleurisy. Per rheumatology evaluation concern for SLE with malar rash, elevated inflammatory markers and pericarditis. Patient initiated on therapy with Hydroxychloroquine 200mg BID.  Pericardial and pleural drains with minimal drainage after 48hrs. Serial CXR with persistent LLL effusion vs atelectasis. CT chest non-contrast performed for further evaluation. Both drains were removed on 11/10 at around noon. Labs thus far showing positive EBV, ordered for EBV PCR. Patient HD stable, still in Aflutter with HR 80-90s, and patient determined stable for step down to 5lACH for cardiac telemetry. Patient to be medically managed s/p pericardial and pleural chest tubes.    Interval History:   ROS:    OBJECTIVE  PHYSICAL EXAM:  T(C): 36.3 (11-10-17 @ 12:00), Max: 36.8 (11-10-17 @ 02:00)  HR: 62 (11-10-17 @ 15:00) (58 - 90)  BP: 135/53 (11-10-17 @ 15:00) (96/50 - 141/61)  RR: 16 (11-10-17 @ 15:00) (16 - 29)  SpO2: 97% (11-10-17 @ 15:00) (92% - 97%)  Wt(kg): --    I&O's Summary    09 Nov 2017 07:01  -  10 Nov 2017 07:00  --------------------------------------------------------  IN: 2805.5 mL / OUT: 2380 mL / NET: 425.5 mL    10 Nov 2017 07:01  -  10 Nov 2017 16:24  --------------------------------------------------------  IN: 156 mL / OUT: 410 mL / NET: -254 mL        Appearance: NAD. No respiratory distress. Speaking in full sentences.   HEENT: PERRL. EOMI. No pallor noted. Anicteric scleral  Conjunctiva clear b/l. Moist oral mucosa.  Cardiovascular: RRR S1, S2 appreciated though distant. No murmurs, rubs or gallops appreciated.  Respiratory: Lungs with decreased breath sounds on L side. Otherwise clear to auscultation. L chest with bandage on lateral chest wall/flank- clean, nontender.   Gastrointestinal:  Soft, nontender. Non-distended, though obese abdomen. Non-rigid. + Bowel sounds in all 4 quadrants.	  Extremities: No edema b/l. No erythema b/l. LE WWP b/l.  Vascular: DP 2+ b/l.  Neurologic:  Alert and awake Oriented x3. Moving all extremities. Following commands. Making eye contact.  	  LABS:                        13.3   10.4  )-----------( 370      ( 10 Nov 2017 05:43 )             39.2     11-10    135  |  98  |  21  ----------------------------<  123<H>  3.8   |  25  |  1.09    Ca    8.6      10 Nov 2017 05:43  Mg     2.0     11-10      PTT - ( 10 Nov 2017 12:19 )  PTT:73.1 sec      RADIOLOGY & ADDITIONAL TESTS:  Reviewed by myself and with medical team.    MEDICATIONS  (STANDING):  carvedilol 12.5 milliGRAM(s) Oral every 12 hours  colchicine 0.6 milliGRAM(s) Oral every 12 hours  heparin  Infusion 2600 Unit(s)/Hr (26 mL/Hr) IV Continuous <Continuous>  hydroxychloroquine 200 milliGRAM(s) Oral every 12 hours    MEDICATIONS  (PRN):  acetaminophen   Tablet. 650 milliGRAM(s) Oral every 6 hours PRN Moderate Pain (4 - 6)      ASSESSMENT/PLAN: Transfer Acceptance Note [facility] PGY-1    Hospital Course    71 yo M with PMH morbid obesity, diastolic CHF (EF 55-60%), unprovoked DVT/PE (2015;on Eliquis), HTN, ADAM (CPAP), renal tumor cryoablation by IR in 2014, recently dx'ed with small pericardial effusion by TTE 2weeks prior to presentation by outpatient cardiologist, Dr. Ramirez, for which patient was sent to [facility] ED for enlarging pericardial effusion. Patient initially presented to Dr. Ramirez 2 weeks prior for dull, centrally located and positional chest pain and for which echocardiogram performed and demonstrated small pericardial effusion. Outpatient labs significant for leukocytosis significant to WBC of 17; CT chest w/ significant pericardial effusion 1.7x1.4cm.  F/u TTE one week later demonstrated moderate pericardial effusion (no tamponade) and ECG w/ NSR and 1st degree AV block. Patient again presented to Dr. Ramirez as outpatient on Monday (11/6) for worsening symptoms with dyspnea, dry cough, low grade fever and generalized malaise and patient sent to [facility]. Vitals in the ED significant for tachycardia with HR to 100s otherwise stable. EKG demonstrated new onset Aflutter with HR to 100s. Bedside echocardiogram demonstrated enlargement of pericardial effusion with debri and  with no evidence of tamponade. CXR significant for large pericardial effusion. Patient admitted to [facility] for cardiac telemetry for management of pericardial effusion with work up. Echocardiogram repeated on presentation demonstrated with EF 55-60%, moderate circumferential pericardial effusion, no echocardiographic evidence of tamponade, fibrinous material c/w inflammatory process vs coagulant. CT Chest/abd/pelvis performed noted large exudative pericardial effusion, moderate left pleural effusion with associated near complete collapse of the left lower lobe; no recurrence of L renal mass however new prominent left retroperitoneal lymph node.  Patient evaluated by Dr. Sepulveda from CT surgery and then  underwent CT guided pericardial drain and L sided chest tube placed by IR on 11/8/17. Patient then transferred to CCU for further monitoring in setting of pericardial drain. Pleural and pericardial fluid exudative based on Light's criteria. Rheumatologic and infectious disease labs were sent. Rheumatologic labs ordered showing elevated RF, dsDNA, PSA, CRP, ESR. ID and rheumatology consulted for possible infectious/autoimmune etiology of pericardial/pleural effusions. Pt also developed new onset R big toe pain w/ mild erythema/pain on palpation, found to have elevated uric acid levels likely representing gout.  Anticoagulation with heparin gtt for AFlutter and h/o DVT/PE. Patient started on Colchicine for gout and pleurisy. Per rheumatology evaluation concern for SLE with malar rash, elevated inflammatory markers and pericarditis. Patient initiated on therapy with Hydroxychloroquine 200mg BID.  Pericardial and pleural drains with minimal drainage after 48hrs. Serial CXR with persistent LLL effusion vs atelectasis. CT chest non-contrast performed for further evaluation. Both drains were removed on 11/10 at around noon. Labs thus far showing positive EBV, ordered for EBV PCR. Patient HD stable, still in Aflutter with HR 80-90s, and patient determined stable for step down to 5lACH for cardiac telemetry. Patient to be medically managed s/p pericardial and pleural chest tubes.    Interval History: Patient has no complaints. Denies headaches, dizziness, fevers, chills, nausea, vomiting, abdominal pain. Denies chest pain, shortness of breath.   ROS: Neg as above.     OBJECTIVE  PHYSICAL EXAM:  T(C): 36.3 (11-10-17 @ 12:00), Max: 36.8 (11-10-17 @ 02:00)  HR: 62 (11-10-17 @ 15:00) (58 - 90)  BP: 135/53 (11-10-17 @ 15:00) (96/50 - 141/61)  RR: 16 (11-10-17 @ 15:00) (16 - 29)  SpO2: 97% (11-10-17 @ 15:00) (92% - 97%)  Wt(kg): --    I&O's Summary    09 Nov 2017 07:01  -  10 Nov 2017 07:00  --------------------------------------------------------  IN: 2805.5 mL / OUT: 2380 mL / NET: 425.5 mL    10 Nov 2017 07:01  -  10 Nov 2017 16:24  --------------------------------------------------------  IN: 156 mL / OUT: 410 mL / NET: -254 mL        Appearance: NAD. No respiratory distress. Speaking in full sentences.   HEENT: PERRL. EOMI. No pallor noted. Anicteric scleral  Conjunctiva clear b/l. Moist oral mucosa.  Cardiovascular: RRR S1, S2 appreciated though distant. No murmurs, rubs or gallops appreciated.  Respiratory: Lungs with decreased breath sounds on L side. Otherwise clear to auscultation. L chest with bandage on lateral chest wall/flank- clean, nontender.   Gastrointestinal:  Soft, nontender. Non-distended, though obese abdomen. Non-rigid. + Bowel sounds in all 4 quadrants.	  Extremities: No edema b/l. No erythema b/l. LE WWP b/l.  Vascular: DP 2+ b/l.  Neurologic:  Alert and awake Oriented x3. Moving all extremities. Following commands. Making eye contact.  	  LABS:                        13.3   10.4  )-----------( 370      ( 10 Nov 2017 05:43 )             39.2     11-10    135  |  98  |  21  ----------------------------<  123<H>  3.8   |  25  |  1.09    Ca    8.6      10 Nov 2017 05:43  Mg     2.0     11-10      PTT - ( 10 Nov 2017 12:19 )  PTT:73.1 sec      RADIOLOGY & ADDITIONAL TESTS:  Reviewed by myself and with medical team.    MEDICATIONS  (STANDING):  carvedilol 12.5 milliGRAM(s) Oral every 12 hours  colchicine 0.6 milliGRAM(s) Oral every 12 hours  heparin  Infusion 2600 Unit(s)/Hr (26 mL/Hr) IV Continuous <Continuous>  hydroxychloroquine 200 milliGRAM(s) Oral every 12 hours    MEDICATIONS  (PRN):  acetaminophen   Tablet. 650 milliGRAM(s) Oral every 6 hours PRN Moderate Pain (4 - 6)      ASSESSMENT/PLAN:

## 2017-11-10 NOTE — PROGRESS NOTE ADULT - PROBLEM SELECTOR PLAN 4
- patient c/o R big toe pain, found to have erythematous and swollen 1st digit. Reports h/o trauma 3days prior when he slipped in a ditch.  - Likely gout given elevated uric acid and negative imaging  - Xray negative for fracture-dislocation  - Uric acid elevated 12.6  - Tylenol PRN for pain (hold NSAIDs in setting of ARF)  - Colchicine 0.6mg BID  - consider starting Plaquenil (Hydroxychloroquine) 200mg BID per rheum recs   - rheumatology consulted - New onset AFlutter likely in setting of pericardial effusion  - EKG with variable 3:1 and 4:1 Aflutter with HR 110s  - c/w Carvedilol 12.5mg BID for rate control  - c/w heparin gtt with plan for transition back to Eliquis 5mg BID tomorrow (11/10)  - plan for cardioversion with EP on Monday, NPO after midnight Sunday  - daily EKGs

## 2017-11-10 NOTE — PROGRESS NOTE ADULT - PROBLEM SELECTOR PLAN 5
- patient with h/o unprovoked DVT/PE in 2015 on Eliquis (recently switched from Pradaxa 1mo ago)  - c/w heparin gtt, restart Eliquis once tubes removed  - SCDs    #History of Renal Tumor  - patient with 2mm nodule in Left kidney s/p cryoablation  with yearly follow-up surveillance studies  - CT imaging this admission with no recurrence or residual disease, but with prominent left retroperitoneal lymph node, which is new from comparison MRI  - continue with outpatient surveillance studies - patient c/o R big toe pain, found to have erythematous and swollen 1st digit. Reports h/o trauma 3days prior when he slipped in a ditch.  - Likely gout given elevated uric acid and negative imaging  - Xray negative for fracture-dislocation  - Uric acid elevated 12.6  - Tylenol PRN for pain (hold NSAIDs in setting of ARF)  - Colchicine 0.6mg BID  - consider starting Plaquenil (Hydroxychloroquine) 200mg BID per rheum recs   - rheumatology consulted

## 2017-11-10 NOTE — PROGRESS NOTE ADULT - PROBLEM SELECTOR PLAN 2
- New L pleural effusion on CT imaging s/p chest tube placement by IR draining 1000cc fluid during procedure and 2200cc post procedure.  - Unclear etiology, likely autoimmune vs. infectious vs. malignant  - drain set to wall suction, limit drainage to 1L/12hrs, drained 2.5L thus far  - drain removal when ~100-200cc over 24hr period  - CT chest non-contrast to r/o fluid collections vs atelectasis in LLL  - Fluid analysis- gram stain mod WBCs, no organisms seen, no growth to date, exudative based on Light's criteria  - Fluid Cytopathology: no malignant cells  - hold Lasix in setting of gout  - f/u fluid, serum, unine and stool studies for infectious/autoimmune etiologies Patient found to have pleural effusion (in setting of pericardial effusion) on  CT imaging. Chest tube placement was placed by IR and drained 1000cc fluid during procedure and 2200cc post procedure. Chest tube removed today. Tolerating well. Etiology remains unclear- given concern for SLE may be autoimmune but concern for malignancy as well though no malignant cells in cytology. Less likely infectious.   - Pleural fluid exudative based on Lights, gram stain with mod WBCs, no organisms seen. No growth to date. Cytology without malignant cells.  -No lasix has been given as concern for gout.   - f/u remaining work up with fluid, serum, unine and stool studies for infectious/autoimmune work up.

## 2017-11-11 ENCOUNTER — TRANSCRIPTION ENCOUNTER (OUTPATIENT)
Age: 72
End: 2017-11-11

## 2017-11-11 VITALS — TEMPERATURE: 98 F

## 2017-11-11 LAB
ANION GAP SERPL CALC-SCNC: 15 MMOL/L — SIGNIFICANT CHANGE UP (ref 5–17)
APTT BLD: 71.1 SEC — HIGH (ref 27.5–37.4)
BUN SERPL-MCNC: 19 MG/DL — SIGNIFICANT CHANGE UP (ref 7–23)
CALCIUM SERPL-MCNC: 8.9 MG/DL — SIGNIFICANT CHANGE UP (ref 8.4–10.5)
CHLORIDE SERPL-SCNC: 97 MMOL/L — SIGNIFICANT CHANGE UP (ref 96–108)
CO2 SERPL-SCNC: 20 MMOL/L — LOW (ref 22–31)
CREAT SERPL-MCNC: 1.07 MG/DL — SIGNIFICANT CHANGE UP (ref 0.5–1.3)
CULTURE RESULTS: SIGNIFICANT CHANGE UP
CULTURE RESULTS: SIGNIFICANT CHANGE UP
GLUCOSE SERPL-MCNC: 116 MG/DL — HIGH (ref 70–99)
HCT VFR BLD CALC: 40.2 % — SIGNIFICANT CHANGE UP (ref 39–50)
HGB BLD-MCNC: 13.3 G/DL — SIGNIFICANT CHANGE UP (ref 13–17)
MAGNESIUM SERPL-MCNC: 2.2 MG/DL — SIGNIFICANT CHANGE UP (ref 1.6–2.6)
MCHC RBC-ENTMCNC: 29.6 PG — SIGNIFICANT CHANGE UP (ref 27–34)
MCHC RBC-ENTMCNC: 33.1 G/DL — SIGNIFICANT CHANGE UP (ref 32–36)
MCV RBC AUTO: 89.5 FL — SIGNIFICANT CHANGE UP (ref 80–100)
PLATELET # BLD AUTO: 352 K/UL — SIGNIFICANT CHANGE UP (ref 150–400)
POTASSIUM SERPL-MCNC: 5 MMOL/L — SIGNIFICANT CHANGE UP (ref 3.5–5.3)
POTASSIUM SERPL-SCNC: 5 MMOL/L — SIGNIFICANT CHANGE UP (ref 3.5–5.3)
RBC # BLD: 4.49 M/UL — SIGNIFICANT CHANGE UP (ref 4.2–5.8)
RBC # FLD: 13.8 % — SIGNIFICANT CHANGE UP (ref 10.3–16.9)
SODIUM SERPL-SCNC: 132 MMOL/L — LOW (ref 135–145)
SPECIMEN SOURCE: SIGNIFICANT CHANGE UP
SPECIMEN SOURCE: SIGNIFICANT CHANGE UP
WBC # BLD: 10.4 K/UL — SIGNIFICANT CHANGE UP (ref 3.8–10.5)
WBC # FLD AUTO: 10.4 K/UL — SIGNIFICANT CHANGE UP (ref 3.8–10.5)

## 2017-11-11 PROCEDURE — 93010 ELECTROCARDIOGRAM REPORT: CPT

## 2017-11-11 PROCEDURE — 71010: CPT | Mod: 26

## 2017-11-11 RX ORDER — FUROSEMIDE 40 MG
1 TABLET ORAL
Qty: 0 | Refills: 0 | COMMUNITY

## 2017-11-11 RX ORDER — HYDROXYCHLOROQUINE SULFATE 200 MG
1 TABLET ORAL
Qty: 0 | Refills: 0 | COMMUNITY
Start: 2017-11-11

## 2017-11-11 RX ORDER — APIXABAN 2.5 MG/1
1 TABLET, FILM COATED ORAL
Qty: 60 | Refills: 0
Start: 2017-11-11 | End: 2017-12-11

## 2017-11-11 RX ORDER — APIXABAN 2.5 MG/1
5 TABLET, FILM COATED ORAL EVERY 12 HOURS
Qty: 0 | Refills: 0 | Status: DISCONTINUED | OUTPATIENT
Start: 2017-11-11 | End: 2017-11-11

## 2017-11-11 RX ORDER — APIXABAN 2.5 MG/1
1 TABLET, FILM COATED ORAL
Qty: 0 | Refills: 0 | COMMUNITY

## 2017-11-11 RX ORDER — COLCHICINE 0.6 MG
1 TABLET ORAL
Qty: 0 | Refills: 0 | COMMUNITY
Start: 2017-11-11

## 2017-11-11 RX ORDER — COLCHICINE 0.6 MG
1 TABLET ORAL
Qty: 60 | Refills: 0
Start: 2017-11-11 | End: 2017-12-11

## 2017-11-11 RX ORDER — NALTREXONE HYDROCHLORIDE AND BUPROPION HYDROCHLORIDE 8; 90 MG/1; MG/1
2 TABLET, EXTENDED RELEASE ORAL
Qty: 0 | Refills: 0 | COMMUNITY

## 2017-11-11 RX ORDER — HYDROXYCHLOROQUINE SULFATE 200 MG
1 TABLET ORAL
Qty: 60 | Refills: 0
Start: 2017-11-11 | End: 2017-12-11

## 2017-11-11 RX ORDER — APIXABAN 2.5 MG/1
1 TABLET, FILM COATED ORAL
Qty: 0 | Refills: 0 | COMMUNITY
Start: 2017-11-11

## 2017-11-11 RX ADMIN — APIXABAN 5 MILLIGRAM(S): 2.5 TABLET, FILM COATED ORAL at 12:13

## 2017-11-11 RX ADMIN — Medication 200 MILLIGRAM(S): at 00:25

## 2017-11-11 RX ADMIN — CARVEDILOL PHOSPHATE 12.5 MILLIGRAM(S): 80 CAPSULE, EXTENDED RELEASE ORAL at 10:09

## 2017-11-11 RX ADMIN — HEPARIN SODIUM 29 UNIT(S)/HR: 5000 INJECTION INTRAVENOUS; SUBCUTANEOUS at 04:30

## 2017-11-11 RX ADMIN — Medication 200 MILLIGRAM(S): at 13:03

## 2017-11-11 RX ADMIN — Medication 0.6 MILLIGRAM(S): at 06:58

## 2017-11-11 NOTE — DISCHARGE NOTE ADULT - MEDICATION SUMMARY - MEDICATIONS TO TAKE
I will START or STAY ON the medications listed below when I get home from the hospital:    apixaban 5 mg oral tablet  -- 1 tab(s) by mouth every 12 hours  -- Indication: For Atrial flutter, unspecified type    colchicine 0.6 mg oral tablet  -- 1 tab(s) by mouth every 12 hours  -- Indication: For Gout    hydroxychloroquine 200 mg oral tablet  -- 1 tab(s) by mouth every 12 hours  -- Indication: For Lupus (systemic lupus erythematosus)    carvedilol 12.5 mg oral tablet  -- 1 tab(s) by mouth 2 times a day  -- Indication: For Atrial flutter, unspecified type

## 2017-11-11 NOTE — DISCHARGE NOTE ADULT - CARE PROVIDER_API CALL
Jay Ramirez), Cardiovascular Disease; Internal Medicine  [address] [place] [address]  Phone: [phone]  Fax: [phone]    Mega Allison (MD), Internal Medicine; Rheumatology  [address] [place] [address]  Phone: [phone]  Fax: [phone]

## 2017-11-11 NOTE — DISCHARGE NOTE ADULT - PATIENT PORTAL LINK FT
“You can access the [facility] Patient Portal, offered by [facility], by registering with the following website: [email or web address]”

## 2017-11-11 NOTE — PROGRESS NOTE ADULT - PROBLEM SELECTOR PROBLEM 1
Pericardial effusion

## 2017-11-11 NOTE — DISCHARGE NOTE ADULT - MEDICATION SUMMARY - MEDICATIONS TO STOP TAKING
I will STOP taking the medications listed below when I get home from the hospital:    Lasix 40 mg oral tablet  -- 1 tab(s) by mouth once a day    Contrave 8 mg-90 mg oral tablet, extended release  -- 2 tab(s) by mouth 2 times a day    Leeann SYKES

## 2017-11-11 NOTE — PROGRESS NOTE ADULT - SUBJECTIVE AND OBJECTIVE BOX
tubes out  feels good-afebrile last echo aftwer tube out shows small pericardial effusion  CXR shows persistent left pleural effusion after that tube out  now on colchicine and plaquenol  d/c heparin and re-start eliquis 5mg BID mid-day  then send home on  2 new meds-colchicine and plaquenol  re: pre-admission meds-keep off everything aside from carvedilol and eliquis-with eliquis at 5mg BID  discussed with housestaff this am

## 2017-11-11 NOTE — DISCHARGE NOTE ADULT - SECONDARY DIAGNOSIS.
Systemic lupus erythematosus, unspecified SLE type, unspecified organ involvement status Atrial flutter, unspecified type Essential hypertension ADAM (obstructive sleep apnea) Acute gout involving toe of right foot, unspecified cause

## 2017-11-11 NOTE — PROGRESS NOTE ADULT - PROVIDER SPECIALTY LIST ADULT
CCU
CCU
Cardiology
Infectious Disease
Infectious Disease
Radiology
Rheumatology
Rheumatology
CCU
Cardiology

## 2017-11-11 NOTE — DISCHARGE NOTE ADULT - ADDITIONAL INSTRUCTIONS
Please follow up with Dr. Ramirez as an outpatient as well as Dr. Allison.  Please call Dr. Allison's office at [phone] for schedule an appointment as an outpatient.

## 2017-11-11 NOTE — DISCHARGE NOTE ADULT - PLAN OF CARE
Discharge home and improve symptoms. You were found to have fluid in the sac surrounding the heart as well as in the left side of the lung. You had a drain in place for which has since been removed. It is thought that the accumulation of fluid is secondary to the diagnosis of systemic lupus erythematosus based on laboratory results and the accumulation of fluid in the lung and heart sac (pericardium). You were started on the medication plaquenil (200 mg tablet to be taken twice a day) as well as colchicine 0.6mg tablet twice a day. Please continue these medications as they will be sent to your pharmacy. Please follow up with DR. Ramirez as he will be setting up an appointment to see you as well as Dr. Allison (the rheumatologist). You were initiated on plaquenil 200 mg tablet twice a day for a diagnosis of lupus based on your laboratory findings, the clinical findings of facial rash, lung and pericardial (lung sac) fluid. Please follow up with Dr. Allison for further work up and management of your lupus. You were found to be in a new irregular heart beat called atrial fibrillation. Please continue with eliquis 5mg and carvedilol 12.5mg twice a day. Please continue these medications as an outpatient and follow up with Dr. Ramirez as an outpatient for further management and possible cardioversion procedure. Continue your home carvedilol as prescribed and follow up with Dr. Ramirez upon discharge from the hospital. Please continue with your CPAP at night. You were found to have toe pain with elevated uric acid levels. You were started on colchicine for which will be sent to your pharmacy to be taken as an outpatient. Your lasix was held for concern of causing elevation of uric acid and therefore please follow up with Dr. Ramirez regarding restarting the lasix as an outpatient.

## 2017-11-11 NOTE — DISCHARGE NOTE ADULT - CARE PLAN
Principal Discharge DX:	Acute pericarditis, unspecified type  Goal:	Discharge home and improve symptoms.  Instructions for follow-up, activity and diet:	You were found to have fluid in the sac surrounding the heart as well as in the left side of the lung. You had a drain in place for which has since been removed. It is thought that the accumulation of fluid is secondary to the diagnosis of systemic lupus erythematosus based on laboratory results and the accumulation of fluid in the lung and heart sac (pericardium). You were started on the medication plaquenil (200 mg tablet to be taken twice a day) as well as colchicine 0.6mg tablet twice a day. Please continue these medications as they will be sent to your pharmacy. Please follow up with DR. Ramirez as he will be setting up an appointment to see you as well as Dr. Allison (the rheumatologist).  Secondary Diagnosis:	Systemic lupus erythematosus, unspecified SLE type, unspecified organ involvement status  Instructions for follow-up, activity and diet:	You were initiated on plaquenil 200 mg tablet twice a day for a diagnosis of lupus based on your laboratory findings, the clinical findings of facial rash, lung and pericardial (lung sac) fluid. Please follow up with Dr. Allison for further work up and management of your lupus.  Secondary Diagnosis:	Atrial flutter, unspecified type  Instructions for follow-up, activity and diet:	You were found to be in a new irregular heart beat called atrial fibrillation. Please continue with eliquis 5mg and carvedilol 12.5mg twice a day. Please continue these medications as an outpatient and follow up with Dr. Ramirez as an outpatient for further management and possible cardioversion procedure.  Secondary Diagnosis:	Essential hypertension  Instructions for follow-up, activity and diet:	Continue your home carvedilol as prescribed and follow up with Dr. Ramirez upon discharge from the hospital.  Secondary Diagnosis:	ADAM (obstructive sleep apnea)  Instructions for follow-up, activity and diet:	Please continue with your CPAP at night.  Secondary Diagnosis:	Acute gout involving toe of right foot, unspecified cause  Instructions for follow-up, activity and diet:	You were found to have toe pain with elevated uric acid levels. You were started on colchicine for which will be sent to your pharmacy to be taken as an outpatient. Your lasix was held for concern of causing elevation of uric acid and therefore please follow up with Dr. Ramirez regarding restarting the lasix as an outpatient.

## 2017-11-12 LAB — AUTO DIFF PNL BLD: (no result)

## 2017-11-13 LAB
H CAPSUL AG SPEC-ACNC: SIGNIFICANT CHANGE UP
H CAPSUL AG UR QL IA: SIGNIFICANT CHANGE UP

## 2017-11-14 DIAGNOSIS — T39.395A ADVERSE EFFECT OF OTHER NONSTEROIDAL ANTI-INFLAMMATORY DRUGS [NSAID], INITIAL ENCOUNTER: ICD-10-CM

## 2017-11-14 DIAGNOSIS — F17.290 NICOTINE DEPENDENCE, OTHER TOBACCO PRODUCT, UNCOMPLICATED: ICD-10-CM

## 2017-11-14 DIAGNOSIS — M32.9 SYSTEMIC LUPUS ERYTHEMATOSUS, UNSPECIFIED: ICD-10-CM

## 2017-11-14 DIAGNOSIS — Z99.81 DEPENDENCE ON SUPPLEMENTAL OXYGEN: ICD-10-CM

## 2017-11-14 DIAGNOSIS — I82.91 CHRONIC EMBOLISM AND THROMBOSIS OF UNSPECIFIED VEIN: ICD-10-CM

## 2017-11-14 DIAGNOSIS — I50.30 UNSPECIFIED DIASTOLIC (CONGESTIVE) HEART FAILURE: ICD-10-CM

## 2017-11-14 DIAGNOSIS — N17.9 ACUTE KIDNEY FAILURE, UNSPECIFIED: ICD-10-CM

## 2017-11-14 DIAGNOSIS — E66.01 MORBID (SEVERE) OBESITY DUE TO EXCESS CALORIES: ICD-10-CM

## 2017-11-14 DIAGNOSIS — Z96.649 PRESENCE OF UNSPECIFIED ARTIFICIAL HIP JOINT: ICD-10-CM

## 2017-11-14 DIAGNOSIS — I30.9 ACUTE PERICARDITIS, UNSPECIFIED: ICD-10-CM

## 2017-11-14 DIAGNOSIS — N40.0 BENIGN PROSTATIC HYPERPLASIA WITHOUT LOWER URINARY TRACT SYMPTOMS: ICD-10-CM

## 2017-11-14 DIAGNOSIS — R06.00 DYSPNEA, UNSPECIFIED: ICD-10-CM

## 2017-11-14 DIAGNOSIS — G47.33 OBSTRUCTIVE SLEEP APNEA (ADULT) (PEDIATRIC): ICD-10-CM

## 2017-11-14 DIAGNOSIS — I48.92 UNSPECIFIED ATRIAL FLUTTER: ICD-10-CM

## 2017-11-14 DIAGNOSIS — Z79.01 LONG TERM (CURRENT) USE OF ANTICOAGULANTS: ICD-10-CM

## 2017-11-14 DIAGNOSIS — I44.0 ATRIOVENTRICULAR BLOCK, FIRST DEGREE: ICD-10-CM

## 2017-11-14 DIAGNOSIS — I11.0 HYPERTENSIVE HEART DISEASE WITH HEART FAILURE: ICD-10-CM

## 2017-11-14 DIAGNOSIS — M10.071 IDIOPATHIC GOUT, RIGHT ANKLE AND FOOT: ICD-10-CM

## 2017-11-14 DIAGNOSIS — I27.82 CHRONIC PULMONARY EMBOLISM: ICD-10-CM

## 2017-11-14 DIAGNOSIS — I31.3 PERICARDIAL EFFUSION (NONINFLAMMATORY): ICD-10-CM

## 2017-11-15 LAB — DSDNA AB SER QL CLIF: NEGATIVE — SIGNIFICANT CHANGE UP

## 2017-11-18 LAB
C BURNET PH1 + PH2 IGG+IGM SER-IMP: SIGNIFICANT CHANGE UP
C BURNET PH1 IGG TITR FLD: SIGNIFICANT CHANGE UP
C BURNET PH1 IGG TITR FLD: SIGNIFICANT CHANGE UP
C BURNET PH1 IGM TITR FLD: SIGNIFICANT CHANGE UP
C BURNET PH1 IGM TITR FLD: SIGNIFICANT CHANGE UP
C PNEUM IGM TITR SER: SIGNIFICANT CHANGE UP TITER
C PSITTACI IGG SER-ACNC: SIGNIFICANT CHANGE UP TITER
C PSITTACI IGM TITR SER: SIGNIFICANT CHANGE UP TITER
C TRACH IGG TITR SER: SIGNIFICANT CHANGE UP TITER
C TRACH IGM SER-ACNC: SIGNIFICANT CHANGE UP TITER
CHLAMYDIA IGG SER-ACNC: SIGNIFICANT CHANGE UP TITER
CK MB BLD-MCNC: SIGNIFICANT CHANGE UP
CK MB BLD-MCNC: SIGNIFICANT CHANGE UP
CV A2 AB SER-ACNC: SIGNIFICANT CHANGE UP
CV A4 AB SER-ACNC: SIGNIFICANT CHANGE UP
CV A7 AB SER-ACNC: SIGNIFICANT CHANGE UP
CV A9 AB TITR FLD: SIGNIFICANT CHANGE UP
CV B1 AB TITR FLD: SIGNIFICANT CHANGE UP
CV B2 AB TITR FLD: SIGNIFICANT CHANGE UP
CV B3 AB TITR FLD: SIGNIFICANT CHANGE UP
CV B4 AB TITR FLD: SIGNIFICANT CHANGE UP
CV B5 AB TITR FLD: SIGNIFICANT CHANGE UP
CV B6 AB TITR FLD: SIGNIFICANT CHANGE UP
IGG SERPL-MCNC: 733 MG/DL — LOW (ref 767–1590)
IGG1 SER-MCNC: 355 MG/DL — SIGNIFICANT CHANGE UP (ref 341–894)
IGG2 SER-MCNC: 190 MG/DL — SIGNIFICANT CHANGE UP (ref 171–632)
IGG3 SER-MCNC: 45.8 MG/DL — SIGNIFICANT CHANGE UP (ref 18.4–106)
IGG4 SER-MCNC: 24.7 MG/DL — SIGNIFICANT CHANGE UP (ref 2.4–121)

## 2017-11-23 LAB
CULTURE RESULTS: NO GROWTH — SIGNIFICANT CHANGE UP
CULTURE RESULTS: NO GROWTH — SIGNIFICANT CHANGE UP
SPECIMEN SOURCE: SIGNIFICANT CHANGE UP
SPECIMEN SOURCE: SIGNIFICANT CHANGE UP

## 2017-12-09 LAB
CULTURE RESULTS: SIGNIFICANT CHANGE UP
CULTURE RESULTS: SIGNIFICANT CHANGE UP
SPECIMEN SOURCE: SIGNIFICANT CHANGE UP
SPECIMEN SOURCE: SIGNIFICANT CHANGE UP

## 2017-12-19 PROCEDURE — 80048 BASIC METABOLIC PNL TOTAL CA: CPT

## 2017-12-19 PROCEDURE — 86631 CHLAMYDIA ANTIBODY: CPT

## 2017-12-19 PROCEDURE — 87116 MYCOBACTERIA CULTURE: CPT

## 2017-12-19 PROCEDURE — 86664 EPSTEIN-BARR NUCLEAR ANTIGEN: CPT

## 2017-12-19 PROCEDURE — 86901 BLOOD TYPING SEROLOGIC RH(D): CPT

## 2017-12-19 PROCEDURE — 84300 ASSAY OF URINE SODIUM: CPT

## 2017-12-19 PROCEDURE — 85027 COMPLETE CBC AUTOMATED: CPT

## 2017-12-19 PROCEDURE — 77012 CT SCAN FOR NEEDLE BIOPSY: CPT

## 2017-12-19 PROCEDURE — C1729: CPT

## 2017-12-19 PROCEDURE — 86635 COCCIDIOIDES ANTIBODY: CPT

## 2017-12-19 PROCEDURE — 87102 FUNGUS ISOLATION CULTURE: CPT

## 2017-12-19 PROCEDURE — 87070 CULTURE OTHR SPECIMN AEROBIC: CPT

## 2017-12-19 PROCEDURE — 33010: CPT

## 2017-12-19 PROCEDURE — 82787 IGG 1 2 3 OR 4 EACH: CPT

## 2017-12-19 PROCEDURE — 86658 ENTEROVIRUS ANTIBODY: CPT

## 2017-12-19 PROCEDURE — 86632 CHLAMYDIA IGM ANTIBODY: CPT

## 2017-12-19 PROCEDURE — 87206 SMEAR FLUORESCENT/ACID STAI: CPT

## 2017-12-19 PROCEDURE — 87633 RESP VIRUS 12-25 TARGETS: CPT

## 2017-12-19 PROCEDURE — 86140 C-REACTIVE PROTEIN: CPT

## 2017-12-19 PROCEDURE — 86160 COMPLEMENT ANTIGEN: CPT

## 2017-12-19 PROCEDURE — 87799 DETECT AGENT NOS DNA QUANT: CPT

## 2017-12-19 PROCEDURE — 87075 CULTR BACTERIA EXCEPT BLOOD: CPT

## 2017-12-19 PROCEDURE — 86638 Q FEVER ANTIBODY: CPT

## 2017-12-19 PROCEDURE — 85610 PROTHROMBIN TIME: CPT

## 2017-12-19 PROCEDURE — 85730 THROMBOPLASTIN TIME PARTIAL: CPT

## 2017-12-19 PROCEDURE — 86777 TOXOPLASMA ANTIBODY: CPT

## 2017-12-19 PROCEDURE — 83615 LACTATE (LD) (LDH) ENZYME: CPT

## 2017-12-19 PROCEDURE — 93005 ELECTROCARDIOGRAM TRACING: CPT

## 2017-12-19 PROCEDURE — 86431 RHEUMATOID FACTOR QUANT: CPT

## 2017-12-19 PROCEDURE — 87252 VIRUS INOCULATION TISSUE: CPT

## 2017-12-19 PROCEDURE — 88112 CYTOPATH CELL ENHANCE TECH: CPT

## 2017-12-19 PROCEDURE — 87449 NOS EACH ORGANISM AG IA: CPT

## 2017-12-19 PROCEDURE — 71260 CT THORAX DX C+: CPT

## 2017-12-19 PROCEDURE — 86644 CMV ANTIBODY: CPT

## 2017-12-19 PROCEDURE — 86225 DNA ANTIBODY NATIVE: CPT

## 2017-12-19 PROCEDURE — 86663 EPSTEIN-BARR ANTIBODY: CPT

## 2017-12-19 PROCEDURE — 86900 BLOOD TYPING SEROLOGIC ABO: CPT

## 2017-12-19 PROCEDURE — 87040 BLOOD CULTURE FOR BACTERIA: CPT

## 2017-12-19 PROCEDURE — 74177 CT ABD & PELVIS W/CONTRAST: CPT

## 2017-12-19 PROCEDURE — 84540 ASSAY OF URINE/UREA-N: CPT

## 2017-12-19 PROCEDURE — 93306 TTE W/DOPPLER COMPLETE: CPT

## 2017-12-19 PROCEDURE — 84484 ASSAY OF TROPONIN QUANT: CPT

## 2017-12-19 PROCEDURE — 84311 SPECTROPHOTOMETRY: CPT

## 2017-12-19 PROCEDURE — 84550 ASSAY OF BLOOD/URIC ACID: CPT

## 2017-12-19 PROCEDURE — 80053 COMPREHEN METABOLIC PANEL: CPT

## 2017-12-19 PROCEDURE — 36415 COLL VENOUS BLD VENIPUNCTURE: CPT

## 2017-12-19 PROCEDURE — 87798 DETECT AGENT NOS DNA AMP: CPT

## 2017-12-19 PROCEDURE — 86778 TOXOPLASMA ANTIBODY IGM: CPT

## 2017-12-19 PROCEDURE — 87581 M.PNEUMON DNA AMP PROBE: CPT

## 2017-12-19 PROCEDURE — 86038 ANTINUCLEAR ANTIBODIES: CPT

## 2017-12-19 PROCEDURE — 85652 RBC SED RATE AUTOMATED: CPT

## 2017-12-19 PROCEDURE — 87389 HIV-1 AG W/HIV-1&-2 AB AG IA: CPT

## 2017-12-19 PROCEDURE — C1894: CPT

## 2017-12-19 PROCEDURE — 88305 TISSUE EXAM BY PATHOLOGIST: CPT

## 2017-12-19 PROCEDURE — 84443 ASSAY THYROID STIM HORMONE: CPT

## 2017-12-19 PROCEDURE — 86645 CMV ANTIBODY IGM: CPT

## 2017-12-19 PROCEDURE — 87015 SPECIMEN INFECT AGNT CONCNTJ: CPT

## 2017-12-19 PROCEDURE — 82570 ASSAY OF URINE CREATININE: CPT

## 2017-12-19 PROCEDURE — 71250 CT THORAX DX C-: CPT

## 2017-12-19 PROCEDURE — 86665 EPSTEIN-BARR CAPSID VCA: CPT

## 2017-12-19 PROCEDURE — 73630 X-RAY EXAM OF FOOT: CPT

## 2017-12-19 PROCEDURE — 82550 ASSAY OF CK (CPK): CPT

## 2017-12-19 PROCEDURE — 81001 URINALYSIS AUTO W/SCOPE: CPT

## 2017-12-19 PROCEDURE — 87205 SMEAR GRAM STAIN: CPT

## 2017-12-19 PROCEDURE — 85025 COMPLETE CBC W/AUTO DIFF WBC: CPT

## 2017-12-19 PROCEDURE — 84302 ASSAY OF SWEAT SODIUM: CPT

## 2017-12-19 PROCEDURE — 84999 UNLISTED CHEMISTRY PROCEDURE: CPT

## 2017-12-19 PROCEDURE — G0103: CPT

## 2017-12-19 PROCEDURE — 86850 RBC ANTIBODY SCREEN: CPT

## 2017-12-19 PROCEDURE — 82438 ASSAY OTHER FLUID CHLORIDES: CPT

## 2017-12-19 PROCEDURE — 82042 OTHER SOURCE ALBUMIN QUAN EA: CPT

## 2017-12-19 PROCEDURE — 99285 EMERGENCY DEPT VISIT HI MDM: CPT | Mod: 25

## 2017-12-19 PROCEDURE — 86622 BRUCELLA ANTIBODY: CPT

## 2017-12-19 PROCEDURE — 82553 CREATINE MB FRACTION: CPT

## 2017-12-19 PROCEDURE — 80076 HEPATIC FUNCTION PANEL: CPT

## 2017-12-19 PROCEDURE — 83735 ASSAY OF MAGNESIUM: CPT

## 2017-12-19 PROCEDURE — 86235 NUCLEAR ANTIGEN ANTIBODY: CPT

## 2017-12-19 PROCEDURE — 87385 HISTOPLASMA CAPSUL AG IA: CPT

## 2017-12-19 PROCEDURE — 86255 FLUORESCENT ANTIBODY SCREEN: CPT

## 2017-12-19 PROCEDURE — 89051 BODY FLUID CELL COUNT: CPT

## 2017-12-19 PROCEDURE — 87486 CHLMYD PNEUM DNA AMP PROBE: CPT

## 2017-12-19 PROCEDURE — 84157 ASSAY OF PROTEIN OTHER: CPT

## 2017-12-19 PROCEDURE — 83880 ASSAY OF NATRIURETIC PEPTIDE: CPT

## 2017-12-19 PROCEDURE — 86618 LYME DISEASE ANTIBODY: CPT

## 2017-12-19 PROCEDURE — 71045 X-RAY EXAM CHEST 1 VIEW: CPT

## 2017-12-19 PROCEDURE — C1769: CPT

## 2017-12-30 LAB
CULTURE RESULTS: SIGNIFICANT CHANGE UP
SPECIMEN SOURCE: SIGNIFICANT CHANGE UP

## 2018-06-05 NOTE — PROGRESS NOTE ADULT - PROBLEM SELECTOR PLAN 4
unclear baseline Cr, however pt w/o hx of CKD as per records  -trend BUN/Cr  -f/u renal studies Clothing C/o R big toe pain on day of admission. Reports slipping into a ditch 3 days ago. On exam, toe mildly erythematous, tender on palpation/movement, mildly swollen.  -Xray neg for Fx  -Uric acid elevated 12.6  -Pain controlled with Tylenol  -To start Colchicine this evening

## 2019-03-11 NOTE — DIETITIAN INITIAL EVALUATION ADULT. - ETIOLOGY
32 y o    female at 27 8 wga EGA for PNV  BP : 118/64  TW  Feeling well   No complaints  IUGR - APFS with dopplers - reactive NST today  Methadone maintenance  GBS collected  S/P BTM  Reviewed labor precautions and FKCs RT excessive PO intake & lifestyle PTA

## 2019-03-21 NOTE — DISCHARGE NOTE ADULT - NS AS DC FOLLOWUP INST YN
Implemented All Fall with Harm Risk Interventions:  [place] to call system. Call bell, personal items and telephone within reach. Instruct patient to call for assistance. Room bathroom lighting operational. Non-slip footwear when patient is off stretcher. Physically safe environment: no spills, clutter or unnecessary equipment. Stretcher in lowest position, wheels locked, appropriate side rails in place. Provide visual cue, wrist band, yellow gown, etc. Monitor gait and stability. Monitor for mental status changes and reorient to person, place, and time. Review medications for side effects contributing to fall risk. Reinforce activity limits and safety measures with patient and family. Provide visual clues: red socks.
Yes

## 2020-04-11 NOTE — PROGRESS NOTE ADULT - PROBLEM SELECTOR PROBLEM 7
Placed on NC 3L with SpO2 in mid-high 90s, but then required NRB and admitted to [facility]. Received CTX and azithro x 1 in the ED. ID consulted and recommend continuing home regimen of HIV meds and hold on plaquenil at this time for prolonged QTc. Stepped down to [facility] for further monitoring and management.  On RMF required increasing O2 and with QTc on repeat EKG <500 was started on azithromycin, plaquenil along with tocilizumab x1, steroids, vancomycin (MRSA swab +), zosyn, and bactrim for PCP ppx as CD4 drop to 103. Has continued to improve since initiation of the aforementioned medications and on 4/8 determined to be stable for transfer to [facility] to finalize inpatient abx and monitoring of weaning off O2. Transferred to [facility] 4/9/20.  Pt D-dimer>3000,     bactrim started.   Respiratory status continues to improve and now needs to finish inpatient treatment of MRSA PNA and continue to be weaned off oxygen support. ADAM (obstructive sleep apnea) bactrim for PCP ppx as CD4 drop to 103. Has continued to improve since initiation of the aforementioned medications and on 4/8 determined to be stable for transfer to [facility] to finalize inpatient abx and monitoring of weaning off O2. Transferred to [facility] 4/9/20.  Pt D-dimer>3000,     bactrim started.   Respiratory status continues to improve and now needs to finish inpatient treatment of MRSA PNA and continue to be weaned off oxygen support. Essential hypertension

## 2020-10-11 ENCOUNTER — TRANSCRIPTION ENCOUNTER (OUTPATIENT)
Age: 75
End: 2020-10-11

## 2020-10-21 PROBLEM — I82.91 CHRONIC EMBOLISM AND THROMBOSIS OF UNSPECIFIED VEIN: Chronic | Status: ACTIVE | Noted: 2017-11-07

## 2020-10-21 PROBLEM — I31.9 DISEASE OF PERICARDIUM, UNSPECIFIED: Chronic | Status: ACTIVE | Noted: 2017-11-06

## 2020-10-21 PROBLEM — I10 ESSENTIAL (PRIMARY) HYPERTENSION: Chronic | Status: ACTIVE | Noted: 2017-11-07

## 2020-10-21 PROBLEM — I10 ESSENTIAL (PRIMARY) HYPERTENSION: Chronic | Status: ACTIVE | Noted: 2017-11-06

## 2020-10-21 PROBLEM — G47.33 OBSTRUCTIVE SLEEP APNEA (ADULT) (PEDIATRIC): Chronic | Status: ACTIVE | Noted: 2017-11-07

## 2020-10-21 PROBLEM — I27.82 CHRONIC PULMONARY EMBOLISM: Chronic | Status: ACTIVE | Noted: 2017-11-07

## 2020-10-21 PROBLEM — E66.9 OBESITY, UNSPECIFIED: Chronic | Status: ACTIVE | Noted: 2017-11-07

## 2020-10-27 NOTE — ED ADULT NURSE NOTE - PMH
[FreeTextEntry1] : creatinine elevation\par will recheck\par \par anxiety and depression\par Discussed diagnosis of anxiety and depression with the patient and potential outcomes/side affects of treatment versus non treatment. Medications were assessed and described at length. Side affects and black box warning were discussed.  Patient was advised to continue will all medications prescribed and the need for compliance was discussed and emphasized. Patient was advised to not stop medications without discussing with a health care provider first. Patient was advised to continue psychotherapy or seek therapy if not currently attending. Patient was educated on addictive potential of controlled substances and was counseled to use only as needed and sparingly. Patient verbalized understanding of all the above.\par doing okay with sertraline\par \par forehead spasm\par neuro referral\par check bw, hydrate\par \par thyroid\par known multiple nodules\par had biopsy done 2015 - benign\par reviewed, 5.7 complex nodule, sent to endo for evaluation and possible fna
HTN (hypertension)    Pericarditis

## 2020-10-29 ENCOUNTER — TRANSCRIPTION ENCOUNTER (OUTPATIENT)
Age: 75
End: 2020-10-29

## 2020-10-29 VITALS
WEIGHT: 274.26 LBS | HEART RATE: 70 BPM | TEMPERATURE: 98 F | DIASTOLIC BLOOD PRESSURE: 74 MMHG | RESPIRATION RATE: 18 BRPM | SYSTOLIC BLOOD PRESSURE: 154 MMHG | OXYGEN SATURATION: 97 % | HEIGHT: 73 IN

## 2020-10-30 ENCOUNTER — OUTPATIENT (OUTPATIENT)
Dept: INPATIENT UNIT | Facility: HOSPITAL | Age: 75
LOS: 1 days | Discharge: HOME | End: 2020-10-30
Payer: MEDICARE

## 2020-10-30 ENCOUNTER — RESULT REVIEW (OUTPATIENT)
Age: 75
End: 2020-10-30

## 2020-10-30 VITALS — HEART RATE: 80 BPM | OXYGEN SATURATION: 95 % | RESPIRATION RATE: 19 BRPM

## 2020-10-30 DIAGNOSIS — K62.89 OTHER SPECIFIED DISEASES OF ANUS AND RECTUM: ICD-10-CM

## 2020-10-30 DIAGNOSIS — H26.9 UNSPECIFIED CATARACT: Chronic | ICD-10-CM

## 2020-10-30 DIAGNOSIS — Z96.649 PRESENCE OF UNSPECIFIED ARTIFICIAL HIP JOINT: Chronic | ICD-10-CM

## 2020-10-30 PROCEDURE — 88309 TISSUE EXAM BY PATHOLOGIST: CPT | Mod: 26

## 2020-10-30 RX ORDER — ACETAMINOPHEN 500 MG
1 TABLET ORAL
Qty: 21 | Refills: 0
Start: 2020-10-30 | End: 2020-11-05

## 2020-10-30 RX ORDER — INFLUENZA VIRUS VACCINE 15; 15; 15; 15 UG/.5ML; UG/.5ML; UG/.5ML; UG/.5ML
0.7 SUSPENSION INTRAMUSCULAR ONCE
Refills: 0 | Status: DISCONTINUED | OUTPATIENT
Start: 2020-10-30 | End: 2020-10-30

## 2020-10-30 RX ORDER — INFLUENZA VIRUS VACCINE 15; 15; 15; 15 UG/.5ML; UG/.5ML; UG/.5ML; UG/.5ML
0.5 SUSPENSION INTRAMUSCULAR ONCE
Refills: 0 | Status: DISCONTINUED | OUTPATIENT
Start: 2020-10-30 | End: 2020-10-30

## 2020-10-30 RX ORDER — BUPIVACAINE 13.3 MG/ML
20 INJECTION, SUSPENSION, LIPOSOMAL INFILTRATION ONCE
Refills: 0 | Status: DISCONTINUED | OUTPATIENT
Start: 2020-10-30 | End: 2020-10-30

## 2020-10-30 RX ORDER — HYDROMORPHONE HYDROCHLORIDE 2 MG/ML
0.5 INJECTION INTRAMUSCULAR; INTRAVENOUS; SUBCUTANEOUS ONCE
Refills: 0 | Status: DISCONTINUED | OUTPATIENT
Start: 2020-10-30 | End: 2020-10-30

## 2020-10-30 RX ORDER — DOCUSATE SODIUM 100 MG
1 CAPSULE ORAL
Qty: 15 | Refills: 0
Start: 2020-10-30 | End: 2020-11-13

## 2020-10-30 RX ORDER — OXYCODONE AND ACETAMINOPHEN 5; 325 MG/1; MG/1
1 TABLET ORAL
Qty: 15 | Refills: 0
Start: 2020-10-30

## 2020-10-30 NOTE — PACU DISCHARGE NOTE - COMMENTS
Pt discharged to home. Discharge paperwork and instructions given to pt. Iv heplock removed. Safety protocol maintained.

## 2020-10-30 NOTE — BRIEF OPERATIVE NOTE - OPERATION/FINDINGS
Large 4-5 cm perianal mass on the 3 O clock position. Very friable. Excised and anal mucosa stitched together with 2 O vicryl suture. Hemostasis confirmed.

## 2020-10-30 NOTE — PROGRESS NOTE ADULT - SUBJECTIVE AND OBJECTIVE BOX
POST-OPERATIVE NOTE    Procedure: excision of anal mass    Diagnosis/Indication: anal mass    Surgeon: Dr. Chris    S: Pt has no complaints. He is asking to drink some juice. He feels comfortable. Has no pain at this time. Denies CP, SOB, SAMUEL, calf tenderness. Pain controlled with medication. Denies nausea, vomiting.    O:  T(C): 36.3 (10-30-20 @ 14:12), Max: 36.3 (10-30-20 @ 14:12)  T(F): 97.3 (10-30-20 @ 14:12), Max: 97.3 (10-30-20 @ 14:12)  HR: 64 (10-30-20 @ 16:01) (52 - 64)  BP: 148/68 (10-30-20 @ 16:01) (105/51 - 148/68)  RR: 20 (10-30-20 @ 16:01) (15 - 20)  SpO2: 98% (10-30-20 @ 16:01) (97% - 100%)  Wt(kg): --            Gen: NAD, resting comfortably in bed  C/V: NSR  Pulm: Nonlabored breathing, no respiratory distress  Abd: soft, NT/ND  Anorectal: gauze dressing in place, no visible blood  Extrem: WWP, no calf edema or tenderness, SCDs in place      A/P: 75y Male s/p above procedure  Diet: regular  IVF: n/a  Pain/nausea control  SQH/SCDs/OOBA/IS  Dispo pending pain control, PO tolerance, clinical improvement

## 2020-11-02 PROCEDURE — 11626 EXC S/N/H/F/G MAL+MRG >4 CM: CPT

## 2020-11-02 PROCEDURE — 82962 GLUCOSE BLOOD TEST: CPT

## 2020-11-02 PROCEDURE — 45300 PROCTOSIGMOIDOSCOPY DX: CPT

## 2020-11-02 PROCEDURE — 88309 TISSUE EXAM BY PATHOLOGIST: CPT

## 2020-11-02 PROCEDURE — 90662 IIV NO PRSV INCREASED AG IM: CPT

## 2020-11-05 DIAGNOSIS — C44.520 SQUAMOUS CELL CARCINOMA OF ANAL SKIN: ICD-10-CM

## 2020-11-09 LAB — SURGICAL PATHOLOGY STUDY: SIGNIFICANT CHANGE UP

## 2020-12-16 PROBLEM — I35.1 NONRHEUMATIC AORTIC (VALVE) INSUFFICIENCY: Chronic | Status: ACTIVE | Noted: 2020-10-30

## 2020-12-16 PROBLEM — M10.9 GOUT, UNSPECIFIED: Chronic | Status: ACTIVE | Noted: 2020-10-29

## 2021-01-08 ENCOUNTER — LABORATORY RESULT (OUTPATIENT)
Age: 76
End: 2021-01-08

## 2021-01-12 ENCOUNTER — OUTPATIENT (OUTPATIENT)
Dept: OUTPATIENT SERVICES | Facility: HOSPITAL | Age: 76
LOS: 1 days | End: 2021-01-12
Payer: MEDICARE

## 2021-01-12 ENCOUNTER — APPOINTMENT (OUTPATIENT)
Dept: INTERVENTIONAL RADIOLOGY/VASCULAR | Facility: HOSPITAL | Age: 76
End: 2021-01-12
Payer: MEDICARE

## 2021-01-12 DIAGNOSIS — Z96.649 PRESENCE OF UNSPECIFIED ARTIFICIAL HIP JOINT: Chronic | ICD-10-CM

## 2021-01-12 DIAGNOSIS — H26.9 UNSPECIFIED CATARACT: Chronic | ICD-10-CM

## 2021-01-12 PROCEDURE — 36573 INSJ PICC RS&I 5 YR+: CPT

## 2021-01-12 PROCEDURE — C1725: CPT

## 2021-01-12 PROCEDURE — C1887: CPT

## 2021-01-12 PROCEDURE — C1769: CPT

## 2021-01-12 PROCEDURE — C1751: CPT

## 2021-08-26 NOTE — PROGRESS NOTE ADULT - I WAS PHYSICALLY PRESENT FOR THE KEY PORTIONS OF THE EVALUATION AND MANAGEMENT (E/M) SERVICE PROVIDED.  I AGREE WITH THE ABOVE HISTORY, PHYSICAL, AND PLAN WHICH I HAVE REVIEWED AND EDITED WHERE APPROPRIATE
nothing
Statement Selected

## 2024-01-30 ENCOUNTER — INPATIENT (INPATIENT)
Facility: HOSPITAL | Age: 79
LOS: 3 days | Discharge: HOME | DRG: 291 | End: 2024-02-03
Attending: HOSPITALIST | Admitting: INTERNAL MEDICINE
Payer: MEDICARE

## 2024-01-30 VITALS
RESPIRATION RATE: 20 BRPM | WEIGHT: 296.96 LBS | HEIGHT: 73 IN | OXYGEN SATURATION: 97 % | SYSTOLIC BLOOD PRESSURE: 171 MMHG | TEMPERATURE: 98 F | HEART RATE: 82 BPM | DIASTOLIC BLOOD PRESSURE: 88 MMHG

## 2024-01-30 DIAGNOSIS — Z96.649 PRESENCE OF UNSPECIFIED ARTIFICIAL HIP JOINT: Chronic | ICD-10-CM

## 2024-01-30 DIAGNOSIS — Z87.39 PERSONAL HISTORY OF OTHER DISEASES OF THE MUSCULOSKELETAL SYSTEM AND CONNECTIVE TISSUE: ICD-10-CM

## 2024-01-30 DIAGNOSIS — E11.9 TYPE 2 DIABETES MELLITUS WITHOUT COMPLICATIONS: ICD-10-CM

## 2024-01-30 DIAGNOSIS — I35.1 NONRHEUMATIC AORTIC (VALVE) INSUFFICIENCY: ICD-10-CM

## 2024-01-30 DIAGNOSIS — Z86.711 PERSONAL HISTORY OF PULMONARY EMBOLISM: ICD-10-CM

## 2024-01-30 DIAGNOSIS — R06.02 SHORTNESS OF BREATH: ICD-10-CM

## 2024-01-30 DIAGNOSIS — I10 ESSENTIAL (PRIMARY) HYPERTENSION: ICD-10-CM

## 2024-01-30 DIAGNOSIS — H26.9 UNSPECIFIED CATARACT: Chronic | ICD-10-CM

## 2024-01-30 DIAGNOSIS — E66.9 OBESITY, UNSPECIFIED: ICD-10-CM

## 2024-01-30 DIAGNOSIS — Z29.9 ENCOUNTER FOR PROPHYLACTIC MEASURES, UNSPECIFIED: ICD-10-CM

## 2024-01-30 LAB
ANION GAP SERPL CALC-SCNC: 11 MMOL/L — SIGNIFICANT CHANGE UP (ref 5–17)
ANION GAP SERPL CALC-SCNC: 13 MMOL/L — SIGNIFICANT CHANGE UP (ref 5–17)
BASE EXCESS BLDV CALC-SCNC: 0.5 MMOL/L — SIGNIFICANT CHANGE UP (ref -2–3)
BUN SERPL-MCNC: 19 MG/DL — SIGNIFICANT CHANGE UP (ref 7–23)
BUN SERPL-MCNC: 19 MG/DL — SIGNIFICANT CHANGE UP (ref 7–23)
CA-I SERPL-SCNC: 1.17 MMOL/L — SIGNIFICANT CHANGE UP (ref 1.15–1.33)
CALCIUM SERPL-MCNC: 8.8 MG/DL — SIGNIFICANT CHANGE UP (ref 8.4–10.5)
CALCIUM SERPL-MCNC: 8.8 MG/DL — SIGNIFICANT CHANGE UP (ref 8.4–10.5)
CHLORIDE SERPL-SCNC: 105 MMOL/L — SIGNIFICANT CHANGE UP (ref 96–108)
CHLORIDE SERPL-SCNC: 106 MMOL/L — SIGNIFICANT CHANGE UP (ref 96–108)
CO2 BLDV-SCNC: 26.7 MMOL/L — HIGH (ref 22–26)
CO2 SERPL-SCNC: 21 MMOL/L — LOW (ref 22–31)
CO2 SERPL-SCNC: 22 MMOL/L — SIGNIFICANT CHANGE UP (ref 22–31)
CREAT SERPL-MCNC: 1.2 MG/DL — SIGNIFICANT CHANGE UP (ref 0.5–1.3)
CREAT SERPL-MCNC: 1.22 MG/DL — SIGNIFICANT CHANGE UP (ref 0.5–1.3)
EGFR: 61 ML/MIN/1.73M2 — SIGNIFICANT CHANGE UP
EGFR: 62 ML/MIN/1.73M2 — SIGNIFICANT CHANGE UP
FLUAV AG NPH QL: SIGNIFICANT CHANGE UP
FLUBV AG NPH QL: SIGNIFICANT CHANGE UP
GAS PNL BLDV: 135 MMOL/L — LOW (ref 136–145)
GAS PNL BLDV: SIGNIFICANT CHANGE UP
GLUCOSE SERPL-MCNC: 86 MG/DL — SIGNIFICANT CHANGE UP (ref 70–99)
GLUCOSE SERPL-MCNC: 96 MG/DL — SIGNIFICANT CHANGE UP (ref 70–99)
HCO3 BLDV-SCNC: 25 MMOL/L — SIGNIFICANT CHANGE UP (ref 22–29)
HCT VFR BLD CALC: 51.6 % — HIGH (ref 39–50)
HGB BLD-MCNC: 17.3 G/DL — HIGH (ref 13–17)
LACTATE SERPL-SCNC: 1.1 MMOL/L — SIGNIFICANT CHANGE UP (ref 0.5–2)
MAGNESIUM SERPL-MCNC: 2 MG/DL — SIGNIFICANT CHANGE UP (ref 1.6–2.6)
MCHC RBC-ENTMCNC: 29.2 PG — SIGNIFICANT CHANGE UP (ref 27–34)
MCHC RBC-ENTMCNC: 33.5 GM/DL — SIGNIFICANT CHANGE UP (ref 32–36)
MCV RBC AUTO: 87 FL — SIGNIFICANT CHANGE UP (ref 80–100)
NRBC # BLD: 0 /100 WBCS — SIGNIFICANT CHANGE UP (ref 0–0)
PCO2 BLDV: 41 MMHG — LOW (ref 42–55)
PH BLDV: 7.4 — SIGNIFICANT CHANGE UP (ref 7.32–7.43)
PHOSPHATE SERPL-MCNC: 2.8 MG/DL — SIGNIFICANT CHANGE UP (ref 2.5–4.5)
PLATELET # BLD AUTO: 297 K/UL — SIGNIFICANT CHANGE UP (ref 150–400)
PO2 BLDV: 60 MMHG — HIGH (ref 25–45)
POTASSIUM BLDV-SCNC: 3.8 MMOL/L — SIGNIFICANT CHANGE UP (ref 3.5–5.1)
POTASSIUM SERPL-MCNC: 4.2 MMOL/L — SIGNIFICANT CHANGE UP (ref 3.5–5.3)
POTASSIUM SERPL-MCNC: SIGNIFICANT CHANGE UP MMOL/L (ref 3.5–5.3)
POTASSIUM SERPL-SCNC: 4.2 MMOL/L — SIGNIFICANT CHANGE UP (ref 3.5–5.3)
POTASSIUM SERPL-SCNC: SIGNIFICANT CHANGE UP MMOL/L (ref 3.5–5.3)
RBC # BLD: 5.93 M/UL — HIGH (ref 4.2–5.8)
RBC # FLD: 15.7 % — HIGH (ref 10.3–14.5)
RSV RNA NPH QL NAA+NON-PROBE: SIGNIFICANT CHANGE UP
SAO2 % BLDV: 89.5 % — HIGH (ref 67–88)
SARS-COV-2 RNA SPEC QL NAA+PROBE: SIGNIFICANT CHANGE UP
SODIUM SERPL-SCNC: 137 MMOL/L — SIGNIFICANT CHANGE UP (ref 135–145)
SODIUM SERPL-SCNC: 141 MMOL/L — SIGNIFICANT CHANGE UP (ref 135–145)
TROPONIN T, HIGH SENSITIVITY RESULT: 38 NG/L — SIGNIFICANT CHANGE UP (ref 0–51)
WBC # BLD: 9.53 K/UL — SIGNIFICANT CHANGE UP (ref 3.8–10.5)
WBC # FLD AUTO: 9.53 K/UL — SIGNIFICANT CHANGE UP (ref 3.8–10.5)

## 2024-01-30 PROCEDURE — 99285 EMERGENCY DEPT VISIT HI MDM: CPT

## 2024-01-30 PROCEDURE — 71046 X-RAY EXAM CHEST 2 VIEWS: CPT | Mod: 26

## 2024-01-30 RX ORDER — AZITHROMYCIN 500 MG/1
500 TABLET, FILM COATED ORAL ONCE
Refills: 0 | Status: COMPLETED | OUTPATIENT
Start: 2024-01-30 | End: 2024-01-30

## 2024-01-30 RX ORDER — VALSARTAN 80 MG/1
0 TABLET ORAL
Qty: 0 | Refills: 0 | DISCHARGE

## 2024-01-30 RX ORDER — APIXABAN 2.5 MG/1
5 TABLET, FILM COATED ORAL EVERY 12 HOURS
Refills: 0 | Status: DISCONTINUED | OUTPATIENT
Start: 2024-01-30 | End: 2024-02-03

## 2024-01-30 RX ORDER — SPIRONOLACTONE 25 MG/1
25 TABLET, FILM COATED ORAL
Refills: 0 | Status: DISCONTINUED | OUTPATIENT
Start: 2024-01-30 | End: 2024-02-02

## 2024-01-30 RX ORDER — CARVEDILOL PHOSPHATE 80 MG/1
0 CAPSULE, EXTENDED RELEASE ORAL
Qty: 0 | Refills: 0 | DISCHARGE

## 2024-01-30 RX ORDER — SPIRONOLACTONE 25 MG/1
1 TABLET, FILM COATED ORAL
Qty: 0 | Refills: 0 | DISCHARGE

## 2024-01-30 RX ORDER — INFLUENZA VIRUS VACCINE 15; 15; 15; 15 UG/.5ML; UG/.5ML; UG/.5ML; UG/.5ML
0.7 SUSPENSION INTRAMUSCULAR ONCE
Refills: 0 | Status: DISCONTINUED | OUTPATIENT
Start: 2024-01-30 | End: 2024-02-03

## 2024-01-30 RX ORDER — HYDRALAZINE HCL 50 MG
5 TABLET ORAL ONCE
Refills: 0 | Status: COMPLETED | OUTPATIENT
Start: 2024-01-30 | End: 2024-01-30

## 2024-01-30 RX ORDER — CARVEDILOL PHOSPHATE 80 MG/1
12.5 CAPSULE, EXTENDED RELEASE ORAL EVERY 12 HOURS
Refills: 0 | Status: DISCONTINUED | OUTPATIENT
Start: 2024-01-30 | End: 2024-02-01

## 2024-01-30 RX ORDER — ALLOPURINOL 300 MG
100 TABLET ORAL DAILY
Refills: 0 | Status: DISCONTINUED | OUTPATIENT
Start: 2024-01-31 | End: 2024-02-03

## 2024-01-30 RX ORDER — CEFTRIAXONE 500 MG/1
1000 INJECTION, POWDER, FOR SOLUTION INTRAMUSCULAR; INTRAVENOUS ONCE
Refills: 0 | Status: COMPLETED | OUTPATIENT
Start: 2024-01-30 | End: 2024-01-30

## 2024-01-30 RX ORDER — ALLOPURINOL 300 MG
0 TABLET ORAL
Qty: 0 | Refills: 0 | DISCHARGE

## 2024-01-30 RX ORDER — APIXABAN 2.5 MG/1
0 TABLET, FILM COATED ORAL
Qty: 0 | Refills: 2 | DISCHARGE

## 2024-01-30 RX ORDER — FUROSEMIDE 40 MG
20 TABLET ORAL ONCE
Refills: 0 | Status: COMPLETED | OUTPATIENT
Start: 2024-01-30 | End: 2024-01-30

## 2024-01-30 RX ORDER — AMLODIPINE BESYLATE 2.5 MG/1
5 TABLET ORAL DAILY
Refills: 0 | Status: DISCONTINUED | OUTPATIENT
Start: 2024-01-31 | End: 2024-02-01

## 2024-01-30 RX ORDER — SACUBITRIL AND VALSARTAN 24; 26 MG/1; MG/1
1 TABLET, FILM COATED ORAL
Refills: 0 | Status: DISCONTINUED | OUTPATIENT
Start: 2024-01-30 | End: 2024-02-01

## 2024-01-30 RX ADMIN — CEFTRIAXONE 100 MILLIGRAM(S): 500 INJECTION, POWDER, FOR SOLUTION INTRAMUSCULAR; INTRAVENOUS at 16:30

## 2024-01-30 RX ADMIN — CARVEDILOL PHOSPHATE 12.5 MILLIGRAM(S): 80 CAPSULE, EXTENDED RELEASE ORAL at 19:40

## 2024-01-30 RX ADMIN — Medication 5 MILLIGRAM(S): at 17:34

## 2024-01-30 RX ADMIN — APIXABAN 5 MILLIGRAM(S): 2.5 TABLET, FILM COATED ORAL at 21:46

## 2024-01-30 RX ADMIN — AZITHROMYCIN 255 MILLIGRAM(S): 500 TABLET, FILM COATED ORAL at 16:30

## 2024-01-30 RX ADMIN — Medication 20 MILLIGRAM(S): at 16:30

## 2024-01-30 RX ADMIN — SACUBITRIL AND VALSARTAN 1 TABLET(S): 24; 26 TABLET, FILM COATED ORAL at 21:46

## 2024-01-30 NOTE — H&P ADULT - PROBLEM SELECTOR PLAN 1
Reports 3 days of SOB w/ associated productive cough. States he has a chronic post nasal drip. Denies any sick contacts. RVP panel negative and CXR negative for infiltrates/consolidations.   Per PCP, patient has not had an echo in years, but has a history of aortic insufficiency.   S/p 20 mg IV Lasix with proper response   - F/u CXR   - F/u TTE complete   - Consider another dose of 20 mg IV Lasix is needed Reports 3 days of SOB w/ associated productive cough. States he has a chronic post nasal drip. Denies any sick contacts. RVP panel negative and CXR negative for infiltrates/consolidations. BNP 6921.  On exam patient has 2+ pitting edema b/l (R>L).  Per PCP, patient has not had an echo in years, but has a history of aortic insufficiency.   S/p 20 mg IV Lasix with increased urinary response   - F/u CXR   - F/u TTE complete   - F/u Bcx  - Consider another dose of 20 mg IV Lasix is needed

## 2024-01-30 NOTE — ED ADULT NURSE NOTE - NSFALLHARMRISKINTERV_ED_ALL_ED

## 2024-01-30 NOTE — ED PROVIDER NOTE - PHYSICAL EXAMINATION
T(C): 36.7 (01-30-24 @ 12:02), Max: 36.7 (01-30-24 @ 12:02)  HR: 82 (01-30-24 @ 12:02) (82 - 82)  BP: 171/88 (01-30-24 @ 12:02) (171/88 - 171/88)  RR: 20 (01-30-24 @ 12:02) (20 - 20)  SpO2: 97% (01-30-24 @ 12:02) (97% - 97%)    CONSTITUTIONAL: Well groomed, no apparent distress  ENMT:  moist membranes. Normal dentition; no pharyngeal injection or exudates  RESP: No respiratory distress, no use of accessory muscles; + crackles hear bilaterally   CV: RRR, +S1S2, no MRG; no JVD; bilteral +1 pitting edema noted on   GI: Soft, NT, ND,  PSYCH: Appropriate insight/judgment; A+O x 3, mood and affect appropriate, recent/remote memory intact T(C): 36.7 (01-30-24 @ 12:02), Max: 36.7 (01-30-24 @ 12:02)  HR: 82 (01-30-24 @ 12:02) (82 - 82)  BP: 171/88 (01-30-24 @ 12:02) (171/88 - 171/88)  RR: 20 (01-30-24 @ 12:02) (20 - 20)  SpO2: 97% (01-30-24 @ 12:02) (97% - 97%)    CONSTITUTIONAL: Well groomed, no apparent distress  ENMT:  moist membranes. Normal dentition; no pharyngeal injection or exudates  RESP: No respiratory distress, no use of accessory muscles; + crackles hear bilaterally   CV: RRR, +S1S2, no MRG; no JVD; bilteral +1 pitting edema noted on   GI: Soft, NT, ND,  PSYCH: Appropriate insight/judgment; A+O x 3, mood and affect appropriate, recent/remote memory intact    [place]pfish:   minimal bibasilar crackles, no JVd, good air entry b/l  1+ b/l Le pitting edema

## 2024-01-30 NOTE — PATIENT PROFILE ADULT - FUNCTIONAL ASSESSMENT - BASIC MOBILITY 6.
4-calculated by average/Not able to assess (calculate score using [facility] averaging method)  4-calculated by average/Not able to assess (calculate score using [facility] averaging method)

## 2024-01-30 NOTE — ED PROVIDER NOTE - PROGRESS NOTE DETAILS
[place]: Hgb 17.3, K hemolyzed (3.8 on VBG), trop 38, BNP 6921, other labs grossly wnl.   XR w/ mild pulm congestion, small L effusion, possible lower lobe infiltrate.  ddx: Likely CHF, possible PNA.   Will give lasix, abx, admit for further care. d/w Dr. Gonzalez, will admit to his service. Updated pt.

## 2024-01-30 NOTE — H&P ADULT - NSHPPHYSICALEXAM_GEN_ALL_CORE
General: in no acute distress, resting in bed comfortably, obese  Eyes: EOMI intact bilaterally. Anicteric sclerae, moist conjunctivae  HENT: Moist mucous membranes  Neck: Trachea midline, supple  Lungs: +crackles on auscultation b/l, diminished air sounds in lung bases, no wheezes  Cardiovascular: RRR. No murmurs, rubs, or gallops  Abdomen: Soft, non-tender non-distended; No rebound or guarding  Extremities: +1 pitting edema to shins b/l; WWP  MSK: No midline bony tenderness. No CVA tenderness bilaterally  Neurological: Alert and oriented x3  Skin: Warm and dry. No obvious rash General: in no acute distress, resting in bed comfortably, obese  Eyes: EOMI intact bilaterally. Anicteric sclerae, moist conjunctivae  HENT: Moist mucous membranes  Neck: Trachea midline, supple  Lungs: +crackles on auscultation b/l, diminished air sounds in lung bases, no wheezes  Cardiovascular: RRR. No murmurs, rubs, or gallops  Abdomen: Soft, non-tender non-distended; No rebound or guarding  Extremities: +2 pitting edema to shins b/l; WWP  MSK: No midline bony tenderness. No CVA tenderness bilaterally  Neurological: Alert and oriented x3  Skin: Warm and dry. No obvious rash

## 2024-01-30 NOTE — ED PROVIDER NOTE - ATTENDING CONTRIBUTION TO CARE
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 Also feels SAMUEL, no SOB at rest. Notes chronic unchanged b/l LE edema. No other systemic symptoms. Went to Dr. wiley who referred to ED.   Denies f/c, rhinorrhea, sore throat, body aches, lightheaded, CP, NVD, abd pain, urinary complaints, black/bloody stool, focal weakness/numbness.   Mild HTN, other vitals wnl. Exam as above.   ddx: possible CHF vs. atypical ACS vs. infectious.   Labs, CXR, reassess.

## 2024-01-30 NOTE — H&P ADULT - PROBLEM SELECTOR PLAN 7
Per patient, has hx of NIDDM.   - f/u A1c in AM F: As needed  E: Replete  N: Regular diet   DVT ppx: eliquis   Dipo: RMF

## 2024-01-30 NOTE — PATIENT PROFILE ADULT - DO YOU LACK THE NECESSARY SUPPORT TO HELP YOU COPE WITH LIFE CHALLENGES?
Detail Level: Simple Plan: Declines treatment today, suggested IPL in the future if patient wants to treat redness Detail Level: Zone Continue Regimen: Vanicream moisturizing cream Plan: May need to use topical steroid on affected areas yes no

## 2024-01-30 NOTE — H&P ADULT - NSICDXPASTMEDICALHX_GEN_ALL_CORE_FT
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

## 2024-01-30 NOTE — ED PROVIDER NOTE - NSICDXPASTSURGICALHX_GEN_ALL_CORE_FT
PAST SURGICAL HISTORY:  Bilateral cataracts     History of hip replacement, unspecified laterality

## 2024-01-30 NOTE — ED ADULT NURSE NOTE - OBJECTIVE STATEMENT
Pt is a 77y/o M presenting to the ED w/ PREARRIVAL note w/ c/o of SAMUEL xmultiple days. Pt denies SOB @ rest, CP, abd pain, N/V/D, fever/chills, numbness/tingling, headache, blurry vision, lightheadedness, dizziness, recent falls @ home. Pt w/ PMHx PEs (+ Eliquis), obesity (takes Ozempic), HTN (took meds today), CHF (takes spirolactone). Pt respirations easy, even and unlabored on RA. Bilat lung sounds clear to auscultation, pitting edema noted to BLE ("not more swollen than normal"). A/Ox3, speaking in clear/complete sentences. Pt placed on continuous cardiac monitor and pulse ox, saturations > 95%. Pt placed in gown, IV placed. Resting comfortably in stretcher.

## 2024-01-30 NOTE — H&P ADULT - PROBLEM SELECTOR PLAN 3
SBP >180 in the ED. S/p 20 mg IV Lasix with appropriate response and 5 mg IV Hydralazine.   Home meds: amlodipine 5 mg qd  - C/w home meds

## 2024-01-30 NOTE — ED ADULT TRIAGE NOTE - CHIEF COMPLAINT QUOTE
Patient sent to the ED by PCP for CHF exacerbation, stating he needs IV lasix for fluid overload. Endorses exertional dyspnea, denies shortness of breath/chest pain at rest. Speaking in complete sentences, AAOX4, NAD.

## 2024-01-30 NOTE — ED PROVIDER NOTE - CLINICAL SUMMARY MEDICAL DECISION MAKING FREE TEXT BOX
Patient is a 78 year old M with hx of hypertension, sleep apnea, obesity, NIDM, PE on Eliquis, HF presents with 3 days of SOB and cough, with no chest pain, fever, nausea, sweating or palpitations. Physical exam findings included crackles bilaterally and +1 pitting edema bilaterally. Patient is being worked up for HF exacerbation vs infection vs atypical ACS with tropes, BNP and chest x ray.

## 2024-01-30 NOTE — H&P ADULT - NSHPLABSRESULTS_GEN_ALL_CORE
.  LABS:                         17.3   9.53  )-----------( 297      ( 30 Jan 2024 12:15 )             51.6     01-30    141  |  106  |  19  ----------------------------<  86  4.2   |  22  |  1.22    Ca    8.8      30 Jan 2024 13:49  Phos  2.8     01-30  Mg     2.0     01-30        Urinalysis Basic - ( 30 Jan 2024 13:49 )    Color: x / Appearance: x / SG: x / pH: x  Gluc: 86 mg/dL / Ketone: x  / Bili: x / Urobili: x   Blood: x / Protein: x / Nitrite: x   Leuk Esterase: x / RBC: x / WBC x   Sq Epi: x / Non Sq Epi: x / Bacteria: x            Lactate, Blood: 1.1 mmol/L (01-30 @ 16:21)      RADIOLOGY, EKG & ADDITIONAL TESTS: Reviewed.

## 2024-01-30 NOTE — H&P ADULT - ASSESSMENT
Patient is a 78 year old M with hx of hypertension, sleep apnea, obesity, NIDM, aortic insufficiency, PE on Eliquis, and gout presenting with 3 days of worsening SOB with productive cough admitted for workup of HF vs ACS vs infectious etiology.  Patient is a 78 year old M with hx of hypertension, sleep apnea, obesity, aortic insufficiency, PE on Eliquis, and gout presenting with 3 days of worsening SOB with productive cough admitted for workup of HF vs ACS vs infectious etiology.

## 2024-01-30 NOTE — H&P ADULT - PROBLEM SELECTOR PLAN 6
Home meds: Contrave, Ozempic 2 mg/week, metformin 500 mg (2 in am, 1 in PM) Home meds: Contrave, Ozempic 2 mg/week, metformin 500 mg (2 in am, 1 in PM)  - Holding for now

## 2024-01-30 NOTE — ED PROVIDER NOTE - OBJECTIVE STATEMENT
Patient states that he first started feeling shortness of breath three days ago. Initially the patient noticed that he had a cough and yellow colored phlegm but now patient states those symptoms have resolved. Patient denies any recent sick contacts, salty meals, changes in diet. At baseline, patient is able to walk a block before feeling short of breath but in the last three days the patient has not been able to walk a couple steps. He denies any chest pain, abdominal pain, increasing abdominal swelling or leg edema in the last three days. He denies any chills, sweating, fever, nausea, palpitations.

## 2024-01-30 NOTE — ED ADULT NURSE NOTE - NSSEPSISNEWALTERMENTAL_ED_A_ED
Patient attempted to get up off of cart, was not able to explain why he tried to get up. Patient was found with legs on floor, kneeling down with arms and head resting on cart. Pt denies headache or hitting head. Patient was helped back onto cart and MD notified. Soft restraint order obtained   No

## 2024-01-30 NOTE — H&P ADULT - PROBLEM SELECTOR PLAN 2
Hx of aortic insufficiency and MR.   Patient states he takes Entresto BID, spironolactone 25 mg (M,W,F) and Coreg 12.5 mg BID for this diagnosis. States he has never had a diagnosis of HF.   - F/u TTE complete  - C/w home meds   - Collateral on dose of Entresto in the AM Hx of aortic insufficiency and MR.   Patient states he takes Entresto BID, coreg 12.5 mg BID spironolactone 25 mg (M,W,F) and Coreg 12.5 mg BID for this diagnosis. States he has never had a diagnosis of HF.   - F/u TTE complete  - C/w home meds

## 2024-01-30 NOTE — H&P ADULT - HISTORY OF PRESENT ILLNESS
Patient is a 78 year old M with hx of hypertension, sleep apnea, obesity, NIDM, aortic insufficiency, PE on Eliquis, and gout presenting with 3 days of worsening SOB with productive cough. States that he has been having difficulty getting around his kitchen d/t shortness of breath. Reports associated symptoms of cough d/t postnasal drip with yellow-clear mucous. States that his LE have always been swollen and does not think the swelling has worsened recently. Has never had a diagnosis of HF, but knows he has aortic insufficiency for which he takes medications for. 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     ED Course:   Vitals: T 98 HR 82 /88 RR 20 saturating 97% on RA  Labs: WBC 9.53 hgb 17.3 Na 137 K 4.2 Cr 1.20 Trop 38 BNP 6921 RVP negative  Imaging: CXR pending read  Interventions: Azithromycin 500 mg, CTX 1g, Lasix 20 mg, hydralazine 5 mg IVP Patient is a 78 year old M with hx of hypertension, sleep apnea, obesity, aortic insufficiency, PE on Eliquis, and gout presenting with 3 days of worsening SOB with productive cough. States that he has been having difficulty getting around his kitchen d/t shortness of breath. Reports associated symptoms of cough d/t postnasal drip with yellow-clear mucous. States that his LE have always been swollen and does not think the swelling has worsened recently. Has never had a diagnosis of HF, but knows he has aortic insufficiency for which he takes medications for. 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     ED Course:   Vitals: T 98 HR 82 /88 RR 20 saturating 97% on RA  Labs: WBC 9.53 hgb 17.3 Na 137 K 4.2 Cr 1.20 Trop 38 BNP 6921 RVP negative  Imaging: CXR pending read  Interventions: Azithromycin 500 mg, CTX 1g, Lasix 20 mg, hydralazine 5 mg IVP Patient is a 78 year old M with hx of hypertension, sleep apnea, obesity, aortic insufficiency, PE on Eliquis, and gout presenting with 3 days of worsening SOB with productive cough. States that he has been having difficulty getting around his kitchen d/t shortness of breath. Reports associated symptoms of cough d/t postnasal drip with yellow-clear mucous. States that his LLE have always been swollen since hurting his knee, but his RLE has become more swollen recently. Has never had a diagnosis of HF, but knows he has aortic insufficiency for which he takes medications for. 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     ED Course:   Vitals: T 98 HR 82 /88 RR 20 saturating 97% on RA  Labs: WBC 9.53 hgb 17.3 Na 137 K 4.2 Cr 1.20 Trop 38 BNP 6921 RVP negative  Imaging: CXR pending read  Interventions: Azithromycin 500 mg, CTX 1g, Lasix 20 mg, hydralazine 5 mg IVP

## 2024-01-30 NOTE — ED ADULT NURSE NOTE - NS TRANSFER PATIENT BELONGINGS
Pt states vaginal itching that started today. Pt stated diagnosed with chlamydia last time she felt like this. Pt states pale yellow vaginal discharge.   
Clothing

## 2024-01-30 NOTE — ED PROVIDER NOTE - NSICDXPASTMEDICALHX_GEN_ALL_CORE_FT
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

## 2024-01-30 NOTE — ED ADULT NURSE REASSESSMENT NOTE - NS ED NURSE REASSESS COMMENT FT1
Pt w/ elevated BP readings, Dr. Hunt made aware. Hydralazine to be ordered. Pt remains asymptomatic, no c/o of CP, headache, blurry vision. Pt remains on cardiac monitor and pulse ox. Pt awaiting admission. Pt w/ elevated BP readings post hydralazine admin. Admitting Medicine team aware. Pt remains asymptomatic, no c/o of CP, headache, blurry vision. Pt plan to go to 4 uris under med as planned s/o ANGELINA.

## 2024-01-31 ENCOUNTER — TRANSCRIPTION ENCOUNTER (OUTPATIENT)
Age: 79
End: 2024-01-31

## 2024-01-31 DIAGNOSIS — I34.0 NONRHEUMATIC MITRAL (VALVE) INSUFFICIENCY: ICD-10-CM

## 2024-01-31 DIAGNOSIS — I50.9 HEART FAILURE, UNSPECIFIED: ICD-10-CM

## 2024-01-31 DIAGNOSIS — I50.20 UNSPECIFIED SYSTOLIC (CONGESTIVE) HEART FAILURE: ICD-10-CM

## 2024-01-31 LAB
ALBUMIN SERPL ELPH-MCNC: 3 G/DL — LOW (ref 3.3–5)
ALP SERPL-CCNC: 69 U/L — SIGNIFICANT CHANGE UP (ref 40–120)
ALT FLD-CCNC: 6 U/L — LOW (ref 10–45)
ANION GAP SERPL CALC-SCNC: 11 MMOL/L — SIGNIFICANT CHANGE UP (ref 5–17)
AST SERPL-CCNC: 11 U/L — SIGNIFICANT CHANGE UP (ref 10–40)
BASOPHILS # BLD AUTO: 0.03 K/UL — SIGNIFICANT CHANGE UP (ref 0–0.2)
BASOPHILS NFR BLD AUTO: 0.4 % — SIGNIFICANT CHANGE UP (ref 0–2)
BILIRUB SERPL-MCNC: 1.2 MG/DL — SIGNIFICANT CHANGE UP (ref 0.2–1.2)
BUN SERPL-MCNC: 18 MG/DL — SIGNIFICANT CHANGE UP (ref 7–23)
CALCIUM SERPL-MCNC: 8.6 MG/DL — SIGNIFICANT CHANGE UP (ref 8.4–10.5)
CHLORIDE SERPL-SCNC: 104 MMOL/L — SIGNIFICANT CHANGE UP (ref 96–108)
CO2 SERPL-SCNC: 25 MMOL/L — SIGNIFICANT CHANGE UP (ref 22–31)
CREAT SERPL-MCNC: 1.22 MG/DL — SIGNIFICANT CHANGE UP (ref 0.5–1.3)
EGFR: 61 ML/MIN/1.73M2 — SIGNIFICANT CHANGE UP
EOSINOPHIL # BLD AUTO: 0.21 K/UL — SIGNIFICANT CHANGE UP (ref 0–0.5)
EOSINOPHIL NFR BLD AUTO: 2.9 % — SIGNIFICANT CHANGE UP (ref 0–6)
GLUCOSE SERPL-MCNC: 88 MG/DL — SIGNIFICANT CHANGE UP (ref 70–99)
HCT VFR BLD CALC: 47.6 % — SIGNIFICANT CHANGE UP (ref 39–50)
HCV AB S/CO SERPL IA: 0.03 S/CO — SIGNIFICANT CHANGE UP
HCV AB SERPL-IMP: SIGNIFICANT CHANGE UP
HGB BLD-MCNC: 15.8 G/DL — SIGNIFICANT CHANGE UP (ref 13–17)
IMM GRANULOCYTES NFR BLD AUTO: 0.3 % — SIGNIFICANT CHANGE UP (ref 0–0.9)
LYMPHOCYTES # BLD AUTO: 0.6 K/UL — LOW (ref 1–3.3)
LYMPHOCYTES # BLD AUTO: 8.4 % — LOW (ref 13–44)
MAGNESIUM SERPL-MCNC: 1.8 MG/DL — SIGNIFICANT CHANGE UP (ref 1.6–2.6)
MCHC RBC-ENTMCNC: 29.2 PG — SIGNIFICANT CHANGE UP (ref 27–34)
MCHC RBC-ENTMCNC: 33.2 GM/DL — SIGNIFICANT CHANGE UP (ref 32–36)
MCV RBC AUTO: 87.8 FL — SIGNIFICANT CHANGE UP (ref 80–100)
MONOCYTES # BLD AUTO: 0.55 K/UL — SIGNIFICANT CHANGE UP (ref 0–0.9)
MONOCYTES NFR BLD AUTO: 7.7 % — SIGNIFICANT CHANGE UP (ref 2–14)
NEUTROPHILS # BLD AUTO: 5.75 K/UL — SIGNIFICANT CHANGE UP (ref 1.8–7.4)
NEUTROPHILS NFR BLD AUTO: 80.3 % — HIGH (ref 43–77)
NRBC # BLD: 0 /100 WBCS — SIGNIFICANT CHANGE UP (ref 0–0)
PHOSPHATE SERPL-MCNC: 3.1 MG/DL — SIGNIFICANT CHANGE UP (ref 2.5–4.5)
PLATELET # BLD AUTO: 246 K/UL — SIGNIFICANT CHANGE UP (ref 150–400)
POTASSIUM SERPL-MCNC: 3.5 MMOL/L — SIGNIFICANT CHANGE UP (ref 3.5–5.3)
POTASSIUM SERPL-SCNC: 3.5 MMOL/L — SIGNIFICANT CHANGE UP (ref 3.5–5.3)
PROT SERPL-MCNC: 5.4 G/DL — LOW (ref 6–8.3)
RBC # BLD: 5.42 M/UL — SIGNIFICANT CHANGE UP (ref 4.2–5.8)
RBC # FLD: 15.9 % — HIGH (ref 10.3–14.5)
SODIUM SERPL-SCNC: 140 MMOL/L — SIGNIFICANT CHANGE UP (ref 135–145)
WBC # BLD: 7.16 K/UL — SIGNIFICANT CHANGE UP (ref 3.8–10.5)
WBC # FLD AUTO: 7.16 K/UL — SIGNIFICANT CHANGE UP (ref 3.8–10.5)

## 2024-01-31 PROCEDURE — 93306 TTE W/DOPPLER COMPLETE: CPT | Mod: 26

## 2024-01-31 PROCEDURE — 99222 1ST HOSP IP/OBS MODERATE 55: CPT

## 2024-01-31 PROCEDURE — 93010 ELECTROCARDIOGRAM REPORT: CPT

## 2024-01-31 PROCEDURE — 71045 X-RAY EXAM CHEST 1 VIEW: CPT | Mod: 26

## 2024-01-31 RX ORDER — LABETALOL HCL 100 MG
10 TABLET ORAL ONCE
Refills: 0 | Status: COMPLETED | OUTPATIENT
Start: 2024-01-31 | End: 2024-01-31

## 2024-01-31 RX ORDER — FUROSEMIDE 40 MG
20 TABLET ORAL ONCE
Refills: 0 | Status: COMPLETED | OUTPATIENT
Start: 2024-01-31 | End: 2024-01-31

## 2024-01-31 RX ORDER — AMLODIPINE BESYLATE 2.5 MG/1
5 TABLET ORAL ONCE
Refills: 0 | Status: COMPLETED | OUTPATIENT
Start: 2024-01-31 | End: 2024-01-31

## 2024-01-31 RX ADMIN — SACUBITRIL AND VALSARTAN 1 TABLET(S): 24; 26 TABLET, FILM COATED ORAL at 17:53

## 2024-01-31 RX ADMIN — SPIRONOLACTONE 25 MILLIGRAM(S): 25 TABLET, FILM COATED ORAL at 10:27

## 2024-01-31 RX ADMIN — Medication 20 MILLIGRAM(S): at 16:51

## 2024-01-31 RX ADMIN — APIXABAN 5 MILLIGRAM(S): 2.5 TABLET, FILM COATED ORAL at 10:27

## 2024-01-31 RX ADMIN — Medication 10 MILLIGRAM(S): at 23:19

## 2024-01-31 RX ADMIN — APIXABAN 5 MILLIGRAM(S): 2.5 TABLET, FILM COATED ORAL at 22:34

## 2024-01-31 RX ADMIN — CARVEDILOL PHOSPHATE 12.5 MILLIGRAM(S): 80 CAPSULE, EXTENDED RELEASE ORAL at 19:41

## 2024-01-31 RX ADMIN — SACUBITRIL AND VALSARTAN 1 TABLET(S): 24; 26 TABLET, FILM COATED ORAL at 05:49

## 2024-01-31 RX ADMIN — Medication 100 MILLIGRAM(S): at 12:35

## 2024-01-31 RX ADMIN — CARVEDILOL PHOSPHATE 12.5 MILLIGRAM(S): 80 CAPSULE, EXTENDED RELEASE ORAL at 05:49

## 2024-01-31 RX ADMIN — AMLODIPINE BESYLATE 5 MILLIGRAM(S): 2.5 TABLET ORAL at 23:19

## 2024-01-31 NOTE — DISCHARGE NOTE PROVIDER - NSDCFUSCHEDAPPT_GEN_ALL_CORE_FT
Sin Sepulveda  [facility] Physician [facility]  CTS[facility] [address]  Scheduled Appointment: 02/12/2024

## 2024-01-31 NOTE — PROGRESS NOTE ADULT - PROBLEM SELECTOR PLAN 2
TTE (1/31/24) w/ severe MR and mod-severe AI   - TTE as above  - Diuresis as above   - Structural consulted, follow up recs TTE (1/31/24) w/ severe MR and mod-severe AI w/ subcentimeter vegetation, pt afebrile   - TTE as above  - Blood Cx x2 (1/31/24) NGTD (x1 day)   - Diuresis as above   - Follow up daily CBCs   - FAREED once euvolemic   - Structural consulted, follow up recs TTE (1/31/24) w/ severe MR and mod-severe AI w/ subcentimeter vegetation, pt afebrile, no WBC elevation   - TTE as above  - Blood Cx x2 (1/31/24) NGTD (x1 day)   - Diuresis as above   - Follow up daily CBCs   - FAREED once euvolemic   - Structural heart following

## 2024-01-31 NOTE — PROGRESS NOTE ADULT - SUBJECTIVE AND OBJECTIVE BOX
--------INCOMPLETE NOTE--------  OVERNIGHT EVENTS: CPAP o/n.     SUBJECTIVE  Pt was seen and examined at bedside. Pt reports no active concerns. Speaks in full sentences on RA.     Patient denies any fevers/ chills, n/v, headache, acute SOB, chest pain, abdominal pain, genitourinary sx    12-point review of systems otherwise negative      Vital Signs Last 24 Hrs  T(C): 36.6 (31 Jan 2024 16:15), Max: 37.3 (31 Jan 2024 10:21)  T(F): 97.9 (31 Jan 2024 16:15), Max: 99.2 (31 Jan 2024 10:21)  HR: 80 (31 Jan 2024 16:15) (72 - 80)  BP: 172/73 (31 Jan 2024 16:15) (157/74 - 186/81)  BP(mean): --  RR: 18 (31 Jan 2024 16:15) (18 - 19)  SpO2: 94% (31 Jan 2024 16:15) (93% - 94%)    Parameters below as of 31 Jan 2024 16:15  Patient On (Oxygen Delivery Method): room air          PHYSICAL EXAM     General: NAD on RA, obese appearing. Speaks in full sentences  HEENT: Neck supple MMM  Respiratory: b/l faint crackles up to middle lobe. b/l diminished air sounds b/l but more significant in lung bases. normal WOB  Cardiovascular: Regular rhythm/ rate, + S1/S2; no murmurs, rubs gallops   Gastrointestinal: Soft; NT, distended due to body habitus; +  bowel sounds   : no suprapubic tenderness  Vascular: extremities WWP; symmetric 3+ pitting edema b/l LE   Neurological: A&Ox3,       .  LABS:                         15.8   7.16  )-----------( 246      ( 31 Jan 2024 05:30 )             47.6     01-31    140  |  104  |  18  ----------------------------<  88  3.5   |  25  |  1.22    Ca    8.6      31 Jan 2024 05:30  Phos  3.1     01-31  Mg     1.8     01-31    TPro  5.4<L>  /  Alb  3.0<L>  /  TBili  1.2  /  DBili  x   /  AST  11  /  ALT  6<L>  /  AlkPhos  69  01-31      Urinalysis Basic - ( 31 Jan 2024 05:30 )    Color: x / Appearance: x / SG: x / pH: x  Gluc: 88 mg/dL / Ketone: x  / Bili: x / Urobili: x   Blood: x / Protein: x / Nitrite: x   Leuk Esterase: x / RBC: x / WBC x   Sq Epi: x / Non Sq Epi: x / Bacteria: x                RADIOLOGY, EKG & ADDITIONAL TESTS: Reviewed.  OVERNIGHT EVENTS: CPAP o/n.     SUBJECTIVE  Pt was seen and examined at bedside. Pt reports no active concerns. Speaks in full sentences on RA.     Patient denies any fevers/ chills, n/v, headache, acute SOB, chest pain, abdominal pain, genitourinary sx    12-point review of systems otherwise negative      Vital Signs Last 24 Hrs  T(C): 36.6 (31 Jan 2024 16:15), Max: 37.3 (31 Jan 2024 10:21)  T(F): 97.9 (31 Jan 2024 16:15), Max: 99.2 (31 Jan 2024 10:21)  HR: 80 (31 Jan 2024 16:15) (72 - 80)  BP: 172/73 (31 Jan 2024 16:15) (157/74 - 186/81)  BP(mean): --  RR: 18 (31 Jan 2024 16:15) (18 - 19)  SpO2: 94% (31 Jan 2024 16:15) (93% - 94%)    Parameters below as of 31 Jan 2024 16:15  Patient On (Oxygen Delivery Method): room air          PHYSICAL EXAM     General: NAD on RA, obese appearing. Speaks in full sentences  HEENT: Neck supple MMM  Respiratory: b/l faint crackles up to middle lobe. b/l diminished air sounds b/l but more significant in lung bases. normal WOB  Cardiovascular: Regular rhythm/ rate, + S1/S2; no murmurs, rubs gallops   Gastrointestinal: Soft; NT, distended due to body habitus; +  bowel sounds   : no suprapubic tenderness  Vascular: extremities WWP; symmetric 3+ pitting edema b/l LE   Neurological: A&Ox3,       .  LABS:                         15.8   7.16  )-----------( 246      ( 31 Jan 2024 05:30 )             47.6     01-31    140  |  104  |  18  ----------------------------<  88  3.5   |  25  |  1.22    Ca    8.6      31 Jan 2024 05:30  Phos  3.1     01-31  Mg     1.8     01-31    TPro  5.4<L>  /  Alb  3.0<L>  /  TBili  1.2  /  DBili  x   /  AST  11  /  ALT  6<L>  /  AlkPhos  69  01-31      Urinalysis Basic - ( 31 Jan 2024 05:30 )    Color: x / Appearance: x / SG: x / pH: x  Gluc: 88 mg/dL / Ketone: x  / Bili: x / Urobili: x   Blood: x / Protein: x / Nitrite: x   Leuk Esterase: x / RBC: x / WBC x   Sq Epi: x / Non Sq Epi: x / Bacteria: x              < from: TTE Echo Complete w/o Contrast w/ Doppler (01.31.24 @ 08:42) >  CONCLUSIONS:     1. Mildly reduced left ventricular systolic function.   2. Dilated left ventriclular size.   3. Normal right ventricular size and systolic function.   4. Severely dilated left atrium.   5. A subcentimeter mobile echodensity is noted on the ventricular aspect   of the aortic valve. Differential includes fibrinous strand versus a   small vegetation. Clinical correlation is recommended.   6. Moderate-to-severe aortic regurgitation. The jet is highly eccentric   and posteriorly directed, which may lend to underestimation of severity.   7. Severe mitral regurgitation. The jet is highly eccentric.   8. No evidence of pulmonary hypertension.   9. Trivial pericardial effusion.  10. Left pleural effusion.  11. 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  12. The proximal ascending aorta is dilated measuring 4.50 cm.  13. Compared to the previous TTE performed on 11/7/2017, ventricular   function is lower and degree of mitral and aortic regurgitation has   progressed.  14. Consider FAREED to better visualize the mitral and aortic valve and   elucidate the mechanism of regurgitation, if clinically indicated.      < end of copied text >   HOSPITAL COURSE: 78M with hx of HTN, ADAM, aortic insufficiency, PE on Eliquis, and gout presenting with 3 days of worsening SOB with productive cough, admitted for cardio workup of likely HF exacerbation vs less likely ACS or infectious etiology. Pt has no official Dx of HF but takes GDMT for AI. TTE 1/31 shows EF 46% w/ moderate-severe AI and severe MR. Structural heart consulted and recommend transfer to cardiac telemetry w/ new TTE finding.       OVERNIGHT EVENTS: CPAP o/n.     SUBJECTIVE  Pt was seen and examined at bedside. Pt reports no active concerns. Speaks in full sentences on RA.     Patient denies any fevers/ chills, n/v, headache, acute SOB, chest pain, abdominal pain, genitourinary sx    12-point review of systems otherwise negative      Vital Signs Last 24 Hrs  T(C): 36.6 (31 Jan 2024 16:15), Max: 37.3 (31 Jan 2024 10:21)  T(F): 97.9 (31 Jan 2024 16:15), Max: 99.2 (31 Jan 2024 10:21)  HR: 80 (31 Jan 2024 16:15) (72 - 80)  BP: 172/73 (31 Jan 2024 16:15) (157/74 - 186/81)  BP(mean): --  RR: 18 (31 Jan 2024 16:15) (18 - 19)  SpO2: 94% (31 Jan 2024 16:15) (93% - 94%)    Parameters below as of 31 Jan 2024 16:15  Patient On (Oxygen Delivery Method): room air          PHYSICAL EXAM     General: NAD on RA, obese appearing. Speaks in full sentences  HEENT: Neck supple MMM  Respiratory: b/l faint crackles up to middle lobe. b/l diminished air sounds b/l but more significant in lung bases. normal WOB  Cardiovascular: Regular rhythm/ rate, + S1/S2; no murmurs, rubs gallops   Gastrointestinal: Soft; NT, distended due to body habitus; +  bowel sounds   : no suprapubic tenderness  Vascular: extremities WWP; symmetric 3+ pitting edema b/l LE   Neurological: A&Ox3,       .  LABS:                         15.8   7.16  )-----------( 246      ( 31 Jan 2024 05:30 )             47.6     01-31    140  |  104  |  18  ----------------------------<  88  3.5   |  25  |  1.22    Ca    8.6      31 Jan 2024 05:30  Phos  3.1     01-31  Mg     1.8     01-31    TPro  5.4<L>  /  Alb  3.0<L>  /  TBili  1.2  /  DBili  x   /  AST  11  /  ALT  6<L>  /  AlkPhos  69  01-31      Urinalysis Basic - ( 31 Jan 2024 05:30 )    Color: x / Appearance: x / SG: x / pH: x  Gluc: 88 mg/dL / Ketone: x  / Bili: x / Urobili: x   Blood: x / Protein: x / Nitrite: x   Leuk Esterase: x / RBC: x / WBC x   Sq Epi: x / Non Sq Epi: x / Bacteria: x              < from: TTE Echo Complete w/o Contrast w/ Doppler (01.31.24 @ 08:42) >  CONCLUSIONS:     1. Mildly reduced left ventricular systolic function.   2. Dilated left ventriclular size.   3. Normal right ventricular size and systolic function.   4. Severely dilated left atrium.   5. A subcentimeter mobile echodensity is noted on the ventricular aspect   of the aortic valve. Differential includes fibrinous strand versus a   small vegetation. Clinical correlation is recommended.   6. Moderate-to-severe aortic regurgitation. The jet is highly eccentric   and posteriorly directed, which may lend to underestimation of severity.   7. Severe mitral regurgitation. The jet is highly eccentric.   8. No evidence of pulmonary hypertension.   9. Trivial pericardial effusion.  10. Left pleural effusion.  11. 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  12. The proximal ascending aorta is dilated measuring 4.50 cm.  13. Compared to the previous TTE performed on 11/7/2017, ventricular   function is lower and degree of mitral and aortic regurgitation has   progressed.  14. Consider FAREED to better visualize the mitral and aortic valve and   elucidate the mechanism of regurgitation, if clinically indicated.      < end of copied text >   ***** TRANSFER FROM [facility] TO CARDIAC TELEMETRY *****  HOSPITAL COURSE: 78M with hx of HTN, ADAM, aortic insufficiency, PE on Eliquis, and gout presenting with 3 days of worsening SOB with productive cough, admitted for cardio workup of likely HF exacerbation vs less likely ACS or infectious etiology. Pt has no official Dx of HF but takes GDMT for AI. TTE 1/31 shows EF 46% w/ moderate-severe AI and severe MR. Structural heart consulted and recommend transfer to cardiac telemetry w/ new TTE finding.       OVERNIGHT EVENTS: CPAP o/n.     SUBJECTIVE  Pt was seen and examined at bedside. Pt reports no active concerns. Speaks in full sentences on RA.     Patient denies any fevers/ chills, n/v, headache, acute SOB, chest pain, abdominal pain, genitourinary sx    12-point review of systems otherwise negative      Vital Signs Last 24 Hrs  T(C): 36.6 (31 Jan 2024 16:15), Max: 37.3 (31 Jan 2024 10:21)  T(F): 97.9 (31 Jan 2024 16:15), Max: 99.2 (31 Jan 2024 10:21)  HR: 80 (31 Jan 2024 16:15) (72 - 80)  BP: 172/73 (31 Jan 2024 16:15) (157/74 - 186/81)  BP(mean): --  RR: 18 (31 Jan 2024 16:15) (18 - 19)  SpO2: 94% (31 Jan 2024 16:15) (93% - 94%)    Parameters below as of 31 Jan 2024 16:15  Patient On (Oxygen Delivery Method): room air          PHYSICAL EXAM     General: NAD on RA, obese appearing. Speaks in full sentences  HEENT: Neck supple MMM  Respiratory: b/l faint crackles up to middle lobe. b/l diminished air sounds b/l but more significant in lung bases. normal WOB  Cardiovascular: Regular rhythm/ rate, + S1/S2; no murmurs, rubs gallops   Gastrointestinal: Soft; NT, distended due to body habitus; +  bowel sounds   : no suprapubic tenderness  Vascular: extremities WWP; symmetric 3+ pitting edema b/l LE   Neurological: A&Ox3,       .  LABS:                         15.8   7.16  )-----------( 246      ( 31 Jan 2024 05:30 )             47.6     01-31    140  |  104  |  18  ----------------------------<  88  3.5   |  25  |  1.22    Ca    8.6      31 Jan 2024 05:30  Phos  3.1     01-31  Mg     1.8     01-31    TPro  5.4<L>  /  Alb  3.0<L>  /  TBili  1.2  /  DBili  x   /  AST  11  /  ALT  6<L>  /  AlkPhos  69  01-31      Urinalysis Basic - ( 31 Jan 2024 05:30 )    Color: x / Appearance: x / SG: x / pH: x  Gluc: 88 mg/dL / Ketone: x  / Bili: x / Urobili: x   Blood: x / Protein: x / Nitrite: x   Leuk Esterase: x / RBC: x / WBC x   Sq Epi: x / Non Sq Epi: x / Bacteria: x              < from: TTE Echo Complete w/o Contrast w/ Doppler (01.31.24 @ 08:42) >  CONCLUSIONS:     1. Mildly reduced left ventricular systolic function.   2. Dilated left ventriclular size.   3. Normal right ventricular size and systolic function.   4. Severely dilated left atrium.   5. A subcentimeter mobile echodensity is noted on the ventricular aspect   of the aortic valve. Differential includes fibrinous strand versus a   small vegetation. Clinical correlation is recommended.   6. Moderate-to-severe aortic regurgitation. The jet is highly eccentric   and posteriorly directed, which may lend to underestimation of severity.   7. Severe mitral regurgitation. The jet is highly eccentric.   8. No evidence of pulmonary hypertension.   9. Trivial pericardial effusion.  10. Left pleural effusion.  11. 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  12. The proximal ascending aorta is dilated measuring 4.50 cm.  13. Compared to the previous TTE performed on 11/7/2017, ventricular   function is lower and degree of mitral and aortic regurgitation has   progressed.  14. Consider FAREED to better visualize the mitral and aortic valve and   elucidate the mechanism of regurgitation, if clinically indicated.      < end of copied text >

## 2024-01-31 NOTE — PROGRESS NOTE ADULT - PROBLEM SELECTOR PLAN 3
SBP ______  - Continue: Amlodipine 5 mg QD, Entresto 49mg-51mg BID, Coreg 12.5 mg BID, Angelo 25 mg M/W/F SBP 150s-180s  - Continue: Amlodipine 5 mg QD, Entresto 49mg-51mg BID, Coreg 12.5 mg BID, [place] 25 mg M/W/F

## 2024-01-31 NOTE — CONSULT NOTE ADULT - SUBJECTIVE AND OBJECTIVE BOX
Surgeon: Dr. Sepulveda    Requesting Physician: Dr. Gonzalez    HISTORY OF PRESENT ILLNESS:  Patient is a 78 year old M with hx of hypertension, sleep apnea, obesity, aortic insufficiency, PE on Eliquis, and gout presenting with 3 days of worsening SOB with productive cough. States that he has been having difficulty getting around his kitchen d/t shortness of breath. Reports associated symptoms of cough d/t postnasal drip with yellow-clear mucous. States that his LLE have always been swollen since hurting his knee, but his RLE has become more swollen recently. Has never had a diagnosis of HF, but knows he has aortic insufficiency for which he takes medications for. On 1/31/24, patient underwent a TTE which demonstrated moderate-severe AI and severe MR. Structural heart was consulted for potential intervention.     At time of evaluation, patient denies any shortness of breath. Reports that he did have some shortness of breath prior but has had none today. Has been getting Lasix Able to ambulate to and from bathroom without shortness of breath.     PAST MEDICAL & SURGICAL HISTORY:  HTN (hypertension)      Pericarditis      Obesity, unspecified classification, unspecified obesity type, unspecified whether serious comorbidity present      ADAM (obstructive sleep apnea)      Chronic deep vein thrombosis (DVT) of non-extremity vein      Other chronic pulmonary embolism without acute cor pulmonale      Essential hypertension      Gout      Aortic regurgitation      History of aortic insufficiency      History of hip replacement, unspecified laterality      Bilateral cataracts          MEDICATIONS  (STANDING):  allopurinol 100 milliGRAM(s) Oral daily  amLODIPine   Tablet 5 milliGRAM(s) Oral daily  apixaban 5 milliGRAM(s) Oral every 12 hours  carvedilol 12.5 milliGRAM(s) Oral every 12 hours  influenza  Vaccine (HIGH DOSE) 0.7 milliLiter(s) IntraMuscular once  sacubitril 49 mG/valsartan 51 mG 1 Tablet(s) Oral two times a day  spironolactone 25 milliGRAM(s) Oral <User Schedule>    MEDICATIONS  (PRN):      Allergies    No Known Allergies    Intolerances        SOCIAL HISTORY:  Smoker:  No   ETOH use:  NO   Ilicit Drug use: NO    Occupation: Part time Dentist   Assisted device use: [employer]  Live with: Wife    FAMILY HISTORY:  No pertinent family history in first degree relatives        Review of Systems:  CONSTITUTIONAL: Denies fevers / chills, sweats, fatigue, weight loss, weight gain                                       NEURO:  Denies parathesias, seizures, syncope, confusion                                                                                  EYES:  Denies blurry vision, discharge, pain, loss of vision                                                                                    ENMT:  Denies difficulty hearing, vertigo, dysphagia, epistaxis, recent dental work                                       CV:  + SOB, SAMUEL. Denies chest pain, palpitations, SAMUEL, orthopnea                                                                                           RESPIRATORY:  Denies Wheezing, SOB, cough / sputum, hemoptysis                                                               GI:  Denies nausea, vomiting, diarrhea, constipation, melena                                                                          : Denies hematuria, dysuria, urgency, incontinence                                                                                          MUSKULOSKELETAL:  Denies arthritis, joint swelling, muscle weakness                                                             SKIN/BREAST:  Denies rash, itching, hair loss, masses                                                                                              PSYCH:  Denies depression, anxiety, suicidal ideation                                                                                                HEME/LYMPH:  Denies bruises easily, enlarged lymph nodes, tender lymph nodes                                          ENDOCRINE:  Denies cold intolerance, heat intolerance, polydipsia                                                                      Vital Signs Last 24 Hrs  T(C): 37.3 (31 Jan 2024 10:21), Max: 37.3 (31 Jan 2024 10:21)  T(F): 99.2 (31 Jan 2024 10:21), Max: 99.2 (31 Jan 2024 10:21)  HR: 74 (31 Jan 2024 10:21) (71 - 82)  BP: 177/93 (31 Jan 2024 10:21) (157/74 - 195/93)  BP(mean): --  RR: 19 (31 Jan 2024 10:21) (18 - 20)  SpO2: 94% (31 Jan 2024 10:21) (93% - 97%)    Parameters below as of 31 Jan 2024 10:21  Patient On (Oxygen Delivery Method): room air        Physical Exam  General: Sitting in bed comfortably in NAD  Neuro: A&Ox3, no focal deficits   HEENT: NCAT, EOMI   Cardiac: Regular rate and rhythm, normal S1 and S2. No m/r/g   Pulm: Breathing comfortably on RA. No signs of respiratory distress. Lungs are CTA b/l without wheezes, rales, or rhonchi   Abdomen: Soft, non-distended, non-tender.   : No handley  Extremities: Warm and well perfused. 1+ peripheral edema appreciated. distal pulses 2+. No calf tenderness. SCDs and TEDs in place  MSK: Full AROM                                                             LABS:                        15.8   7.16  )-----------( 246      ( 31 Jan 2024 05:30 )             47.6     01-31    140  |  104  |  18  ----------------------------<  88  3.5   |  25  |  1.22    Ca    8.6      31 Jan 2024 05:30  Phos  3.1     01-31  Mg     1.8     01-31    TPro  5.4<L>  /  Alb  3.0<L>  /  TBili  1.2  /  DBili  x   /  AST  11  /  ALT  6<L>  /  AlkPhos  69  01-31      Urinalysis Basic - ( 31 Jan 2024 05:30 )    Color: x / Appearance: x / SG: x / pH: x  Gluc: 88 mg/dL / Ketone: x  / Bili: x / Urobili: x   Blood: x / Protein: x / Nitrite: x   Leuk Esterase: x / RBC: x / WBC x   Sq Epi: x / Non Sq Epi: x / Bacteria: x              RADIOLOGY & ADDITIONAL STUDIES:  < from: TTE Echo Complete w/o Contrast w/ Doppler (01.31.24 @ 08:42) >  CONCLUSIONS:     1. Mildly reduced left ventricular systolic function.   2. Dilated left ventriclular size.   3. Normal right ventricular size and systolic function.   4. Severely dilated left atrium.   5. A subcentimeter mobile echodensity is noted on the ventricular aspect   of the aortic valve. Differential includes fibrinous strand versus a   small vegetation. Clinical correlation is recommended.   6. Moderate-to-severe aortic regurgitation. The jet is highly eccentric   and posteriorly directed, which may lend to underestimation of severity.   7. Severe mitral regurgitation. The jet is highly eccentric.   8. No evidence of pulmonary hypertension.   9. Trivial pericardial effusion.  10. Left pleural effusion.  11. 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  12. The proximal ascending aorta is dilated measuring 4.50 cm.  13. Compared to the previous TTE performed on 11/7/2017, ventricular   function is lower and degree of mitral and aortic regurgitation has   progressed.  14. Consider FAREED to better visualize the mitral and aortic valve and   elucidate the mechanism of regurgitation, if clinically indicated.

## 2024-01-31 NOTE — PROGRESS NOTE ADULT - PROBLEM SELECTOR PLAN 3
SBP >180 in the ED. S/p 20 mg IV Lasix with appropriate response and 5 mg IV Hydralazine.   Home meds: amlodipine 5 mg qd  - C/w home meds SBP >180 in the ED. S/p 20 mg IV Lasix ,  IV Hydralazine.     - continue home meds amlodipine 5 mg qd  - continue GDMT meds as above  - Lasix IVP 20 PRN, uptitrate PRN

## 2024-01-31 NOTE — PROGRESS NOTE ADULT - PROBLEM SELECTOR PLAN 1
P/w SAMUEL and SOB x3 days w/ productive cough, currently _____ volemic, WWP throughout___, satting ____ on room air   BNP 6921, Trops _____, CXR (____) no acute disease, RVP negative   - TTE (1/31/24): EF 46%, mod-severe AI, severe MR   - Diuresis: s/p Lasix 20 mg IVPx2, initiated _______________   - GDMT: Coreg 12.5 mg BID, Entresto 49mg-51mg BID, yanira 25 mg M/W/F   - Core measures, strict I&Os, daily weights   - Structural heart following for severe MR P/w SAMUEL and SOB x3 days, asymptomatic currently, WWP throughout, satting 92% on room air   - BNP 6921, Trop 38 (1/30/24), CXR (1/31/24) w/ b/l pleural effusion and LLL opacity, RVP negative   - EKG (1/31/24): sinus rhythm w/ 1st deg AVB ( ms), LVH, Q waves V1 and V2 (unchanged compared to EKG 2017)  - TTE (1/31/24): EF 46%, mod-severe AI, severe MR   - Diuresis: s/p Lasix 20 mg IVPx2, initiated lasix 20 mg IVP BID (pt lasix naiive at home)  - GDMT: Coreg 12.5 mg BID, Entresto 49mg-51mg BID, yanira 25 mg M/W/F   - Core measures, strict I&Os, daily weights   - Structural heart following for severe MR P/w SAMUEL and SOB x3 days, asymptomatic currently, WWP throughout, satting 92% on room air   - BNP 6921, Trop 38 (1/30/24), CXR (1/31/24) w/ b/l pleural effusion and LLL opacity, RVP negative   - EKG (1/31/24): sinus rhythm w/ 1st deg AVB ( ms), LVH, Q waves V1 and V2 (unchanged compared to EKG 2017)  - TTE (1/31/24): EF 46%, mod-severe AI, severe MR   - Diuresis: s/p Lasix 20 mg IVPx3, initiated Lasix 20 mg IVP BID (pt lasix naiive)  - GDMT: Coreg 12.5 mg BID, Entresto 49mg-51mg BID, yanira 25 mg M/W/F   - Core measures, strict I&Os, daily weights

## 2024-01-31 NOTE — PROGRESS NOTE ADULT - ASSESSMENT
Patient is a 78 year old M with hx of hypertension, sleep apnea, obesity, aortic insufficiency, PE on Eliquis, and gout presenting with 3 days of worsening SOB with productive cough admitted for workup of HF vs ACS vs infectious etiology.  78M with hx of HTN, ADAM, aortic insufficiency, PE on Eliquis, and gout presenting with 3 days of worsening SOB with productive cough, admitted for cardio workup of likely HF exacerbation vs ACS vs infectious etiology.  78M with hx of HTN, ADAM, aortic insufficiency, PE on Eliquis, and gout presenting with 3 days of worsening SOB with productive cough, admitted for cardio workup of likely HF exacerbation vs less likely ACS or infectious etiology.

## 2024-01-31 NOTE — PROGRESS NOTE ADULT - PROBLEM SELECTOR PLAN 1
Reports 3 days of SOB w/ associated productive cough. States he has a chronic post nasal drip. Denies any sick contacts. RVP panel negative and CXR negative for infiltrates/consolidations. BNP 6921.  On exam patient has 2+ pitting edema b/l (R>L).  Per PCP, patient has not had an echo in years, but has a history of aortic insufficiency.   S/p 20 mg IV Lasix with increased urinary response   - F/u CXR   - F/u TTE complete   - F/u Bcx  - Consider another dose of 20 mg IV Lasix is needed POA w/ SAMUEL and progressive SOB x 3 days w/ associated productive cough. States he has a chronic post nasal drip. Denies any sick contacts. RVP panel negative and CXR negative for infiltrates/consolidations. BNP 6921.  On exam patient has 2+ pitting edema b/l (R>L).  Per PCP, patient has not had an echo in years, but has a history of aortic insufficiency.   S/p 20 mg IV Lasix with increased urinary response   - F/u CXR   - F/u TTE complete   - F/u Bcx  - Consider another dose of 20 mg IV Lasix is needed POA w/ SAMUEL and progressive SOB x 3 days w/ associated productive cough. Reports Hx of aortic insufficiency w/o prior HF dx or recent official TTE. BNP 6921.   Hypervolemic in exam. S/p 20 mg IV Lasix x 2.   Infectious causes unlikely w/ no sick contacts, neg RVP and CXR negative for infiltrates/consolidations.   TTE 1/31: EF 46%, severe MR/ AI    - structural heart consulted, will appreciate recs.   - f/u BCx  - Lasix 20 IV PRN POA w/ SAMUEL and progressive SOB x 3 days w/ associated productive cough. Reports Hx of aortic insufficiency w/o prior HF dx or recent official TTE. BNP 6921.   Hypervolemic in exam. S/p 20 mg IV Lasix x 2.   Infectious causes unlikely w/ no sick contacts, neg RVP and CXR negative for infiltrates/consolidations.   TTE 1/31: EF 46%, moderate-severe AI and severe MR.    - f/u BCx  - Lasix IVP 20 PRN, uptitrate PRN    Structural heart consulted, recs  - continue diuresis  - FAREED when patient euvolemic   - recommend transfer to telemetry unit in setting of severe MR and AI

## 2024-01-31 NOTE — DISCHARGE NOTE PROVIDER - CARE PROVIDERS DIRECT ADDRESSES
,DirectAddress_Unknown ,DirectAddress_Unknown,[email or web address] ,DirectAddress_Unknown,[email or web address]

## 2024-01-31 NOTE — DISCHARGE NOTE PROVIDER - NSDCFUADDAPPT_GEN_ALL_CORE_FT
Please follow up with your cardiologist Dr. Ramirez on 2/13/24 at 9:30 AM.    Please follow up with Dr. Sepulveda on 2/13/24 at 10:30 AM, located at:   [address] [facility], 4th Floor  [phone] Please follow up with your cardiologist Dr. Ramirez on 2/20/24 at 9:20 AM.    Please follow up with Dr. Sepluveda on 2/13/24 at 10:30 AM, located at:   [address] [facility], 4th Floor  [phone] Please follow up with your cardiologist Dr. Ramirez on 2/20/24 at 9:20 AM.    Please follow up with Dr. Sepulveda next week. They will call you with an appointment on Monday. He is located at:   [address] [place] [address], 4th Floor  [phone] Please bring your Insurance card, Photo ID and Discharge paperwork to the following appointment:    (1) Please follow up with your Primary Care Provider, Dr. Kurt Gonzalez at [address] [place] [address] on 2/9/2024 at 12:30pm.    Appointment was scheduled by Ms. LONNY Rios, Referral Coordinator.          Please follow up with your cardiologist Dr. Ramirez on 2/20/24 at 9:20 AM.    Please follow up with Dr. Sepulveda next week. They will call you with an appointment on Monday. He is located at:   [address] [place] [address], 4th Floor  [phone]

## 2024-01-31 NOTE — PROGRESS NOTE ADULT - PROBLEM SELECTOR PLAN 2
Hx of aortic insufficiency and MR.   Patient states he takes Entresto BID, coreg 12.5 mg BID spironolactone 25 mg (M,W,F) and Coreg 12.5 mg BID for this diagnosis. States he has never had a diagnosis of HF.   - F/u TTE complete  - C/w home meds Hx of aortic insufficiency and MR.     - continue home med Entresto BID, coreg 12.5 mg BID spironolactone 25 mg (M,W,F)

## 2024-01-31 NOTE — PROGRESS NOTE ADULT - PROBLEM SELECTOR PLAN 5
- Continue: Allopurinol 100 mg QD     DVT ppx: Eliquis   F: tolerating PO intake   E: Keep K > 4, Mg > 2  N: DASH/TLC w/ CC, 1.2 L fluid restriction     Code: Full  Dispo: pending clinical progression - Continue: Allopurinol 100 mg QD     #?Hx Lupus  Pt denies, does not follow with rheumatologist, was dx with Lupus previous admission 2017, denies confirmed dx of lupus and does not take plaquenil     DVT ppx: Eliquis   F: tolerating PO intake   E: Keep K > 4, Mg > 2  N: DASH/TLC w/ CC, 1.2 L fluid restriction     Code: Full  Dispo: pending clinical progression - Continue: Allopurinol 100 mg QD     #?Hx Lupus  Pt denies, does not follow with rheumatologist, was dx with Lupus previous admission 2017, denies confirmed dx of lupus and does not take Plaquenil     DVT ppx: Eliquis   F: tolerating PO intake   E: Keep K > 4, Mg > 2  N: DASH/TLC w/ CC, 1.2 L fluid restriction     Code: Full  Dispo: pending clinical progression

## 2024-01-31 NOTE — DISCHARGE NOTE PROVIDER - NSDCCPCAREPLAN_GEN_ALL_CORE_FT
PRINCIPAL DISCHARGE DIAGNOSIS  Diagnosis: CHF exacerbation  Assessment and Plan of Treatment: -Heart failure is a condition that occurs when the heart cannot pump blood as well as it should; this leads to inadequate blood flow to vital organs such as the kidneys and congestion (buildup of fluid) in other vital organs such as the lungs.  This can lead to symptoms such as swelling, trouble breathing, and feeling tired.   -You have a history of weakened heart muscle called systolic congestive heart failure. When the heart pumps, it doesn't squeeze normally.  Your Ejection Fraction (EF) is 45% a normal EF is 55-60%.   -Please continue torsemide 20mg and spironolactone 25mg daily to prevent fluid build up in the body.  -Avoid drinking more than 1.5L of fluid daily and maintain a low salt diet (max 2grams daily).  -Please weigh yourself daily, for any significant increases in daily weight of 2lbs/day or 5lbs/week with associated swelling in the legs or abdomen and/or shortness of breath, please call your doctor or go to the emergency room.  -Please make sure you follow up with your cardiologist Dr. Ramirez on 2/20/24 at 9:20 AM.         SECONDARY DISCHARGE DIAGNOSES  Diagnosis: Pneumonia  Assessment and Plan of Treatment:     Diagnosis: Mitral regurgitation  Assessment and Plan of Treatment: You have a known history of mitral regurgitation. Mitral regurgitation is a condition in which one of the valves in the heart, called the mitral valve, leaks. When the heart valves are working normally, they keep blood flowing in only 1 direction. The valves work like swinging doors that open only 1 way – letting blood out, but not back in. Normally, little or no blood is able to leak backward. But if the valves are not working properly, more blood can go back in the direction it came from. This can cause problems.  Please follow up with CTS Dr. Sepulveda next week. Their office will call you on Monday with an appointment.       Diagnosis: HTN (hypertension)  Assessment and Plan of Treatment: Please continue Entresto  mg 2x/day, Coreg 25mg 2x/day, Amlodipine 10mg daily, Spironolactone 25mg daily as listed to keep your blood pressure controlled. For blood pressure that is too high or too low please see your doctor or go to the emergency room as necessary.    Diagnosis: HLD (hyperlipidemia)  Assessment and Plan of Treatment: Please continue atorvastatin 40mg at bedtime to keep your cholesterol low. High cholesterol contributes to heart disease.

## 2024-01-31 NOTE — DISCHARGE NOTE PROVIDER - HOSPITAL COURSE
#Discharge: do not delete    Hospital Course  Patient is __ yo M/F with past medical history of _____  Presented with _____, found to have _____    Problem List/Main Diagnoses:     New medications:     Labs to be followed outpatient:     Exam to be followed outpatient:    Physical Exam on Discharge  Weight ______ #Discharge: do not delete    Hospital Course  Patient is __ yo M/F with past medical history of _____  Presented with _____, found to have _____    Problem List/Main Diagnoses:     New medications:     Labs to be followed outpatient:     Exam to be followed outpatient:    Physical Exam on Discharge  Weight ______    Pt will follow up with  his cardiologist Dr. Ramirez on 2/13/24 at 9:30 AM. INCOMPLETE    Pt seen and examined at bedside this AM without any complaints or events overnight, VSS, labs and telemetry reviewed and pt stable for discharge as discussed with  ___. Pt has received appropriate discharge instructions, including medication regimen, access site management and follow up with Pt will follow up with  his cardiologist Dr. Ramirez on 2/13/24 at 9:30 AM.     Discharge medications:     Pt discharge copies detail cardiovascular history, medications, testing/treatments, OR patient has created a portal account and instructed to provide their records at their 1st appointment.       INCOMPLETE        Pt seen and examined at bedside this AM without any complaints or events overnight, VSS, labs and telemetry reviewed and pt stable for discharge as discussed with  ___. Pt has received appropriate discharge instructions, including medication regimen, access site management and follow up with Pt will follow up with  his cardiologist Dr. Ramirez on 2/20/24 at 9:20 AM. He will follow up with Dr. Sepulveda on _____.     Discharge medications: Entresto 97-103mg BID, Amlodipine 10mg QD, Coreg 25mg BID, Spironolactone 25mg M/W/F, Eliquis 5mg BID, Atorvastatin 40mg QD    Pt discharge copies detail cardiovascular history, medications, testing/treatments, OR patient has created a portal account and instructed to provide their records at their 1st appointment.       INCOMPLETE    77 yo M with PMHx of HTN, ADAM on CPAP, aortic insufficiency unprovoked PE/DVT (2015, on Eliquis), gout, renal tumor cryoablation (2014), A.flutter (on Eliquis), ?lupus (per last admission 2017, pt denies any hx of lupus/rheum/meds), who was initially admitted to medicine for SOB/LE edema, BNP 6921, found to have new severe MR and mod-severe AR on TTE w EF 46% and transferred to cardiac tele on 1/31/24 for further management of HF exacerbation and workup of vasculopathies.     Pt was diuresed to euvolemia with Lasix gtt, transitioned to Torsemide 20mg PO on discharge. Net neg ___ on discharge. Pt was optimized on GDMT with Entresto 97-102mg BID, Coreg 25 mg BID, [place] 25 mg QD.     Structural was consulted for new severe MR on TTE 1/31/24: Mildly reduced LVSF EF 46%, dialted LV, normal RV size/systolic fctn. Severely dialted LA. Subcentimeter mobile echodensity is noted on the ventricular aspect of the AV. Differential includes fibrinous strand vs a small vegetation. Clinical correlation is recommended. Mod-severe AR. The jet is highly eccentric and posteriorly directed. Severe MR, jet is highly eccentric. Trivial pericardial effusion. Left pleural effusion. Aortic root is dilated 4.50 cm. Proximal ascending aorta is dilated measuring 4.50 cm. BCx were drawn with NGTD x2 days. Pt was ordered for FAREED on 2/2/24 however echo lab full; discussed with structural and pt will follow up with Dr. Sepulveda next week to undergo FAREED outpt.      Pt seen and examined at bedside this AM without any complaints or events overnight, VSS, labs and telemetry reviewed and pt stable for discharge as discussed with  ___. Pt has received appropriate discharge instructions, including medication regimen, access site management and follow up with Pt will follow up with  his cardiologist Dr. Ramirez on 2/20/24 at 9:20 AM. He will follow up with Dr. Sepulveda next week - their office will call him with an appt for Tuesday or Wednesday.     Discharge medications: Entresto 97-103mg BID, Amlodipine 10mg QD, Coreg 25mg BID, Spironolactone 25mg QD, Eliquis 5mg BID, Atorvastatin 40mg QD    Pt discharge copies detail cardiovascular history, medications, testing/treatments, OR patient has created a portal account and instructed to provide their records at their 1st appointment.       77 y/o M with PMHx of HTN, ADAM on CPAP, aortic insufficiency unprovoked PE/DVT (2015, on Eliquis), gout, renal tumor cryoablation (2014), A.flutter (on Eliquis), ?lupus (per last admission 2017, pt denies any hx of lupus/rheum/meds), who was initially admitted to medicine for SOB/LE edema, BNP 6921, found to have new severe MR and mod-severe AR on TTE w EF 46% and transferred to cardiac tele on 1/31/24 for further management of HF exacerbation and workup of vasculopathies.     Pt was diuresed to euvolemia with Lasix gtt, transitioned to Torsemide 20mg PO on discharge. Net neg -3.2 L on discharge. Pt was optimized on GDMT with Entresto 97-102mg BID, Coreg 25 mg BID, [place] 25 mg QD.     Structural was consulted for new severe MR on TTE 1/31/24: Mildly reduced LVSF EF 46%, dialted LV, normal RV size/systolic fctn. Severely dilated LA. Subcentimeter mobile echodensity is noted on the ventricular aspect of the AV. Differential includes fibrinous strand vs a small vegetation. Clinical correlation is recommended. Mod-severe AR. The jet is highly eccentric and posteriorly directed. Severe MR, jet is highly eccentric. Trivial pericardial effusion. Left pleural effusion. Aortic root is dilated 4.50 cm. Proximal ascending aorta is dilated measuring 4.50 cm. BCx were drawn with NGTD x2 days. Pt was ordered for FAREED on 2/2/24 however echo lab full; discussed with structural and pt will follow up with Dr. Sepulveda next week to undergo FAREED outpt.      Pt seen and examined at bedside this AM without any complaints or events overnight, VSS, labs and telemetry reviewed and pt stable for discharge as discussed with Dr. De Souza. Pt has received appropriate discharge instructions, including medication regimen, access site management and follow up with Pt will follow up with  his cardiologist Dr. Ramirez on 2/20/24 at 9:20 AM. He will follow up with Dr. Sepulveda next week - their office will call him with an appt for Tuesday or Wednesday.     Discharge medications: Entresto 97-103mg BID, Amlodipine 10mg QD, Coreg 25mg BID, Spironolactone 25mg QD, Eliquis 5mg BID, Atorvastatin 40mg QD, Torsemide 20 mg QD    Pt discharge copies detail cardiovascular history, medications, testing/treatments, OR patient has created a portal account and instructed to provide their records at their 1st appointment.

## 2024-01-31 NOTE — DISCHARGE NOTE PROVIDER - NSDCMRMEDTOKEN_GEN_ALL_CORE_FT
allopurinol 100 mg oral tablet: 1 tab(s) orally once a day  AMLODIPINE 5MG TAB:   apixaban 5 mg oral tablet: 1 tab(s) orally every 12 hours  carvedilol 12.5 mg oral tablet: 1 tab(s) orally 2 times a day  Contrave 8 mg-90 mg oral tablet, extended release: 2 tab(s) orally 2 times a day  ENTRESTO 49 MG-51 MG TABLET: TAKE 1 TABLET BY MOUTH TWICE A DAY  metFORMIN 500 mg oral tablet: orally 3 times a day  Ozempic (1 mg dose) 2 mg/1.5 mL subcutaneous solution:   SPIRONOLACT 25MG TAB:    allopurinol 100 mg oral tablet: 1 tab(s) orally once a day  amLODIPine 10 mg oral tablet: 1 tab(s) orally once a day  apixaban 5 mg oral tablet: 1 tab(s) orally every 12 hours  atorvastatin 40 mg oral tablet: 1 tab(s) orally once a day (at bedtime)  carvedilol 12.5 mg oral tablet: 1 tab(s) orally 2 times a day  Contrave 8 mg-90 mg oral tablet, extended release: 2 tab(s) orally 2 times a day  Entresto 97 mg-103 mg oral tablet: 1 tab(s) orally 2 times a day  metFORMIN 500 mg oral tablet: orally 3 times a day  Ozempic (1 mg dose) 2 mg/1.5 mL subcutaneous solution:   spironolactone 25 mg oral tablet: 1 tab(s) orally once a day  torsemide 20 mg oral tablet: 1 tab(s) orally once a day

## 2024-01-31 NOTE — DISCHARGE NOTE PROVIDER - PROVIDER TOKENS
PROVIDER:[[id number]],FOLLOWUP:[2 weeks],ESTABLISHEDPATIENT:[T]] PROVIDER:[TOKEN:[[id number]],FOLLOWUP:[2 weeks],ESTABLISHEDPATIENT:[T]],PROVIDER:[[id number]],FOLLOWUP:[2 weeks]] PROVIDER:[TOKEN:[[id number]],FOLLOWUP:[2 weeks],ESTABLISHEDPATIENT:[T]],PROVIDER:[[id number]],SCHEDULEDAPPT:[02/13/2024],SCHEDULEDAPPTTIME:[09:30 AM]] PROVIDER:[TOKEN:[[id number]],FOLLOWUP:[2 weeks],ESTABLISHEDPATIENT:[T]],PROVIDER:[[id number]],SCHEDULEDAPPT:[02/13/2024],SCHEDULEDAPPTTIME:[09:30 AM]],PROVIDER:[[id number]],FOLLOWUP:[2 weeks]] PROVIDER:[TOKEN:[[id number]],FOLLOWUP:[2 weeks],ESTABLISHEDPATIENT:[T]],PROVIDER:[[id number]],SCHEDULEDAPPT:[02/20/2024],SCHEDULEDAPPTTIME:[09:20 AM]],PROVIDER:[[id number]],FOLLOWUP:[1 week]] PROVIDER:[TOKEN:[[id number]],SCHEDULEDAPPT:[02/09/2024],SCHEDULEDAPPTTIME:[12:30 PM],ESTABLISHEDPATIENT:[T]],PROVIDER:[[id number]],SCHEDULEDAPPT:[02/20/2024],SCHEDULEDAPPTTIME:[09:20 AM]],PROVIDER:[[id number]],FOLLOWUP:[1 week]]

## 2024-01-31 NOTE — CONSULT NOTE ADULT - ASSESSMENT
Patient is a 78 year old M with hx of hypertension, sleep apnea, obesity, aortic insufficiency, PE on Eliquis, and gout presenting with 3 days of worsening SOB with productive cough. States that he has been having difficulty getting around his kitchen d/t shortness of breath. Reports associated symptoms of cough d/t postnasal drip with yellow-clear mucous. States that his LLE have always been swollen since hurting his knee, but his RLE has become more swollen recently. Has never had a diagnosis of HF, but knows he has aortic insufficiency for which he takes medications for. On 1/31/24, patient underwent a TTE which demonstrated moderate-severe AI and severe MR. Structural heart was consulted for potential intervention.    Problem 1: AI, MR  - TTE on 1/31/24 demonstrated severe MR and mod-severe TR   - please obtain FAREED when patient euvolemic   - continue diuresis   - remainder of care per primary team     Problem 2: HTN   - takes amlodipne at home   - care per primary team     Problem 3: Hx of PE  - on Eliquis 5 mg BID   - remiander of care per primary team     Problem 4: Gout   - on allopurionol   - care per primary team     Final Recs pending discussion with attending physician  Patient is a 78 year old M with hx of hypertension, sleep apnea, obesity, aortic insufficiency, PE on Eliquis, and gout presenting with 3 days of worsening SOB with productive cough. States that he has been having difficulty getting around his kitchen d/t shortness of breath. Reports associated symptoms of cough d/t postnasal drip with yellow-clear mucous. States that his LLE have always been swollen since hurting his knee, but his RLE has become more swollen recently. Has never had a diagnosis of HF, but knows he has aortic insufficiency for which he takes medications for. On 1/31/24, patient underwent a TTE which demonstrated moderate-severe AI and severe MR. Structural heart was consulted for potential intervention.    Problem 1: AI, MR  - TTE on 1/31/24 demonstrated severe MR and mod-severe TR   - please obtain FAREED when patient euvolemic   - continue diuresis   - remainder of care per primary team     Problem 2: HTN   - takes amlodipne at home   - care per primary team     Problem 3: Hx of PE  - on Eliquis 5 mg BID   - remiander of care per primary team     Problem 4: Gout   - on allopurionol   - care per primary team     Case discussed with  *** who agrees with above plan     Consult PA Number: [phone]      Final Recs pending discussion with attending physician  Patient is a 78 year old M with hx of hypertension, sleep apnea, obesity, aortic insufficiency, PE on Eliquis, and gout presenting with 3 days of worsening SOB with productive cough. States that he has been having difficulty getting around his kitchen d/t shortness of breath. Reports associated symptoms of cough d/t postnasal drip with yellow-clear mucous. States that his LLE have always been swollen since hurting his knee, but his RLE has become more swollen recently. Has never had a diagnosis of HF, but knows he has aortic insufficiency for which he takes medications for. On 1/31/24, patient underwent a TTE which demonstrated moderate-severe AI and severe MR. Structural heart was consulted for potential intervention.    Problem 1: AI, MR  - TTE on 1/31/24 demonstrated severe MR and mod-severe TR   - please obtain FAREED when patient euvolemic   - recommend transfer to telemetry unit in setting of severe MR and AI   - continue diuresis   - remainder of care per primary team     Problem 2: HTN   - takes amlodipine at home   - care per primary team     Problem 3: Hx of PE  - on Eliquis 5 mg BID   - remiander of care per primary team     Problem 4: Gout   - on allopurionol   - care per primary team     Case discussed with Dr. Sepulveda who agrees with above plan     Consult PA Number: [phone]

## 2024-01-31 NOTE — PROGRESS NOTE ADULT - SUBJECTIVE AND OBJECTIVE BOX
****TRANSFER ACCEPTANCE NOTE: TRANSFER FROM MEDICINE F TO CARDIAC TELEMETRY***    HOSPITAL COURSE: 78M with hx of HTN, ADAM, aortic insufficiency, PE on Eliquis, and gout presenting with 3 days of worsening SOB with productive cough, admitted for cardio workup of likely HF exacerbation vs less likely ACS or infectious etiology. Pt has no official Dx of HF but takes GDMT for AI. TTE 1/31 shows EF 46% w/ moderate-severe AI and severe MR. Structural heart consulted and recommend transfer to cardiac telemetry w/ new TTE finding.     S: Pt seen and examined bedside.  Patient denies C/P, SOB, N/V, dizziness, palpitations, and diaphoresis.  Pt denies fever/chills, dysuria, abdominal pain, diarrhea, and cough  12 Point ROS otherwise negative except as per HPI/subjective.     O: Vital Signs Last 24 Hrs  T(C): 36.7 (31 Jan 2024 21:20), Max: 37.3 (31 Jan 2024 10:21)  T(F): 98 (31 Jan 2024 21:20), Max: 99.2 (31 Jan 2024 10:21)  HR: 84 (31 Jan 2024 21:20) (74 - 84)  BP: 174/76 (31 Jan 2024 21:20) (172/73 - 186/79)  BP(mean): --  RR: 18 (31 Jan 2024 21:20) (18 - 19)  SpO2: 92% (31 Jan 2024 21:20) (92% - 95%)    Parameters below as of 31 Jan 2024 21:20  Patient On (Oxygen Delivery Method): room air    PHYSICAL EXAM:  GEN: NAD  HEENT: No JVD  PULM:  CTA B/L  CARD:  RRR, S1 and S2   ABD: +BS, NT, soft/ND	  EXT: No Edema B/L LE  NEURO: A+Ox3, no focal deficit  PSYCH: Mood Appropriate    LABS:                        15.8   7.16  )-----------( 246      ( 31 Jan 2024 05:30 )             47.6     01-31    140  |  104  |  18  ----------------------------<  88  3.5   |  25  |  1.22    Ca    8.6      31 Jan 2024 05:30  Phos  3.1     01-31  Mg     1.8     01-31    TPro  5.4<L>  /  Alb  3.0<L>  /  TBili  1.2  /  DBili  x   /  AST  11  /  ALT  6<L>  /  AlkPhos  69  01-31         INCOMPLETE    ****TRANSFER ACCEPTANCE NOTE: TRANSFER FROM MEDICINE [facility] TO CARDIAC TELEMETRY***    HOSPITAL COURSE: 78M with hx of HTN, ADAM on CPAP, aortic insufficiency, PE on Eliquis, and gout presenting with 3 days of worsening SOB with productive cough, admitted for cardio workup of likely HF exacerbation vs less likely ACS or infectious etiology. TTE (1/31/24) shows EF 46% w/ moderate-severe AI and severe MR. Structural heart consulted and recommend transfer to cardiac telemetry for diuresis i/s/o acute, ?new-onset HFrEF with plan for AFREED once euvolemic.     _______States that his LLE have always been swollen since hurting his knee, but his RLE has become more swollen recently. Has never had a diagnosis of HF, but knows he has aortic insufficiency for which he takes medications for. ____________________    S: Pt seen and examined bedside.  Patient denies C/P, SOB, N/V, dizziness, palpitations, and diaphoresis.  Pt denies fever/chills, dysuria, abdominal pain, diarrhea, and cough  12 Point ROS otherwise negative except as per HPI/subjective.     O: Vital Signs Last 24 Hrs  T(C): 36.7 (31 Jan 2024 21:20), Max: 37.3 (31 Jan 2024 10:21)  T(F): 98 (31 Jan 2024 21:20), Max: 99.2 (31 Jan 2024 10:21)  HR: 84 (31 Jan 2024 21:20) (74 - 84)  BP: 174/76 (31 Jan 2024 21:20) (172/73 - 186/79)  BP(mean): --  RR: 18 (31 Jan 2024 21:20) (18 - 19)  SpO2: 92% (31 Jan 2024 21:20) (92% - 95%)    Parameters below as of 31 Jan 2024 21:20  Patient On (Oxygen Delivery Method): room air    PHYSICAL EXAM:  GEN: NAD  HEENT: No JVD  PULM:  CTA B/L  CARD:  RRR, S1 and S2   ABD: +BS, NT, soft/ND	  EXT: No Edema B/L LE  NEURO: A+Ox3, no focal deficit  PSYCH: Mood Appropriate    LABS:                        15.8   7.16  )-----------( 246      ( 31 Jan 2024 05:30 )             47.6     01-31    140  |  104  |  18  ----------------------------<  88  3.5   |  25  |  1.22    Ca    8.6      31 Jan 2024 05:30  Phos  3.1     01-31  Mg     1.8     01-31    TPro  5.4<L>  /  Alb  3.0<L>  /  TBili  1.2  /  DBili  x   /  AST  11  /  ALT  6<L>  /  AlkPhos  69  01-31         INCOMPLETE    ****TRANSFER ACCEPTANCE NOTE: TRANSFER FROM MEDICINE [facility] TO CARDIAC TELEMETRY***    HOSPITAL COURSE: 78M with hx of HTN, ADAM on CPAP, aortic insufficiency, PE on Eliquis, and gout presenting with 3 days of worsening SOB with productive cough, admitted for cardio workup of likely HF exacerbation vs less likely ACS or infectious etiology. TTE (1/31/24) shows EF 46% w/ moderate-severe AI and severe MR. Structural heart consulted and recommend transfer to cardiac telemetry for diuresis i/s/o acute, ?new-onset HFrEF with plan for FAREED once euvolemic.     _______States that his LLE have always been swollen since hurting his knee, but his RLE has become more swollen recently. Has never had a diagnosis of HF, but knows he has aortic insufficiency for which he takes medications for. ____________________    S: Pt seen and examined bedside.  Patient denies C/P, SOB, N/V, dizziness, palpitations, and diaphoresis.  Pt denies fever/chills, dysuria, abdominal pain, diarrhea, and cough  12 Point ROS otherwise negative except as per HPI/subjective.     O: Vital Signs Last 24 Hrs  T(C): 36.7 (31 Jan 2024 21:20), Max: 37.3 (31 Jan 2024 10:21)  T(F): 98 (31 Jan 2024 21:20), Max: 99.2 (31 Jan 2024 10:21)  HR: 84 (31 Jan 2024 21:20) (74 - 84)  BP: 174/76 (31 Jan 2024 21:20) (172/73 - 186/79)  BP(mean): --  RR: 18 (31 Jan 2024 21:20) (18 - 19)  SpO2: 92% (31 Jan 2024 21:20) (92% - 95%)    Parameters below as of 31 Jan 2024 21:20  Patient On (Oxygen Delivery Method): room air    PHYSICAL EXAM:  GEN: NAD  HEENT: + JVD  PULM:  right lung fields clear to auscultation, left lower lung field w/ crackles remaining lung fields clear to auscultation, no respiratory distress   CARD:  regular rate and rhythm, +II/VI systolic murmur   ABD: +BS, NT, soft, abdominal binder in place (hx abd hernia, soft, reducible, no erythema, nontender)	  EXT: 3+ pitting edema b/l LE   NEURO: A+Ox3, no focal deficit  PSYCH: Mood Appropriate    LABS:                        15.8   7.16  )-----------( 246      ( 31 Jan 2024 05:30 )             47.6     01-31    140  |  104  |  18  ----------------------------<  88  3.5   |  25  |  1.22    Ca    8.6      31 Jan 2024 05:30  Phos  3.1     01-31  Mg     1.8     01-31    TPro  5.4<L>  /  Alb  3.0<L>  /  TBili  1.2  /  DBili  x   /  AST  11  /  ALT  6<L>  /  AlkPhos  69  01-31         ****TRANSFER ACCEPTANCE NOTE: TRANSFER FROM MEDICINE [facility] TO CARDIAC TELEMETRY***    HOSPITAL COURSE: 78M with hx of HTN, ADAM on CPAP, aortic insufficiency, PE on Eliquis, and gout presenting with 3 days of worsening SOB with productive cough, admitted for cardio workup of likely HF exacerbation vs less likely ACS or infectious etiology. TTE (1/31/24) shows EF 46% w/ moderate-severe AI and severe MR. Structural heart consulted and recommend transfer to cardiac telemetry for diuresis i/s/o acute, ?new-onset HFrEF with plan for FAREED once euvolemic.     S: Pt seen and examined bedside. Pt states that he has noticed b/l LE swelling for two weeks. Last cardiology follow up was one year ago; he recalls that two valves were "leaky." Pt states cough has improved such that it is now intermittent and productive of clear sputum. Pt denies missed doses of medications.   Patient denies C/P, SOB, N/V, dizziness, palpitations, and diaphoresis.  Pt denies fever/chills, dysuria, abdominal pain, diarrhea, cough, orthopnea, paroxysmal nocturnal dyspnea, recent ill contacts, recent prolonged travel.   12 Point ROS otherwise negative except as per HPI/subjective.     O: Vital Signs Last 24 Hrs  T(C): 36.7 (31 Jan 2024 21:20), Max: 37.3 (31 Jan 2024 10:21)  T(F): 98 (31 Jan 2024 21:20), Max: 99.2 (31 Jan 2024 10:21)  HR: 84 (31 Jan 2024 21:20) (74 - 84)  BP: 174/76 (31 Jan 2024 21:20) (172/73 - 186/79)  BP(mean): --  RR: 18 (31 Jan 2024 21:20) (18 - 19)  SpO2: 92% (31 Jan 2024 21:20) (92% - 95%)    Parameters below as of 31 Jan 2024 21:20  Patient On (Oxygen Delivery Method): room air    PHYSICAL EXAM:  GEN: NAD  HEENT: + JVD  PULM:  right lung fields clear to auscultation, left lower lung field w/ crackles remaining lung fields clear to auscultation, no respiratory distress   CARD:  regular rate and rhythm, +II/VI systolic murmur   ABD: +BS, NT, soft, abdominal binder in place (hx abd hernia, soft, reducible, no erythema, nontender)	  EXT: 3+ pitting edema b/l LE   NEURO: A+Ox3, no focal deficit  PSYCH: Mood Appropriate    LABS:                        15.8   7.16  )-----------( 246      ( 31 Jan 2024 05:30 )             47.6     01-31    140  |  104  |  18  ----------------------------<  88  3.5   |  25  |  1.22    Ca    8.6      31 Jan 2024 05:30  Phos  3.1     01-31  Mg     1.8     01-31    TPro  5.4<L>  /  Alb  3.0<L>  /  TBili  1.2  /  DBili  x   /  AST  11  /  ALT  6<L>  /  AlkPhos  69  01-31         ****TRANSFER ACCEPTANCE NOTE: TRANSFER FROM MEDICINE F TO CARDIAC TELEMETRY***    HOSPITAL COURSE: 78-year-old male with PMHx of HTN, ADAM on CPAP, aortic insufficiency, unprovoked PE/DVT (2015, on Eliquis), gout, renal tumor cryoablation by IR (2014), Aflutter (on Eliquis), ?lupus (per last admission 2017; pt denies hx lupus/rheumatologist/meds), presented with 3 days of worsening SOB with productive cough, admitted for cardiac workup of likely HF exacerbation vs less likely ACS vs infectious etiology. TTE (1/31/24) shows EF 46% w/ moderate-severe AI and severe MR. Structural heart consulted and recommend transfer to cardiac telemetry for diuresis i/s/o acute HFrEF (newly reduced at 46%, TTE 2017 w/ EF 55-60%) with plan for FAREED once euvolemic.     S: Pt seen and examined bedside. Pt states that he has noticed b/l LE swelling for the last two weeks. Last cardiology follow up was one year ago; he recalls that two valves were "leaky" on last year's echo but does not recall reduced EF in the past. Pt states cough has improved such that it is now intermittent and productive of clear sputum. Pt denies missed doses of medications.   Patient denies C/P, SOB, N/V, dizziness, palpitations, and diaphoresis.  Pt denies fever/chills, dysuria, abdominal pain, diarrhea, cough, orthopnea, paroxysmal nocturnal dyspnea, recent ill contacts, recent prolonged travel.   12 Point ROS otherwise negative except as per HPI/subjective.     O: Vital Signs Last 24 Hrs  T(C): 36.7 (31 Jan 2024 21:20), Max: 37.3 (31 Jan 2024 10:21)  T(F): 98 (31 Jan 2024 21:20), Max: 99.2 (31 Jan 2024 10:21)  HR: 84 (31 Jan 2024 21:20) (74 - 84)  BP: 174/76 (31 Jan 2024 21:20) (172/73 - 186/79)  BP(mean): --  RR: 18 (31 Jan 2024 21:20) (18 - 19)  SpO2: 92% (31 Jan 2024 21:20) (92% - 95%)    Parameters below as of 31 Jan 2024 21:20  Patient On (Oxygen Delivery Method): room air    PHYSICAL EXAM:  GEN: NAD  HEENT: + JVD  PULM:  right lung fields clear to auscultation, left lower lung field w/ crackles remaining lung fields clear to auscultation, no respiratory distress   CARD:  regular rate and rhythm, +II/VI systolic murmur   ABD: +BS, NT, soft, abdominal binder in place (hx abd hernia, soft, reducible, no erythema, nontender)	  EXT: 3+ pitting edema b/l LE   NEURO: A+Ox3, no focal deficit  PSYCH: Mood Appropriate    LABS:                        15.8   7.16  )-----------( 246      ( 31 Jan 2024 05:30 )             47.6     01-31    140  |  104  |  18  ----------------------------<  88  3.5   |  25  |  1.22    Ca    8.6      31 Jan 2024 05:30  Phos  3.1     01-31  Mg     1.8     01-31    TPro  5.4<L>  /  Alb  3.0<L>  /  TBili  1.2  /  DBili  x   /  AST  11  /  ALT  6<L>  /  AlkPhos  69  01-31

## 2024-01-31 NOTE — PROGRESS NOTE ADULT - PROBLEM SELECTOR PLAN 4
Hx of PE _____  - Continue: Eliquis 5 mg BID Hx of PE and Aflutter in past, currently NSR   - Continue: Eliquis 5 mg BID    #Hx AFlutter   Per EKG 2017, pt asymptomatic, HR 70s-80s, currently in NSR  - Anticoagulation: Eliquis 5 mg BID  - Rate-control: Coreg 12.5 mg BID Hx unprovoked PE/DVT 2015   - Continue: Eliquis 5 mg BID    #Hx AFlutter   Hx Aflutter as seen on EKG 2017, pt asymptomatic, HR 70s-80s, currently in NSR  - Anticoagulation: Eliquis 5 mg BID  - Rate-control: Coreg 12.5 mg BID

## 2024-01-31 NOTE — PROGRESS NOTE ADULT - PROBLEM SELECTOR PLAN 6
Home meds: Contrave, Ozempic 2 mg/week, metformin 500 mg (2 in am, 1 in PM)  - Holding for now glucose level WNL.     - Holding Home meds: Contrave, Ozempic 2 mg/week, metformin 500 mg (2 in am, 1 in PM)

## 2024-01-31 NOTE — DISCHARGE NOTE PROVIDER - CARE PROVIDER_API CALL
Kurt Gonzalez  Internal Medicine  [address] [place] [address]  Phone: [phone]  Fax: [phone]  Established Patient  Follow Up Time: 2 weeks   Kurt Gonzalez  Internal Medicine  [address] [place] [address]  Phone: [phone]  Fax: [phone]  Established Patient  Follow Up Time: 2 weeks    Jay Ramirez  Cardiovascular Disease  [address] [place] [address], [address] [place] [address]  Phone: [phone]  Fax: [phone]  Follow Up Time: 2 weeks   Kurt Gonzalez  Internal Medicine  [address] [address]  Phone: [phone]  Fax: [phone]  Established Patient  Follow Up Time: 2 weeks    Jay Ramirez  Cardiovascular Disease  [address] [place] [address], [address] [place] [address]  Phone: [phone]  Fax: [phone]  Scheduled Appointment: 02/13/2024 09:30 AM   Kurt Gonzalez  Internal Medicine  [address] [address]  Phone: [phone]  Fax: [phone]  Established Patient  Follow Up Time: 2 weeks    Jay Ramirez  Cardiovascular Disease  [address] [place] [address], [address] [place] [address]  Phone: [phone]  Fax: [phone]  Scheduled Appointment: 02/13/2024 09:30 AM    Sin Sepulveda  Interventional Cardiology  [address], [address]  [place] [address]  Phone: [phone]  Fax: [phone]  Follow Up Time: 2 weeks   Kurt Gonzalez  Internal Medicine  [address] [address]  Phone: [phone]  Fax: [phone]  Established Patient  Follow Up Time: 2 weeks    Jay Ramirez  Cardiovascular Disease  [address] [place] [address], [address] [place] [address]  Phone: [phone]  Fax: [phone]  Scheduled Appointment: 02/20/2024 09:20 AM    Sin Sepulveda  Interventional Cardiology  [address], [address]  [place] [address]  Phone: [phone]  Fax: [phone]  Follow Up Time: 1 week   Kurt Gonzalez  Internal Medicine  [address] [address]  Phone: [phone]  Fax: [phone]  Established Patient  Scheduled Appointment: 02/09/2024 12:30 PM    Jay Ramirez  Cardiovascular Disease  [address] [facility], [address]th [place] [address]  Phone: [phone]  Fax: [phone]  Scheduled Appointment: 02/20/2024 09:20 AM    Sin Sepulveda  Interventional Cardiology  [address], [address]  [place] [address]  Phone: [phone]  Fax: [phone]  Follow Up Time: 1 week

## 2024-01-31 NOTE — DISCHARGE NOTE PROVIDER - REASON FOR ADMISSION
[Never] : never [TextBox_4] : using cpap with full face, falls asleep and forgets at times\par On trelegy daily and ProAir 2-3 times a week\par Biggest c/o is sob with walk 1 yahaira, no changes rare wheeze. No cough. \par had a stroke last month went to [facility] ([place]). No significant residual. Initially affected speech. \par now has loop recorder\par just retired last week\par \par Got Covid vaccines\par Has gained wt.\par Multiple medical problems\par \par  saw cardiologist \par \par  CHF exacerbation

## 2024-01-31 NOTE — PROGRESS NOTE ADULT - ASSESSMENT
78-year-old male with PMHx of HTN, ADAM, aortic insufficiency, PE on Eliquis, and gout presenting with 3 days of worsening SOB with productive cough, admitted for cardiac workup of likely HF exacerbation vs less likely ACS or infectious etiology. TTE 1/31 shows EF 46% w/ moderate-severe AI and severe MR. Structural heart consulted and recommend transfer to cardiac telemetry for diuresis and further evaluation of severe MR. 78-year-old male with PMHx of HTN, HFrEF (EF 46% on TTE 1/31/24), ADAM on CPAP, aortic insufficiency, PE on Eliquis, gout, ?lupux (dx 2017, not on medication), presenting with 3 days of worsening SOB with productive cough, admitted for cardiac workup of likely HF exacerbation vs less likely ACS or infectious etiology. TTE 1/31 shows EF 46% w/ moderate-severe AI and severe MR. Structural heart consulted and recommend transfer to cardiac telemetry for diuresis and further evaluation of severe MR. 78-year-old male with PMHx of HTN, ADAM on CPAP, aortic insufficiency, unprovoked PE/DVT (2015, on Eliquis), gout, renal tumor cryoablation by IR (2014), Aflutter (on Eliquis), ?lupus (per last admission 2017; pt denies hx lupus/rheumatologist/meds), admitted to medicine for HF exacerbation, found to have severe MR and mod-severe AR on TTE. Structural consulted, recommended transfer to cardiac telemetry for diuresis and FAREED pending euvolemia to further evaluate valvular abnormalities.

## 2024-02-01 LAB
A1C WITH ESTIMATED AVERAGE GLUCOSE RESULT: 4.8 % — SIGNIFICANT CHANGE UP (ref 4–5.6)
ALBUMIN SERPL ELPH-MCNC: 3.2 G/DL — LOW (ref 3.3–5)
ALP SERPL-CCNC: 72 U/L — SIGNIFICANT CHANGE UP (ref 40–120)
ALT FLD-CCNC: 7 U/L — LOW (ref 10–45)
ANION GAP SERPL CALC-SCNC: 10 MMOL/L — SIGNIFICANT CHANGE UP (ref 5–17)
ANION GAP SERPL CALC-SCNC: 10 MMOL/L — SIGNIFICANT CHANGE UP (ref 5–17)
AST SERPL-CCNC: 15 U/L — SIGNIFICANT CHANGE UP (ref 10–40)
BASOPHILS # BLD AUTO: 0.03 K/UL — SIGNIFICANT CHANGE UP (ref 0–0.2)
BASOPHILS NFR BLD AUTO: 0.4 % — SIGNIFICANT CHANGE UP (ref 0–2)
BILIRUB SERPL-MCNC: 0.9 MG/DL — SIGNIFICANT CHANGE UP (ref 0.2–1.2)
BUN SERPL-MCNC: 18 MG/DL — SIGNIFICANT CHANGE UP (ref 7–23)
BUN SERPL-MCNC: 21 MG/DL — SIGNIFICANT CHANGE UP (ref 7–23)
CALCIUM SERPL-MCNC: 8.8 MG/DL — SIGNIFICANT CHANGE UP (ref 8.4–10.5)
CALCIUM SERPL-MCNC: 8.9 MG/DL — SIGNIFICANT CHANGE UP (ref 8.4–10.5)
CHLORIDE SERPL-SCNC: 103 MMOL/L — SIGNIFICANT CHANGE UP (ref 96–108)
CHLORIDE SERPL-SCNC: 104 MMOL/L — SIGNIFICANT CHANGE UP (ref 96–108)
CHOLEST SERPL-MCNC: 151 MG/DL — SIGNIFICANT CHANGE UP
CK MB CFR SERPL CALC: 2 NG/ML — SIGNIFICANT CHANGE UP (ref 0–6.7)
CK SERPL-CCNC: 41 U/L — SIGNIFICANT CHANGE UP (ref 30–200)
CO2 SERPL-SCNC: 22 MMOL/L — SIGNIFICANT CHANGE UP (ref 22–31)
CO2 SERPL-SCNC: 27 MMOL/L — SIGNIFICANT CHANGE UP (ref 22–31)
CREAT SERPL-MCNC: 1.26 MG/DL — SIGNIFICANT CHANGE UP (ref 0.5–1.3)
CREAT SERPL-MCNC: 1.49 MG/DL — HIGH (ref 0.5–1.3)
EGFR: 48 ML/MIN/1.73M2 — LOW
EGFR: 58 ML/MIN/1.73M2 — LOW
EOSINOPHIL # BLD AUTO: 0.32 K/UL — SIGNIFICANT CHANGE UP (ref 0–0.5)
EOSINOPHIL NFR BLD AUTO: 4.8 % — SIGNIFICANT CHANGE UP (ref 0–6)
ESTIMATED AVERAGE GLUCOSE: 91 MG/DL — SIGNIFICANT CHANGE UP (ref 68–114)
GLUCOSE SERPL-MCNC: 104 MG/DL — HIGH (ref 70–99)
GLUCOSE SERPL-MCNC: 94 MG/DL — SIGNIFICANT CHANGE UP (ref 70–99)
HCT VFR BLD CALC: 50.6 % — HIGH (ref 39–50)
HDLC SERPL-MCNC: 38 MG/DL — LOW
HGB BLD-MCNC: 16.4 G/DL — SIGNIFICANT CHANGE UP (ref 13–17)
IMM GRANULOCYTES NFR BLD AUTO: 0.4 % — SIGNIFICANT CHANGE UP (ref 0–0.9)
LIPID PNL WITH DIRECT LDL SERPL: 97 MG/DL — SIGNIFICANT CHANGE UP
LYMPHOCYTES # BLD AUTO: 0.77 K/UL — LOW (ref 1–3.3)
LYMPHOCYTES # BLD AUTO: 11.5 % — LOW (ref 13–44)
MAGNESIUM SERPL-MCNC: 1.9 MG/DL — SIGNIFICANT CHANGE UP (ref 1.6–2.6)
MCHC RBC-ENTMCNC: 29.1 PG — SIGNIFICANT CHANGE UP (ref 27–34)
MCHC RBC-ENTMCNC: 32.4 GM/DL — SIGNIFICANT CHANGE UP (ref 32–36)
MCV RBC AUTO: 89.9 FL — SIGNIFICANT CHANGE UP (ref 80–100)
MONOCYTES # BLD AUTO: 0.61 K/UL — SIGNIFICANT CHANGE UP (ref 0–0.9)
MONOCYTES NFR BLD AUTO: 9.1 % — SIGNIFICANT CHANGE UP (ref 2–14)
NEUTROPHILS # BLD AUTO: 4.96 K/UL — SIGNIFICANT CHANGE UP (ref 1.8–7.4)
NEUTROPHILS NFR BLD AUTO: 73.8 % — SIGNIFICANT CHANGE UP (ref 43–77)
NON HDL CHOLESTEROL: 113 MG/DL — SIGNIFICANT CHANGE UP
NRBC # BLD: 0 /100 WBCS — SIGNIFICANT CHANGE UP (ref 0–0)
PLATELET # BLD AUTO: 253 K/UL — SIGNIFICANT CHANGE UP (ref 150–400)
POTASSIUM SERPL-MCNC: 3.7 MMOL/L — SIGNIFICANT CHANGE UP (ref 3.5–5.3)
POTASSIUM SERPL-MCNC: 3.8 MMOL/L — SIGNIFICANT CHANGE UP (ref 3.5–5.3)
POTASSIUM SERPL-SCNC: 3.7 MMOL/L — SIGNIFICANT CHANGE UP (ref 3.5–5.3)
POTASSIUM SERPL-SCNC: 3.8 MMOL/L — SIGNIFICANT CHANGE UP (ref 3.5–5.3)
PROT SERPL-MCNC: 5.4 G/DL — LOW (ref 6–8.3)
RBC # BLD: 5.63 M/UL — SIGNIFICANT CHANGE UP (ref 4.2–5.8)
RBC # FLD: 16 % — HIGH (ref 10.3–14.5)
SODIUM SERPL-SCNC: 136 MMOL/L — SIGNIFICANT CHANGE UP (ref 135–145)
SODIUM SERPL-SCNC: 140 MMOL/L — SIGNIFICANT CHANGE UP (ref 135–145)
TRIGL SERPL-MCNC: 80 MG/DL — SIGNIFICANT CHANGE UP
TROPONIN T, HIGH SENSITIVITY RESULT: 45 NG/L — SIGNIFICANT CHANGE UP (ref 0–51)
WBC # BLD: 6.72 K/UL — SIGNIFICANT CHANGE UP (ref 3.8–10.5)
WBC # FLD AUTO: 6.72 K/UL — SIGNIFICANT CHANGE UP (ref 3.8–10.5)

## 2024-02-01 RX ORDER — FUROSEMIDE 40 MG
10 TABLET ORAL
Qty: 500 | Refills: 0 | Status: DISCONTINUED | OUTPATIENT
Start: 2024-02-02 | End: 2024-02-02

## 2024-02-01 RX ORDER — FUROSEMIDE 40 MG
20 TABLET ORAL
Qty: 500 | Refills: 0 | Status: DISCONTINUED | OUTPATIENT
Start: 2024-02-01 | End: 2024-02-01

## 2024-02-01 RX ORDER — POTASSIUM CHLORIDE 20 MEQ
40 PACKET (EA) ORAL ONCE
Refills: 0 | Status: COMPLETED | OUTPATIENT
Start: 2024-02-01 | End: 2024-02-01

## 2024-02-01 RX ORDER — FUROSEMIDE 40 MG
20 TABLET ORAL EVERY 12 HOURS
Refills: 0 | Status: DISCONTINUED | OUTPATIENT
Start: 2024-02-01 | End: 2024-02-01

## 2024-02-01 RX ORDER — ATORVASTATIN CALCIUM 80 MG/1
40 TABLET, FILM COATED ORAL AT BEDTIME
Refills: 0 | Status: DISCONTINUED | OUTPATIENT
Start: 2024-02-01 | End: 2024-02-03

## 2024-02-01 RX ORDER — AMLODIPINE BESYLATE 2.5 MG/1
10 TABLET ORAL DAILY
Refills: 0 | Status: DISCONTINUED | OUTPATIENT
Start: 2024-02-01 | End: 2024-02-03

## 2024-02-01 RX ORDER — FUROSEMIDE 40 MG
20 TABLET ORAL ONCE
Refills: 0 | Status: COMPLETED | OUTPATIENT
Start: 2024-02-01 | End: 2024-02-01

## 2024-02-01 RX ORDER — SACUBITRIL AND VALSARTAN 24; 26 MG/1; MG/1
1 TABLET, FILM COATED ORAL
Refills: 0 | Status: DISCONTINUED | OUTPATIENT
Start: 2024-02-01 | End: 2024-02-03

## 2024-02-01 RX ORDER — SACUBITRIL AND VALSARTAN 24; 26 MG/1; MG/1
1 TABLET, FILM COATED ORAL
Refills: 0 | Status: DISCONTINUED | OUTPATIENT
Start: 2024-02-01 | End: 2024-02-01

## 2024-02-01 RX ORDER — MAGNESIUM OXIDE 400 MG ORAL TABLET 241.3 MG
400 TABLET ORAL ONCE
Refills: 0 | Status: COMPLETED | OUTPATIENT
Start: 2024-02-01 | End: 2024-02-01

## 2024-02-01 RX ORDER — CARVEDILOL PHOSPHATE 80 MG/1
12.5 CAPSULE, EXTENDED RELEASE ORAL EVERY 12 HOURS
Refills: 0 | Status: DISCONTINUED | OUTPATIENT
Start: 2024-02-01 | End: 2024-02-02

## 2024-02-01 RX ORDER — ATORVASTATIN CALCIUM 80 MG/1
20 TABLET, FILM COATED ORAL AT BEDTIME
Refills: 0 | Status: DISCONTINUED | OUTPATIENT
Start: 2024-02-01 | End: 2024-02-01

## 2024-02-01 RX ORDER — AMLODIPINE BESYLATE 2.5 MG/1
10 TABLET ORAL DAILY
Refills: 0 | Status: DISCONTINUED | OUTPATIENT
Start: 2024-02-01 | End: 2024-02-01

## 2024-02-01 RX ORDER — POTASSIUM CHLORIDE 20 MEQ
20 PACKET (EA) ORAL ONCE
Refills: 0 | Status: COMPLETED | OUTPATIENT
Start: 2024-02-01 | End: 2024-02-01

## 2024-02-01 RX ORDER — FUROSEMIDE 40 MG
40 TABLET ORAL EVERY 12 HOURS
Refills: 0 | Status: DISCONTINUED | OUTPATIENT
Start: 2024-02-01 | End: 2024-02-01

## 2024-02-01 RX ORDER — FUROSEMIDE 40 MG
10 TABLET ORAL
Qty: 500 | Refills: 0 | Status: DISCONTINUED | OUTPATIENT
Start: 2024-02-01 | End: 2024-02-01

## 2024-02-01 RX ADMIN — Medication 20 MILLIGRAM(S): at 00:40

## 2024-02-01 RX ADMIN — AMLODIPINE BESYLATE 5 MILLIGRAM(S): 2.5 TABLET ORAL at 05:32

## 2024-02-01 RX ADMIN — CARVEDILOL PHOSPHATE 12.5 MILLIGRAM(S): 80 CAPSULE, EXTENDED RELEASE ORAL at 07:07

## 2024-02-01 RX ADMIN — Medication 40 MILLIEQUIVALENT(S): at 07:41

## 2024-02-01 RX ADMIN — Medication 100 MILLIGRAM(S): at 14:03

## 2024-02-01 RX ADMIN — APIXABAN 5 MILLIGRAM(S): 2.5 TABLET, FILM COATED ORAL at 10:28

## 2024-02-01 RX ADMIN — Medication 40 MILLIGRAM(S): at 10:28

## 2024-02-01 RX ADMIN — SACUBITRIL AND VALSARTAN 1 TABLET(S): 24; 26 TABLET, FILM COATED ORAL at 22:53

## 2024-02-01 RX ADMIN — MAGNESIUM OXIDE 400 MG ORAL TABLET 400 MILLIGRAM(S): 241.3 TABLET ORAL at 07:41

## 2024-02-01 RX ADMIN — AMLODIPINE BESYLATE 10 MILLIGRAM(S): 2.5 TABLET ORAL at 16:16

## 2024-02-01 RX ADMIN — Medication 5 MG/HR: at 18:28

## 2024-02-01 RX ADMIN — APIXABAN 5 MILLIGRAM(S): 2.5 TABLET, FILM COATED ORAL at 22:51

## 2024-02-01 RX ADMIN — CARVEDILOL PHOSPHATE 12.5 MILLIGRAM(S): 80 CAPSULE, EXTENDED RELEASE ORAL at 22:53

## 2024-02-01 RX ADMIN — Medication 10 MG/HR: at 13:14

## 2024-02-01 RX ADMIN — SACUBITRIL AND VALSARTAN 1 TABLET(S): 24; 26 TABLET, FILM COATED ORAL at 05:32

## 2024-02-01 RX ADMIN — Medication 20 MILLIEQUIVALENT(S): at 18:13

## 2024-02-01 RX ADMIN — ATORVASTATIN CALCIUM 40 MILLIGRAM(S): 80 TABLET, FILM COATED ORAL at 22:53

## 2024-02-01 NOTE — PROGRESS NOTE ADULT - ASSESSMENT
78-year-old male with PMHx of HTN, ADAM on CPAP, aortic insufficiency, unprovoked PE/DVT (2015, on Eliquis), gout, renal tumor cryoablation by IR (2014), Aflutter (on Eliquis), ?lupus (per last admission 2017; pt denies hx lupus/rheumatologist/meds), admitted to medicine for HF exacerbation, found to have severe MR and mod-severe AR on TTE. Structural consulted, recommended transfer to cardiac telemetry for diuresis and FAREED pending euvolemia to further evaluate valvular abnormalities. 78-year-old male with PMHx of HTN, ADAM on CPAP, aortic insufficiency, unprovoked PE/DVT (2015, on Eliquis), gout, renal tumor cryoablation by IR (2014), Aflutter (on Eliquis), ?lupus (per last admission 2017; pt denies hx lupus/rheumatologist/meds), admitted to medicine for HF exacerbation, found to have severe MR and mod-severe AR on TTE. Patient transferred to cardiac tele on 01/31/24 for further work up of above. Patient currently being diuresed with lasix gtt with tentative plan for FAREED on 02/02/24 AM.

## 2024-02-01 NOTE — PROGRESS NOTE ADULT - SUBJECTIVE AND OBJECTIVE BOX
*incomplete*  Cardiology PA Adult Progress Note    C.C.:     Subjective Assessment:      ROS Negative except as per Subjective and HPI  	  MEDICATIONS:  amLODIPine   Tablet 5 milliGRAM(s) Oral daily  carvedilol 12.5 milliGRAM(s) Oral every 12 hours  furosemide   Injectable 20 milliGRAM(s) IV Push every 12 hours  sacubitril 49 mG/valsartan 51 mG 1 Tablet(s) Oral two times a day  spironolactone 25 milliGRAM(s) Oral <User Schedule>            allopurinol 100 milliGRAM(s) Oral daily    apixaban 5 milliGRAM(s) Oral every 12 hours  influenza  Vaccine (HIGH DOSE) 0.7 milliLiter(s) IntraMuscular once      	    [PHYSICAL EXAM:  TELEMETRY:  T(C): 36.3 (02-01-24 @ 05:30), Max: 37.3 (01-31-24 @ 10:21)  HR: 74 (02-01-24 @ 07:07) (72 - 84)  BP: 178/86 (02-01-24 @ 07:07) (172/73 - 186/79)  RR: 19 (02-01-24 @ 07:07) (18 - 22)  SpO2: 92% (02-01-24 @ 07:07) (92% - 97%)  Wt(kg): --  I&O's Summary    31 Jan 2024 07:01  -  01 Feb 2024 07:00  --------------------------------------------------------  IN: 0 mL / OUT: 390 mL / NET: -390 mL        Siegel:  Central/PICC/Mid Line:                                         Appearance: Normal	  HEENT:   Normal oral mucosa, PERRL, EOMI	  Neck: Supple, + JVD/ - JVD; Carotid Bruit   Cardiovascular: Normal S1 S2, No JVD, No murmurs,   Respiratory: Lungs clear to auscultation/Decreased Breath Sounds/No Rales, Rhonchi, Wheezing	  Gastrointestinal:  Soft, Non-tender, + BS	  Skin: No rashes, No ecchymoses, No cyanosis  Extremities: Normal range of motion, No clubbing, cyanosis or edema  Vascular: Peripheral pulses palpable 2+ bilaterally  Neurologic: Non-focal  Psychiatry: A & O x 3, Mood & affect appropriate      	    ECG:  	  RADIOLOGY:   DIAGNOSTIC TESTING:  [ ] Echocardiogram:  [ ]  Catheterization:  [ ] Stress Test:    [ ] FAREED  OTHER: 	    LABS:	 	  CARDIAC MARKERS:                          16.4   6.72  )-----------( 253      ( 01 Feb 2024 05:30 )             50.6     02-01    136  |  104  |  18  ----------------------------<  94  3.7   |  22  |  1.26    Ca    8.8      01 Feb 2024 05:30  Phos  3.1     01-31  Mg     1.9     02-01    TPro  5.4<L>  /  Alb  3.2<L>  /  TBili  0.9  /  DBili  x   /  AST  15  /  ALT  7<L>  /  AlkPhos  72  02-01    proBNP:   Lipid Profile:   HgA1c:   TSH:       ASSESSMENT/PLAN: 	        DVT ppx:  Dispo:     Cardiology PA Adult Progress Note    Subjective Assessment:  Patient states he feels "fine". Denies fever, cough, chest pain, shortness of breath, orthopnea, PND, abd pain, N/V, S/S of bleeding, and LE swelling.      ROS Negative except as per Subjective and HPI  	  MEDICATIONS:  amLODIPine   Tablet 5 milliGRAM(s) Oral daily  carvedilol 12.5 milliGRAM(s) Oral every 12 hours  furosemide   Injectable 20 milliGRAM(s) IV Push every 12 hours  sacubitril 49 mG/valsartan 51 mG 1 Tablet(s) Oral two times a day  spironolactone 25 milliGRAM(s) Oral <User Schedule>  allopurinol 100 milliGRAM(s) Oral daily  apixaban 5 milliGRAM(s) Oral every 12 hours  influenza  Vaccine (HIGH DOSE) 0.7 milliLiter(s) IntraMuscular once      [PHYSICAL EXAM:  TELEMETRY:  T(C): 36.3 (02-01-24 @ 05:30), Max: 37.3 (01-31-24 @ 10:21)  HR: 74 (02-01-24 @ 07:07) (72 - 84)  BP: 178/86 (02-01-24 @ 07:07) (172/73 - 186/79)  RR: 19 (02-01-24 @ 07:07) (18 - 22)  SpO2: 92% (02-01-24 @ 07:07) (92% - 97%)  Wt(kg): --  I&O's Summary    31 Jan 2024 07:01  -  01 Feb 2024 07:00  --------------------------------------------------------  IN: 0 mL / OUT: 390 mL / NET: -390 mL                                     Appearance: Obese male, resting comfortably in bed   Neck: difficult to assess JVD given body habitus   Cardiovascular: Normal S1 S2 +systolic murmur   Respiratory: diminished BS in B/L lower lobes otherwise no rhonchi or rales   Gastrointestinal:  Distended, soft, +BS  Skin: No rashes, No ecchymoses, No cyanosis  Extremities: Normal range of motion, No clubbing, cyanosis or edema  Vascular: Peripheral pulses palpable 2+ bilaterally  Neurologic: Non-focal  Psychiatry: A & O x 3, Mood & affect appropriate    	  	  RADIOLOGY:   [ x] CXR 01/30/24: Right pleural effusion. Left basilar opacity/pleural effusion. Heart and mediastinum are unremarkable. Stable bony structures.   Gynecomastia.  [ x] CXR 01/31/24: Right pleural effusion and left basilar opacity/pleural effusion, increased. Heart and mediastinum are unremarkable. Stable bony   structures.    DIAGNOSTIC TESTING:  [ x] Echocardiogram 01/31/24:   1. Mildly reduced left ventricular systolic function.   2. Dilated left ventriclular size.   3. Normal right ventricular size and systolic function.   4. Severely dilated left atrium.   5. A subcentimeter mobile echodensity is noted on the ventricular aspect   of the aortic valve. Differential includes fibrinous strand versus a   small vegetation. Clinical correlation is recommended.   6. Moderate-to-severe aortic regurgitation. The jet is highly eccentric   and posteriorly directed, which may lend to underestimation of severity.   7. Severe mitral regurgitation. The jet is highly eccentric.   8. No evidence of pulmonary hypertension.   9. Trivial pericardial effusion.  10. Left pleural effusion.  11. 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  12. The proximal ascending aorta is dilated measuring 4.50 cm.  13. Compared to the previous TTE performed on 11/7/2017, ventricular   function is lower and degree of mitral and aortic regurgitation has   progressed.  14. Consider FAREED to better visualize the mitral and aortic valve and   elucidate the mechanism of regurgitation, if clinically indicated.      [ ]  Catheterization:  [ ] Stress Test:    [ ] FAREED  OTHER: 	    LABS:	 	  CARDIAC MARKERS:                          16.4   6.72  )-----------( 253      ( 01 Feb 2024 05:30 )             50.6     02-01    136  |  104  |  18  ----------------------------<  94  3.7   |  22  |  1.26    Ca    8.8      01 Feb 2024 05:30  Phos  3.1     01-31  Mg     1.9     02-01    TPro  5.4<L>  /  Alb  3.2<L>  /  TBili  0.9  /  DBili  x   /  AST  15  /  ALT  7<L>  /  AlkPhos  72  02-01    proBNP:   Lipid Profile:   HgA1c:   TSH:       ASSESSMENT/PLAN: 	         Cardiology PA Adult Progress Note    Subjective Assessment:  Patient states he feels "fine". Denies fever, cough, chest pain, shortness of breath, orthopnea, PND, abd pain, N/V, S/S of bleeding, and LE swelling.      ROS Negative except as per Subjective and HPI  	  MEDICATIONS:  amLODIPine   Tablet 5 milliGRAM(s) Oral daily  carvedilol 12.5 milliGRAM(s) Oral every 12 hours  furosemide   Injectable 20 milliGRAM(s) IV Push every 12 hours  sacubitril 49 mG/valsartan 51 mG 1 Tablet(s) Oral two times a day  spironolactone 25 milliGRAM(s) Oral <User Schedule>  allopurinol 100 milliGRAM(s) Oral daily  apixaban 5 milliGRAM(s) Oral every 12 hours  influenza  Vaccine (HIGH DOSE) 0.7 milliLiter(s) IntraMuscular once      [PHYSICAL EXAM:  TELEMETRY:  T(C): 36.3 (02-01-24 @ 05:30), Max: 37.3 (01-31-24 @ 10:21)  HR: 74 (02-01-24 @ 07:07) (72 - 84)  BP: 178/86 (02-01-24 @ 07:07) (172/73 - 186/79)  RR: 19 (02-01-24 @ 07:07) (18 - 22)  SpO2: 92% (02-01-24 @ 07:07) (92% - 97%)  Wt(kg): --  I&O's Summary    31 Jan 2024 07:01  -  01 Feb 2024 07:00  --------------------------------------------------------  IN: 0 mL / OUT: 390 mL / NET: -390 mL                                     Appearance: Obese male, resting comfortably in bed   Neck: difficult to assess JVD given body habitus   Cardiovascular: Normal S1 S2 +systolic murmur   Respiratory: diminished BS in B/L lower lobes otherwise no rhonchi or rales   Gastrointestinal:  Distended, soft, +BS  Skin: No rashes, No ecchymoses, No cyanosis  Extremities: Normal range of motion, +3 B/L pitting edema  Vascular: Peripheral pulses palpable 2+ bilaterally  Neurologic: Non-focal  Psychiatry: A & O x 3, Mood & affect appropriate    	  	  RADIOLOGY:   [ x] CXR 01/30/24: Right pleural effusion. Left basilar opacity/pleural effusion. Heart and mediastinum are unremarkable. Stable bony structures.   Gynecomastia.  [ x] CXR 01/31/24: Right pleural effusion and left basilar opacity/pleural effusion, increased. Heart and mediastinum are unremarkable. Stable bony   structures.    DIAGNOSTIC TESTING:  [ x] Echocardiogram 01/31/24:   1. Mildly reduced left ventricular systolic function.   2. Dilated left ventriclular size.   3. Normal right ventricular size and systolic function.   4. Severely dilated left atrium.   5. A subcentimeter mobile echodensity is noted on the ventricular aspect   of the aortic valve. Differential includes fibrinous strand versus a   small vegetation. Clinical correlation is recommended.   6. Moderate-to-severe aortic regurgitation. The jet is highly eccentric   and posteriorly directed, which may lend to underestimation of severity.   7. Severe mitral regurgitation. The jet is highly eccentric.   8. No evidence of pulmonary hypertension.   9. Trivial pericardial effusion.  10. Left pleural effusion.  11. 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  12. The proximal ascending aorta is dilated measuring 4.50 cm.  13. Compared to the previous TTE performed on 11/7/2017, ventricular   function is lower and degree of mitral and aortic regurgitation has   progressed.  14. Consider FAREED to better visualize the mitral and aortic valve and   elucidate the mechanism of regurgitation, if clinically indicated.      [ ]  Catheterization:  [ ] Stress Test:    [ ] FAREED  OTHER: 	    LABS:	 	  CARDIAC MARKERS:                          16.4   6.72  )-----------( 253      ( 01 Feb 2024 05:30 )             50.6     02-01    136  |  104  |  18  ----------------------------<  94  3.7   |  22  |  1.26    Ca    8.8      01 Feb 2024 05:30  Phos  3.1     01-31  Mg     1.9     02-01    TPro  5.4<L>  /  Alb  3.2<L>  /  TBili  0.9  /  DBili  x   /  AST  15  /  ALT  7<L>  /  AlkPhos  72  02-01    proBNP:   Lipid Profile:   HgA1c:   TSH:       ASSESSMENT/PLAN:

## 2024-02-01 NOTE — PROGRESS NOTE ADULT - PROBLEM SELECTOR PLAN 5
- Continue: Allopurinol 100 mg QD     #?Hx Lupus  Pt denies, does not follow with rheumatologist, was dx with Lupus previous admission 2017, denies confirmed dx of lupus and does not take Plaquenil     DVT ppx: Eliquis   F: tolerating PO intake   E: Keep K > 4, Mg > 2  N: DASH/TLC w/ CC, 1.2 L fluid restriction     Code: Full  Dispo: pending clinical progression - Continue: Allopurinol 100 mg QD     #?Hx Lupus  Pt denies, does not follow with rheumatologist, was dx with Lupus previous admission 2017, denies confirmed dx of lupus and does not take Plaquenil. No concern for acute flare at this time     DVT ppx: Eliquis   F: tolerating PO intake   E: Keep K > 4, Mg > 2  N: DASH/TLC w/ CC, 1.2 L fluid restriction (NPO @MN)     Code: Full  Dispo: pending clinical progression

## 2024-02-01 NOTE — PROGRESS NOTE ADULT - SUBJECTIVE AND OBJECTIVE BOX
Patient discussed on morning rounds with Dr. Henderson    Consult for AI/MR    SUBJECTIVE ASSESSMENT:  78y Male, reports feeling well. No acute changes.       Vital Signs Last 24 Hrs  T(C): 36.3 (01 Feb 2024 05:30), Max: 36.7 (31 Jan 2024 21:20)  T(F): 97.4 (01 Feb 2024 05:30), Max: 98 (31 Jan 2024 21:20)  HR: 78 (01 Feb 2024 09:52) (72 - 84)  SR  BP: 183/87 (01 Feb 2024 09:52) (170/71 - 186/79)  BP(mean): 119 (01 Feb 2024 05:30) (119 - 129)  RR: 19 (01 Feb 2024 09:38) (18 - 22)  SpO2: 94% (01 Feb 2024 09:52) (92% - 97%) RA    Parameters below as of 01 Feb 2024 09:52  Patient On (Oxygen Delivery Method): room air      I&O's Detail    31 Jan 2024 07:01  -  01 Feb 2024 07:00  --------------------------------------------------------  IN:  Total IN: 0 mL    OUT:    Voided (mL): 390 mL  Total OUT: 390 mL    Total NET: -390 mL      01 Feb 2024 07:01  -  01 Feb 2024 11:10  --------------------------------------------------------  IN:    Oral Fluid: 180 mL  Total IN: 180 mL    OUT:    Voided (mL): 225 mL  Total OUT: 225 mL    Total NET: -45 mL                            16.4   6.72  )-----------( 253      ( 01 Feb 2024 05:30 )             50.6         02-01    136  |  104  |  18  ----------------------------<  94  3.7   |  22  |  1.26    Ca    8.8      01 Feb 2024 05:30  Phos  3.1     01-31  Mg     1.9     02-01    TPro  5.4<L>  /  Alb  3.2<L>  /  TBili  0.9  /  DBili  x   /  AST  15  /  ALT  7<L>  /  AlkPhos  72  02-01      Urinalysis Basic - ( 01 Feb 2024 05:30 )    Color: x / Appearance: x / SG: x / pH: x  Gluc: 94 mg/dL / Ketone: x  / Bili: x / Urobili: x   Blood: x / Protein: x / Nitrite: x   Leuk Esterase: x / RBC: x / WBC x   Sq Epi: x / Non Sq Epi: x / Bacteria: x        MEDICATIONS  (STANDING):  allopurinol 100 milliGRAM(s) Oral daily  amLODIPine   Tablet 10 milliGRAM(s) Oral daily  apixaban 5 milliGRAM(s) Oral every 12 hours  carvedilol 12.5 milliGRAM(s) Oral every 12 hours  furosemide   Injectable 40 milliGRAM(s) IV Push every 12 hours  influenza  Vaccine (HIGH DOSE) 0.7 milliLiter(s) IntraMuscular once  sacubitril 97 mG/valsartan 103 mG 1 Tablet(s) Oral two times a day  spironolactone 25 milliGRAM(s) Oral <User Schedule>      RADIOLOGY & ADDITIONAL TESTS:    < from: TTE Echo Complete w/o Contrast w/ Doppler (01.31.24 @ 08:42) >  CONCLUSIONS:     1. Mildly reduced left ventricular systolic function.   2. Dilated left ventriclular size.   3. Normal right ventricular size and systolic function.   4. Severely dilated left atrium.   5. A subcentimeter mobile echodensity is noted on the ventricular aspect   of the aortic valve. Differential includes fibrinous strand versus a   small vegetation. Clinical correlation is recommended.   6. Moderate-to-severe aortic regurgitation. The jet is highly eccentric   and posteriorly directed, which may lend to underestimation of severity.   7. Severe mitral regurgitation. The jet is highly eccentric.   8. No evidence of pulmonary hypertension.   9. Trivial pericardial effusion.  10. Left pleural effusion.  11. 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  12. The proximal ascending aorta is dilated measuring 4.50 cm.  13. Compared to the previous TTE performed on 11/7/2017, ventricular   function is lower and degree of mitral and aortic regurgitation has   progressed.  14. Consider FAREED to better visualize the mitral and aortic valve and   elucidate the mechanism of regurgitation, if clinically indicated.    < end of copied text >

## 2024-02-01 NOTE — PROGRESS NOTE ADULT - PROBLEM SELECTOR PLAN 3
SBP 150s-180s  - Continue: Amlodipine 5 mg QD, Entresto 49mg-51mg BID, Coreg 12.5 mg BID, Ada 25 mg M/W/F SBP 170s  - Increase Amlodipine 5mg to 10mg daily and increase Entresto 49mg- 51mg to  mg BID  - C/w Angelo 25 mg (M/W/F)

## 2024-02-01 NOTE — PHYSICAL THERAPY INITIAL EVALUATION ADULT - MANUAL MUSCLE TESTING RESULTS, REHAB EVAL
b/l UE and LE grossly assessed as >3+/5
Go for blood tests as directed. Your doctor will do lab tests at regular visits to monitor the effects of this medicine. Please follow up with your doctor and keep your health care provider appointments.

## 2024-02-01 NOTE — PROGRESS NOTE ADULT - PROBLEM SELECTOR PLAN 1
P/w SAMUEL and SOB x3 days, asymptomatic currently, WWP throughout, satting 92% on room air   - BNP 6921, Trop 38 (1/30/24), CXR (1/31/24) w/ b/l pleural effusion and LLL opacity, RVP negative   - EKG (1/31/24): sinus rhythm w/ 1st deg AVB ( ms), LVH, Q waves V1 and V2 (unchanged compared to EKG 2017)  - TTE (1/31/24): EF 46%, mod-severe AI, severe MR   - Diuresis: s/p Lasix 20 mg IVPx3, initiated Lasix 20 mg IVP BID (pt lasix naiive)  - GDMT: Coreg 12.5 mg BID, Entresto 49mg-51mg BID, yanira 25 mg M/W/F   - Core measures, strict I&Os, daily weights P/w SAMUEL and SOB x3 days, asymptomatic currently, WWP throughout, satting 92% on room air   - BNP 6921, Trop 38 (1/30/24), CXR (1/31/24) w/ b/l pleural effusion and LLL opacity, RVP negative   - EKG (1/31/24): sinus rhythm w/ 1st deg AVB ( ms), LVH, Q waves V1 and V2 (unchanged compared to EKG 2017)  - TTE (1/31/24): EF 46%, mod-severe AI, severe MR   - Diuresis: Lasix gtt 10mL/hr will dc at 9PM.   - GDMT:  Entresto 49mg-51mg increased to 97-102mg BID, c/w- Coreg 12.5 mg BID, Angelo 25 mg M/W/F   - Core measures, strict I&Os, daily weights P/w SAMUEL and SOB x3 days, asymptomatic currently, WWP throughout, satting 92% on room air   - BNP 6921, Trop 38 -> 45 likely demand ischemia   - CXR:  b/l pleural effusion and LLL opacity, RVP negative   - TTE (1/31/24): EF 46%, mod-severe AI, severe MR   - Diuresis: Lasix gtt 10mL/hr will dc at 9PM.   - GDMT:  Entresto 49mg-51mg increased to 97-102mg BID, c/w- Coreg 12.5 mg BID, Fowler 25 mg M/W/F   - Core measures, strict I&Os, daily weights

## 2024-02-01 NOTE — PROGRESS NOTE ADULT - ASSESSMENT
78 year old M with hx of hypertension, sleep apnea, obesity, aortic insufficiency, PE on Eliquis, and gout presenting with 3 days of worsening SOB with productive cough and LE edema. Has never had a diagnosis of HF, but knows he has aortic insufficiency for which he takes medications for. On 1/31/24, patient underwent a TTE which demonstrated moderate-severe AI and severe MR. Structural heart was consulted for potential intervention.    Problem 1: AI, MR  - TTE on 1/31/24 demonstrated severe MR and mod-severe TR   - please obtain FAREED when patient euvolemic   - continue diuresis, aim 500-1 L negative for today  - It is very important to obtain accurate I&O's and daily weights.   - remainder of care per primary team     Problem 2: HTN   - takes amlodipine at home   - care per primary team     Problem 3: Hx of PE  - on Eliquis 5 mg BID   - remiander of care per primary team     Problem 4: Gout   - on allopurionol   - care per primary team     Consult PA Number: [phone]

## 2024-02-01 NOTE — PROGRESS NOTE ADULT - PROBLEM SELECTOR PLAN 4
Hx unprovoked PE/DVT 2015   - Continue: Eliquis 5 mg BID    #Hx AFlutter   Hx Aflutter as seen on EKG 2017, pt asymptomatic, HR 70s-80s, currently in NSR  - Anticoagulation: Eliquis 5 mg BID  - Rate-control: Coreg 12.5 mg BID

## 2024-02-01 NOTE — PHYSICAL THERAPY INITIAL EVALUATION ADULT - PERTINENT HX OF CURRENT PROBLEM, REHAB EVAL
Patient is a 78 year old M with hx of hypertension, sleep apnea, obesity, aortic insufficiency, PE on Eliquis, and gout presenting with 3 days of worsening SOB with productive cough. States that he has been having difficulty getting around his kitchen d/t shortness of breath. Reports associated symptoms of cough d/t postnasal drip with yellow-clear mucous. States that his LLE have always been swollen since hurting his knee, but his RLE has become more swollen recently. Has never had a diagnosis of HF, but knows he has aortic insufficiency for which he takes medications for.

## 2024-02-01 NOTE — PHYSICAL THERAPY INITIAL EVALUATION ADULT - ADDITIONAL COMMENTS
Patient lives with wife in 2-story private home, +2 ANA, +walk-in shower, +shower chair. PTA, patient was fully independent with all ADLs and functional mobility, without the use of AD. Patient owns a cane.

## 2024-02-01 NOTE — PROGRESS NOTE ADULT - PROBLEM SELECTOR PLAN 2
TTE (1/31/24) w/ severe MR and mod-severe AI w/ subcentimeter vegetation, pt afebrile, no WBC elevation   - TTE as above  - Blood Cx x2 (1/31/24) NGTD (x1 day)   - Diuresis as above   - Follow up daily CBCs   - FAREED once euvolemic   - Structural heart following TTE (1/31/24) w/ severe MR and mod-severe AI w/ subcentimeter vegetation, pt afebrile, no WBC elevation   - TTE as above  - Blood Cx x2 (1/31/24) NGTD (x1 day)   - Plan for FAREED 02/02/24 in AM  - CTSx recs appreciated TTE (1/31/24) w/ severe MR and mod-severe AI w/ subcentimeter vegetation.   - Afebrile, no leukocytosis, BCx NGTD x1 day  - Plan for FAREED 02/02/24 in AM  - CTSx recs appreciated

## 2024-02-02 ENCOUNTER — TRANSCRIPTION ENCOUNTER (OUTPATIENT)
Age: 79
End: 2024-02-02

## 2024-02-02 PROBLEM — Z86.79 PERSONAL HISTORY OF OTHER DISEASES OF THE CIRCULATORY SYSTEM: Chronic | Status: ACTIVE | Noted: 2024-01-30

## 2024-02-02 LAB
ANION GAP SERPL CALC-SCNC: 10 MMOL/L — SIGNIFICANT CHANGE UP (ref 5–17)
BUN SERPL-MCNC: 19 MG/DL — SIGNIFICANT CHANGE UP (ref 7–23)
CALCIUM SERPL-MCNC: 9 MG/DL — SIGNIFICANT CHANGE UP (ref 8.4–10.5)
CHLORIDE SERPL-SCNC: 102 MMOL/L — SIGNIFICANT CHANGE UP (ref 96–108)
CO2 SERPL-SCNC: 28 MMOL/L — SIGNIFICANT CHANGE UP (ref 22–31)
CREAT SERPL-MCNC: 1.32 MG/DL — HIGH (ref 0.5–1.3)
EGFR: 55 ML/MIN/1.73M2 — LOW
GLUCOSE SERPL-MCNC: 93 MG/DL — SIGNIFICANT CHANGE UP (ref 70–99)
HCT VFR BLD CALC: 50 % — SIGNIFICANT CHANGE UP (ref 39–50)
HGB BLD-MCNC: 16.5 G/DL — SIGNIFICANT CHANGE UP (ref 13–17)
MAGNESIUM SERPL-MCNC: 1.8 MG/DL — SIGNIFICANT CHANGE UP (ref 1.6–2.6)
MAGNESIUM SERPL-MCNC: 1.9 MG/DL — SIGNIFICANT CHANGE UP (ref 1.6–2.6)
MCHC RBC-ENTMCNC: 28.8 PG — SIGNIFICANT CHANGE UP (ref 27–34)
MCHC RBC-ENTMCNC: 33 GM/DL — SIGNIFICANT CHANGE UP (ref 32–36)
MCV RBC AUTO: 87.4 FL — SIGNIFICANT CHANGE UP (ref 80–100)
NRBC # BLD: 0 /100 WBCS — SIGNIFICANT CHANGE UP (ref 0–0)
PLATELET # BLD AUTO: 254 K/UL — SIGNIFICANT CHANGE UP (ref 150–400)
POTASSIUM SERPL-MCNC: 3.3 MMOL/L — LOW (ref 3.5–5.3)
POTASSIUM SERPL-SCNC: 3.3 MMOL/L — LOW (ref 3.5–5.3)
RBC # BLD: 5.72 M/UL — SIGNIFICANT CHANGE UP (ref 4.2–5.8)
RBC # FLD: 16 % — HIGH (ref 10.3–14.5)
SODIUM SERPL-SCNC: 140 MMOL/L — SIGNIFICANT CHANGE UP (ref 135–145)
WBC # BLD: 7.33 K/UL — SIGNIFICANT CHANGE UP (ref 3.8–10.5)
WBC # FLD AUTO: 7.33 K/UL — SIGNIFICANT CHANGE UP (ref 3.8–10.5)

## 2024-02-02 PROCEDURE — 99232 SBSQ HOSP IP/OBS MODERATE 35: CPT

## 2024-02-02 RX ORDER — CARVEDILOL PHOSPHATE 80 MG/1
25 CAPSULE, EXTENDED RELEASE ORAL EVERY 12 HOURS
Refills: 0 | Status: DISCONTINUED | OUTPATIENT
Start: 2024-02-02 | End: 2024-02-03

## 2024-02-02 RX ORDER — FUROSEMIDE 40 MG
10 TABLET ORAL
Qty: 500 | Refills: 0 | Status: DISCONTINUED | OUTPATIENT
Start: 2024-02-02 | End: 2024-02-03

## 2024-02-02 RX ORDER — POTASSIUM CHLORIDE 20 MEQ
40 PACKET (EA) ORAL EVERY 4 HOURS
Refills: 0 | Status: COMPLETED | OUTPATIENT
Start: 2024-02-02 | End: 2024-02-02

## 2024-02-02 RX ORDER — MAGNESIUM OXIDE 400 MG ORAL TABLET 241.3 MG
800 TABLET ORAL ONCE
Refills: 0 | Status: COMPLETED | OUTPATIENT
Start: 2024-02-02 | End: 2024-02-02

## 2024-02-02 RX ORDER — MAGNESIUM OXIDE 400 MG ORAL TABLET 241.3 MG
400 TABLET ORAL ONCE
Refills: 0 | Status: COMPLETED | OUTPATIENT
Start: 2024-02-02 | End: 2024-02-02

## 2024-02-02 RX ORDER — SPIRONOLACTONE 25 MG/1
25 TABLET, FILM COATED ORAL DAILY
Refills: 0 | Status: DISCONTINUED | OUTPATIENT
Start: 2024-02-03 | End: 2024-02-03

## 2024-02-02 RX ADMIN — Medication 40 MILLIEQUIVALENT(S): at 15:05

## 2024-02-02 RX ADMIN — SACUBITRIL AND VALSARTAN 1 TABLET(S): 24; 26 TABLET, FILM COATED ORAL at 17:53

## 2024-02-02 RX ADMIN — SPIRONOLACTONE 25 MILLIGRAM(S): 25 TABLET, FILM COATED ORAL at 12:21

## 2024-02-02 RX ADMIN — Medication 10 MG/HR: at 09:32

## 2024-02-02 RX ADMIN — APIXABAN 5 MILLIGRAM(S): 2.5 TABLET, FILM COATED ORAL at 09:26

## 2024-02-02 RX ADMIN — MAGNESIUM OXIDE 400 MG ORAL TABLET 400 MILLIGRAM(S): 241.3 TABLET ORAL at 15:05

## 2024-02-02 RX ADMIN — AMLODIPINE BESYLATE 10 MILLIGRAM(S): 2.5 TABLET ORAL at 06:01

## 2024-02-02 RX ADMIN — Medication 100 MILLIGRAM(S): at 12:21

## 2024-02-02 RX ADMIN — APIXABAN 5 MILLIGRAM(S): 2.5 TABLET, FILM COATED ORAL at 22:18

## 2024-02-02 RX ADMIN — CARVEDILOL PHOSPHATE 12.5 MILLIGRAM(S): 80 CAPSULE, EXTENDED RELEASE ORAL at 06:01

## 2024-02-02 RX ADMIN — MAGNESIUM OXIDE 400 MG ORAL TABLET 800 MILLIGRAM(S): 241.3 TABLET ORAL at 11:09

## 2024-02-02 RX ADMIN — Medication 40 MILLIEQUIVALENT(S): at 17:53

## 2024-02-02 RX ADMIN — SACUBITRIL AND VALSARTAN 1 TABLET(S): 24; 26 TABLET, FILM COATED ORAL at 06:01

## 2024-02-02 RX ADMIN — Medication 5 MG/HR: at 16:04

## 2024-02-02 RX ADMIN — ATORVASTATIN CALCIUM 40 MILLIGRAM(S): 80 TABLET, FILM COATED ORAL at 22:18

## 2024-02-02 RX ADMIN — CARVEDILOL PHOSPHATE 25 MILLIGRAM(S): 80 CAPSULE, EXTENDED RELEASE ORAL at 17:54

## 2024-02-02 NOTE — PROGRESS NOTE ADULT - PROBLEM SELECTOR PLAN 5
- Continue: Allopurinol 100 mg QD     #?Hx Lupus  Pt denies, does not follow with rheumatologist, was dx with Lupus previous admission 2017, denies confirmed dx of lupus and does not take Plaquenil. No concern for acute flare at this time     DVT ppx: Eliquis   F: tolerating PO intake   E: Keep K > 4, Mg > 2  N: DASH/TLC w/ CC, 1.2 L fluid restriction  Code: Full  Dispo: pending clinical progression    Case discussed with Dr. Mccloud - Continue: Allopurinol 100 mg QD     #?Hx Lupus  Pt denies, does not follow with rheumatologist, was dx with Lupus previous admission 2017, denies confirmed dx of lupus and does not take Plaquenil. No concern for acute flare at this time     DVT ppx: Eliquis   F: tolerating PO intake   E: Keep K > 4, Mg > 2  N: DASH/TLC w/ CC, 1.2 L fluid restriction  Code: Full  Dispo: Dc home tomorrow     Case discussed with Dr. Mccloud

## 2024-02-02 NOTE — PROGRESS NOTE ADULT - PROBLEM SELECTOR PLAN 1
P/w SAMUEL/SOB x3 days, WWP throughout, satting 92% on room air, able to lay falt  - BNP 6921, Trop 38>45 likely demand ischemia, CK/CKMB normal  - CXR:  b/l pleural effusion and LLL opacity, RVP negative   - TTE 1/31/24: EF 46%, mod-severe AI, severe MR   - Diuresis: Lasix gtt 5mL/hr. Plan to d/c in AM and transition to torsemide 20mg qd on discharge  - GDMT:  Entresto 97-102mg BID, Coreg 25 mg BID, Angelo 25 mg M/W/F   - Core measures, strict I&Os, daily weights P/w SAMUEL/SOB x3 days, WWP throughout, satting 92% on room air, lying flat  - BNP 6921, Trop 38>45 likely demand ischemia, CK/CKMB normal  - CXR:  b/l pleural effusion and LLL opacity, RVP negative   - TTE 1/31/24: EF 46%, mod-severe AI, severe MR   - Diuresis: Lasix gtt 5mL/hr. Plan to d/c in AM and transition to torsemide 20mg qd on discharge  - GDMT: Entresto 97-102mg BID, Coreg 25 mg BID, Brewster 25 mg M/W/F   - Core measures, strict I&Os, daily weights P/w SAMUEL/SOB x3 days, WWP throughout, satting 92% on room air, lying flat  - BNP 6921, Trop 38>45 likely demand ischemia, CK/CKMB normal  - CXR:  b/l pleural effusion and LLL opacity, RVP negative   - TTE 1/31/24: EF 46%, mod-severe AI, severe MR   - Diuresis: Lasix gtt 5mL/hr. Plan to d/c in AM and transition to torsemide 20mg qd on discharge  - GDMT: Entresto 97-102mg BID, Coreg 25 mg BID, Harper 25 mg QD   - Core measures, strict I&Os, daily weights

## 2024-02-02 NOTE — DISCHARGE NOTE NURSING/CASE MANAGEMENT/SOCIAL WORK - NSDCFUADDAPPT_GEN_ALL_CORE_FT
Please follow up with your cardiologist Dr. Ramirez on 2/20/24 at 9:20 AM.    Please follow up with Dr. Sepulveda next week. They will call you with an appointment on Monday. He is located at:   [address] [place] [address], [address]  [phone]

## 2024-02-02 NOTE — PROGRESS NOTE ADULT - SUBJECTIVE AND OBJECTIVE BOX
Patient discussed on morning rounds with Dr. Henderson     SUBJECTIVE ASSESSMENT:  78y Male with no acute complaints.          Vital Signs Last 24 Hrs  T(C): 36.8 (02 Feb 2024 08:43), Max: 36.8 (01 Feb 2024 16:12)  T(F): 98.2 (02 Feb 2024 08:43), Max: 98.3 (01 Feb 2024 16:12)  HR: 62 (02 Feb 2024 08:43) (62 - 92)  BP: 124/56 (02 Feb 2024 08:43) (124/56 - 187/66)  BP(mean): 79 (02 Feb 2024 08:43) (79 - 108)  RR: 18 (02 Feb 2024 08:43) (16 - 20)  SpO2: 96% (02 Feb 2024 05:54) (92% - 97%)    Parameters below as of 02 Feb 2024 08:43  Patient On (Oxygen Delivery Method): room air      I&O's Detail    01 Feb 2024 07:01  -  02 Feb 2024 07:00  --------------------------------------------------------  IN:    Oral Fluid: 840 mL  Total IN: 840 mL    OUT:    Voided (mL): 2905 mL  Total OUT: 2905 mL    Total NET: -2065 mL    PHYSICAL EXAM:    General: Sitting in bed comfortably in NAD  Neuro: A&Ox3, no focal deficits   HEENT: NCAT, EOMI   Cardiac: Regular rate and rhythm, normal S1 and S2. No m/r/g   Pulm: Breathing comfortably on RA. No signs of respiratory distress. Lungs are CTA b/l without wheezes, rales, or rhonchi   Abdomen: Soft, non-distended, non-tender. + bowel sounds   : No handley  Extremities: WWP, 3+/4+ pitting edema appreciated over b/l LE.  No calf tenderness. SCDs and TEDs in place  MSK: Full AROM     LABS:                        16.4   6.72  )-----------( 253      ( 01 Feb 2024 05:30 )             50.6     02-01    140  |  103  |  21  ----------------------------<  104<H>  3.8   |  27  |  1.49<H>    Ca    8.9      01 Feb 2024 16:42  Mg     1.9     02-01    TPro  5.4<L>  /  Alb  3.2<L>  /  TBili  0.9  /  DBili  x   /  AST  15  /  ALT  7<L>  /  AlkPhos  72  02-01      Urinalysis Basic - ( 01 Feb 2024 16:42 )    Color: x / Appearance: x / SG: x / pH: x  Gluc: 104 mg/dL / Ketone: x  / Bili: x / Urobili: x   Blood: x / Protein: x / Nitrite: x   Leuk Esterase: x / RBC: x / WBC x   Sq Epi: x / Non Sq Epi: x / Bacteria: x        MEDICATIONS  (STANDING):  allopurinol 100 milliGRAM(s) Oral daily  amLODIPine   Tablet 10 milliGRAM(s) Oral daily  apixaban 5 milliGRAM(s) Oral every 12 hours  atorvastatin 40 milliGRAM(s) Oral at bedtime  carvedilol 12.5 milliGRAM(s) Oral every 12 hours  furosemide Infusion 10 mG/Hr (5 mL/Hr) IV Continuous <Continuous>  influenza  Vaccine (HIGH DOSE) 0.7 milliLiter(s) IntraMuscular once  sacubitril 97 mG/valsartan 103 mG 1 Tablet(s) Oral two times a day  spironolactone 25 milliGRAM(s) Oral <User Schedule>    MEDICATIONS  (PRN):        RADIOLOGY & ADDITIONAL TESTS:

## 2024-02-02 NOTE — PROGRESS NOTE ADULT - ASSESSMENT
78 year old M with hx of hypertension, sleep apnea, obesity, aortic insufficiency, PE on Eliquis, and gout presenting with 3 days of worsening SOB with productive cough and LE edema. Has never had a diagnosis of HF, but knows he has aortic insufficiency for which he takes medications for. On 1/31/24, patient underwent a TTE which demonstrated moderate-severe AI and severe MR. Structural heart was consulted for potential intervention.    Problem 1: AI, MR  - TTE on 1/31/24 demonstrated severe MR and mod-severe TR   - please obtain FAREED when patient euvolemic   - Patient currently requiring lasix gtt for diuresis, continue diuresis. Appears volume overloaded on exam today   - Maintain accurate I/Os and daily weights   - remainder of care per primary team     Problem 2: HTN   - takes amlodipine at home   - care per primary team     Problem 3: Hx of PE  - on Eliquis 5 mg BID   - remiander of care per primary team     Problem 4: Gout   - on allopurionol   - care per primary team     Case discussed with Dr. Henderson who agrees with above plan     Consult PA Number: [phone]     78 year old M with hx of hypertension, sleep apnea, obesity, aortic insufficiency, PE on Eliquis, and gout presenting with 3 days of worsening SOB with productive cough and LE edema. Has never had a diagnosis of HF, but knows he has aortic insufficiency for which he takes medications for. On 1/31/24, patient underwent a TTE which demonstrated moderate-severe AI and severe MR. Structural heart was consulted for potential intervention.    Problem 1: AI, MR  - TTE on 1/31/24 demonstrated severe MR and mod-severe TR   - please obtain FAREED when patient euvolemic   - if patient volume optimized over weekend, can be discharged without FAREED. PLease have patient follow-up with Dr. Henderson in 1-2 weeks. The office number is [phone]. Please schedule an appointment before discharge. Patient will need TTE on day of appointment to assess degree of MR  - Patient currently requiring lasix gtt for diuresis, continue diuresis. Appears volume overloaded on exam today   - Maintain accurate I/Os and daily weights   - remainder of care per primary team     Problem 2: HTN   - takes amlodipine at home   - care per primary team     Problem 3: Hx of PE  - on Eliquis 5 mg BID   - remiander of care per primary team     Problem 4: Gout   - on allopurionol   - care per primary team     Case discussed with Dr. Henderson who agrees with above plan     Consult PA Number: [phone]

## 2024-02-02 NOTE — DISCHARGE NOTE NURSING/CASE MANAGEMENT/SOCIAL WORK - PATIENT PORTAL LINK FT
You can access the FollowMyHealth Patient Portal offered by [facility] by registering at the following website: [email or web address]. By joining [employer]’s FollowMyHealth portal, you will also be able to view your health information using other applications (apps) compatible with our system.

## 2024-02-02 NOTE — PROGRESS NOTE ADULT - NS ATTEND AMEND GEN_ALL_CORE FT
79 yo m admitted with fluid overload, TTE found to have severe AI, severe MR  aggressive diuresis while inpatient  obtain FAREED for further delineation of pathology and anatomy for consideration of intervention  will follow
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 will require further afterload w/ Hydral 25 PO TID; Structural consulted/following, Pt. was scheduled for FAREED today but was cancelled d/t busy schedule. Will order TTE for tmrw; Would favor continued diuresis thru the weekend with FAREED on Monday, though not sure pt. willing to stay thru the weekend.  -AF - currently in NSR; c/w Coreg as above; c/w Eliquis 5 BID  -DASH diet  -OOB to chair   -DVT PPx  -Dispo: Cardiac tele  -Full Code    Migel Mccloud MD

## 2024-02-02 NOTE — PROGRESS NOTE ADULT - PROBLEM SELECTOR PLAN 3
SBP 170s  - Continue Amlodipine 10mg qd, Entresto  mg BID, Coreg 25mg BID  - C/w Angelo 25 mg (M/W/F) SBP 170s  - Continue Amlodipine 10mg qd, Entresto  mg BID, Coreg 25mg BID, Angelo 25 mg QD

## 2024-02-02 NOTE — PROGRESS NOTE ADULT - PROBLEM SELECTOR PROBLEM 2
Aortic insufficiency
Severe mitral regurgitation

## 2024-02-02 NOTE — PROVIDER CONTACT NOTE (OTHER) - BACKGROUND
Pt admitted for HTN, L AC 20g IV is positional and leaking. R FA IV came out. pt is a hard stick due to edema on the hands

## 2024-02-02 NOTE — PROGRESS NOTE ADULT - PROBLEM SELECTOR PLAN 2
TTE w/ severe MR and mod-severe AI w/ subcentimeter vegetation  - Afebrile, no leukocytosis, BCx NGTD x1 day  - S/p FAREED 2/2/24: ___  - CTSx following - recs appreciated TTE w/ severe MR and mod-severe AI w/ subcentimeter vegetation  - Afebrile, no leukocytosis, BCx NGTD x1 day  - Planned for FAREED on 2/2 however schedule was full; pt will follow up outpt w Dr. Sepulveda to undergo FAREED next week  - CTSx following - recs appreciated

## 2024-02-02 NOTE — PROGRESS NOTE ADULT - SUBJECTIVE AND OBJECTIVE BOX
INCOMPLETE  Cardiology PA Adult Progress Note    SUBJECTIVE ASSESSMENT: Met with and examined the pt at bedside this morning seen lying comfortably in bed. He states he is feeling well this morning and his breathing has improved. He denies any CP, dizziness/lightheadedness, n/v, palpitations   	  MEDICATIONS:  amLODIPine   Tablet 10 milliGRAM(s) Oral daily  carvedilol 25 milliGRAM(s) Oral every 12 hours  furosemide Infusion 10 mG/Hr IV Continuous <Continuous>  sacubitril 97 mG/valsartan 103 mG 1 Tablet(s) Oral two times a day  spironolactone 25 milliGRAM(s) Oral <User Schedule>    allopurinol 100 milliGRAM(s) Oral daily  atorvastatin 40 milliGRAM(s) Oral at bedtime    apixaban 5 milliGRAM(s) Oral every 12 hours  influenza  Vaccine (HIGH DOSE) 0.7 milliLiter(s) IntraMuscular once    	  VITAL SIGNS:  T(C): 36.6 (02-02-24 @ 12:32), Max: 36.8 (02-01-24 @ 16:12)  HR: 68 (02-02-24 @ 12:32) (62 - 92)  BP: 149/75 (02-02-24 @ 12:32) (124/56 - 187/66)  RR: 18 (02-02-24 @ 12:32) (18 - 20)  SpO2: 92% (02-02-24 @ 10:06) (92% - 97%)  Wt(kg): --    I&O's Summary    01 Feb 2024 07:01  -  02 Feb 2024 07:00  --------------------------------------------------------  IN: 840 mL / OUT: 2905 mL / NET: -2065 mL    02 Feb 2024 07:01  -  02 Feb 2024 14:20  --------------------------------------------------------  IN: 180 mL / OUT: 400 mL / NET: -220 mL        Weight (kg): 125.7 (02-02 @ 06:00)                                     PHYSICAL EXAM:  Appearance: Normal, obese  HEENT: Normal oral mucosa, EOMI	  Neck: Supple, - JVD  Cardiovascular: Normal S1 S2, No murmurs  Respiratory: Lungs clear to auscultation/Decreased Breath Sounds/No Rales, Rhonchi, Wheezing	  Gastrointestinal:  Soft, Non-tender, + BS	  Skin: No rashes, No ecchymoses, No cyanosis  Extremities: 2+ pitting edema b/l. Normal ROM  Vascular: Peripheral pulses palpable 2+ bilaterally  Neurologic: Non-focal  Psychiatry: A & O x 3, Mood & affect appropriate    LABS:	 	                          16.5   7.33  )-----------( 254      ( 02 Feb 2024 09:22 )             50.0     02-02    140  |  102  |  19  ----------------------------<  93  3.3<L>   |  28  |  1.32<H>    Ca    9.0      02 Feb 2024 12:50  Mg     1.9     02-02    TPro  5.4<L>  /  Alb  3.2<L>  /  TBili  0.9  /  DBili  x   /  AST  15  /  ALT  7<L>  /  AlkPhos  72  02-01   Cardiology PA Adult Progress Note    SUBJECTIVE ASSESSMENT: Met with and examined the pt at bedside this morning seen lying comfortably in bed. He states he is feeling well this morning and his breathing has improved. He denies any CP, dizziness/lightheadedness, n/v, palpitations   	  MEDICATIONS:  amLODIPine   Tablet 10 milliGRAM(s) Oral daily  carvedilol 25 milliGRAM(s) Oral every 12 hours  furosemide Infusion 10 mG/Hr IV Continuous <Continuous>  sacubitril 97 mG/valsartan 103 mG 1 Tablet(s) Oral two times a day  spironolactone 25 milliGRAM(s) Oral <User Schedule>    allopurinol 100 milliGRAM(s) Oral daily  atorvastatin 40 milliGRAM(s) Oral at bedtime    apixaban 5 milliGRAM(s) Oral every 12 hours  influenza  Vaccine (HIGH DOSE) 0.7 milliLiter(s) IntraMuscular once    	  VITAL SIGNS:  T(C): 36.6 (02-02-24 @ 12:32), Max: 36.8 (02-01-24 @ 16:12)  HR: 68 (02-02-24 @ 12:32) (62 - 92)  BP: 149/75 (02-02-24 @ 12:32) (124/56 - 187/66)  RR: 18 (02-02-24 @ 12:32) (18 - 20)  SpO2: 92% (02-02-24 @ 10:06) (92% - 97%)  Wt(kg): --    I&O's Summary    01 Feb 2024 07:01  -  02 Feb 2024 07:00  --------------------------------------------------------  IN: 840 mL / OUT: 2905 mL / NET: -2065 mL    02 Feb 2024 07:01  -  02 Feb 2024 14:20  --------------------------------------------------------  IN: 180 mL / OUT: 400 mL / NET: -220 mL      Weight (kg): 125.7 (02-02 @ 06:00)                                     PHYSICAL EXAM:  Appearance: Normal, obese  HEENT: Normal oral mucosa, EOMI	  Neck: Supple, - JVD  Cardiovascular: Normal S1 S2, No murmurs  Respiratory: Lungs clear to auscultation. No Rales, Rhonchi, Wheezing	  Gastrointestinal:  Soft, Non-tender, + BS	  Skin: No rashes, No ecchymoses, No cyanosis  Extremities: 2+ pitting edema b/l. Normal ROM  Vascular: RAD 2+. Unable to palpate PT/DP due to edema   Neurologic: Non-focal  Psychiatry: A & O x 3, Mood & affect appropriate    LABS:	 	                        16.5   7.33  )-----------( 254      ( 02 Feb 2024 09:22 )             50.0     02-02    140  |  102  |  19  ----------------------------<  93  3.3<L>   |  28  |  1.32<H>    Ca    9.0      02 Feb 2024 12:50  Mg     1.9     02-02    TPro  5.4<L>  /  Alb  3.2<L>  /  TBili  0.9  /  DBili  x   /  AST  15  /  ALT  7<L>  /  AlkPhos  72  02-01

## 2024-02-02 NOTE — DISCHARGE NOTE NURSING/CASE MANAGEMENT/SOCIAL WORK - NSCORESITESY/N_GEN_A_CORE_RD
PACU Transfer to [facility]    Vitals:    11/12/21 1145   BP: (!) 136/90   Pulse: 75   Resp: 14   Temp: 97.6 °F (36.4 °C)   SpO2: 100%   BP is within 20% of admission.       Intake/Output Summary (Last 24 hours) at 11/12/2021 1152  Last data filed at 11/12/2021 1151  Gross per 24 hour   Intake 1450 ml   Output 50 ml   Net 1400 ml       Pain assessment:  none  Pain Level: 0    Patient transferred to care of TRACEY RN.    11/12/2021 11:52 AM No

## 2024-02-02 NOTE — PROGRESS NOTE ADULT - PROBLEM SELECTOR PLAN 4
Hx unprovoked PE/DVT 2015   - Continue: Eliquis 5 mg BID    #Hx AFlutter   Hx Aflutter as seen on EKG 2017, pt asymptomatic, HR 70s-80s, currently in NSR  - AC: Eliquis 5 mg BID  - Rate-control: Coreg 25 mg BID Hx unprovoked PE/DVT 2015   - Continue: Eliquis 5 mg BID    #Hx AFlutter   Hx Aflutter as seen on EKG 2017. Currently NSR HR 70s-80s  - AC: Eliquis 5 mg BID  - Rate-control: Coreg 25 mg BID

## 2024-02-02 NOTE — PROVIDER CONTACT NOTE (OTHER) - SITUATION
Pt came in for CHF exacerbation. stepped up to 5 [facility] for HTN pt came in with a L18g in the AC and yesterday a R 22g in the FA was placed by day RN. R IV came out inadvertently in beginning of shift

## 2024-02-02 NOTE — PROVIDER CONTACT NOTE (OTHER) - ASSESSMENT
3 floor nurses and ANM from [facility] tried to get access. all were unsuccessful. WILLAM Sandoval and WILLAM Lawton aware

## 2024-02-03 ENCOUNTER — NON-APPOINTMENT (OUTPATIENT)
Age: 79
End: 2024-02-03

## 2024-02-03 VITALS
HEART RATE: 64 BPM | SYSTOLIC BLOOD PRESSURE: 144 MMHG | DIASTOLIC BLOOD PRESSURE: 76 MMHG | OXYGEN SATURATION: 93 % | RESPIRATION RATE: 17 BRPM | TEMPERATURE: 97 F

## 2024-02-03 LAB
ANION GAP SERPL CALC-SCNC: 11 MMOL/L — SIGNIFICANT CHANGE UP (ref 5–17)
BUN SERPL-MCNC: 25 MG/DL — HIGH (ref 7–23)
CALCIUM SERPL-MCNC: 8.9 MG/DL — SIGNIFICANT CHANGE UP (ref 8.4–10.5)
CHLORIDE SERPL-SCNC: 103 MMOL/L — SIGNIFICANT CHANGE UP (ref 96–108)
CO2 SERPL-SCNC: 25 MMOL/L — SIGNIFICANT CHANGE UP (ref 22–31)
CREAT SERPL-MCNC: 1.38 MG/DL — HIGH (ref 0.5–1.3)
EGFR: 52 ML/MIN/1.73M2 — LOW
GLUCOSE SERPL-MCNC: 92 MG/DL — SIGNIFICANT CHANGE UP (ref 70–99)
HCT VFR BLD CALC: 51.7 % — HIGH (ref 39–50)
HGB BLD-MCNC: 17.4 G/DL — HIGH (ref 13–17)
MAGNESIUM SERPL-MCNC: 2.2 MG/DL — SIGNIFICANT CHANGE UP (ref 1.6–2.6)
MCHC RBC-ENTMCNC: 29.7 PG — SIGNIFICANT CHANGE UP (ref 27–34)
MCHC RBC-ENTMCNC: 33.7 GM/DL — SIGNIFICANT CHANGE UP (ref 32–36)
MCV RBC AUTO: 88.2 FL — SIGNIFICANT CHANGE UP (ref 80–100)
NRBC # BLD: 0 /100 WBCS — SIGNIFICANT CHANGE UP (ref 0–0)
PLATELET # BLD AUTO: 248 K/UL — SIGNIFICANT CHANGE UP (ref 150–400)
POTASSIUM SERPL-MCNC: 4.2 MMOL/L — SIGNIFICANT CHANGE UP (ref 3.5–5.3)
POTASSIUM SERPL-SCNC: 4.2 MMOL/L — SIGNIFICANT CHANGE UP (ref 3.5–5.3)
RBC # BLD: 5.86 M/UL — HIGH (ref 4.2–5.8)
RBC # FLD: 15.5 % — HIGH (ref 10.3–14.5)
SODIUM SERPL-SCNC: 139 MMOL/L — SIGNIFICANT CHANGE UP (ref 135–145)
WBC # BLD: 8.23 K/UL — SIGNIFICANT CHANGE UP (ref 3.8–10.5)
WBC # FLD AUTO: 8.23 K/UL — SIGNIFICANT CHANGE UP (ref 3.8–10.5)

## 2024-02-03 PROCEDURE — 83605 ASSAY OF LACTIC ACID: CPT

## 2024-02-03 PROCEDURE — 93005 ELECTROCARDIOGRAM TRACING: CPT

## 2024-02-03 PROCEDURE — 36415 COLL VENOUS BLD VENIPUNCTURE: CPT

## 2024-02-03 PROCEDURE — 83735 ASSAY OF MAGNESIUM: CPT

## 2024-02-03 PROCEDURE — 87040 BLOOD CULTURE FOR BACTERIA: CPT

## 2024-02-03 PROCEDURE — 86803 HEPATITIS C AB TEST: CPT

## 2024-02-03 PROCEDURE — 84100 ASSAY OF PHOSPHORUS: CPT

## 2024-02-03 PROCEDURE — 82330 ASSAY OF CALCIUM: CPT

## 2024-02-03 PROCEDURE — 71045 X-RAY EXAM CHEST 1 VIEW: CPT

## 2024-02-03 PROCEDURE — 82553 CREATINE MB FRACTION: CPT

## 2024-02-03 PROCEDURE — 85027 COMPLETE CBC AUTOMATED: CPT

## 2024-02-03 PROCEDURE — 87637 SARSCOV2&INF A&B&RSV AMP PRB: CPT

## 2024-02-03 PROCEDURE — 80053 COMPREHEN METABOLIC PANEL: CPT

## 2024-02-03 PROCEDURE — 96375 TX/PRO/DX INJ NEW DRUG ADDON: CPT

## 2024-02-03 PROCEDURE — 84295 ASSAY OF SERUM SODIUM: CPT

## 2024-02-03 PROCEDURE — 80048 BASIC METABOLIC PNL TOTAL CA: CPT

## 2024-02-03 PROCEDURE — 99285 EMERGENCY DEPT VISIT HI MDM: CPT | Mod: 25

## 2024-02-03 PROCEDURE — 84484 ASSAY OF TROPONIN QUANT: CPT

## 2024-02-03 PROCEDURE — 85025 COMPLETE CBC W/AUTO DIFF WBC: CPT

## 2024-02-03 PROCEDURE — 93306 TTE W/DOPPLER COMPLETE: CPT | Mod: 26

## 2024-02-03 PROCEDURE — 83036 HEMOGLOBIN GLYCOSYLATED A1C: CPT

## 2024-02-03 PROCEDURE — 96374 THER/PROPH/DIAG INJ IV PUSH: CPT

## 2024-02-03 PROCEDURE — 82550 ASSAY OF CK (CPK): CPT

## 2024-02-03 PROCEDURE — 82803 BLOOD GASES ANY COMBINATION: CPT

## 2024-02-03 PROCEDURE — 71046 X-RAY EXAM CHEST 2 VIEWS: CPT

## 2024-02-03 PROCEDURE — 93306 TTE W/DOPPLER COMPLETE: CPT

## 2024-02-03 PROCEDURE — 94660 CPAP INITIATION&MGMT: CPT

## 2024-02-03 PROCEDURE — 97161 PT EVAL LOW COMPLEX 20 MIN: CPT

## 2024-02-03 PROCEDURE — 84132 ASSAY OF SERUM POTASSIUM: CPT

## 2024-02-03 PROCEDURE — 80061 LIPID PANEL: CPT

## 2024-02-03 PROCEDURE — 93321 DOPPLER ECHO F-UP/LMTD STD: CPT

## 2024-02-03 PROCEDURE — 83880 ASSAY OF NATRIURETIC PEPTIDE: CPT

## 2024-02-03 RX ORDER — SACUBITRIL AND VALSARTAN 24; 26 MG/1; MG/1
0 TABLET, FILM COATED ORAL
Qty: 0 | Refills: 3 | DISCHARGE

## 2024-02-03 RX ORDER — ATORVASTATIN CALCIUM 80 MG/1
1 TABLET, FILM COATED ORAL
Qty: 30 | Refills: 3
Start: 2024-02-03 | End: 2024-06-01

## 2024-02-03 RX ORDER — APIXABAN 2.5 MG/1
1 TABLET, FILM COATED ORAL
Qty: 60 | Refills: 3
Start: 2024-02-03 | End: 2024-06-01

## 2024-02-03 RX ORDER — CARVEDILOL PHOSPHATE 80 MG/1
1 CAPSULE, EXTENDED RELEASE ORAL
Qty: 0 | Refills: 0 | DISCHARGE

## 2024-02-03 RX ORDER — SPIRONOLACTONE 25 MG/1
0 TABLET, FILM COATED ORAL
Qty: 0 | Refills: 0 | DISCHARGE

## 2024-02-03 RX ORDER — SPIRONOLACTONE 25 MG/1
1 TABLET, FILM COATED ORAL
Qty: 30 | Refills: 3
Start: 2024-02-03 | End: 2024-06-01

## 2024-02-03 RX ORDER — AMLODIPINE BESYLATE 2.5 MG/1
0 TABLET ORAL
Qty: 0 | Refills: 0 | DISCHARGE

## 2024-02-03 RX ORDER — AMLODIPINE BESYLATE 2.5 MG/1
1 TABLET ORAL
Qty: 30 | Refills: 3
Start: 2024-02-03 | End: 2024-06-01

## 2024-02-03 RX ORDER — SPIRONOLACTONE 25 MG/1
1 TABLET, FILM COATED ORAL
Qty: 30 | Refills: 3 | DISCHARGE
Start: 2024-02-03 | End: 2024-06-01

## 2024-02-03 RX ORDER — CARVEDILOL PHOSPHATE 80 MG/1
1 CAPSULE, EXTENDED RELEASE ORAL
Qty: 60 | Refills: 3
Start: 2024-02-03 | End: 2024-06-01

## 2024-02-03 RX ORDER — SACUBITRIL AND VALSARTAN 24; 26 MG/1; MG/1
1 TABLET, FILM COATED ORAL
Qty: 60 | Refills: 3
Start: 2024-02-03 | End: 2024-06-01

## 2024-02-03 RX ORDER — FUROSEMIDE 40 MG
10 TABLET ORAL
Qty: 500 | Refills: 0 | Status: DISCONTINUED | OUTPATIENT
Start: 2024-02-03 | End: 2024-02-03

## 2024-02-03 RX ADMIN — AMLODIPINE BESYLATE 10 MILLIGRAM(S): 2.5 TABLET ORAL at 09:30

## 2024-02-03 RX ADMIN — Medication 100 MILLIGRAM(S): at 09:30

## 2024-02-03 RX ADMIN — Medication 5 MG/HR: at 02:26

## 2024-02-03 RX ADMIN — APIXABAN 5 MILLIGRAM(S): 2.5 TABLET, FILM COATED ORAL at 09:30

## 2024-02-03 RX ADMIN — CARVEDILOL PHOSPHATE 25 MILLIGRAM(S): 80 CAPSULE, EXTENDED RELEASE ORAL at 06:53

## 2024-02-03 RX ADMIN — SPIRONOLACTONE 25 MILLIGRAM(S): 25 TABLET, FILM COATED ORAL at 06:53

## 2024-02-03 RX ADMIN — SACUBITRIL AND VALSARTAN 1 TABLET(S): 24; 26 TABLET, FILM COATED ORAL at 09:30

## 2024-02-04 ENCOUNTER — TRANSCRIPTION ENCOUNTER (OUTPATIENT)
Age: 79
End: 2024-02-04

## 2024-02-05 ENCOUNTER — OUTPATIENT (OUTPATIENT)
Dept: OUTPATIENT SERVICES | Facility: HOSPITAL | Age: 79
LOS: 1 days | End: 2024-02-05
Payer: MEDICARE

## 2024-02-05 ENCOUNTER — RESULT REVIEW (OUTPATIENT)
Age: 79
End: 2024-02-05

## 2024-02-05 ENCOUNTER — APPOINTMENT (OUTPATIENT)
Dept: CARDIOTHORACIC SURGERY | Facility: CLINIC | Age: 79
End: 2024-02-05
Payer: MEDICARE

## 2024-02-05 VITALS
WEIGHT: 280 LBS | TEMPERATURE: 98.1 F | HEIGHT: 73 IN | RESPIRATION RATE: 16 BRPM | OXYGEN SATURATION: 93 % | HEART RATE: 75 BPM | SYSTOLIC BLOOD PRESSURE: 143 MMHG | BODY MASS INDEX: 37.11 KG/M2 | DIASTOLIC BLOOD PRESSURE: 66 MMHG

## 2024-02-05 DIAGNOSIS — I26.99 OTHER PULMONARY EMBOLISM W/OUT ACUTE COR PULMONALE: ICD-10-CM

## 2024-02-05 DIAGNOSIS — H26.9 UNSPECIFIED CATARACT: Chronic | ICD-10-CM

## 2024-02-05 DIAGNOSIS — Z78.9 OTHER SPECIFIED HEALTH STATUS: ICD-10-CM

## 2024-02-05 DIAGNOSIS — Z86.79 PERSONAL HISTORY OF OTHER DISEASES OF THE CIRCULATORY SYSTEM: ICD-10-CM

## 2024-02-05 DIAGNOSIS — Z87.39 PERSONAL HISTORY OF OTHER DISEASES OF THE MUSCULOSKELETAL SYSTEM AND CONNECTIVE TISSUE: ICD-10-CM

## 2024-02-05 DIAGNOSIS — E66.01 MORBID (SEVERE) OBESITY DUE TO EXCESS CALORIES: ICD-10-CM

## 2024-02-05 DIAGNOSIS — Z86.718 PERSONAL HISTORY OF OTHER VENOUS THROMBOSIS AND EMBOLISM: ICD-10-CM

## 2024-02-05 DIAGNOSIS — I34.0 NONRHEUMATIC MITRAL (VALVE) INSUFFICIENCY: ICD-10-CM

## 2024-02-05 DIAGNOSIS — I35.1 NONRHEUMATIC AORTIC (VALVE) INSUFFICIENCY: ICD-10-CM

## 2024-02-05 DIAGNOSIS — Z86.69 PERSONAL HISTORY OF OTHER DISEASES OF THE NERVOUS SYSTEM AND SENSE ORGANS: ICD-10-CM

## 2024-02-05 DIAGNOSIS — Z96.649 PRESENCE OF UNSPECIFIED ARTIFICIAL HIP JOINT: Chronic | ICD-10-CM

## 2024-02-05 DIAGNOSIS — Z86.39 PERSONAL HISTORY OF OTHER ENDOCRINE, NUTRITIONAL AND METABOLIC DISEASE: ICD-10-CM

## 2024-02-05 DIAGNOSIS — I50.22 CHRONIC SYSTOLIC (CONGESTIVE) HEART FAILURE: ICD-10-CM

## 2024-02-05 PROCEDURE — 76377 3D RENDER W/INTRP POSTPROCES: CPT | Mod: 26

## 2024-02-05 PROCEDURE — 93312 ECHO TRANSESOPHAGEAL: CPT

## 2024-02-05 PROCEDURE — 99203 OFFICE O/P NEW LOW 30 MIN: CPT

## 2024-02-05 PROCEDURE — 93312 ECHO TRANSESOPHAGEAL: CPT | Mod: 26

## 2024-02-06 PROBLEM — Z86.39 HISTORY OF HYPERLIPIDEMIA: Status: RESOLVED | Noted: 2024-02-06 | Resolved: 2024-02-06

## 2024-02-06 PROBLEM — E66.01 CLASS 2 SEVERE OBESITY DUE TO EXCESS CALORIES WITH SERIOUS COMORBIDITY AND BODY MASS INDEX (BMI) OF 36.0 TO 36.9 IN ADULT: Status: RESOLVED | Noted: 2024-02-06 | Resolved: 2024-02-06

## 2024-02-06 PROBLEM — I26.99 PULMONARY EMBOLISM, OTHER: Status: RESOLVED | Noted: 2024-02-06 | Resolved: 2024-02-06

## 2024-02-06 PROBLEM — Z86.69 HISTORY OF SLEEP APNEA: Status: RESOLVED | Noted: 2024-02-06 | Resolved: 2024-02-06

## 2024-02-06 PROBLEM — I50.22 HEART FAILURE WITH MILDLY REDUCED EJECTION FRACTION (HFMREF): Status: RESOLVED | Noted: 2024-02-06 | Resolved: 2024-02-06

## 2024-02-06 PROBLEM — Z86.79 HISTORY OF ATRIAL FLUTTER: Status: RESOLVED | Noted: 2024-02-06 | Resolved: 2024-02-06

## 2024-02-06 PROBLEM — Z87.39 HISTORY OF GOUT: Status: RESOLVED | Noted: 2024-02-06 | Resolved: 2024-02-06

## 2024-02-06 PROBLEM — Z86.79 HISTORY OF HYPERTENSION: Status: RESOLVED | Noted: 2024-02-06 | Resolved: 2024-02-06

## 2024-02-06 PROBLEM — I35.1 SEVERE AORTIC VALVE REGURGITATION: Status: RESOLVED | Noted: 2024-02-06 | Resolved: 2024-02-06

## 2024-02-06 PROBLEM — Z78.9 NON-SMOKER: Status: ACTIVE | Noted: 2024-02-06

## 2024-02-06 PROBLEM — Z86.718 HISTORY OF DEEP VEIN THROMBOSIS (DVT) OF LOWER EXTREMITY: Status: RESOLVED | Noted: 2024-02-06 | Resolved: 2024-02-06

## 2024-02-06 RX ORDER — NALTREXONE HYDROCHLORIDE AND BUPROPION HYDROCHLORIDE 8; 90 MG/1; MG/1
8-90 TABLET, EXTENDED RELEASE ORAL
Refills: 0 | Status: ACTIVE | COMMUNITY

## 2024-02-06 RX ORDER — ALLOPURINOL 100 MG/1
100 TABLET ORAL DAILY
Refills: 0 | Status: ACTIVE | COMMUNITY

## 2024-02-06 RX ORDER — SEMAGLUTIDE 1.34 MG/ML
2 INJECTION, SOLUTION SUBCUTANEOUS
Refills: 0 | Status: ACTIVE | COMMUNITY

## 2024-02-06 RX ORDER — METFORMIN HYDROCHLORIDE 500 MG/1
500 TABLET, COATED ORAL TWICE DAILY
Refills: 0 | Status: ACTIVE | COMMUNITY

## 2024-02-06 RX ORDER — ATORVASTATIN CALCIUM 40 MG/1
40 TABLET, FILM COATED ORAL DAILY
Refills: 0 | Status: ACTIVE | COMMUNITY

## 2024-02-06 RX ORDER — APIXABAN 5 MG/1
5 TABLET, FILM COATED ORAL
Qty: 60 | Refills: 3 | Status: ACTIVE | COMMUNITY

## 2024-02-07 ENCOUNTER — NON-APPOINTMENT (OUTPATIENT)
Age: 79
End: 2024-02-07

## 2024-02-07 DIAGNOSIS — I35.1 NONRHEUMATIC AORTIC (VALVE) INSUFFICIENCY: ICD-10-CM

## 2024-02-07 DIAGNOSIS — Z79.85 LONG-TERM (CURRENT) USE OF INJECTABLE NON-INSULIN ANTIDIABETIC DRUGS: ICD-10-CM

## 2024-02-07 DIAGNOSIS — E66.9 OBESITY, UNSPECIFIED: ICD-10-CM

## 2024-02-07 DIAGNOSIS — J18.9 PNEUMONIA, UNSPECIFIED ORGANISM: ICD-10-CM

## 2024-02-07 DIAGNOSIS — I50.23 ACUTE ON CHRONIC SYSTOLIC (CONGESTIVE) HEART FAILURE: ICD-10-CM

## 2024-02-07 DIAGNOSIS — I11.0 HYPERTENSIVE HEART DISEASE WITH HEART FAILURE: ICD-10-CM

## 2024-02-07 DIAGNOSIS — Z79.84 LONG TERM (CURRENT) USE OF ORAL HYPOGLYCEMIC DRUGS: ICD-10-CM

## 2024-02-07 DIAGNOSIS — Z86.711 PERSONAL HISTORY OF PULMONARY EMBOLISM: ICD-10-CM

## 2024-02-07 DIAGNOSIS — M10.9 GOUT, UNSPECIFIED: ICD-10-CM

## 2024-02-09 NOTE — ASSESSMENT
[FreeTextEntry1] : 78 Y male with BMI of 36.6, and PHM of ADAM (uses CPAP), gout, RCC s/p cryoablation, BPH s/p prostatectomy, unprovoked PE/DVT (2015, on Eliquis), paroxysmal Aflutter (on Eliquis), prior pericardial effusion requiring drainage (2017), and aortic insufficiency, who was admitted at [facility] from January 30th to February 3rd with ADHF found to have newly depressed LVEF with new severe MR and moderate to severe AR on TTE, patient was medically optimized with plans for outpatient FAREED to better evaluate valves. Now patient presents for follow up after hospital discharge. Patient is clinically stable with improved volume status; NYHA class II. Dr. Sepulveda have independently seen and evaluated the patient in this face-to-face encounter. Dr. Sepulveda have reviewed patient's history and pertinent clinical data. Echocardiogram on 2/3/24 showed LVEF 35-40%, normal RV function, severe AR with small mobile poorly differentiated mass seen on the ventricular aspect of aortic valve and severe MR. Results discussed with patient. Dr. Sepulveda recommends proceeding with FAREED to better assess patient heart valves. Return to [facility] clinic after testing. All questions were answered.

## 2024-02-09 NOTE — PLAN
[TextEntry] : # HFrEF with mixed valve disease - continue current medication regimen - FAREED today - follow up with Dr. Gonzalez as scheduled for ongoing health maintenance. - follow up with Dr. Jay Vang for ongoing cardiology care.  - return to [facility] clinic with Dr. Sepulveda after testing or as needed.   I, Dr. Sin Sepulveda, personally performed the evaluation and management (E/M) services for this new patient. That E/M includes conducting the clinically appropriate initial history &/or exam, assessing all conditions, and establishing the plan of care. Today, my NICKIE, noted herewith, was here to observe my evaluation and management service for this patient & follow plan of care established by me going forward.

## 2024-02-09 NOTE — RESULTS/DATA
[TextEntry] : TTE 1/31/24 CONCLUSIONS:  1. Mildly reduced left ventricular systolic function.  2. Dilated left ventriclular size.  3. Normal right ventricular size and systolic function.  4. Severely dilated left atrium.  5. A subcentimeter mobile echodensity is noted on the ventricular aspect of the aortic valve. Differential includes fibrinous strand versus a small vegetation. Clinical correlation is recommended.  6. Moderate-to-severe aortic regurgitation. The jet is highly eccentric and posteriorly directed, which may lend to underestimation of severity.  7. Severe mitral regurgitation. The jet is highly eccentric.  8. No evidence of pulmonary hypertension.  9. Trivial pericardial effusion. 10. Left pleural effusion. 11. 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 12. The proximal ascending aorta is dilated measuring 4.50 cm. 13. Compared to the previous TTE performed on 11/7/2017, ventricular function is lower and degree of mitral and aortic regurgitation has progressed. 14. Consider FAREED to better visualize the mitral and aortic valve and elucidate the mechanism of regurgitation, if clinically indicated.  Limited TTE 2/3/24 CONCLUSIONS:  1. Limited study obtained for evaluation of valvular function.  2. Moderately reduced left ventricular systolic function.  3. Normal right ventricular size and systolic function.  4. Severe aortic regurgitation. Small mobile poorly differentiated mass seen on ventricular aspect of aortic valve.  5. Severe mitral regurgitation.  6. Trivial pericardial effusion.  7. Left pleural effusion.  8. Consider FAREED to better visualize the aortic and mitral valve and elucidate the mechanism of mitral regurgitation, if clinically indicated.  2/3/24 labs reviewed: WBC 8.23 Hgb 17.4 Hct 51.7  Cr 1.38  1/30/24 Pro BNP 6921 1/30/24: Blood Cx x 2 no growth

## 2024-02-09 NOTE — HISTORY OF PRESENT ILLNESS
[FreeTextEntry1] : 78 Y male with BMI of 36.6, and PHM of ADAM (uses CPAP), gout, RCC s/p cryoablation, BPH s/p prostatectomy, unprovoked PE/DVT (2015, on Eliquis), paroxysmal Aflutter (on Eliquis), prior pericardial effusion requiring drainage (2017), and aortic insufficiency, who was admitted at [facility] from January 30th to February 3rd with ADHF found to have newly depressed LVEF with new severe MR and moderate to severe AR on TTE, patient was medically optimized with plans for outpatient FAREED to better evaluate valves. Now patient presents for follow up after hospital discharge.   Denies prior history of CVA, MI/CAD, T2DM, or prior sternotomy.   Since hospital discharge, patient reports feeling well with good energy. Only walks around his house and has been cooking with no issue, whereas prior to hospital presentation, he was not able to cook or walk around his house without significant shortness of breath. Prior to hospital admission his weight is 300 lbs, this morning his weight is 280 lbs, when weighed in clinic his weight is 283.6 lbs.  Denies any recent chest pain, shortness of breath, palpitations, lightheadedness/dizziness or syncope, orthopnea, PND, LE edema, abdominal bloating, fever, chills or dysuria.

## 2024-02-12 ENCOUNTER — APPOINTMENT (OUTPATIENT)
Dept: CARDIOTHORACIC SURGERY | Facility: CLINIC | Age: 79
End: 2024-02-12

## 2024-02-20 ENCOUNTER — APPOINTMENT (OUTPATIENT)
Dept: CARDIOTHORACIC SURGERY | Facility: CLINIC | Age: 79
End: 2024-02-20
Payer: MEDICARE

## 2024-02-20 VITALS
OXYGEN SATURATION: 94 % | SYSTOLIC BLOOD PRESSURE: 128 MMHG | HEART RATE: 67 BPM | HEIGHT: 73 IN | BODY MASS INDEX: 36.18 KG/M2 | DIASTOLIC BLOOD PRESSURE: 60 MMHG | WEIGHT: 273 LBS | RESPIRATION RATE: 18 BRPM

## 2024-02-20 DIAGNOSIS — I34.0 NONRHEUMATIC MITRAL (VALVE) INSUFFICIENCY: ICD-10-CM

## 2024-02-20 DIAGNOSIS — I48.92 UNSPECIFIED ATRIAL FLUTTER: ICD-10-CM

## 2024-02-20 PROCEDURE — 99213 OFFICE O/P EST LOW 20 MIN: CPT

## 2024-02-20 PROCEDURE — 99204 OFFICE O/P NEW MOD 45 MIN: CPT

## 2024-02-20 NOTE — HISTORY OF PRESENT ILLNESS
[FreeTextEntry1] : 78 Y male with BMI of 36.6, and PHM of ADAM (uses CPAP), gout, RCC s/p cryoablation, BPH s/p prostatectomy, unprovoked PE/DVT s/p IVC filter placement/then retrieval (2015, on Eliquis), paroxysmal Aflutter (on Eliquis), prior pericardial effusion requiring drainage (2017), HFrEF (LVEF 35-40%), moderate AR, and severe MR, who presents for follow up after testing.   Patient was initially seen in [facility] on 2/5/24 following hospital discharge at which time patient reported feeling well with good energy, but was only walking around his house and has been cooking with no issue, whereas prior to hospital presentation, he was not able to cook or walk around his house without significant shortness of breath. Prior to hospital admission his weight is 300 lbs, this morning his weight is 280 lbs, when weighed in clinic his weight is 283.6 lbs.    FAREED on 2/5/24 (read by Dr. Goldberg) CONCLUSIONS:  1. Moderately reduced left ventricular systolic function.  2. Left ventricular long strain analysis reveals reduced global strain with apical sparing which may be a marker of amyloidosis.  3. Mildly reduced right ventricular systolic function.  4. Moderate aortic regurgitation.  5. There is prolapse of the tip of the right coronary cusp.  6. Severe mitral regurgitation.  7. There are two separate regurgitant jets. The central jet is functional due to posterior greater than anterior leaflet tethering. The other is eccentric and laterally directed due to commissural/P3 prolapse with a regurgitant jet that is anterolaterally directed.  Since last visit, patient reports feeling great, able to do everything he wants with no limitations. He has good level of energy. Denies any recent chest pain, shortness of breath, palpitations, lightheadedness/dizziness or syncope, orthopnea, PND, LE edema, abdominal bloating, fever, chills, or dysuria. His weight continues to go down, weight this morning was 273 lbs.

## 2024-02-20 NOTE — PHYSICAL EXAM
[Well Developed] : well developed [Well Nourished] : well nourished [No Acute Distress] : no acute distress [Normal Conjunctiva] : normal conjunctiva [Normal S1, S2] : normal S1, S2 [No Rub] : no rub [No Gallop] : no gallop [Clear Lung Fields] : clear lung fields [Good Air Entry] : good air entry [No Respiratory Distress] : no respiratory distress  [Soft] : abdomen soft [Non Tender] : non-tender [Normal Bowel Sounds] : normal bowel sounds [Normal Gait] : normal gait [No Cyanosis] : no cyanosis [No Clubbing] : no clubbing [No Rash] : no rash [No Skin Lesions] : no skin lesions [Moves all extremities] : moves all extremities [No Focal Deficits] : no focal deficits [Normal Speech] : normal speech [Alert and Oriented] : alert and oriented [Normal memory] : normal memory [de-identified] : supple [de-identified] : dsitant heart sounds due to body habitus, II/VI HSM  [de-identified] : trace BLE edema

## 2024-02-20 NOTE — ASSESSMENT
[FreeTextEntry1] : 78 Y male with BMI of 36.6, and PHM of ADAM (uses CPAP), gout, RCC s/p cryoablation, BPH s/p prostatectomy, unprovoked PE/DVT s/p IVC filter placement/then retrieval (2015, on Eliquis), paroxysmal Aflutter (on Eliquis), prior pericardial effusion requiring drainage (2017), HFrEF (LVEF 35-40%), moderate AR, and severe MR, who presents for follow up after testing. Patient is clinically stable; NYHA Class I. Dr. Sepulveda have independently seen, evaluated, and reviewed patient's history and pertinent clinical data in this face-to-face encounter. FAREED from 2/5/24 showed moderately reduced LVEF of 35%, mildly reduced RV function, moderate AR, severe MR with two separate regurgitant jets. The central jet is functional due to posterior greater than anterior leaflet tethering. The other is eccentric and laterally directed due to commissural/P3 prolapse with a regurgitant jet that is anterolaterally directed. Patient mitral valve anatomy is not favorable for mTEER. Dr. Sepulveda discussed results with patient. Treatment options for severe symptomatic degenerative mitral regurgitation including medical therapy, surgical MVR/MVRe, and percutaneous intervention were discussed. Given mitral valve anatomy, surgical evaluation with Dr. Pate is recommended. We will defer further testing (Cardiac CTA and coronary angiogram) with the CV surgery team. Return to [facility] clinic as needed. All questions were answered.

## 2024-02-20 NOTE — PLAN
[TextEntry] : - continue current medication regimen. - per patient's request, our office will fax over patient's records (discharge summary, TTE and FAREED reports) to Dr. Vang's office in anticipation of his cardiology follow up appointment next week.  - follow up with Dr. Gonzalez as scheduled for ongoing cardiology care.  - surgical consultation with Dr. Pate today.  - return to [facility] clinic as needed.

## 2024-02-21 VITALS
OXYGEN SATURATION: 94 % | WEIGHT: 273 LBS | HEIGHT: 73 IN | HEART RATE: 67 BPM | TEMPERATURE: 98.1 F | SYSTOLIC BLOOD PRESSURE: 128 MMHG | RESPIRATION RATE: 18 BRPM | DIASTOLIC BLOOD PRESSURE: 60 MMHG | BODY MASS INDEX: 36.18 KG/M2

## 2024-02-21 NOTE — ASSESSMENT
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 Dr. Pate reviewed the echocardiogram images with the patient and discussed the case with Dr. Sepulveda, Dr. Goldberg and Dr. Ramirez.  Dr. Pate performed the STS risk calculator and quoted 2-4% operative mortality and complication risk and discussed the STS risk factors with the patient including but not limited to death, heart attack, bleeding, stroke, kidney problems and infection. Dr. Pate discussed the need for left heart cardiac catherization to assess his coronaries arteries. Dr. Pate feels the patient will benefit from and is a candidate for mitral valve repair, possible cryoablation maze and possible left atrial appendage occlusion. Dr. Pate will determine surgical approach, sternotomy vs minimally invasive, after [facility] and preop work up completed. All questions were addressed. The patient will call us to schedule.   Plan:  hold eliquis, bridge with [facility] via radial artery hold entresto and ozempic prior to admission non contrast chest CT scan

## 2024-02-21 NOTE — DATA REVIEWED
[FreeTextEntry1] : 2/5/24 FAREED: 1. Moderately reduced left ventricular systolic function, EF 35%.  2. Left ventricular long strain analysis reveals reduced global strain with apical sparing which may be a marker of amyloidosis. 3. Mildly reduced right ventricular systolic function. 4. Moderate aortic regurgitation. 5. There is prolapse of the tip of the right coronary cusp. 6. Severe mitral regurgitation. 7. There are two separate regurgitant jets. The central jet is functional due to posterior greater than anterior leaflet tethering. The other is eccentric and laterally directed due to commissural/P3 prolapse with a regurgitant jet that is anterolaterally directed.  2/3/24 TTE:  1. Limited study obtained for evaluation of valvular function. 2. Moderately reduced left ventricular systolic function. EF 35-40% 3. Normal right ventricular size and systolic function. 4. Severe aortic regurgitation. Small mobile poorly differentiated mass seen on ventricular aspect of aortic valve.  5. Severe mitral regurgitation. 6. Trivial pericardial effusion. 7. Left pleural effusion. 8. Consider FAREED to better visualize the aortic and mitral valve and elucidate the mechanism of mitral regurgitation, if clinically indicated.  1/31/24 TTE: 1. Mildly reduced left ventricular systolic function, EF 46% 2. Dilated left ventricular size. 3. Normal right ventricular size and systolic function. 4. Severely dilated left atrium. 5. A sub centimeter mobile echo density is noted on the ventricular aspect of the aortic valve. Differential includes fibrinous strand versus a small vegetation. Clinical correlation is recommended. 6. Moderate-to-severe aortic regurgitation. The jet is highly eccentric and posteriorly directed, which may lend to underestimation of severity.  7. Severe mitral regurgitation. The jet is highly eccentric. 8. No evidence of pulmonary hypertension. 9. Trivial pericardial effusion. 10. Left pleural effusion. 11. 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  12. The proximal ascending aorta is dilated measuring 4.50 cm.  13. Compared to the previous TTE performed on 11/7/2017, ventricular function is lower, and degree of mitral and aortic regurgitation has progressed. 14. Consider FAREED to better visualize the mitral and aortic valve and elucidate the mechanism of regurgitation, if clinically indicated.

## 2024-02-21 NOTE — PHYSICAL EXAM
[General Appearance - Alert] : alert [General Appearance - In No Acute Distress] : in no acute distress [Sclera] : the sclera and conjunctiva were normal [PERRL With Normal Accommodation] : pupils were equal in size, round, and reactive to light [Extraocular Movements] : extraocular movements were intact [Outer Ear] : the ears and nose were normal in appearance [Oropharynx] : the oropharynx was normal [Neck Appearance] : the appearance of the neck was normal [Neck Cervical Mass (___cm)] : no neck mass was observed [Jugular Venous Distention Increased] : there was no jugular-venous distention [Thyroid Diffuse Enlargement] : the thyroid was not enlarged [Thyroid Nodule] : there were no palpable thyroid nodules [Auscultation Breath Sounds / Voice Sounds] : lungs were clear to auscultation bilaterally [Heart Rate And Rhythm] : heart rate was normal and rhythm regular [Examination Of The Chest] : the chest was normal in appearance [Chest Visual Inspection Thoracic Asymmetry] : no chest asymmetry [Diminished Respiratory Excursion] : normal chest expansion [2+] : left 2+ [Bowel Sounds] : normal bowel sounds [Abdomen Soft] : soft [FreeTextEntry1] : obese [No CVA Tenderness] : no ~M costovertebral angle tenderness [No Spinal Tenderness] : no spinal tenderness [Abnormal Walk] : normal gait [Nail Clubbing] : no clubbing  or cyanosis of the fingernails [Musculoskeletal - Swelling] : no joint swelling seen [Motor Tone] : muscle strength and tone were normal [Skin Color & Pigmentation] : normal skin color and pigmentation [Skin Turgor] : normal skin turgor [] : no rash [Deep Tendon Reflexes (DTR)] : deep tendon reflexes were 2+ and symmetric [Sensation] : the sensory exam was normal to light touch and pinprick [No Focal Deficits] : no focal deficits [Oriented To Time, Place, And Person] : oriented to person, place, and time [Impaired Insight] : insight and judgment were intact [Affect] : the affect was normal

## 2024-02-21 NOTE — HISTORY OF PRESENT ILLNESS
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 Patient was admitted to [facility] on 1/30/24 s/t acute heart failure exacerbation. Patient was medically managed by heart failure and SHD team. He was discharged home on GDMT on 2/3. He was seen by Dr. Sepulveda and SHD on 2/5/24 with FAREED.  He presents today for surgical consult with Dr. Pate as part of heart valve team approach. He is unaccompanied for visit today but has recorded visit for his 2 sons (dentist and musician). He appears generally well, NAD. He reports feeling "great", being able to walk and climb stairs without difficulty and perform ADLs independently. NYHA 1-2. He has remained compliant with all medications. At time of discharged his weight was 300lbs, today he weighs 273lbs. He denies c/o chest pain, SOB/SAMUEL, fever, orthopnea, PND, syncope or palpitations. He reports mild lower extremity edema at the end of the day.   Cardiologist: Dr. Jay Ramirez

## 2024-04-03 DIAGNOSIS — N28.89 OTHER SPECIFIED DISORDERS OF KIDNEY AND URETER: ICD-10-CM

## 2024-04-05 ENCOUNTER — NON-APPOINTMENT (OUTPATIENT)
Age: 79
End: 2024-04-05

## 2024-04-10 VITALS
HEART RATE: 61 BPM | HEIGHT: 73 IN | SYSTOLIC BLOOD PRESSURE: 155 MMHG | RESPIRATION RATE: 15 BRPM | OXYGEN SATURATION: 92 % | DIASTOLIC BLOOD PRESSURE: 68 MMHG | TEMPERATURE: 98 F | WEIGHT: 272.93 LBS

## 2024-04-10 RX ORDER — CHLORHEXIDINE GLUCONATE 213 G/1000ML
1 SOLUTION TOPICAL ONCE
Refills: 0 | Status: DISCONTINUED | OUTPATIENT
Start: 2024-04-16 | End: 2024-04-17

## 2024-04-10 NOTE — H&P ADULT - CARDIOVASCULAR
normal/regular rate and rhythm/S1 S2 present/no gallops/no rub/vascular normal/regular rate and rhythm/S1 S2 present/no gallops/no rub/no JVD/murmur/peripheral edema/vascular

## 2024-04-10 NOTE — H&P ADULT - NSHPLABSRESULTS_GEN_ALL_CORE
16.1   8.18  )-----------( 281      ( 16 Apr 2024 06:44 )             49.4       04-16    143  |  107  |  x   ----------------------------<  109<H>  4.0   |  26  |  x     Ca    9.6      16 Apr 2024 06:44  Mg     2.0     04-16        PT/INR - ( 16 Apr 2024 06:44 )   PT: 11.5 sec;   INR: 1.01          PTT - ( 16 Apr 2024 06:44 )  PTT:43.7 sec    CARDIAC MARKERS ( 16 Apr 2024 06:44 )  x     / x     / x     / x     / 1.5 ng/mL        Urinalysis Basic - ( 16 Apr 2024 06:44 )    Color: x / Appearance: x / SG: x / pH: x  Gluc: 109 mg/dL / Ketone: x  / Bili: x / Urobili: x   Blood: x / Protein: x / Nitrite: x   Leuk Esterase: x / RBC: x / WBC x   Sq Epi: x / Non Sq Epi: x / Bacteria: x        EKG: 16.1   8.18  )-----------( 281      ( 16 Apr 2024 06:44 )             49.4       04-16    143  |  107  |  24<H>  ----------------------------<  109<H>  4.0   |  26  |  1.39<H>    Ca    9.6      16 Apr 2024 06:44  Mg     2.0     04-16    TPro  6.3  /  Alb  4.0  /  TBili  1.1  /  DBili  x   /  AST  15  /  ALT  15  /  AlkPhos  100  04-16      PT/INR - ( 16 Apr 2024 06:44 )   PT: 11.5 sec;   INR: 1.01          PTT - ( 16 Apr 2024 06:44 )  PTT:43.7 sec    CARDIAC MARKERS ( 16 Apr 2024 06:44 )  x     / x     / 42 U/L / x     / 1.5 ng/mL        Urinalysis Basic - ( 16 Apr 2024 06:44 )    Color: x / Appearance: x / SG: x / pH: x  Gluc: 109 mg/dL / Ketone: x  / Bili: x / Urobili: x   Blood: x / Protein: x / Nitrite: x   Leuk Esterase: x / RBC: x / WBC x   Sq Epi: x / Non Sq Epi: x / Bacteria: x        EKG: AFib, rate controlled at 61, prominent S waves in precordial leads, II, III, aVF. T wave flattening in II, V1, aVF

## 2024-04-10 NOTE — H&P ADULT - HISTORY OF PRESENT ILLNESS
Cardiologist: Dr. Ramirez  Pharmacy: [facility] in [place]  Escort:    Admit to [facility] after procedure for surgery scheduled 4/17    77 y/o M with PMHx of HFrEF (LVEF 35%), pAflutter (on Eliquis), unprovoked PE/DVT s/p IVC filter placement then retrieval (2015, on Eliquis), HTN,. HLD, ADAM (on CPAP), CKD stage 4 (cr 1.2-1.49 Jan-Feb 2024), obesity who presented to outpatient structural cardiologist Dr. Pate for consultation of severe MR. Patient denies ___ CP, SOB, SAMUEL, palpitations, orthopnea, PND, dizziness, syncope, LE edema, hematuria, hematochezia/melena, fatigue, fever, chills, N/V.     TTE (2/5/24): mod reduced LV systolic function, LV global strain w/ apical sparing which may be indicative of amyloidosis, mildly reduced RV systolic function, mod AR, severe MR, LVEF 35%.    In light of patient's risk factors, CCS class ___ anginal symptoms, and abnormal TTE, patient now presents to [facility] for LHC via radial access. Cardiologist: Dr. Ramirez  Pharmacy: [facility] in [place]  Escort:    Admit to [facility] after procedure for surgery scheduled 4/17. Last dose Eliquis 4/13, Lovenox bridge BID 4/14 and 4/15 - confirmed w/ patient 4/10    79 y/o M with PMHx of HFrEF (LVEF 35%), pAflutter (on Eliquis), unprovoked PE/DVT s/p IVC filter placement then retrieval (2015, on Eliquis), HTN,. HLD, ADAM (on CPAP), CKD stage 4 (cr 1.2-1.49 Jan-Feb 2024), obesity who presented to outpatient structural cardiologist Dr. Pate for consultation of severe MR. Patient denies ___ CP, SOB, SAMUEL, palpitations, orthopnea, PND, dizziness, syncope, LE edema, hematuria, hematochezia/melena, fatigue, fever, chills, N/V.     TTE (2/5/24): mod reduced LV systolic function, LV global strain w/ apical sparing which may be indicative of amyloidosis, mildly reduced RV systolic function, mod AR, severe MR, LVEF 35%.    In light of patient's risk factors, CCS class ___ anginal symptoms, and abnormal TTE, patient now presents to [facility] for LHC via radial access. Cardiologist: Dr. Ramirez  Pharmacy: [facility] in [place]  Escort: being admitted to [facility] for surgery 4/17    Admit to [facility] after procedure for surgery scheduled 4/17. Last dose Eliquis 4/13, Lovenox bridge BID 4/14 and 4/15 - confirmed w/ patient 4/10    77 y/o M with PMHx of HFrEF (LVEF 35%), pAflutter (on Eliquis), unprovoked PE/DVT s/p IVC filter placement then retrieval (2015, on Eliquis), HTN,. HLD, ADAM (on CPAP), CKD stage 4 (cr 1.2-1.49 Jan-Feb 2024), obesity who presented to outpatient structural cardiologist Dr. Pate for consultation of severe MR. Patient denies CP, SOB, SAMUEL, palpitations, orthopnea, PND, dizziness, syncope, LE edema, hematuria, hematochezia/melena, fatigue, fever, chills, N/V.     TTE (2/5/24): mod reduced LV systolic function, LV global strain w/ apical sparing which may be indicative of amyloidosis, mildly reduced RV systolic function, mod AR, severe MR, LVEF 35%.    In light of patient's risk factors and abnormal TTE, patient now presents to [facility] for LHC via radial acces. Cardiologist: Dr. Ramirez  Pharmacy: [facility] [facility] in [place]  Escort: being admitted to [facility] for surgery 4/17    77 y/o M with PMHx of HFrEF (LVEF 35%), pAflutter (on Eliquis), unprovoked PE/DVT s/p IVC filter placement then retrieval (2015, on Eliquis), HTN,. HLD, ADAM (on CPAP), CKD stage 4 (cr 1.2-1.49 Jan-Feb 2024), obesity who presented to outpatient structural cardiologist Dr. Pate for consultation of severe MR. Patient denies CP, SOB, SAMUEL, palpitations, orthopnea, PND, dizziness, syncope, hematuria, hematochezia/melena, fatigue, fever, chills, N/V, abdominal pain.     TTE (2/5/24): mod reduced LV systolic function, LV global strain w/ apical sparing which may be indicative of amyloidosis, mildly reduced RV systolic function, mod AR, severe MR, LVEF 35%.    In light of patient's risk factors and abnormal TTE, patient now presents to [facility] for LHC via radial acces prior to surgery 4/17/24.

## 2024-04-10 NOTE — H&P ADULT - ASSESSMENT
79 y/o M with PMHx of HFrEF (LVEF 35%), pAflutter (on Eliquis), unprovoked PE/DVT s/p IVC filter placement then retrieval (2015, on Eliquis), HTN,. HLD, ADAM (on CPAP), CKD stage 4 (cr 1.2-1.49 Jan-Feb 2024), obesity who presented to outpatient structural cardiologist Dr. Pate for consultation of severe MR, now presents to [facility] for LHC via radial acces prior to surgery 4/17/24 in light of patient's risk factors and abnormal TTE.    -ASA III. Mallampati III  -VSS  -Patient is a candidate for conscious sedation  -IVF: no IVF to be given in setting of severe MR  -DAPT: Patient to be given , no Plavix as he is pre-op for surgery 4/17  -Eliquis last dose 4/13/24, patient bridged with Lovenox BID 4/14 and 4/15    Risks & benefits of procedure and alternative therapy have been explained to the patient including but not limited to: allergic reaction, bleeding w/possible need for blood transfusion, infection, renal and vascular compromise, limb damage, arrhythmia, stroke, vessel dissection/perforation, Myocardial infarction, emergent CABG. Informed consent obtained and in chart.

## 2024-04-15 ENCOUNTER — OUTPATIENT (OUTPATIENT)
Dept: OUTPATIENT SERVICES | Facility: HOSPITAL | Age: 79
LOS: 1 days | End: 2024-04-15
Payer: MEDICARE

## 2024-04-15 ENCOUNTER — APPOINTMENT (OUTPATIENT)
Dept: MRI IMAGING | Facility: HOSPITAL | Age: 79
End: 2024-04-15

## 2024-04-15 ENCOUNTER — RESULT REVIEW (OUTPATIENT)
Age: 79
End: 2024-04-15

## 2024-04-15 DIAGNOSIS — H26.9 UNSPECIFIED CATARACT: Chronic | ICD-10-CM

## 2024-04-15 DIAGNOSIS — Z96.649 PRESENCE OF UNSPECIFIED ARTIFICIAL HIP JOINT: Chronic | ICD-10-CM

## 2024-04-15 PROCEDURE — 74183 MRI ABD W/O CNTR FLWD CNTR: CPT | Mod: 26,MH

## 2024-04-16 ENCOUNTER — INPATIENT (INPATIENT)
Facility: HOSPITAL | Age: 79
LOS: 8 days | Discharge: HOME HEALTH | End: 2024-04-25
Attending: THORACIC SURGERY (CARDIOTHORACIC VASCULAR SURGERY) | Admitting: THORACIC SURGERY (CARDIOTHORACIC VASCULAR SURGERY)
Payer: MEDICARE

## 2024-04-16 ENCOUNTER — TRANSCRIPTION ENCOUNTER (OUTPATIENT)
Age: 79
End: 2024-04-16

## 2024-04-16 DIAGNOSIS — H26.9 UNSPECIFIED CATARACT: Chronic | ICD-10-CM

## 2024-04-16 DIAGNOSIS — Z96.649 PRESENCE OF UNSPECIFIED ARTIFICIAL HIP JOINT: Chronic | ICD-10-CM

## 2024-04-16 LAB
A1C WITH ESTIMATED AVERAGE GLUCOSE RESULT: 4.7 % — SIGNIFICANT CHANGE UP (ref 4–5.6)
ADD ON TEST-SPECIMEN IN LAB: SIGNIFICANT CHANGE UP
ALBUMIN SERPL ELPH-MCNC: 4 G/DL — SIGNIFICANT CHANGE UP (ref 3.3–5)
ALP SERPL-CCNC: 100 U/L — SIGNIFICANT CHANGE UP (ref 40–120)
ALT FLD-CCNC: 15 U/L — SIGNIFICANT CHANGE UP (ref 10–45)
ANION GAP SERPL CALC-SCNC: 10 MMOL/L — SIGNIFICANT CHANGE UP (ref 5–17)
APPEARANCE UR: CLEAR — SIGNIFICANT CHANGE UP
APTT BLD: 43.7 SEC — HIGH (ref 24.5–35.6)
AST SERPL-CCNC: 15 U/L — SIGNIFICANT CHANGE UP (ref 10–40)
BACTERIA # UR AUTO: NEGATIVE /HPF — SIGNIFICANT CHANGE UP
BASE EXCESS BLDV CALC-SCNC: 3.3 MMOL/L — HIGH (ref -2–3)
BASOPHILS # BLD AUTO: 0.03 K/UL — SIGNIFICANT CHANGE UP (ref 0–0.2)
BASOPHILS NFR BLD AUTO: 0.4 % — SIGNIFICANT CHANGE UP (ref 0–2)
BILIRUB SERPL-MCNC: 1.1 MG/DL — SIGNIFICANT CHANGE UP (ref 0.2–1.2)
BILIRUB UR-MCNC: NEGATIVE — SIGNIFICANT CHANGE UP
BLD GP AB SCN SERPL QL: NEGATIVE — SIGNIFICANT CHANGE UP
BUN SERPL-MCNC: 24 MG/DL — HIGH (ref 7–23)
CA-I SERPL-SCNC: 1.19 MMOL/L — SIGNIFICANT CHANGE UP (ref 1.15–1.33)
CALCIUM SERPL-MCNC: 9.6 MG/DL — SIGNIFICANT CHANGE UP (ref 8.4–10.5)
CAST: 1 /LPF — SIGNIFICANT CHANGE UP (ref 0–4)
CHLORIDE SERPL-SCNC: 107 MMOL/L — SIGNIFICANT CHANGE UP (ref 96–108)
CHOLEST SERPL-MCNC: 92 MG/DL — SIGNIFICANT CHANGE UP
CK MB CFR SERPL CALC: 1.5 NG/ML — SIGNIFICANT CHANGE UP (ref 0–6.7)
CK SERPL-CCNC: 42 U/L — SIGNIFICANT CHANGE UP (ref 30–200)
CO2 BLDV-SCNC: 29.9 MMOL/L — HIGH (ref 22–26)
CO2 SERPL-SCNC: 26 MMOL/L — SIGNIFICANT CHANGE UP (ref 22–31)
COHGB MFR BLDV: 1.3 % — SIGNIFICANT CHANGE UP
COLOR SPEC: YELLOW — SIGNIFICANT CHANGE UP
CREAT SERPL-MCNC: 1.39 MG/DL — HIGH (ref 0.5–1.3)
DIFF PNL FLD: NEGATIVE — SIGNIFICANT CHANGE UP
EGFR: 52 ML/MIN/1.73M2 — LOW
EOSINOPHIL # BLD AUTO: 0.22 K/UL — SIGNIFICANT CHANGE UP (ref 0–0.5)
EOSINOPHIL NFR BLD AUTO: 2.7 % — SIGNIFICANT CHANGE UP (ref 0–6)
ESTIMATED AVERAGE GLUCOSE: 88 MG/DL — SIGNIFICANT CHANGE UP (ref 68–114)
GAS PNL BLDV: 139 MMOL/L — SIGNIFICANT CHANGE UP (ref 136–145)
GLUCOSE BLDC GLUCOMTR-MCNC: 140 MG/DL — HIGH (ref 70–99)
GLUCOSE BLDC GLUCOMTR-MCNC: 93 MG/DL — SIGNIFICANT CHANGE UP (ref 70–99)
GLUCOSE BLDV-MCNC: 101 MG/DL — HIGH (ref 70–99)
GLUCOSE SERPL-MCNC: 109 MG/DL — HIGH (ref 70–99)
GLUCOSE UR QL: NEGATIVE MG/DL — SIGNIFICANT CHANGE UP
HCO3 BLDV-SCNC: 28 MMOL/L — SIGNIFICANT CHANGE UP (ref 22–29)
HCT VFR BLD CALC: 49.4 % — SIGNIFICANT CHANGE UP (ref 39–50)
HCT VFR BLDA CALC: 49 % — SIGNIFICANT CHANGE UP
HDLC SERPL-MCNC: 40 MG/DL — LOW
HGB BLD CALC-MCNC: 16.4 G/DL — SIGNIFICANT CHANGE UP (ref 12.6–17.4)
HGB BLD-MCNC: 16.1 G/DL — SIGNIFICANT CHANGE UP (ref 13–17)
IMM GRANULOCYTES NFR BLD AUTO: 0.6 % — SIGNIFICANT CHANGE UP (ref 0–0.9)
INR BLD: 1.01 — SIGNIFICANT CHANGE UP (ref 0.85–1.18)
KETONES UR-MCNC: NEGATIVE MG/DL — SIGNIFICANT CHANGE UP
LEUKOCYTE ESTERASE UR-ACNC: NEGATIVE — SIGNIFICANT CHANGE UP
LIPID PNL WITH DIRECT LDL SERPL: 36 MG/DL — SIGNIFICANT CHANGE UP
LYMPHOCYTES # BLD AUTO: 0.68 K/UL — LOW (ref 1–3.3)
LYMPHOCYTES # BLD AUTO: 8.3 % — LOW (ref 13–44)
MAGNESIUM SERPL-MCNC: 2 MG/DL — SIGNIFICANT CHANGE UP (ref 1.6–2.6)
MCHC RBC-ENTMCNC: 29.1 PG — SIGNIFICANT CHANGE UP (ref 27–34)
MCHC RBC-ENTMCNC: 32.6 GM/DL — SIGNIFICANT CHANGE UP (ref 32–36)
MCV RBC AUTO: 89.2 FL — SIGNIFICANT CHANGE UP (ref 80–100)
METHGB MFR BLDV: 0.6 % — SIGNIFICANT CHANGE UP
MONOCYTES # BLD AUTO: 0.52 K/UL — SIGNIFICANT CHANGE UP (ref 0–0.9)
MONOCYTES NFR BLD AUTO: 6.4 % — SIGNIFICANT CHANGE UP (ref 2–14)
NEUTROPHILS # BLD AUTO: 6.68 K/UL — SIGNIFICANT CHANGE UP (ref 1.8–7.4)
NEUTROPHILS NFR BLD AUTO: 81.6 % — HIGH (ref 43–77)
NITRITE UR-MCNC: NEGATIVE — SIGNIFICANT CHANGE UP
NON HDL CHOLESTEROL: 52 MG/DL — SIGNIFICANT CHANGE UP
NRBC # BLD: 0 /100 WBCS — SIGNIFICANT CHANGE UP (ref 0–0)
PCO2 BLDV: 44 MMHG — SIGNIFICANT CHANGE UP (ref 42–55)
PH BLDV: 7.42 — SIGNIFICANT CHANGE UP (ref 7.32–7.43)
PH UR: 6 — SIGNIFICANT CHANGE UP (ref 5–8)
PLATELET # BLD AUTO: 281 K/UL — SIGNIFICANT CHANGE UP (ref 150–400)
PO2 BLDV: 52 MMHG — HIGH (ref 25–45)
POTASSIUM BLDV-SCNC: 3.9 MMOL/L — SIGNIFICANT CHANGE UP (ref 3.5–5.1)
POTASSIUM SERPL-MCNC: 4 MMOL/L — SIGNIFICANT CHANGE UP (ref 3.5–5.3)
POTASSIUM SERPL-SCNC: 4 MMOL/L — SIGNIFICANT CHANGE UP (ref 3.5–5.3)
PROT SERPL-MCNC: 6.3 G/DL — SIGNIFICANT CHANGE UP (ref 6–8.3)
PROT UR-MCNC: 100 MG/DL
PROTHROM AB SERPL-ACNC: 11.5 SEC — SIGNIFICANT CHANGE UP (ref 9.5–13)
RBC # BLD: 5.54 M/UL — SIGNIFICANT CHANGE UP (ref 4.2–5.8)
RBC # FLD: 15.5 % — HIGH (ref 10.3–14.5)
RBC CASTS # UR COMP ASSIST: 0 /HPF — SIGNIFICANT CHANGE UP (ref 0–4)
RH IG SCN BLD-IMP: POSITIVE — SIGNIFICANT CHANGE UP
SAO2 % BLDV: 87 % — SIGNIFICANT CHANGE UP (ref 67–88)
SODIUM SERPL-SCNC: 143 MMOL/L — SIGNIFICANT CHANGE UP (ref 135–145)
SP GR SPEC: 1.03 — SIGNIFICANT CHANGE UP (ref 1–1.03)
SQUAMOUS # UR AUTO: 0 /HPF — SIGNIFICANT CHANGE UP (ref 0–5)
TRIGL SERPL-MCNC: 78 MG/DL — SIGNIFICANT CHANGE UP
TSH SERPL-MCNC: 2.38 UIU/ML — SIGNIFICANT CHANGE UP (ref 0.27–4.2)
UROBILINOGEN FLD QL: 0.2 MG/DL — SIGNIFICANT CHANGE UP (ref 0.2–1)
WBC # BLD: 8.18 K/UL — SIGNIFICANT CHANGE UP (ref 3.8–10.5)
WBC # FLD AUTO: 8.18 K/UL — SIGNIFICANT CHANGE UP (ref 3.8–10.5)
WBC UR QL: 0 /HPF — SIGNIFICANT CHANGE UP (ref 0–5)

## 2024-04-16 PROCEDURE — 93454 CORONARY ARTERY ANGIO S&I: CPT | Mod: 26

## 2024-04-16 PROCEDURE — 94010 BREATHING CAPACITY TEST: CPT | Mod: 26

## 2024-04-16 PROCEDURE — 93880 EXTRACRANIAL BILAT STUDY: CPT | Mod: 26

## 2024-04-16 PROCEDURE — 71046 X-RAY EXAM CHEST 2 VIEWS: CPT | Mod: 26

## 2024-04-16 PROCEDURE — 99152 MOD SED SAME PHYS/QHP 5/>YRS: CPT

## 2024-04-16 RX ORDER — SODIUM CHLORIDE 9 MG/ML
1000 INJECTION INTRAMUSCULAR; INTRAVENOUS; SUBCUTANEOUS
Refills: 0 | Status: DISCONTINUED | OUTPATIENT
Start: 2024-04-16 | End: 2024-04-17

## 2024-04-16 RX ORDER — ATORVASTATIN CALCIUM 80 MG/1
40 TABLET, FILM COATED ORAL AT BEDTIME
Refills: 0 | Status: DISCONTINUED | OUTPATIENT
Start: 2024-04-16 | End: 2024-04-17

## 2024-04-16 RX ORDER — SEMAGLUTIDE 0.68 MG/ML
0 INJECTION, SOLUTION SUBCUTANEOUS
Qty: 0 | Refills: 0 | DISCHARGE

## 2024-04-16 RX ORDER — SODIUM CHLORIDE 9 MG/ML
1000 INJECTION, SOLUTION INTRAVENOUS
Refills: 0 | Status: DISCONTINUED | OUTPATIENT
Start: 2024-04-16 | End: 2024-04-17

## 2024-04-16 RX ORDER — ACETAMINOPHEN 500 MG
1000 TABLET ORAL ONCE
Refills: 0 | Status: COMPLETED | OUTPATIENT
Start: 2024-04-17 | End: 2024-04-17

## 2024-04-16 RX ORDER — ALLOPURINOL 300 MG
1 TABLET ORAL
Qty: 0 | Refills: 0 | DISCHARGE

## 2024-04-16 RX ORDER — ALLOPURINOL 300 MG
100 TABLET ORAL DAILY
Refills: 0 | Status: DISCONTINUED | OUTPATIENT
Start: 2024-04-16 | End: 2024-04-17

## 2024-04-16 RX ORDER — CHLORHEXIDINE GLUCONATE 213 G/1000ML
1 SOLUTION TOPICAL ONCE
Refills: 0 | Status: COMPLETED | OUTPATIENT
Start: 2024-04-16 | End: 2024-04-17

## 2024-04-16 RX ORDER — CHLORHEXIDINE GLUCONATE 213 G/1000ML
1 SOLUTION TOPICAL ONCE
Refills: 0 | Status: COMPLETED | OUTPATIENT
Start: 2024-04-17 | End: 2024-04-17

## 2024-04-16 RX ORDER — PANTOPRAZOLE SODIUM 20 MG/1
40 TABLET, DELAYED RELEASE ORAL
Refills: 0 | Status: DISCONTINUED | OUTPATIENT
Start: 2024-04-16 | End: 2024-04-17

## 2024-04-16 RX ORDER — HEPARIN SODIUM 5000 [USP'U]/ML
1400 INJECTION INTRAVENOUS; SUBCUTANEOUS
Qty: 25000 | Refills: 0 | Status: DISCONTINUED | OUTPATIENT
Start: 2024-04-16 | End: 2024-04-17

## 2024-04-16 RX ORDER — GLUCAGON INJECTION, SOLUTION 0.5 MG/.1ML
1 INJECTION, SOLUTION SUBCUTANEOUS ONCE
Refills: 0 | Status: DISCONTINUED | OUTPATIENT
Start: 2024-04-16 | End: 2024-04-17

## 2024-04-16 RX ORDER — METFORMIN HYDROCHLORIDE 850 MG/1
1 TABLET ORAL
Refills: 0 | DISCHARGE

## 2024-04-16 RX ORDER — DEXTROSE 50 % IN WATER 50 %
12.5 SYRINGE (ML) INTRAVENOUS ONCE
Refills: 0 | Status: DISCONTINUED | OUTPATIENT
Start: 2024-04-16 | End: 2024-04-17

## 2024-04-16 RX ORDER — INFLUENZA VIRUS VACCINE 15; 15; 15; 15 UG/.5ML; UG/.5ML; UG/.5ML; UG/.5ML
0.7 SUSPENSION INTRAMUSCULAR ONCE
Refills: 0 | Status: DISCONTINUED | OUTPATIENT
Start: 2024-04-16 | End: 2024-04-17

## 2024-04-16 RX ORDER — CARVEDILOL PHOSPHATE 80 MG/1
12.5 CAPSULE, EXTENDED RELEASE ORAL EVERY 12 HOURS
Refills: 0 | Status: DISCONTINUED | OUTPATIENT
Start: 2024-04-16 | End: 2024-04-17

## 2024-04-16 RX ORDER — SENNA PLUS 8.6 MG/1
2 TABLET ORAL AT BEDTIME
Refills: 0 | Status: DISCONTINUED | OUTPATIENT
Start: 2024-04-16 | End: 2024-04-17

## 2024-04-16 RX ORDER — ASPIRIN/CALCIUM CARB/MAGNESIUM 324 MG
81 TABLET ORAL DAILY
Refills: 0 | Status: DISCONTINUED | OUTPATIENT
Start: 2024-04-16 | End: 2024-04-17

## 2024-04-16 RX ORDER — LATANOPROST 0.05 MG/ML
1 SOLUTION/ DROPS OPHTHALMIC; TOPICAL AT BEDTIME
Refills: 0 | Status: DISCONTINUED | OUTPATIENT
Start: 2024-04-16 | End: 2024-04-17

## 2024-04-16 RX ORDER — ASCORBIC ACID 60 MG
2000 TABLET,CHEWABLE ORAL ONCE
Refills: 0 | Status: COMPLETED | OUTPATIENT
Start: 2024-04-16 | End: 2024-04-16

## 2024-04-16 RX ORDER — DEXTROSE 50 % IN WATER 50 %
15 SYRINGE (ML) INTRAVENOUS ONCE
Refills: 0 | Status: DISCONTINUED | OUTPATIENT
Start: 2024-04-16 | End: 2024-04-17

## 2024-04-16 RX ORDER — NALTREXONE HYDROCHLORIDE AND BUPROPION HYDROCHLORIDE 8; 90 MG/1; MG/1
2 TABLET, EXTENDED RELEASE ORAL
Qty: 0 | Refills: 0 | DISCHARGE

## 2024-04-16 RX ORDER — ASPIRIN/CALCIUM CARB/MAGNESIUM 324 MG
325 TABLET ORAL ONCE
Refills: 0 | Status: COMPLETED | OUTPATIENT
Start: 2024-04-16 | End: 2024-04-16

## 2024-04-16 RX ORDER — MUPIROCIN 20 MG/G
1 OINTMENT TOPICAL
Refills: 0 | Status: DISCONTINUED | OUTPATIENT
Start: 2024-04-16 | End: 2024-04-17

## 2024-04-16 RX ORDER — DEXTROSE 10 % IN WATER 10 %
125 INTRAVENOUS SOLUTION INTRAVENOUS ONCE
Refills: 0 | Status: DISCONTINUED | OUTPATIENT
Start: 2024-04-16 | End: 2024-04-17

## 2024-04-16 RX ORDER — INSULIN LISPRO 100/ML
VIAL (ML) SUBCUTANEOUS
Refills: 0 | Status: DISCONTINUED | OUTPATIENT
Start: 2024-04-16 | End: 2024-04-17

## 2024-04-16 RX ORDER — DEXTROSE 50 % IN WATER 50 %
25 SYRINGE (ML) INTRAVENOUS ONCE
Refills: 0 | Status: DISCONTINUED | OUTPATIENT
Start: 2024-04-16 | End: 2024-04-17

## 2024-04-16 RX ORDER — CHLORHEXIDINE GLUCONATE 213 G/1000ML
10 SOLUTION TOPICAL ONCE
Refills: 0 | Status: COMPLETED | OUTPATIENT
Start: 2024-04-16 | End: 2024-04-17

## 2024-04-16 RX ADMIN — LATANOPROST 1 DROP(S): 0.05 SOLUTION/ DROPS OPHTHALMIC; TOPICAL at 21:44

## 2024-04-16 RX ADMIN — SENNA PLUS 2 TABLET(S): 8.6 TABLET ORAL at 21:43

## 2024-04-16 RX ADMIN — Medication 325 MILLIGRAM(S): at 08:00

## 2024-04-16 RX ADMIN — ATORVASTATIN CALCIUM 40 MILLIGRAM(S): 80 TABLET, FILM COATED ORAL at 21:43

## 2024-04-16 RX ADMIN — Medication 2000 MILLIGRAM(S): at 18:47

## 2024-04-16 RX ADMIN — SODIUM CHLORIDE 30 MILLILITER(S): 9 INJECTION INTRAMUSCULAR; INTRAVENOUS; SUBCUTANEOUS at 12:26

## 2024-04-16 RX ADMIN — CARVEDILOL PHOSPHATE 12.5 MILLIGRAM(S): 80 CAPSULE, EXTENDED RELEASE ORAL at 17:46

## 2024-04-16 RX ADMIN — MUPIROCIN 1 APPLICATION(S): 20 OINTMENT TOPICAL at 18:13

## 2024-04-16 RX ADMIN — HEPARIN SODIUM 14 UNIT(S)/HR: 5000 INJECTION INTRAVENOUS; SUBCUTANEOUS at 15:04

## 2024-04-16 NOTE — PROGRESS NOTE ADULT - SUBJECTIVE AND OBJECTIVE BOX
Planned Date of Surgery: 4/17/24                                                                                                            Surgeon/Attending MD: Dr. Pate    Procedure: preop MV repair/replacement, possible cryomaze / left atrial appendage occlusion     Subjective: Pt is feeling well, no complaints. Feels ready for his surgery tomorrow. Denies any chest pain, palpitations, orthopnea, dyspnea on exertion, shortness of breath, wheezing, abd pain, nausea, vomiting, constipation, lightheadedness, headaches, fevers, or chills.    HPI:  79 y/o M with PMHx of HFrEF (LVEF 35%), pAflutter (on Eliquis), unprovoked PE/DVT (s/p IVC filter placement then retrieval 2015, on Eliquis), HTN, HLD, ADAM (on CPAP), CKD stage 4 (Cr 1.2-1.49 Jan-Feb 2024), obesity who presented to outpatient structural cardiologist Dr. Pate for consultation of severe MR. TTE (2/5/24): mod reduced LV systolic function, LV global strain w/ apical sparing which may be indicative of amyloidosis, mildly reduced RV systolic function, mod AR, severe MR, LVEF 35%. 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  Other chronic pulmonary embolism without acute cor pulmonale  Essential hypertension  Gout  Aortic regurgitation  History of aortic insufficiency  History of hip replacement, unspecified laterality  Bilateral cataracts    No Known Allergies    Vitals:  T(C): --  HR: 68 (04-16-24 @ 13:10) (68 - 68)  BP: 159/79 (04-16-24 @ 13:10) (159/79 - 159/79)  RR: 18 (04-16-24 @ 13:10) (18 - 18)  SpO2: 96% (04-16-24 @ 13:10) (96% - 96%)    Physical Exam  General: well appearing sitting in chair in NAD   Neuro:AOx3  CV: normal s1/s2, no murmurs   Pulmonary: lungs CTA b/l   GI: previous laparotomy scar well healed, +BS 4 quadrants, soft nontender   Extremities: no edema, no calf tenderness     MEDICATIONS  (STANDING):  ascorbic acid 2000 milliGRAM(s) Oral once  chlorhexidine 0.12% Liquid 10 milliLiter(s) Swish and Spit once  chlorhexidine 4% Liquid 1 Application(s) Topical once  chlorhexidine 4% Liquid 1 Application(s) Topical once  chlorhexidine 4% Liquid 1 Application(s) Topical once  influenza  Vaccine (HIGH DOSE) 0.7 milliLiter(s) IntraMuscular once  mupirocin 2% Ointment 1 Application(s) Both Nostrils two times a day  sodium chloride 0.9%. 1000 milliLiter(s) (30 mL/Hr) IV Continuous <Continuous>    MEDICATIONS  (PRN):      On Beta Blocker? yes    Labs:                        16.1   8.18  )-----------( 281      ( 16 Apr 2024 06:44 )             49.4     04-16    143  |  107  |  24<H>  ----------------------------<  109<H>  4.0   |  26  |  1.39<H>    Ca    9.6      16 Apr 2024 06:44  Mg     2.0     04-16    TPro  6.3  /  Alb  4.0  /  TBili  1.1  /  DBili  x   /  AST  15  /  ALT  15  /  AlkPhos  100  04-16    PT/INR - ( 16 Apr 2024 06:44 )   PT: 11.5 sec;   INR: 1.01          PTT - ( 16 Apr 2024 06:44 )  PTT:43.7 sec  Urinalysis Basic - ( 16 Apr 2024 06:44 )    Color: x / Appearance: x / SG: x / pH: x  Gluc: 109 mg/dL / Ketone: x  / Bili: x / Urobili: x   Blood: x / Protein: x / Nitrite: x   Leuk Esterase: x / RBC: x / WBC x   Sq Epi: x / Non Sq Epi: x / Bacteria: x          CARDIAC MARKERS ( 16 Apr 2024 06:44 )  x     / x     / 42 U/L / x     / 1.5 ng/mL        Preoperative Checklist: (x = completed, n/a = not applicable, p = pending)  [ x ] ECHO  [ x ] CXR  [ p ] Carotid Duplex  [ p ] CT imaging  [ p ] PFTs  [  ] UA  [  ] Baseline Labs (CMP, CBC w/ diff, PTT, PT/INR)  [  ] A1c  [  ] TSH  [  ] Lipid panel  [  ] Cardiac enzymes  [  ] Pro BNP  [  ] EKG   [  ] T&S 1  [  ] T&S 2 (within 72 hours)  [  ] COVID PCR (within 72 hours)  [  ] Room air ABG  [  ] P2Y12  [  ] bHCG  [  ] Consents  [  ] Pre-op Orders Placed  [  ] Blood Products Ordered  [  ] NPO at midnight  [  ] Conduit tested    Abnormal/Noteworthy Preoperative Testing Results:    Planned Date of Surgery: 4/17/24                                                                                                            Surgeon/Attending MD: Dr. Pate    Procedure: preop MV repair/replacement, possible cryomaze / left atrial appendage occlusion     Subjective: Pt is feeling well, no complaints. Feels ready for his surgery tomorrow. Denies any chest pain, palpitations, orthopnea, dyspnea on exertion, shortness of breath, wheezing, abd pain, nausea, vomiting, constipation, lightheadedness, headaches, fevers, or chills.    HPI:  79 y/o M with PMHx of HFrEF (LVEF 35%), pAflutter (on Eliquis), unprovoked PE/DVT (s/p IVC filter placement then retrieval 2015, on Eliquis), HTN, HLD, ADAM (on CPAP), CKD stage 4 (Cr 1.2-1.49 Jan-Feb 2024), obesity who presented to outpatient structural cardiologist Dr. Pate for consultation of severe MR. TTE (2/5/24): mod reduced LV systolic function, LV global strain, mildly reduced RV systolic function, mod AR, severe MR, LVEF 35%. Patient deemed a good surgical candidate for surgical candidate and is now preop for MV repair/replacement, possible cryomaze, possible atrialclip (possible CABG? pending discussion with Dr. Pate for RCA dx on cath today).     PAST MEDICAL & SURGICAL HISTORY:  HTN (hypertension)    Pericarditis  Obesity, unspecified classification, unspecified obesity type, unspecified whether serious comorbidity present  ADAM (obstructive sleep apnea)  Chronic deep vein thrombosis (DVT) of non-extremity vein  Other chronic pulmonary embolism without acute cor pulmonale  Essential hypertension  Gout  Aortic regurgitation  History of aortic insufficiency  History of hip replacement, unspecified laterality  Bilateral cataracts    No Known Allergies    Vitals:  T(C): --  HR: 68 (04-16-24 @ 13:10) (68 - 68)  BP: 159/79 (04-16-24 @ 13:10) (159/79 - 159/79)  RR: 18 (04-16-24 @ 13:10) (18 - 18)  SpO2: 96% (04-16-24 @ 13:10) (96% - 96%)    Physical Exam  General: well appearing sitting in chair in NAD   Neuro:AOx3  CV: normal s1/s2, no murmurs   Pulmonary: lungs CTA b/l   GI: previous laparotomy scar well healed, +BS 4 quadrants, soft nontender   Extremities: no edema, no calf tenderness     MEDICATIONS  (STANDING):  ascorbic acid 2000 milliGRAM(s) Oral once  chlorhexidine 0.12% Liquid 10 milliLiter(s) Swish and Spit once  chlorhexidine 4% Liquid 1 Application(s) Topical once  chlorhexidine 4% Liquid 1 Application(s) Topical once  chlorhexidine 4% Liquid 1 Application(s) Topical once  influenza  Vaccine (HIGH DOSE) 0.7 milliLiter(s) IntraMuscular once  mupirocin 2% Ointment 1 Application(s) Both Nostrils two times a day  sodium chloride 0.9%. 1000 milliLiter(s) (30 mL/Hr) IV Continuous <Continuous>    MEDICATIONS  (PRN):      On Beta Blocker? yes    Labs:                        16.1   8.18  )-----------( 281      ( 16 Apr 2024 06:44 )             49.4     04-16    143  |  107  |  24<H>  ----------------------------<  109<H>  4.0   |  26  |  1.39<H>    Ca    9.6      16 Apr 2024 06:44  Mg     2.0     04-16    TPro  6.3  /  Alb  4.0  /  TBili  1.1  /  DBili  x   /  AST  15  /  ALT  15  /  AlkPhos  100  04-16    PT/INR - ( 16 Apr 2024 06:44 )   PT: 11.5 sec;   INR: 1.01          PTT - ( 16 Apr 2024 06:44 )  PTT:43.7 sec  Urinalysis Basic - ( 16 Apr 2024 06:44 )    Color: x / Appearance: x / SG: x / pH: x  Gluc: 109 mg/dL / Ketone: x  / Bili: x / Urobili: x   Blood: x / Protein: x / Nitrite: x   Leuk Esterase: x / RBC: x / WBC x   Sq Epi: x / Non Sq Epi: x / Bacteria: x          CARDIAC MARKERS ( 16 Apr 2024 06:44 )  x     / x     / 42 U/L / x     / 1.5 ng/mL    Preoperative Checklist: (x = completed, n/a = not applicable, p = pending)  [ x ] ECHO  [ x ] CXR  [ p ] Carotid Duplex  [ n/a ] CT imaging - confirmed with Rosa in office   [ p ] PFTs  [ p ] UA  [ x ] Baseline Labs (CMP, CBC w/ diff, PTT, PT/INR)  [ x ] A1c  [ x ] TSH  [ x ] Lipid panel  [ x ] Cardiac enzymes  [ x ] Pro BNP  [ p ] EKG   [ p ] T&S 1 (ordered for 4 pm)  [ p ] T&S 2 (within 72 hours) (ordered for 6pm)  [ n/a ] Room air ABG  [ n/a ] P2Y12  [ n/a ] bHCG  [ x ] Consents  [ x ] Pre-op Orders Placed  [ x ] Blood Products Ordered  [ x ] NPO at midnight  [ p ] Conduit tested -- small chance pt is a CABGx1? RCA dx but pending plan with Dr. Pate -- pt aware     Abnormal/Noteworthy Preoperative Testing Results:   Pending remainder of PST at this time   -carotids  -pfts  -UA

## 2024-04-16 NOTE — PATIENT PROFILE ADULT - NSPRESCRALCSIXMORE_GEN_A_NUR
Never
Pt scheduled for tonsillectomy and adenoidectomy on 9/5/23 with [place]. Miguel [place] [facility] [place]

## 2024-04-16 NOTE — CHART NOTE - NSCHARTNOTEFT_GEN_A_CORE
Vein Mapping: b/l LE mapped for conduit harvest  -b/l LE extremities appear appropriate to harvest, small scattered varicosities throughout both legs, appears to be appropriate diameter b/l.

## 2024-04-16 NOTE — PATIENT PROFILE ADULT - FALL HARM RISK - RISK INTERVENTIONS
Assistance OOB with selected safe patient handling equipment/Assistance with ambulation/Communicate Fall Risk and Risk Factors to all staff, patient, and family/Monitor gait and stability/Reinforce activity limits and safety measures with patient and family/Sit up slowly, dangle for a short time, stand at bedside before walking/Use of alarms - bed, chair and/or voice tab/Visual Cue: Yellow wristband/Bed in lowest position, wheels locked, appropriate side rails in place/Call bell, personal items and telephone in reach/Instruct patient to call for assistance before getting out of bed or chair/Non-slip footwear when patient is out of bed/[place] to call system/Physically safe environment - no spills, clutter or unnecessary equipment/Purposeful Proactive Rounding/Room/bathroom lighting operational, light cord in reach

## 2024-04-16 NOTE — PROGRESS NOTE ADULT - ASSESSMENT
77 y/o M with PMHx of HFrEF (LVEF 35%), pAflutter (on Eliquis), unprovoked PE/DVT (s/p IVC filter placement then retrieval 2015, on Eliquis), HTN, HLD, ADAM (on CPAP), CKD stage 4 (Cr 1.2-1.49 Jan-Feb 2024), obesity who presented to outpatient structural cardiologist Dr. Pate for consultation of severe MR. TTE (2/5/24): mod reduced LV systolic function, LV global strain, mildly reduced RV systolic function, mod AR, severe MR, LVEF 35%. Patient deemed a good surgical candidate for surgical candidate and is now preop for MV repair/replacement, possible cryomaze, possible atrialclip (possible CABG? pending discussion with Dr. Pate for RCA dx on cath today).     Plan:    Neurovascular:   -Stable, no active issues    Cardiovascular:   -preop MV repair/replacement, possible cryomaze and SWEETIE occlusion    -pst pending: carotid duplex, UA, PFTs, T&Sx2, EKG   -AFlutter hx: on eliquis at home, held the last few days    -starting hep gtt   -hx of pericardial effusion requiring pericardiocentesis  -hx of HFrEF 35-40%   -Hemodynamically stable.   -Monitor: BP, HR, tele    Respiratory:   -hx ADAM (uses CPAP at home)    -ordered for CPAP here   -hx of PE/DVT in past    -remains on eliquis     -hx od IVC filer placement and then retrieval 2015  -Oxygenating well on room air  -Encourage continued use of IS 10x/hr and frequent ambulation  -CXR: no new CXR this admission, previously normal     GI:  -GI PPX: pantoprazole 40mg daily   -PO Diet: DASH diet   -Bowel Regimen: senna     Renal / :  -hx of RCC s/p cryoablation (resolved)  -hx of CKD 3a (baseline around 1.3-1.4)   -hx of BPH s/p prostatectomy   -hx of gout: allopurinol  -Continue to monitor renal function: BUN/Cr 24/1.39  -Monitor I/O's daily     Endocrine:    -No hx of DM or thyroid dx  -A1c: 4.7  -TSH: pending     Hematologic:  -CBC: H/H- 16/49  -Coagulation Panel: WNL     ID:  -Temperature: afebrile   -CBC: WBC- 8    Prophylaxis:  -DVT prophylaxis with hep gtt   -Continue with SCD's b/l while patient is at rest     Disposition:  -OR tomorrow with Dr. Pate

## 2024-04-17 ENCOUNTER — TRANSCRIPTION ENCOUNTER (OUTPATIENT)
Age: 79
End: 2024-04-17

## 2024-04-17 ENCOUNTER — RESULT REVIEW (OUTPATIENT)
Age: 79
End: 2024-04-17

## 2024-04-17 ENCOUNTER — APPOINTMENT (OUTPATIENT)
Dept: CARDIOTHORACIC SURGERY | Facility: HOSPITAL | Age: 79
End: 2024-04-17

## 2024-04-17 LAB
ALBUMIN SERPL ELPH-MCNC: 2.7 G/DL — LOW (ref 3.3–5)
ALBUMIN SERPL ELPH-MCNC: 3.3 G/DL — SIGNIFICANT CHANGE UP (ref 3.3–5)
ALP SERPL-CCNC: 51 U/L — SIGNIFICANT CHANGE UP (ref 40–120)
ALP SERPL-CCNC: 65 U/L — SIGNIFICANT CHANGE UP (ref 40–120)
ALT FLD-CCNC: 18 U/L — SIGNIFICANT CHANGE UP (ref 10–45)
ALT FLD-CCNC: 22 U/L — SIGNIFICANT CHANGE UP (ref 10–45)
ANION GAP SERPL CALC-SCNC: 10 MMOL/L — SIGNIFICANT CHANGE UP (ref 5–17)
ANION GAP SERPL CALC-SCNC: 14 MMOL/L — SIGNIFICANT CHANGE UP (ref 5–17)
ANION GAP SERPL CALC-SCNC: 6 MMOL/L — SIGNIFICANT CHANGE UP (ref 5–17)
APTT BLD: 37.1 SEC — HIGH (ref 24.5–35.6)
APTT BLD: 46.2 SEC — HIGH (ref 24.5–35.6)
APTT BLD: 48.7 SEC — HIGH (ref 24.5–35.6)
APTT BLD: 54.2 SEC — HIGH (ref 24.5–35.6)
APTT BLD: 67.4 SEC — HIGH (ref 24.5–35.6)
AST SERPL-CCNC: 167 U/L — HIGH (ref 10–40)
AST SERPL-CCNC: 201 U/L — HIGH (ref 10–40)
BASE EXCESS BLDA CALC-SCNC: -1.1 MMOL/L — SIGNIFICANT CHANGE UP (ref -2–3)
BASE EXCESS BLDA CALC-SCNC: -2.2 MMOL/L — LOW (ref -2–3)
BASE EXCESS BLDA CALC-SCNC: -2.5 MMOL/L — LOW (ref -2–3)
BASE EXCESS BLDA CALC-SCNC: 0.6 MMOL/L — SIGNIFICANT CHANGE UP (ref -2–3)
BASE EXCESS BLDA CALC-SCNC: 1 MMOL/L — SIGNIFICANT CHANGE UP (ref -2–3)
BASE EXCESS BLDA CALC-SCNC: 1.2 MMOL/L — SIGNIFICANT CHANGE UP (ref -2–3)
BASE EXCESS BLDA CALC-SCNC: 1.6 MMOL/L — SIGNIFICANT CHANGE UP (ref -2–3)
BASE EXCESS BLDA CALC-SCNC: 1.7 MMOL/L — SIGNIFICANT CHANGE UP (ref -2–3)
BASE EXCESS BLDA CALC-SCNC: 2.1 MMOL/L — SIGNIFICANT CHANGE UP (ref -2–3)
BASE EXCESS BLDV CALC-SCNC: -4.6 MMOL/L — LOW (ref -2–3)
BASE EXCESS BLDV CALC-SCNC: -5 MMOL/L — LOW (ref -2–3)
BASE EXCESS BLDV CALC-SCNC: 3.3 MMOL/L — HIGH (ref -2–3)
BASOPHILS # BLD AUTO: 0.01 K/UL — SIGNIFICANT CHANGE UP (ref 0–0.2)
BASOPHILS # BLD AUTO: 0.02 K/UL — SIGNIFICANT CHANGE UP (ref 0–0.2)
BASOPHILS # BLD AUTO: 0.04 K/UL — SIGNIFICANT CHANGE UP (ref 0–0.2)
BASOPHILS NFR BLD AUTO: 0.1 % — SIGNIFICANT CHANGE UP (ref 0–2)
BASOPHILS NFR BLD AUTO: 0.1 % — SIGNIFICANT CHANGE UP (ref 0–2)
BASOPHILS NFR BLD AUTO: 0.2 % — SIGNIFICANT CHANGE UP (ref 0–2)
BILIRUB SERPL-MCNC: 1.2 MG/DL — SIGNIFICANT CHANGE UP (ref 0.2–1.2)
BILIRUB SERPL-MCNC: 1.3 MG/DL — HIGH (ref 0.2–1.2)
BLD GP AB SCN SERPL QL: NEGATIVE — SIGNIFICANT CHANGE UP
BUN SERPL-MCNC: 21 MG/DL — SIGNIFICANT CHANGE UP (ref 7–23)
BUN SERPL-MCNC: 22 MG/DL — SIGNIFICANT CHANGE UP (ref 7–23)
CA-I BLDA-SCNC: 0.94 MMOL/L — LOW (ref 1.15–1.33)
CA-I BLDA-SCNC: 0.95 MMOL/L — LOW (ref 1.15–1.33)
CA-I BLDA-SCNC: 1.07 MMOL/L — LOW (ref 1.15–1.33)
CA-I BLDA-SCNC: 1.12 MMOL/L — LOW (ref 1.15–1.33)
CA-I BLDA-SCNC: 1.15 MMOL/L — SIGNIFICANT CHANGE UP (ref 1.15–1.33)
CA-I BLDA-SCNC: 1.16 MMOL/L — SIGNIFICANT CHANGE UP (ref 1.15–1.33)
CA-I BLDA-SCNC: 1.16 MMOL/L — SIGNIFICANT CHANGE UP (ref 1.15–1.33)
CA-I BLDA-SCNC: 1.21 MMOL/L — SIGNIFICANT CHANGE UP (ref 1.15–1.33)
CA-I BLDA-SCNC: 1.22 MMOL/L — SIGNIFICANT CHANGE UP (ref 1.15–1.33)
CA-I SERPL-SCNC: 1.07 MMOL/L — LOW (ref 1.15–1.33)
CA-I SERPL-SCNC: 1.08 MMOL/L — LOW (ref 1.15–1.33)
CA-I SERPL-SCNC: 1.09 MMOL/L — LOW (ref 1.15–1.33)
CALCIUM SERPL-MCNC: 8.1 MG/DL — LOW (ref 8.4–10.5)
CALCIUM SERPL-MCNC: 8.3 MG/DL — LOW (ref 8.4–10.5)
CALCIUM SERPL-MCNC: 8.7 MG/DL — SIGNIFICANT CHANGE UP (ref 8.4–10.5)
CHLORIDE SERPL-SCNC: 106 MMOL/L — SIGNIFICANT CHANGE UP (ref 96–108)
CHLORIDE SERPL-SCNC: 107 MMOL/L — SIGNIFICANT CHANGE UP (ref 96–108)
CO2 BLDA-SCNC: 24 MMOL/L — SIGNIFICANT CHANGE UP (ref 19–24)
CO2 BLDA-SCNC: 24 MMOL/L — SIGNIFICANT CHANGE UP (ref 19–24)
CO2 BLDA-SCNC: 26 MMOL/L — HIGH (ref 19–24)
CO2 BLDA-SCNC: 27 MMOL/L — HIGH (ref 19–24)
CO2 BLDA-SCNC: 27 MMOL/L — HIGH (ref 19–24)
CO2 BLDA-SCNC: 28 MMOL/L — HIGH (ref 19–24)
CO2 BLDA-SCNC: 28 MMOL/L — HIGH (ref 19–24)
CO2 BLDA-SCNC: 29 MMOL/L — HIGH (ref 19–24)
CO2 BLDA-SCNC: 29 MMOL/L — HIGH (ref 19–24)
CO2 BLDV-SCNC: 23 MMOL/L — SIGNIFICANT CHANGE UP (ref 22–26)
CO2 BLDV-SCNC: 23.5 MMOL/L — SIGNIFICANT CHANGE UP (ref 22–26)
CO2 BLDV-SCNC: 31.9 MMOL/L — HIGH (ref 22–26)
CO2 SERPL-SCNC: 21 MMOL/L — LOW (ref 22–31)
CO2 SERPL-SCNC: 22 MMOL/L — SIGNIFICANT CHANGE UP (ref 22–31)
CO2 SERPL-SCNC: 25 MMOL/L — SIGNIFICANT CHANGE UP (ref 22–31)
COHGB MFR BLDA: 0.8 % — SIGNIFICANT CHANGE UP
COHGB MFR BLDA: 0.9 % — SIGNIFICANT CHANGE UP
COHGB MFR BLDA: 1 % — SIGNIFICANT CHANGE UP
COHGB MFR BLDA: 1.1 % — SIGNIFICANT CHANGE UP
COHGB MFR BLDA: 1.1 % — SIGNIFICANT CHANGE UP
COHGB MFR BLDA: 1.2 % — SIGNIFICANT CHANGE UP
COHGB MFR BLDA: 1.3 % — SIGNIFICANT CHANGE UP
COHGB MFR BLDA: 1.3 % — SIGNIFICANT CHANGE UP
COHGB MFR BLDA: 1.4 % — SIGNIFICANT CHANGE UP
COHGB MFR BLDV: 1.5 % — SIGNIFICANT CHANGE UP
CREAT SERPL-MCNC: 1.15 MG/DL — SIGNIFICANT CHANGE UP (ref 0.5–1.3)
CREAT SERPL-MCNC: 1.23 MG/DL — SIGNIFICANT CHANGE UP (ref 0.5–1.3)
CREAT SERPL-MCNC: 1.29 MG/DL — SIGNIFICANT CHANGE UP (ref 0.5–1.3)
CREAT SERPL-MCNC: 1.37 MG/DL — HIGH (ref 0.5–1.3)
EGFR: 53 ML/MIN/1.73M2 — LOW
EGFR: 57 ML/MIN/1.73M2 — LOW
EGFR: 60 ML/MIN/1.73M2 — SIGNIFICANT CHANGE UP
EGFR: 65 ML/MIN/1.73M2 — SIGNIFICANT CHANGE UP
EOSINOPHIL # BLD AUTO: 0.01 K/UL — SIGNIFICANT CHANGE UP (ref 0–0.5)
EOSINOPHIL # BLD AUTO: 0.01 K/UL — SIGNIFICANT CHANGE UP (ref 0–0.5)
EOSINOPHIL # BLD AUTO: 0.02 K/UL — SIGNIFICANT CHANGE UP (ref 0–0.5)
EOSINOPHIL NFR BLD AUTO: 0.1 % — SIGNIFICANT CHANGE UP (ref 0–6)
GAS PNL BLDA: SIGNIFICANT CHANGE UP
GAS PNL BLDV: 134 MMOL/L — LOW (ref 136–145)
GAS PNL BLDV: 136 MMOL/L — SIGNIFICANT CHANGE UP (ref 136–145)
GAS PNL BLDV: 136 MMOL/L — SIGNIFICANT CHANGE UP (ref 136–145)
GAS PNL BLDV: SIGNIFICANT CHANGE UP
GLUCOSE BLDA-MCNC: 103 MG/DL — HIGH (ref 70–99)
GLUCOSE BLDA-MCNC: 114 MG/DL — HIGH (ref 70–99)
GLUCOSE BLDA-MCNC: 136 MG/DL — HIGH (ref 70–99)
GLUCOSE BLDA-MCNC: 141 MG/DL — HIGH (ref 70–99)
GLUCOSE BLDA-MCNC: 143 MG/DL — HIGH (ref 70–99)
GLUCOSE BLDA-MCNC: 148 MG/DL — HIGH (ref 70–99)
GLUCOSE BLDA-MCNC: 149 MG/DL — HIGH (ref 70–99)
GLUCOSE BLDA-MCNC: 198 MG/DL — HIGH (ref 70–99)
GLUCOSE BLDA-MCNC: 84 MG/DL — SIGNIFICANT CHANGE UP (ref 70–99)
GLUCOSE BLDC GLUCOMTR-MCNC: 91 MG/DL — SIGNIFICANT CHANGE UP (ref 70–99)
GLUCOSE BLDC GLUCOMTR-MCNC: 94 MG/DL — SIGNIFICANT CHANGE UP (ref 70–99)
GLUCOSE BLDV-MCNC: 111 MG/DL — HIGH (ref 70–99)
GLUCOSE SERPL-MCNC: 198 MG/DL — HIGH (ref 70–99)
GLUCOSE SERPL-MCNC: 209 MG/DL — HIGH (ref 70–99)
GLUCOSE SERPL-MCNC: 212 MG/DL — HIGH (ref 70–99)
GLUCOSE SERPL-MCNC: 92 MG/DL — SIGNIFICANT CHANGE UP (ref 70–99)
HCO3 BLDA-SCNC: 22 MMOL/L — SIGNIFICANT CHANGE UP (ref 21–28)
HCO3 BLDA-SCNC: 22 MMOL/L — SIGNIFICANT CHANGE UP (ref 21–28)
HCO3 BLDA-SCNC: 25 MMOL/L — SIGNIFICANT CHANGE UP (ref 21–28)
HCO3 BLDA-SCNC: 26 MMOL/L — SIGNIFICANT CHANGE UP (ref 21–28)
HCO3 BLDA-SCNC: 26 MMOL/L — SIGNIFICANT CHANGE UP (ref 21–28)
HCO3 BLDA-SCNC: 27 MMOL/L — SIGNIFICANT CHANGE UP (ref 21–28)
HCO3 BLDA-SCNC: 28 MMOL/L — SIGNIFICANT CHANGE UP (ref 21–28)
HCO3 BLDV-SCNC: 22 MMOL/L — SIGNIFICANT CHANGE UP (ref 22–29)
HCO3 BLDV-SCNC: 22 MMOL/L — SIGNIFICANT CHANGE UP (ref 22–29)
HCO3 BLDV-SCNC: 30 MMOL/L — HIGH (ref 22–29)
HCT VFR BLD CALC: 27.8 % — LOW (ref 39–50)
HCT VFR BLD CALC: 34 % — LOW (ref 39–50)
HCT VFR BLD CALC: 41.6 % — SIGNIFICANT CHANGE UP (ref 39–50)
HCT VFR BLD CALC: 41.8 % — SIGNIFICANT CHANGE UP (ref 39–50)
HGB BLD CALC-MCNC: 12 G/DL — LOW (ref 12.6–17.4)
HGB BLD-MCNC: 11.3 G/DL — LOW (ref 13–17)
HGB BLD-MCNC: 13.7 G/DL — SIGNIFICANT CHANGE UP (ref 13–17)
HGB BLD-MCNC: 14.1 G/DL — SIGNIFICANT CHANGE UP (ref 13–17)
HGB BLD-MCNC: 9.3 G/DL — LOW (ref 13–17)
HGB BLDA-MCNC: 11.9 G/DL — LOW (ref 12.6–17.4)
HGB BLDA-MCNC: 12.1 G/DL — LOW (ref 12.6–17.4)
HGB BLDA-MCNC: 12.1 G/DL — LOW (ref 12.6–17.4)
HGB BLDA-MCNC: 12.2 G/DL — LOW (ref 12.6–17.4)
HGB BLDA-MCNC: 12.9 G/DL — SIGNIFICANT CHANGE UP (ref 12.6–17.4)
HGB BLDA-MCNC: 13.1 G/DL — SIGNIFICANT CHANGE UP (ref 12.6–17.4)
HGB BLDA-MCNC: 13.3 G/DL — SIGNIFICANT CHANGE UP (ref 12.6–17.4)
HGB BLDA-MCNC: 13.9 G/DL — SIGNIFICANT CHANGE UP (ref 12.6–17.4)
HGB BLDA-MCNC: 14.6 G/DL — SIGNIFICANT CHANGE UP (ref 12.6–17.4)
IMM GRANULOCYTES NFR BLD AUTO: 1.5 % — HIGH (ref 0–0.9)
IMM GRANULOCYTES NFR BLD AUTO: 1.5 % — HIGH (ref 0–0.9)
IMM GRANULOCYTES NFR BLD AUTO: 1.8 % — HIGH (ref 0–0.9)
INR BLD: 1.06 — SIGNIFICANT CHANGE UP (ref 0.85–1.18)
INR BLD: 1.12 — SIGNIFICANT CHANGE UP (ref 0.85–1.18)
INR BLD: 1.18 — SIGNIFICANT CHANGE UP (ref 0.85–1.18)
INR BLD: 1.35 — HIGH (ref 0.85–1.18)
INR BLD: 1.4 — HIGH (ref 0.85–1.18)
LACTATE SERPL-SCNC: 1.6 MMOL/L — SIGNIFICANT CHANGE UP (ref 0.5–2)
LACTATE SERPL-SCNC: 1.8 MMOL/L — SIGNIFICANT CHANGE UP (ref 0.5–2)
LACTATE SERPL-SCNC: 2.9 MMOL/L — HIGH (ref 0.5–2)
LYMPHOCYTES # BLD AUTO: 0.44 K/UL — LOW (ref 1–3.3)
LYMPHOCYTES # BLD AUTO: 0.75 K/UL — LOW (ref 1–3.3)
LYMPHOCYTES # BLD AUTO: 0.96 K/UL — LOW (ref 1–3.3)
LYMPHOCYTES # BLD AUTO: 3.3 % — LOW (ref 13–44)
LYMPHOCYTES # BLD AUTO: 4.2 % — LOW (ref 13–44)
LYMPHOCYTES # BLD AUTO: 4.9 % — LOW (ref 13–44)
MAGNESIUM SERPL-MCNC: 1.9 MG/DL — SIGNIFICANT CHANGE UP (ref 1.6–2.6)
MAGNESIUM SERPL-MCNC: 3 MG/DL — HIGH (ref 1.6–2.6)
MAGNESIUM SERPL-MCNC: 3.1 MG/DL — HIGH (ref 1.6–2.6)
MCHC RBC-ENTMCNC: 29.2 PG — SIGNIFICANT CHANGE UP (ref 27–34)
MCHC RBC-ENTMCNC: 29.3 PG — SIGNIFICANT CHANGE UP (ref 27–34)
MCHC RBC-ENTMCNC: 29.6 PG — SIGNIFICANT CHANGE UP (ref 27–34)
MCHC RBC-ENTMCNC: 30.1 PG — SIGNIFICANT CHANGE UP (ref 27–34)
MCHC RBC-ENTMCNC: 32.9 GM/DL — SIGNIFICANT CHANGE UP (ref 32–36)
MCHC RBC-ENTMCNC: 33.2 GM/DL — SIGNIFICANT CHANGE UP (ref 32–36)
MCHC RBC-ENTMCNC: 33.5 GM/DL — SIGNIFICANT CHANGE UP (ref 32–36)
MCHC RBC-ENTMCNC: 33.7 GM/DL — SIGNIFICANT CHANGE UP (ref 32–36)
MCV RBC AUTO: 86.5 FL — SIGNIFICANT CHANGE UP (ref 80–100)
MCV RBC AUTO: 89 FL — SIGNIFICANT CHANGE UP (ref 80–100)
MCV RBC AUTO: 89.1 FL — SIGNIFICANT CHANGE UP (ref 80–100)
MCV RBC AUTO: 90 FL — SIGNIFICANT CHANGE UP (ref 80–100)
METHGB MFR BLDA: 0.6 % — SIGNIFICANT CHANGE UP
METHGB MFR BLDA: 0.9 % — SIGNIFICANT CHANGE UP
METHGB MFR BLDA: 1 % — SIGNIFICANT CHANGE UP
METHGB MFR BLDA: 1.1 % — SIGNIFICANT CHANGE UP
METHGB MFR BLDA: 1.1 % — SIGNIFICANT CHANGE UP
METHGB MFR BLDA: 1.2 % — SIGNIFICANT CHANGE UP
METHGB MFR BLDA: 1.3 % — SIGNIFICANT CHANGE UP
METHGB MFR BLDV: 0.6 % — SIGNIFICANT CHANGE UP
MONOCYTES # BLD AUTO: 0.69 K/UL — SIGNIFICANT CHANGE UP (ref 0–0.9)
MONOCYTES # BLD AUTO: 1.04 K/UL — HIGH (ref 0–0.9)
MONOCYTES # BLD AUTO: 1.17 K/UL — HIGH (ref 0–0.9)
MONOCYTES NFR BLD AUTO: 5.2 % — SIGNIFICANT CHANGE UP (ref 2–14)
MONOCYTES NFR BLD AUTO: 5.8 % — SIGNIFICANT CHANGE UP (ref 2–14)
MONOCYTES NFR BLD AUTO: 5.9 % — SIGNIFICANT CHANGE UP (ref 2–14)
NEUTROPHILS # BLD AUTO: 11.94 K/UL — HIGH (ref 1.8–7.4)
NEUTROPHILS # BLD AUTO: 15.74 K/UL — HIGH (ref 1.8–7.4)
NEUTROPHILS # BLD AUTO: 17.17 K/UL — HIGH (ref 1.8–7.4)
NEUTROPHILS NFR BLD AUTO: 87.1 % — HIGH (ref 43–77)
NEUTROPHILS NFR BLD AUTO: 88.3 % — HIGH (ref 43–77)
NEUTROPHILS NFR BLD AUTO: 89.8 % — HIGH (ref 43–77)
NRBC # BLD: 0 /100 WBCS — SIGNIFICANT CHANGE UP (ref 0–0)
OXYHGB MFR BLDA: 96.4 % — HIGH (ref 90–95)
OXYHGB MFR BLDA: 96.8 % — HIGH (ref 90–95)
OXYHGB MFR BLDA: 96.8 % — HIGH (ref 90–95)
OXYHGB MFR BLDA: 96.9 % — HIGH (ref 90–95)
OXYHGB MFR BLDA: 96.9 % — HIGH (ref 90–95)
OXYHGB MFR BLDA: 97 % — HIGH (ref 90–95)
OXYHGB MFR BLDA: 97 % — HIGH (ref 90–95)
OXYHGB MFR BLDA: 97.3 % — HIGH (ref 90–95)
OXYHGB MFR BLDA: 97.3 % — HIGH (ref 90–95)
PCO2 BLDA: 37 MMHG — SIGNIFICANT CHANGE UP (ref 35–48)
PCO2 BLDA: 39 MMHG — SIGNIFICANT CHANGE UP (ref 35–48)
PCO2 BLDA: 39 MMHG — SIGNIFICANT CHANGE UP (ref 35–48)
PCO2 BLDA: 41 MMHG — SIGNIFICANT CHANGE UP (ref 35–48)
PCO2 BLDA: 45 MMHG — SIGNIFICANT CHANGE UP (ref 35–48)
PCO2 BLDA: 47 MMHG — SIGNIFICANT CHANGE UP (ref 35–48)
PCO2 BLDA: 50 MMHG — HIGH (ref 35–48)
PCO2 BLDV: 45 MMHG — SIGNIFICANT CHANGE UP (ref 42–55)
PCO2 BLDV: 46 MMHG — SIGNIFICANT CHANGE UP (ref 42–55)
PCO2 BLDV: 56 MMHG — HIGH (ref 42–55)
PH BLDA: 7.34 — LOW (ref 7.35–7.45)
PH BLDA: 7.35 — SIGNIFICANT CHANGE UP (ref 7.35–7.45)
PH BLDA: 7.37 — SIGNIFICANT CHANGE UP (ref 7.35–7.45)
PH BLDA: 7.38 — SIGNIFICANT CHANGE UP (ref 7.35–7.45)
PH BLDA: 7.38 — SIGNIFICANT CHANGE UP (ref 7.35–7.45)
PH BLDA: 7.39 — SIGNIFICANT CHANGE UP (ref 7.35–7.45)
PH BLDA: 7.39 — SIGNIFICANT CHANGE UP (ref 7.35–7.45)
PH BLDA: 7.41 — SIGNIFICANT CHANGE UP (ref 7.35–7.45)
PH BLDA: 7.43 — SIGNIFICANT CHANGE UP (ref 7.35–7.45)
PH BLDV: 7.29 — LOW (ref 7.32–7.43)
PH BLDV: 7.29 — LOW (ref 7.32–7.43)
PH BLDV: 7.34 — SIGNIFICANT CHANGE UP (ref 7.32–7.43)
PHOSPHATE SERPL-MCNC: 3.2 MG/DL — SIGNIFICANT CHANGE UP (ref 2.5–4.5)
PHOSPHATE SERPL-MCNC: 3.5 MG/DL — SIGNIFICANT CHANGE UP (ref 2.5–4.5)
PHOSPHATE SERPL-MCNC: 3.6 MG/DL — SIGNIFICANT CHANGE UP (ref 2.5–4.5)
PLATELET # BLD AUTO: 166 K/UL — SIGNIFICANT CHANGE UP (ref 150–400)
PLATELET # BLD AUTO: 213 K/UL — SIGNIFICANT CHANGE UP (ref 150–400)
PLATELET # BLD AUTO: 219 K/UL — SIGNIFICANT CHANGE UP (ref 150–400)
PLATELET # BLD AUTO: 225 K/UL — SIGNIFICANT CHANGE UP (ref 150–400)
PO2 BLDA: 224 MMHG — HIGH (ref 83–108)
PO2 BLDA: 289 MMHG — HIGH (ref 83–108)
PO2 BLDA: 343 MMHG — HIGH (ref 83–108)
PO2 BLDA: 349 MMHG — HIGH (ref 83–108)
PO2 BLDA: 358 MMHG — HIGH (ref 83–108)
PO2 BLDA: 393 MMHG — HIGH (ref 83–108)
PO2 BLDA: 406 MMHG — HIGH (ref 83–108)
PO2 BLDA: 432 MMHG — HIGH (ref 83–108)
PO2 BLDA: 435 MMHG — HIGH (ref 83–108)
PO2 BLDV: 42 MMHG — SIGNIFICANT CHANGE UP (ref 25–45)
PO2 BLDV: 46 MMHG — HIGH (ref 25–45)
PO2 BLDV: 50 MMHG — HIGH (ref 25–45)
POTASSIUM BLDA-SCNC: 3.6 MMOL/L — SIGNIFICANT CHANGE UP (ref 3.5–5.1)
POTASSIUM BLDA-SCNC: 3.7 MMOL/L — SIGNIFICANT CHANGE UP (ref 3.5–5.1)
POTASSIUM BLDA-SCNC: 5.2 MMOL/L — HIGH (ref 3.5–5.1)
POTASSIUM BLDA-SCNC: 5.5 MMOL/L — HIGH (ref 3.5–5.1)
POTASSIUM BLDA-SCNC: 5.6 MMOL/L — HIGH (ref 3.5–5.1)
POTASSIUM BLDA-SCNC: 5.9 MMOL/L — HIGH (ref 3.5–5.1)
POTASSIUM BLDA-SCNC: 6.4 MMOL/L — CRITICAL HIGH (ref 3.5–5.1)
POTASSIUM BLDV-SCNC: 3.5 MMOL/L — SIGNIFICANT CHANGE UP (ref 3.5–5.1)
POTASSIUM BLDV-SCNC: 4.1 MMOL/L — SIGNIFICANT CHANGE UP (ref 3.5–5.1)
POTASSIUM BLDV-SCNC: 4.3 MMOL/L — SIGNIFICANT CHANGE UP (ref 3.5–5.1)
POTASSIUM SERPL-MCNC: 3.6 MMOL/L — SIGNIFICANT CHANGE UP (ref 3.5–5.3)
POTASSIUM SERPL-MCNC: 4.2 MMOL/L — SIGNIFICANT CHANGE UP (ref 3.5–5.3)
POTASSIUM SERPL-MCNC: 4.5 MMOL/L — SIGNIFICANT CHANGE UP (ref 3.5–5.3)
POTASSIUM SERPL-MCNC: 4.9 MMOL/L — SIGNIFICANT CHANGE UP (ref 3.5–5.3)
POTASSIUM SERPL-SCNC: 3.6 MMOL/L — SIGNIFICANT CHANGE UP (ref 3.5–5.3)
POTASSIUM SERPL-SCNC: 4.2 MMOL/L — SIGNIFICANT CHANGE UP (ref 3.5–5.3)
POTASSIUM SERPL-SCNC: 4.5 MMOL/L — SIGNIFICANT CHANGE UP (ref 3.5–5.3)
POTASSIUM SERPL-SCNC: 4.9 MMOL/L — SIGNIFICANT CHANGE UP (ref 3.5–5.3)
PROT SERPL-MCNC: 4.2 G/DL — LOW (ref 6–8.3)
PROT SERPL-MCNC: 4.4 G/DL — LOW (ref 6–8.3)
PROTHROM AB SERPL-ACNC: 12.1 SEC — SIGNIFICANT CHANGE UP (ref 9.5–13)
PROTHROM AB SERPL-ACNC: 12.7 SEC — SIGNIFICANT CHANGE UP (ref 9.5–13)
PROTHROM AB SERPL-ACNC: 13.4 SEC — HIGH (ref 9.5–13)
PROTHROM AB SERPL-ACNC: 15.3 SEC — HIGH (ref 9.5–13)
PROTHROM AB SERPL-ACNC: 15.8 SEC — HIGH (ref 9.5–13)
RBC # BLD: 3.09 M/UL — LOW (ref 4.2–5.8)
RBC # BLD: 3.82 M/UL — LOW (ref 4.2–5.8)
RBC # BLD: 4.67 M/UL — SIGNIFICANT CHANGE UP (ref 4.2–5.8)
RBC # BLD: 4.83 M/UL — SIGNIFICANT CHANGE UP (ref 4.2–5.8)
RBC # FLD: 14.8 % — HIGH (ref 10.3–14.5)
RBC # FLD: 15.5 % — HIGH (ref 10.3–14.5)
RBC # FLD: 15.5 % — HIGH (ref 10.3–14.5)
RBC # FLD: 15.9 % — HIGH (ref 10.3–14.5)
RH IG SCN BLD-IMP: POSITIVE — SIGNIFICANT CHANGE UP
SAO2 % BLDA: 98.8 % — HIGH (ref 94–98)
SAO2 % BLDA: 98.8 % — HIGH (ref 94–98)
SAO2 % BLDA: 98.9 % — HIGH (ref 94–98)
SAO2 % BLDA: 98.9 % — HIGH (ref 94–98)
SAO2 % BLDA: 99 % — HIGH (ref 94–98)
SAO2 % BLDA: 99.1 % — HIGH (ref 94–98)
SAO2 % BLDA: 99.1 % — HIGH (ref 94–98)
SAO2 % BLDA: 99.3 % — HIGH (ref 94–98)
SAO2 % BLDA: 99.5 % — HIGH (ref 94–98)
SAO2 % BLDV: 69.1 % — SIGNIFICANT CHANGE UP (ref 67–88)
SAO2 % BLDV: 79.1 % — SIGNIFICANT CHANGE UP (ref 67–88)
SAO2 % BLDV: 84 % — SIGNIFICANT CHANGE UP (ref 67–88)
SODIUM BLDA-SCNC: 131 MMOL/L — LOW (ref 136–145)
SODIUM BLDA-SCNC: 133 MMOL/L — LOW (ref 136–145)
SODIUM BLDA-SCNC: 134 MMOL/L — LOW (ref 136–145)
SODIUM BLDA-SCNC: 135 MMOL/L — LOW (ref 136–145)
SODIUM BLDA-SCNC: 135 MMOL/L — LOW (ref 136–145)
SODIUM BLDA-SCNC: 136 MMOL/L — SIGNIFICANT CHANGE UP (ref 136–145)
SODIUM BLDA-SCNC: 136 MMOL/L — SIGNIFICANT CHANGE UP (ref 136–145)
SODIUM SERPL-SCNC: 137 MMOL/L — SIGNIFICANT CHANGE UP (ref 135–145)
SODIUM SERPL-SCNC: 138 MMOL/L — SIGNIFICANT CHANGE UP (ref 135–145)
SODIUM SERPL-SCNC: 141 MMOL/L — SIGNIFICANT CHANGE UP (ref 135–145)
SODIUM SERPL-SCNC: 141 MMOL/L — SIGNIFICANT CHANGE UP (ref 135–145)
WBC # BLD: 13.29 K/UL — HIGH (ref 3.8–10.5)
WBC # BLD: 17.83 K/UL — HIGH (ref 3.8–10.5)
WBC # BLD: 19.72 K/UL — HIGH (ref 3.8–10.5)
WBC # BLD: 6.45 K/UL — SIGNIFICANT CHANGE UP (ref 3.8–10.5)
WBC # FLD AUTO: 13.29 K/UL — HIGH (ref 3.8–10.5)
WBC # FLD AUTO: 17.83 K/UL — HIGH (ref 3.8–10.5)
WBC # FLD AUTO: 19.72 K/UL — HIGH (ref 3.8–10.5)
WBC # FLD AUTO: 6.45 K/UL — SIGNIFICANT CHANGE UP (ref 3.8–10.5)

## 2024-04-17 PROCEDURE — 93010 ELECTROCARDIOGRAM REPORT: CPT

## 2024-04-17 PROCEDURE — 33259 ABLATE ATRIA W/BYPASS ADD-ON: CPT | Mod: AS

## 2024-04-17 PROCEDURE — 33259 ABLATE ATRIA W/BYPASS ADD-ON: CPT

## 2024-04-17 PROCEDURE — 71045 X-RAY EXAM CHEST 1 VIEW: CPT | Mod: 26

## 2024-04-17 PROCEDURE — 33430 REPLACEMENT OF MITRAL VALVE: CPT | Mod: AS

## 2024-04-17 PROCEDURE — 88305 TISSUE EXAM BY PATHOLOGIST: CPT | Mod: 26

## 2024-04-17 PROCEDURE — 33510 CABG VEIN SINGLE: CPT

## 2024-04-17 PROCEDURE — 33430 REPLACEMENT OF MITRAL VALVE: CPT

## 2024-04-17 PROCEDURE — 33510 CABG VEIN SINGLE: CPT | Mod: AS

## 2024-04-17 RX ORDER — CEFAZOLIN SODIUM 1 G
3000 VIAL (EA) INJECTION EVERY 8 HOURS
Refills: 0 | Status: DISCONTINUED | OUTPATIENT
Start: 2024-04-17 | End: 2024-04-18

## 2024-04-17 RX ORDER — MIDAZOLAM HYDROCHLORIDE 1 MG/ML
4 INJECTION, SOLUTION INTRAMUSCULAR; INTRAVENOUS ONCE
Refills: 0 | Status: DISCONTINUED | OUTPATIENT
Start: 2024-04-17 | End: 2024-04-18

## 2024-04-17 RX ORDER — ASPIRIN/CALCIUM CARB/MAGNESIUM 324 MG
81 TABLET ORAL DAILY
Refills: 0 | Status: DISCONTINUED | OUTPATIENT
Start: 2024-04-18 | End: 2024-04-18

## 2024-04-17 RX ORDER — DEXTROSE 50 % IN WATER 50 %
25 SYRINGE (ML) INTRAVENOUS
Refills: 0 | Status: DISCONTINUED | OUTPATIENT
Start: 2024-04-17 | End: 2024-04-19

## 2024-04-17 RX ORDER — SODIUM CHLORIDE 9 MG/ML
1000 INJECTION INTRAMUSCULAR; INTRAVENOUS; SUBCUTANEOUS
Refills: 0 | Status: DISCONTINUED | OUTPATIENT
Start: 2024-04-17 | End: 2024-04-22

## 2024-04-17 RX ORDER — CEFAZOLIN SODIUM 1 G
2000 VIAL (EA) INJECTION EVERY 8 HOURS
Refills: 0 | Status: DISCONTINUED | OUTPATIENT
Start: 2024-04-17 | End: 2024-04-17

## 2024-04-17 RX ORDER — FENTANYL CITRATE 50 UG/ML
25 INJECTION INTRAVENOUS ONCE
Refills: 0 | Status: DISCONTINUED | OUTPATIENT
Start: 2024-04-17 | End: 2024-04-17

## 2024-04-17 RX ORDER — INSULIN HUMAN 100 [IU]/ML
2 INJECTION, SOLUTION SUBCUTANEOUS
Qty: 50 | Refills: 0 | Status: DISCONTINUED | OUTPATIENT
Start: 2024-04-17 | End: 2024-04-19

## 2024-04-17 RX ORDER — CHLORHEXIDINE GLUCONATE 213 G/1000ML
1 SOLUTION TOPICAL DAILY
Refills: 0 | Status: DISCONTINUED | OUTPATIENT
Start: 2024-04-17 | End: 2024-04-22

## 2024-04-17 RX ORDER — MUPIROCIN 20 MG/G
1 OINTMENT TOPICAL
Refills: 0 | Status: DISCONTINUED | OUTPATIENT
Start: 2024-04-17 | End: 2024-04-17

## 2024-04-17 RX ORDER — ACETAMINOPHEN 500 MG
1000 TABLET ORAL EVERY 6 HOURS
Refills: 0 | Status: COMPLETED | OUTPATIENT
Start: 2024-04-17 | End: 2024-04-18

## 2024-04-17 RX ORDER — HEPARIN SODIUM 5000 [USP'U]/ML
7500 INJECTION INTRAVENOUS; SUBCUTANEOUS EVERY 8 HOURS
Refills: 0 | Status: DISCONTINUED | OUTPATIENT
Start: 2024-04-17 | End: 2024-04-23

## 2024-04-17 RX ORDER — ALBUMIN HUMAN 25 %
250 VIAL (ML) INTRAVENOUS ONCE
Refills: 0 | Status: COMPLETED | OUTPATIENT
Start: 2024-04-17 | End: 2024-04-17

## 2024-04-17 RX ORDER — NOREPINEPHRINE BITARTRATE/D5W 8 MG/250ML
0.04 PLASTIC BAG, INJECTION (ML) INTRAVENOUS
Qty: 8 | Refills: 0 | Status: DISCONTINUED | OUTPATIENT
Start: 2024-04-17 | End: 2024-04-18

## 2024-04-17 RX ORDER — EPINEPHRINE 0.3 MG/.3ML
0.05 INJECTION INTRAMUSCULAR; SUBCUTANEOUS
Qty: 4 | Refills: 0 | Status: DISCONTINUED | OUTPATIENT
Start: 2024-04-17 | End: 2024-04-18

## 2024-04-17 RX ORDER — HEPARIN SODIUM 5000 [USP'U]/ML
5000 INJECTION INTRAVENOUS; SUBCUTANEOUS EVERY 8 HOURS
Refills: 0 | Status: DISCONTINUED | OUTPATIENT
Start: 2024-04-17 | End: 2024-04-17

## 2024-04-17 RX ORDER — DEXTROSE 50 % IN WATER 50 %
50 SYRINGE (ML) INTRAVENOUS
Refills: 0 | Status: DISCONTINUED | OUTPATIENT
Start: 2024-04-17 | End: 2024-04-19

## 2024-04-17 RX ORDER — POLYETHYLENE GLYCOL 3350 17 G/17G
17 POWDER, FOR SOLUTION ORAL DAILY
Refills: 0 | Status: DISCONTINUED | OUTPATIENT
Start: 2024-04-18 | End: 2024-04-19

## 2024-04-17 RX ORDER — ASCORBIC ACID 60 MG
500 TABLET,CHEWABLE ORAL
Refills: 0 | Status: DISCONTINUED | OUTPATIENT
Start: 2024-04-17 | End: 2024-04-19

## 2024-04-17 RX ORDER — MILRINONE LACTATE 1 MG/ML
0.38 INJECTION, SOLUTION INTRAVENOUS
Qty: 20 | Refills: 0 | Status: DISCONTINUED | OUTPATIENT
Start: 2024-04-17 | End: 2024-04-18

## 2024-04-17 RX ORDER — SENNA PLUS 8.6 MG/1
2 TABLET ORAL AT BEDTIME
Refills: 0 | Status: DISCONTINUED | OUTPATIENT
Start: 2024-04-18 | End: 2024-04-19

## 2024-04-17 RX ORDER — CALCIUM GLUCONATE 100 MG/ML
2 VIAL (ML) INTRAVENOUS ONCE
Refills: 0 | Status: COMPLETED | OUTPATIENT
Start: 2024-04-17 | End: 2024-04-18

## 2024-04-17 RX ORDER — MIDAZOLAM HYDROCHLORIDE 1 MG/ML
2 INJECTION, SOLUTION INTRAMUSCULAR; INTRAVENOUS ONCE
Refills: 0 | Status: DISCONTINUED | OUTPATIENT
Start: 2024-04-17 | End: 2024-04-17

## 2024-04-17 RX ORDER — VASOPRESSIN 20 [USP'U]/ML
0.04 INJECTION INTRAVENOUS
Qty: 40 | Refills: 0 | Status: DISCONTINUED | OUTPATIENT
Start: 2024-04-17 | End: 2024-04-18

## 2024-04-17 RX ORDER — OXYCODONE HYDROCHLORIDE 5 MG/1
5 TABLET ORAL EVERY 4 HOURS
Refills: 0 | Status: DISCONTINUED | OUTPATIENT
Start: 2024-04-17 | End: 2024-04-18

## 2024-04-17 RX ORDER — PANTOPRAZOLE SODIUM 20 MG/1
40 TABLET, DELAYED RELEASE ORAL DAILY
Refills: 0 | Status: DISCONTINUED | OUTPATIENT
Start: 2024-04-17 | End: 2024-04-19

## 2024-04-17 RX ORDER — CHLORHEXIDINE GLUCONATE 213 G/1000ML
15 SOLUTION TOPICAL EVERY 12 HOURS
Refills: 0 | Status: DISCONTINUED | OUTPATIENT
Start: 2024-04-17 | End: 2024-04-18

## 2024-04-17 RX ORDER — MUPIROCIN 20 MG/G
1 OINTMENT TOPICAL
Refills: 0 | Status: COMPLETED | OUTPATIENT
Start: 2024-04-17 | End: 2024-04-22

## 2024-04-17 RX ORDER — DOBUTAMINE HCL 250MG/20ML
5 VIAL (ML) INTRAVENOUS
Qty: 500 | Refills: 0 | Status: DISCONTINUED | OUTPATIENT
Start: 2024-04-17 | End: 2024-04-18

## 2024-04-17 RX ORDER — MIDAZOLAM HYDROCHLORIDE 1 MG/ML
5 INJECTION, SOLUTION INTRAMUSCULAR; INTRAVENOUS ONCE
Refills: 0 | Status: DISCONTINUED | OUTPATIENT
Start: 2024-04-17 | End: 2024-04-17

## 2024-04-17 RX ADMIN — Medication 125 MILLILITER(S): at 22:00

## 2024-04-17 RX ADMIN — Medication 125 MILLILITER(S): at 21:00

## 2024-04-17 RX ADMIN — CHLORHEXIDINE GLUCONATE 10 MILLILITER(S): 213 SOLUTION TOPICAL at 06:31

## 2024-04-17 RX ADMIN — Medication 18.6 MICROGRAM(S)/KG/MIN: at 23:28

## 2024-04-17 RX ADMIN — PANTOPRAZOLE SODIUM 40 MILLIGRAM(S): 20 TABLET, DELAYED RELEASE ORAL at 06:14

## 2024-04-17 RX ADMIN — Medication 1000 MILLIGRAM(S): at 11:43

## 2024-04-17 RX ADMIN — Medication 125 MILLILITER(S): at 21:30

## 2024-04-17 RX ADMIN — CHLORHEXIDINE GLUCONATE 1 APPLICATION(S): 213 SOLUTION TOPICAL at 05:02

## 2024-04-17 RX ADMIN — EPINEPHRINE 23.2 MICROGRAM(S)/KG/MIN: 0.3 INJECTION INTRAMUSCULAR; SUBCUTANEOUS at 23:28

## 2024-04-17 RX ADMIN — MIDAZOLAM HYDROCHLORIDE 5 MILLIGRAM(S): 1 INJECTION, SOLUTION INTRAMUSCULAR; INTRAVENOUS at 23:55

## 2024-04-17 RX ADMIN — VASOPRESSIN 6 UNIT(S)/MIN: 20 INJECTION INTRAVENOUS at 23:28

## 2024-04-17 RX ADMIN — FENTANYL CITRATE 25 MICROGRAM(S): 50 INJECTION INTRAVENOUS at 22:00

## 2024-04-17 RX ADMIN — FENTANYL CITRATE 25 MICROGRAM(S): 50 INJECTION INTRAVENOUS at 22:15

## 2024-04-17 RX ADMIN — CHLORHEXIDINE GLUCONATE 1 APPLICATION(S): 213 SOLUTION TOPICAL at 00:00

## 2024-04-17 RX ADMIN — Medication 9.29 MICROGRAM(S)/KG/MIN: at 23:29

## 2024-04-17 RX ADMIN — MUPIROCIN 1 APPLICATION(S): 20 OINTMENT TOPICAL at 08:23

## 2024-04-17 RX ADMIN — MIDAZOLAM HYDROCHLORIDE 2 MILLIGRAM(S): 1 INJECTION, SOLUTION INTRAMUSCULAR; INTRAVENOUS at 22:51

## 2024-04-17 RX ADMIN — CHLORHEXIDINE GLUCONATE 1 APPLICATION(S): 213 SOLUTION TOPICAL at 06:31

## 2024-04-17 NOTE — PROGRESS NOTE ADULT - SUBJECTIVE AND OBJECTIVE BOX
Critical care attending addendum,    significant and ongoing chest tube output -   1150 L from mediastinal bulbs (2);  260 from mediastinal chest tube to pleuravac since arrival to ICU at 8pm     improved hemodynamics after aggressive resuscitation -   2 U pRBC/1 FFP/5 Cryo and platelet, 1L Albumin     on levo/vaso//Epi - no longer as labile  Serial xray obtained  CVP 7    CTS made aware and decision made to RTOR

## 2024-04-17 NOTE — BRIEF OPERATIVE NOTE - NSICDXBRIEFPREOP_GEN_ALL_CORE_FT
PRE-OP DIAGNOSIS:  Mitral valve prolapse 17-Apr-2024 19:41:08  Shanda Merchant  CAD (coronary artery disease) 17-Apr-2024 19:41:13  Shanda Merchant  Chronic atrial fibrillation 17-Apr-2024 19:41:26  Shanda Merchant

## 2024-04-17 NOTE — PROGRESS NOTE ADULT - SUBJECTIVE AND OBJECTIVE BOX
INTERVAL HPI/OVERNIGHT EVENTS:    OpDay - MVR/CABG x 1/CryoMAZE/SWEETIE occlusion  EF 40%  Cardiologist: Dr. Ramirez    79 yo M Hx Chronic systolic HF (EF 35%), pAflutter (Eliquis), unprovoked PE/DVT - IVC filter & retrieval '15, HTN,. HLD, ADAM (on CPAP), CKD stage 4 (cr 1.2-1.49 Jan-Feb 2024), obesity who presented to outpatient structural cardiologist Dr. Pate for consultation of severe MR. Patient denies CP, SOB, SAMUEL, palpitations, orthopnea, PND, dizziness, syncope, hematuria, hematochezia/melena, fatigue, fever, chills, N/V, abdominal pain.     TTE (2/5/24): mod reduced LV systolic function, LV global strain w/ apical sparing which may be indicative of amyloidosis, mildly reduced RV systolic function, mod AR, severe MR, LVEF 35%.    In light of patient's risk factors and abnormal TTE, patient now presents to [facility] for LHC via radial acces prior to surgery 4/17/24. (10 Apr 2024 18:08)        PAST MEDICAL & SURGICAL HISTORY:  HTN (hypertension)      Pericarditis      Obesity, unspecified classification, unspecified obesity type, unspecified whether serious comorbidity present      ADAM (obstructive sleep apnea)      Chronic deep vein thrombosis (DVT) of non-extremity vein      Other chronic pulmonary embolism without acute cor pulmonale      Essential hypertension      Gout      Aortic regurgitation      History of aortic insufficiency      History of hip replacement, unspecified laterality      Bilateral cataracts            ICU Vital Signs Last 24 Hrs  T(C): 36.1 (17 Apr 2024 20:18), Max: 37.1 (16 Apr 2024 22:14)  T(F): 96.9 (17 Apr 2024 20:18), Max: 98.7 (16 Apr 2024 22:14)  HR: 92 (17 Apr 2024 20:00) (50 - 92)  BP: 157/70 (17 Apr 2024 16:31) (146/67 - 186/86)  BP(mean): 110 (17 Apr 2024 16:31) (96 - 124)  ABP: 107/57 (17 Apr 2024 20:00) (107/57 - 107/57)  ABP(mean): 73 (17 Apr 2024 20:00) (73 - 73)  RR: 12 (17 Apr 2024 20:00) (12 - 19)  SpO2: 96% (17 Apr 2024 16:31) (90% - 97%)    O2 Parameters below as of 17 Apr 2024 20:00  Patient On (Oxygen Delivery Method): ventilator    O2 Concentration (%): 50      Qtts:     I&O's Summary    16 Apr 2024 07:01  -  17 Apr 2024 07:00  --------------------------------------------------------  IN: 718 mL / OUT: 1775 mL / NET: -1057 mL    17 Apr 2024 07:01  -  17 Apr 2024 20:50  --------------------------------------------------------  IN: 14 mL / OUT: 0 mL / NET: 14 mL            Physical Exam    Heart  Lungs  Abd  Ext  Chest  Neuro  Skin    LABS:                        13.7   19.72 )-----------( 225      ( 17 Apr 2024 20:21 )             41.6     04-17    x   |  x   |  21  ----------------------------<  209<H>  x    |  22  |  1.23    Ca    8.7      17 Apr 2024 05:30  Mg     1.9     04-17    TPro  6.3  /  Alb  4.0  /  TBili  1.1  /  DBili  x   /  AST  15  /  ALT  15  /  AlkPhos  100  04-16    PT/INR - ( 17 Apr 2024 20:21 )   PT: 13.4 sec;   INR: 1.18          PTT - ( 17 Apr 2024 20:21 )  PTT:37.1 sec  Urinalysis Basic - ( 17 Apr 2024 20:21 )    Color: x / Appearance: x / SG: x / pH: x  Gluc: 209 mg/dL / Ketone: x  / Bili: x / Urobili: x   Blood: x / Protein: x / Nitrite: x   Leuk Esterase: x / RBC: x / WBC x   Sq Epi: x / Non Sq Epi: x / Bacteria: x      ABG - ( 17 Apr 2024 20:03 )  pH, Arterial: 7.33  pH, Blood: x     /  pCO2: 39    /  pO2: 158   / HCO3: 21    / Base Excess: -4.9  /  SaO2: 99.2                RADIOLOGY & ADDITIONAL STUDIES:    I have spent/provided stated minutes of critical care time to this patient:  INTERVAL HPI/OVERNIGHT EVENTS:    OpDay - MVR/CABG x 1/CryoMAZE/SWEETIE occlusion  EF 40%  Cardiologist: Dr. Ramirez    79 yo M Hx Chronic systolic HF (EF 35%), pAflutter (Eliquis), unprovoked PE/DVT - IVC filter & retrieval '15, HTN,. HLD, ADAM (on CPAP), CKD stage 4 (Cr 1.4), obesity who presented to outpatient structural cardiologist Dr. Pate for consultation of severe MR.     TTE (24): mod reduced LV systolic function, LV global strain w/ apical sparing which may be indicative of amyloidosis, mildly reduced RV systolic function, mod AR, severe MR, LVEF 35%.    OR - 1 U pRBC/3.3 L Crystalloid  arrived on levo/epi and primacor    ICU Vital Signs Last 24 Hrs  T(C): 36.1 (2024 20:18), Max: 37.1 (2024 22:14)  T(F): 96.9 (2024 20:18), Max: 98.7 (2024 22:14)  HR: 92 (2024 20:00) (50 - 92) sinus  BP: 157/70 (2024 16:31) (146/67 - 186/86)  BP(mean): 110 (2024 16:31) (96 - 124)  ABP: 107/57 (2024 20:00) (107/57 - 107/57)  ABP(mean): 73 (2024 20:00) (73 - 73)  RR: 12 (2024 20:00) (12 - 19)  SpO2: 96% (2024 16:31) (90% - 97%) 50%    I&O's Summary    2024 07:01  -  2024 07:00  --------------------------------------------------------  IN: 718 mL / OUT: 1775 mL / NET: -1057 mL    2024 07:01  -  2024 20:50  --------------------------------------------------------  IN: 14 mL / OUT: 0 mL / NET: 14 mL    Physical Exam    Heart - regular (-)rub/gallop  Lungs - BS appreciated bilaterally - no rhonchi/wheeze  Abd - (+)BS obese/soft NTND (-)r/r/g  Ext - cool to touch; no cyanosis/clubbing - 2+ peripheral palpable pulses apprciated   Chest - op bandage in place  Neuro - pupils reactive to light; otherwise unable at this time   Skin - no rash     LABS:                        13.7   19.72 )-----------( 225      ( 2024 20:21 )             41.6     04-17    x   |  x   |  21  ----------------------------<  209<H>  x    |  22  |  1.23    Ca    8.7      2024 05:30  Mg     1.9     04-17    TPro  6.3  /  Alb  4.0  /  TBili  1.1  /  DBili  x   /  AST  15  /  ALT  15  /  AlkPhos  100  04-16    PT/INR - ( 2024 20:21 )   PT: 13.4 sec;   INR: 1.18          PTT - ( 2024 20:21 )  PTT:37.1 sec  Urinalysis Basic - ( 2024 20:21 )    Color: x / Appearance: x / SG: x / pH: x  Gluc: 209 mg/dL / Ketone: x  / Bili: x / Urobili: x   Blood: x / Protein: x / Nitrite: x   Leuk Esterase: x / RBC: x / WBC x   Sq Epi: x / Non Sq Epi: x / Bacteria: x      ABG - ( 2024 20:03 )  pH, Arterial: 7.33  pH, Blood: x     /  pCO2: 39    /  pO2: 158   / HCO3: 21    / Base Excess: -4.9  /  SaO2: 99.2      RADIOLOGY & ADDITIONAL STUDIES:reviewed    patient with severe MR - now s/p OR as above with post-op cardiogenic/vasoplegic and hypovolemic shock - aggressive volume resuscitation initiated ; on pressors and inotropic support with significant chest tube output in first hour - CTS aware    1. CV  cardiogenic shock - Epi/Levo/Primacor - transition to ; primacor may be dilating patient  titrate to maintain MAP 65  volume resuscitation - cryo/FFP/platelet and plan 2 U pPRBC - Mediastinal CT output   serial XRAY to assess; POCUS performed   CVP 8. LA 1.8 and 1.6 in short interval check   sinus rhythm  complete periop Abx prophylaxis    2. Pulm  obesity and ADAM on CPAP  serial ABG to optimize oxygenation and ventilation ; titrate vent settings    3. Renal   known CKD - Cr baseline 1.4  anuric at this time - suspect prerenal /ATN  serial BMP and monitor UO/Lytes and Cr   renally adjust meds  avoid nephrotoxins    4. Endocrine  hyperglycemia; HgA1c 4.7  suspect stress response related     Hold SC heparin for now; GI prophylaxis    d/w anesthesia/staff/CTS; family updated by surgeon directly      I have spent/provided stated minutes of critical care time to this patient: 2 hour

## 2024-04-18 ENCOUNTER — RESULT REVIEW (OUTPATIENT)
Age: 79
End: 2024-04-18

## 2024-04-18 ENCOUNTER — APPOINTMENT (OUTPATIENT)
Dept: CARDIOTHORACIC SURGERY | Facility: HOSPITAL | Age: 79
End: 2024-04-18

## 2024-04-18 LAB
ADD ON TEST-SPECIMEN IN LAB: SIGNIFICANT CHANGE UP
ALBUMIN SERPL ELPH-MCNC: 2.7 G/DL — LOW (ref 3.3–5)
ALBUMIN SERPL ELPH-MCNC: 2.8 G/DL — LOW (ref 3.3–5)
ALBUMIN SERPL ELPH-MCNC: 3 G/DL — LOW (ref 3.3–5)
ALBUMIN SERPL ELPH-MCNC: 3.2 G/DL — LOW (ref 3.3–5)
ALBUMIN SERPL ELPH-MCNC: 3.4 G/DL — SIGNIFICANT CHANGE UP (ref 3.3–5)
ALBUMIN SERPL ELPH-MCNC: 3.5 G/DL — SIGNIFICANT CHANGE UP (ref 3.3–5)
ALBUMIN SERPL ELPH-MCNC: 3.6 G/DL — SIGNIFICANT CHANGE UP (ref 3.3–5)
ALP SERPL-CCNC: 34 U/L — LOW (ref 40–120)
ALP SERPL-CCNC: 34 U/L — LOW (ref 40–120)
ALP SERPL-CCNC: 35 U/L — LOW (ref 40–120)
ALP SERPL-CCNC: 39 U/L — LOW (ref 40–120)
ALP SERPL-CCNC: 40 U/L — SIGNIFICANT CHANGE UP (ref 40–120)
ALT FLD-CCNC: 11 U/L — SIGNIFICANT CHANGE UP (ref 10–45)
ALT FLD-CCNC: 13 U/L — SIGNIFICANT CHANGE UP (ref 10–45)
ALT FLD-CCNC: 14 U/L — SIGNIFICANT CHANGE UP (ref 10–45)
ALT FLD-CCNC: 14 U/L — SIGNIFICANT CHANGE UP (ref 10–45)
ALT FLD-CCNC: 6 U/L — LOW (ref 10–45)
ALT FLD-CCNC: 8 U/L — LOW (ref 10–45)
ALT FLD-CCNC: 9 U/L — LOW (ref 10–45)
ANION GAP SERPL CALC-SCNC: 11 MMOL/L — SIGNIFICANT CHANGE UP (ref 5–17)
ANION GAP SERPL CALC-SCNC: 12 MMOL/L — SIGNIFICANT CHANGE UP (ref 5–17)
ANION GAP SERPL CALC-SCNC: 12 MMOL/L — SIGNIFICANT CHANGE UP (ref 5–17)
ANION GAP SERPL CALC-SCNC: 13 MMOL/L — SIGNIFICANT CHANGE UP (ref 5–17)
ANION GAP SERPL CALC-SCNC: 7 MMOL/L — SIGNIFICANT CHANGE UP (ref 5–17)
APTT BLD: 29.1 SEC — SIGNIFICANT CHANGE UP (ref 24.5–35.6)
APTT BLD: 29.2 SEC — SIGNIFICANT CHANGE UP (ref 24.5–35.6)
APTT BLD: 30 SEC — SIGNIFICANT CHANGE UP (ref 24.5–35.6)
APTT BLD: 31.5 SEC — SIGNIFICANT CHANGE UP (ref 24.5–35.6)
APTT BLD: 32.5 SEC — SIGNIFICANT CHANGE UP (ref 24.5–35.6)
AST SERPL-CCNC: 112 U/L — HIGH (ref 10–40)
AST SERPL-CCNC: 115 U/L — HIGH (ref 10–40)
AST SERPL-CCNC: 69 U/L — HIGH (ref 10–40)
AST SERPL-CCNC: 77 U/L — HIGH (ref 10–40)
AST SERPL-CCNC: 81 U/L — HIGH (ref 10–40)
AST SERPL-CCNC: 87 U/L — HIGH (ref 10–40)
AST SERPL-CCNC: 98 U/L — HIGH (ref 10–40)
BASE EXCESS BLDA CALC-SCNC: -3.6 MMOL/L — LOW (ref -2–3)
BASE EXCESS BLDA CALC-SCNC: -4.4 MMOL/L — LOW (ref -2–3)
BASE EXCESS BLDV CALC-SCNC: -1.1 MMOL/L — SIGNIFICANT CHANGE UP (ref -2–3)
BASE EXCESS BLDV CALC-SCNC: -13.3 MMOL/L — LOW (ref -2–3)
BASE EXCESS BLDV CALC-SCNC: -2.5 MMOL/L — LOW (ref -2–3)
BASE EXCESS BLDV CALC-SCNC: -4.3 MMOL/L — LOW (ref -2–3)
BASE EXCESS BLDV CALC-SCNC: 0.5 MMOL/L — SIGNIFICANT CHANGE UP (ref -2–3)
BASE EXCESS BLDV CALC-SCNC: 0.8 MMOL/L — SIGNIFICANT CHANGE UP (ref -2–3)
BASE EXCESS BLDV CALC-SCNC: 1.7 MMOL/L — SIGNIFICANT CHANGE UP (ref -2–3)
BASOPHILS # BLD AUTO: 0 K/UL — SIGNIFICANT CHANGE UP (ref 0–0.2)
BASOPHILS # BLD AUTO: 0.01 K/UL — SIGNIFICANT CHANGE UP (ref 0–0.2)
BASOPHILS # BLD AUTO: 0.03 K/UL — SIGNIFICANT CHANGE UP (ref 0–0.2)
BASOPHILS NFR BLD AUTO: 0 % — SIGNIFICANT CHANGE UP (ref 0–2)
BASOPHILS NFR BLD AUTO: 0.1 % — SIGNIFICANT CHANGE UP (ref 0–2)
BASOPHILS NFR BLD AUTO: 0.3 % — SIGNIFICANT CHANGE UP (ref 0–2)
BILIRUB SERPL-MCNC: 0.6 MG/DL — SIGNIFICANT CHANGE UP (ref 0.2–1.2)
BILIRUB SERPL-MCNC: 0.7 MG/DL — SIGNIFICANT CHANGE UP (ref 0.2–1.2)
BILIRUB SERPL-MCNC: 0.8 MG/DL — SIGNIFICANT CHANGE UP (ref 0.2–1.2)
BILIRUB SERPL-MCNC: 0.8 MG/DL — SIGNIFICANT CHANGE UP (ref 0.2–1.2)
BILIRUB SERPL-MCNC: 1 MG/DL — SIGNIFICANT CHANGE UP (ref 0.2–1.2)
BILIRUB SERPL-MCNC: 1.4 MG/DL — HIGH (ref 0.2–1.2)
BILIRUB SERPL-MCNC: 1.4 MG/DL — HIGH (ref 0.2–1.2)
BUN SERPL-MCNC: 23 MG/DL — SIGNIFICANT CHANGE UP (ref 7–23)
BUN SERPL-MCNC: 24 MG/DL — HIGH (ref 7–23)
BUN SERPL-MCNC: 27 MG/DL — HIGH (ref 7–23)
BUN SERPL-MCNC: 28 MG/DL — HIGH (ref 7–23)
CA-I BLDA-SCNC: 1.26 MMOL/L — SIGNIFICANT CHANGE UP (ref 1.15–1.33)
CA-I BLDA-SCNC: 1.29 MMOL/L — SIGNIFICANT CHANGE UP (ref 1.15–1.33)
CA-I SERPL-SCNC: 1.16 MMOL/L — SIGNIFICANT CHANGE UP (ref 1.15–1.33)
CA-I SERPL-SCNC: 1.17 MMOL/L — SIGNIFICANT CHANGE UP (ref 1.15–1.33)
CA-I SERPL-SCNC: 1.17 MMOL/L — SIGNIFICANT CHANGE UP (ref 1.15–1.33)
CA-I SERPL-SCNC: 1.2 MMOL/L — SIGNIFICANT CHANGE UP (ref 1.15–1.33)
CA-I SERPL-SCNC: 1.22 MMOL/L — SIGNIFICANT CHANGE UP (ref 1.15–1.33)
CA-I SERPL-SCNC: 1.23 MMOL/L — SIGNIFICANT CHANGE UP (ref 1.15–1.33)
CA-I SERPL-SCNC: 1.23 MMOL/L — SIGNIFICANT CHANGE UP (ref 1.15–1.33)
CALCIUM SERPL-MCNC: 7.9 MG/DL — LOW (ref 8.4–10.5)
CALCIUM SERPL-MCNC: 8.3 MG/DL — LOW (ref 8.4–10.5)
CALCIUM SERPL-MCNC: 8.3 MG/DL — LOW (ref 8.4–10.5)
CALCIUM SERPL-MCNC: 8.4 MG/DL — SIGNIFICANT CHANGE UP (ref 8.4–10.5)
CALCIUM SERPL-MCNC: 8.4 MG/DL — SIGNIFICANT CHANGE UP (ref 8.4–10.5)
CALCIUM SERPL-MCNC: 8.5 MG/DL — SIGNIFICANT CHANGE UP (ref 8.4–10.5)
CALCIUM SERPL-MCNC: 8.7 MG/DL — SIGNIFICANT CHANGE UP (ref 8.4–10.5)
CHLORIDE SERPL-SCNC: 105 MMOL/L — SIGNIFICANT CHANGE UP (ref 96–108)
CHLORIDE SERPL-SCNC: 106 MMOL/L — SIGNIFICANT CHANGE UP (ref 96–108)
CHLORIDE SERPL-SCNC: 106 MMOL/L — SIGNIFICANT CHANGE UP (ref 96–108)
CHLORIDE SERPL-SCNC: 107 MMOL/L — SIGNIFICANT CHANGE UP (ref 96–108)
CHLORIDE SERPL-SCNC: 107 MMOL/L — SIGNIFICANT CHANGE UP (ref 96–108)
CHLORIDE SERPL-SCNC: 108 MMOL/L — SIGNIFICANT CHANGE UP (ref 96–108)
CO2 BLDA-SCNC: 22 MMOL/L — SIGNIFICANT CHANGE UP (ref 19–24)
CO2 BLDA-SCNC: 23 MMOL/L — SIGNIFICANT CHANGE UP (ref 19–24)
CO2 BLDV-SCNC: 12.7 MMOL/L — LOW (ref 22–26)
CO2 BLDV-SCNC: 24.9 MMOL/L — SIGNIFICANT CHANGE UP (ref 22–26)
CO2 BLDV-SCNC: 25.2 MMOL/L — SIGNIFICANT CHANGE UP (ref 22–26)
CO2 BLDV-SCNC: 25.7 MMOL/L — SIGNIFICANT CHANGE UP (ref 22–26)
CO2 BLDV-SCNC: 26.8 MMOL/L — HIGH (ref 22–26)
CO2 BLDV-SCNC: 27.4 MMOL/L — HIGH (ref 22–26)
CO2 BLDV-SCNC: 28.6 MMOL/L — HIGH (ref 22–26)
CO2 SERPL-SCNC: 21 MMOL/L — LOW (ref 22–31)
CO2 SERPL-SCNC: 22 MMOL/L — SIGNIFICANT CHANGE UP (ref 22–31)
CO2 SERPL-SCNC: 24 MMOL/L — SIGNIFICANT CHANGE UP (ref 22–31)
COHGB MFR BLDA: 1.2 % — SIGNIFICANT CHANGE UP
COHGB MFR BLDA: 1.4 % — SIGNIFICANT CHANGE UP
CORTIS AM PEAK SERPL-MCNC: 14.89 UG/DL — SIGNIFICANT CHANGE UP (ref 6.02–18.4)
CREAT SERPL-MCNC: 1.31 MG/DL — HIGH (ref 0.5–1.3)
CREAT SERPL-MCNC: 1.44 MG/DL — HIGH (ref 0.5–1.3)
CREAT SERPL-MCNC: 1.56 MG/DL — HIGH (ref 0.5–1.3)
CREAT SERPL-MCNC: 1.6 MG/DL — HIGH (ref 0.5–1.3)
CREAT SERPL-MCNC: 1.64 MG/DL — HIGH (ref 0.5–1.3)
CREAT SERPL-MCNC: 1.65 MG/DL — HIGH (ref 0.5–1.3)
EGFR: 42 ML/MIN/1.73M2 — LOW
EGFR: 43 ML/MIN/1.73M2 — LOW
EGFR: 44 ML/MIN/1.73M2 — LOW
EGFR: 45 ML/MIN/1.73M2 — LOW
EGFR: 50 ML/MIN/1.73M2 — LOW
EGFR: 56 ML/MIN/1.73M2 — LOW
EOSINOPHIL # BLD AUTO: 0 K/UL — SIGNIFICANT CHANGE UP (ref 0–0.5)
EOSINOPHIL # BLD AUTO: 0.01 K/UL — SIGNIFICANT CHANGE UP (ref 0–0.5)
EOSINOPHIL # BLD AUTO: 0.01 K/UL — SIGNIFICANT CHANGE UP (ref 0–0.5)
EOSINOPHIL NFR BLD AUTO: 0 % — SIGNIFICANT CHANGE UP (ref 0–6)
EOSINOPHIL NFR BLD AUTO: 0.1 % — SIGNIFICANT CHANGE UP (ref 0–6)
EOSINOPHIL NFR BLD AUTO: 0.1 % — SIGNIFICANT CHANGE UP (ref 0–6)
GAS PNL BLDA: SIGNIFICANT CHANGE UP
GAS PNL BLDV: 129 MMOL/L — LOW (ref 136–145)
GAS PNL BLDV: 135 MMOL/L — LOW (ref 136–145)
GAS PNL BLDV: 135 MMOL/L — LOW (ref 136–145)
GAS PNL BLDV: 136 MMOL/L — SIGNIFICANT CHANGE UP (ref 136–145)
GAS PNL BLDV: SIGNIFICANT CHANGE UP
GLUCOSE BLDA-MCNC: 183 MG/DL — HIGH (ref 70–99)
GLUCOSE BLDA-MCNC: 187 MG/DL — HIGH (ref 70–99)
GLUCOSE BLDC GLUCOMTR-MCNC: 103 MG/DL — HIGH (ref 70–99)
GLUCOSE BLDC GLUCOMTR-MCNC: 118 MG/DL — HIGH (ref 70–99)
GLUCOSE BLDC GLUCOMTR-MCNC: 125 MG/DL — HIGH (ref 70–99)
GLUCOSE BLDC GLUCOMTR-MCNC: 139 MG/DL — HIGH (ref 70–99)
GLUCOSE BLDC GLUCOMTR-MCNC: 158 MG/DL — HIGH (ref 70–99)
GLUCOSE BLDC GLUCOMTR-MCNC: 174 MG/DL — HIGH (ref 70–99)
GLUCOSE BLDC GLUCOMTR-MCNC: 174 MG/DL — HIGH (ref 70–99)
GLUCOSE BLDC GLUCOMTR-MCNC: 178 MG/DL — HIGH (ref 70–99)
GLUCOSE BLDC GLUCOMTR-MCNC: 179 MG/DL — HIGH (ref 70–99)
GLUCOSE BLDC GLUCOMTR-MCNC: 184 MG/DL — HIGH (ref 70–99)
GLUCOSE BLDC GLUCOMTR-MCNC: 196 MG/DL — HIGH (ref 70–99)
GLUCOSE BLDC GLUCOMTR-MCNC: 201 MG/DL — HIGH (ref 70–99)
GLUCOSE BLDC GLUCOMTR-MCNC: 72 MG/DL — SIGNIFICANT CHANGE UP (ref 70–99)
GLUCOSE BLDC GLUCOMTR-MCNC: 73 MG/DL — SIGNIFICANT CHANGE UP (ref 70–99)
GLUCOSE BLDC GLUCOMTR-MCNC: 77 MG/DL — SIGNIFICANT CHANGE UP (ref 70–99)
GLUCOSE BLDC GLUCOMTR-MCNC: 81 MG/DL — SIGNIFICANT CHANGE UP (ref 70–99)
GLUCOSE BLDC GLUCOMTR-MCNC: 90 MG/DL — SIGNIFICANT CHANGE UP (ref 70–99)
GLUCOSE BLDC GLUCOMTR-MCNC: 93 MG/DL — SIGNIFICANT CHANGE UP (ref 70–99)
GLUCOSE SERPL-MCNC: 112 MG/DL — HIGH (ref 70–99)
GLUCOSE SERPL-MCNC: 124 MG/DL — HIGH (ref 70–99)
GLUCOSE SERPL-MCNC: 173 MG/DL — HIGH (ref 70–99)
GLUCOSE SERPL-MCNC: 196 MG/DL — HIGH (ref 70–99)
GLUCOSE SERPL-MCNC: 200 MG/DL — HIGH (ref 70–99)
GLUCOSE SERPL-MCNC: 71 MG/DL — SIGNIFICANT CHANGE UP (ref 70–99)
GRAM STN FLD: ABNORMAL
HCO3 BLDA-SCNC: 21 MMOL/L — SIGNIFICANT CHANGE UP (ref 21–28)
HCO3 BLDA-SCNC: 22 MMOL/L — SIGNIFICANT CHANGE UP (ref 21–28)
HCO3 BLDV-SCNC: 12 MMOL/L — LOW (ref 22–29)
HCO3 BLDV-SCNC: 23 MMOL/L — SIGNIFICANT CHANGE UP (ref 22–29)
HCO3 BLDV-SCNC: 24 MMOL/L — SIGNIFICANT CHANGE UP (ref 22–29)
HCO3 BLDV-SCNC: 24 MMOL/L — SIGNIFICANT CHANGE UP (ref 22–29)
HCO3 BLDV-SCNC: 25 MMOL/L — SIGNIFICANT CHANGE UP (ref 22–29)
HCO3 BLDV-SCNC: 26 MMOL/L — SIGNIFICANT CHANGE UP (ref 22–29)
HCO3 BLDV-SCNC: 27 MMOL/L — SIGNIFICANT CHANGE UP (ref 22–29)
HCT VFR BLD CALC: 25.2 % — LOW (ref 39–50)
HCT VFR BLD CALC: 25.7 % — LOW (ref 39–50)
HCT VFR BLD CALC: 26.5 % — LOW (ref 39–50)
HCT VFR BLD CALC: 27.2 % — LOW (ref 39–50)
HCT VFR BLD CALC: 29.7 % — LOW (ref 39–50)
HCT VFR BLD CALC: 31.5 % — LOW (ref 39–50)
HGB BLD-MCNC: 10.6 G/DL — LOW (ref 13–17)
HGB BLD-MCNC: 8.8 G/DL — LOW (ref 13–17)
HGB BLD-MCNC: 8.8 G/DL — LOW (ref 13–17)
HGB BLD-MCNC: 9.1 G/DL — LOW (ref 13–17)
HGB BLD-MCNC: 9.6 G/DL — LOW (ref 13–17)
HGB BLD-MCNC: 9.9 G/DL — LOW (ref 13–17)
HGB BLDA-MCNC: 10 G/DL — LOW (ref 12.6–17.4)
HGB BLDA-MCNC: 10.1 G/DL — LOW (ref 12.6–17.4)
IMM GRANULOCYTES NFR BLD AUTO: 0.3 % — SIGNIFICANT CHANGE UP (ref 0–0.9)
IMM GRANULOCYTES NFR BLD AUTO: 0.5 % — SIGNIFICANT CHANGE UP (ref 0–0.9)
IMM GRANULOCYTES NFR BLD AUTO: 0.7 % — SIGNIFICANT CHANGE UP (ref 0–0.9)
IMM GRANULOCYTES NFR BLD AUTO: 1.5 % — HIGH (ref 0–0.9)
INR BLD: 1.24 — HIGH (ref 0.85–1.18)
INR BLD: 1.25 — HIGH (ref 0.85–1.18)
INR BLD: 1.29 — HIGH (ref 0.85–1.18)
INR BLD: 1.36 — HIGH (ref 0.85–1.18)
INR BLD: 1.37 — HIGH (ref 0.85–1.18)
ISTAT ARTERIAL BE: -2 MMOL/L — SIGNIFICANT CHANGE UP (ref -2–3)
ISTAT ARTERIAL GLUCOSE: 186 MG/DL — HIGH (ref 70–99)
ISTAT ARTERIAL HCO3: 24 MMOL/L — SIGNIFICANT CHANGE UP (ref 22–26)
ISTAT ARTERIAL HEMATOCRIT: 28 % — LOW (ref 39–50)
ISTAT ARTERIAL HEMOGLOBIN: 9.5 G/DL — LOW (ref 13–17)
ISTAT ARTERIAL IONIZED CALCIUM: 1.27 MMOL/L — SIGNIFICANT CHANGE UP (ref 1.12–1.3)
ISTAT ARTERIAL PCO2: 46 MMHG — HIGH (ref 35–45)
ISTAT ARTERIAL PH: 7.32 — LOW (ref 7.35–7.45)
ISTAT ARTERIAL PO2: 67 MMHG — LOW (ref 80–105)
ISTAT ARTERIAL POTASSIUM: 3.8 MMOL/L — SIGNIFICANT CHANGE UP (ref 3.5–5.3)
ISTAT ARTERIAL SO2: 91 % — LOW (ref 95–98)
ISTAT ARTERIAL SODIUM: 139 MMOL/L — SIGNIFICANT CHANGE UP (ref 135–145)
ISTAT ARTERIAL TCO2: 26 MMOL/L — SIGNIFICANT CHANGE UP (ref 22–31)
LACTATE SERPL-SCNC: 0.8 MMOL/L — SIGNIFICANT CHANGE UP (ref 0.5–2)
LACTATE SERPL-SCNC: 1.6 MMOL/L — SIGNIFICANT CHANGE UP (ref 0.5–2)
LACTATE SERPL-SCNC: 2.2 MMOL/L — HIGH (ref 0.5–2)
LACTATE SERPL-SCNC: 2.6 MMOL/L — HIGH (ref 0.5–2)
LACTATE SERPL-SCNC: 3 MMOL/L — HIGH (ref 0.5–2)
LACTATE SERPL-SCNC: 3.4 MMOL/L — HIGH (ref 0.5–2)
LYMPHOCYTES # BLD AUTO: 0.46 K/UL — LOW (ref 1–3.3)
LYMPHOCYTES # BLD AUTO: 0.5 K/UL — LOW (ref 1–3.3)
LYMPHOCYTES # BLD AUTO: 0.57 K/UL — LOW (ref 1–3.3)
LYMPHOCYTES # BLD AUTO: 0.63 K/UL — LOW (ref 1–3.3)
LYMPHOCYTES # BLD AUTO: 0.67 K/UL — LOW (ref 1–3.3)
LYMPHOCYTES # BLD AUTO: 0.73 K/UL — LOW (ref 1–3.3)
LYMPHOCYTES # BLD AUTO: 3.7 % — LOW (ref 13–44)
LYMPHOCYTES # BLD AUTO: 4.2 % — LOW (ref 13–44)
LYMPHOCYTES # BLD AUTO: 5.2 % — LOW (ref 13–44)
LYMPHOCYTES # BLD AUTO: 6.1 % — LOW (ref 13–44)
LYMPHOCYTES # BLD AUTO: 7.2 % — LOW (ref 13–44)
LYMPHOCYTES # BLD AUTO: 7.3 % — LOW (ref 13–44)
MAGNESIUM SERPL-MCNC: 2.4 MG/DL — SIGNIFICANT CHANGE UP (ref 1.6–2.6)
MAGNESIUM SERPL-MCNC: 2.4 MG/DL — SIGNIFICANT CHANGE UP (ref 1.6–2.6)
MAGNESIUM SERPL-MCNC: 2.5 MG/DL — SIGNIFICANT CHANGE UP (ref 1.6–2.6)
MAGNESIUM SERPL-MCNC: 2.7 MG/DL — HIGH (ref 1.6–2.6)
MCHC RBC-ENTMCNC: 29.7 PG — SIGNIFICANT CHANGE UP (ref 27–34)
MCHC RBC-ENTMCNC: 30 PG — SIGNIFICANT CHANGE UP (ref 27–34)
MCHC RBC-ENTMCNC: 30 PG — SIGNIFICANT CHANGE UP (ref 27–34)
MCHC RBC-ENTMCNC: 30.1 PG — SIGNIFICANT CHANGE UP (ref 27–34)
MCHC RBC-ENTMCNC: 30.1 PG — SIGNIFICANT CHANGE UP (ref 27–34)
MCHC RBC-ENTMCNC: 30.2 PG — SIGNIFICANT CHANGE UP (ref 27–34)
MCHC RBC-ENTMCNC: 33.3 GM/DL — SIGNIFICANT CHANGE UP (ref 32–36)
MCHC RBC-ENTMCNC: 33.7 GM/DL — SIGNIFICANT CHANGE UP (ref 32–36)
MCHC RBC-ENTMCNC: 34.2 GM/DL — SIGNIFICANT CHANGE UP (ref 32–36)
MCHC RBC-ENTMCNC: 34.3 GM/DL — SIGNIFICANT CHANGE UP (ref 32–36)
MCHC RBC-ENTMCNC: 34.9 GM/DL — SIGNIFICANT CHANGE UP (ref 32–36)
MCHC RBC-ENTMCNC: 35.3 GM/DL — SIGNIFICANT CHANGE UP (ref 32–36)
MCV RBC AUTO: 85.3 FL — SIGNIFICANT CHANGE UP (ref 80–100)
MCV RBC AUTO: 86.3 FL — SIGNIFICANT CHANGE UP (ref 80–100)
MCV RBC AUTO: 87.7 FL — SIGNIFICANT CHANGE UP (ref 80–100)
MCV RBC AUTO: 88 FL — SIGNIFICANT CHANGE UP (ref 80–100)
MCV RBC AUTO: 89.2 FL — SIGNIFICANT CHANGE UP (ref 80–100)
MCV RBC AUTO: 89.2 FL — SIGNIFICANT CHANGE UP (ref 80–100)
METHGB MFR BLDA: 0.6 % — SIGNIFICANT CHANGE UP
METHGB MFR BLDA: 0.6 % — SIGNIFICANT CHANGE UP
MONOCYTES # BLD AUTO: 0.77 K/UL — SIGNIFICANT CHANGE UP (ref 0–0.9)
MONOCYTES # BLD AUTO: 0.81 K/UL — SIGNIFICANT CHANGE UP (ref 0–0.9)
MONOCYTES # BLD AUTO: 0.97 K/UL — HIGH (ref 0–0.9)
MONOCYTES # BLD AUTO: 0.99 K/UL — HIGH (ref 0–0.9)
MONOCYTES # BLD AUTO: 1.12 K/UL — HIGH (ref 0–0.9)
MONOCYTES # BLD AUTO: 1.16 K/UL — HIGH (ref 0–0.9)
MONOCYTES NFR BLD AUTO: 10.6 % — SIGNIFICANT CHANGE UP (ref 2–14)
MONOCYTES NFR BLD AUTO: 7.3 % — SIGNIFICANT CHANGE UP (ref 2–14)
MONOCYTES NFR BLD AUTO: 8.3 % — SIGNIFICANT CHANGE UP (ref 2–14)
MONOCYTES NFR BLD AUTO: 8.6 % — SIGNIFICANT CHANGE UP (ref 2–14)
MONOCYTES NFR BLD AUTO: 9.2 % — SIGNIFICANT CHANGE UP (ref 2–14)
MONOCYTES NFR BLD AUTO: 9.6 % — SIGNIFICANT CHANGE UP (ref 2–14)
NEUTROPHILS # BLD AUTO: 10.07 K/UL — HIGH (ref 1.8–7.4)
NEUTROPHILS # BLD AUTO: 11.63 K/UL — HIGH (ref 1.8–7.4)
NEUTROPHILS # BLD AUTO: 11.82 K/UL — HIGH (ref 1.8–7.4)
NEUTROPHILS # BLD AUTO: 7.26 K/UL — SIGNIFICANT CHANGE UP (ref 1.8–7.4)
NEUTROPHILS # BLD AUTO: 7.49 K/UL — HIGH (ref 1.8–7.4)
NEUTROPHILS # BLD AUTO: 7.65 K/UL — HIGH (ref 1.8–7.4)
NEUTROPHILS NFR BLD AUTO: 81.5 % — HIGH (ref 43–77)
NEUTROPHILS NFR BLD AUTO: 82.5 % — HIGH (ref 43–77)
NEUTROPHILS NFR BLD AUTO: 83.5 % — HIGH (ref 43–77)
NEUTROPHILS NFR BLD AUTO: 85.8 % — HIGH (ref 43–77)
NEUTROPHILS NFR BLD AUTO: 86.7 % — HIGH (ref 43–77)
NEUTROPHILS NFR BLD AUTO: 87.4 % — HIGH (ref 43–77)
NRBC # BLD: 0 /100 WBCS — SIGNIFICANT CHANGE UP (ref 0–0)
OXYHGB MFR BLDA: 97.5 % — HIGH (ref 90–95)
OXYHGB MFR BLDA: 97.5 % — HIGH (ref 90–95)
PCO2 BLDA: 38 MMHG — SIGNIFICANT CHANGE UP (ref 35–48)
PCO2 BLDA: 39 MMHG — SIGNIFICANT CHANGE UP (ref 35–48)
PCO2 BLDV: 26 MMHG — LOW (ref 42–55)
PCO2 BLDV: 34 MMHG — LOW (ref 42–55)
PCO2 BLDV: 44 MMHG — SIGNIFICANT CHANGE UP (ref 42–55)
PCO2 BLDV: 45 MMHG — SIGNIFICANT CHANGE UP (ref 42–55)
PCO2 BLDV: 48 MMHG — SIGNIFICANT CHANGE UP (ref 42–55)
PCO2 BLDV: 48 MMHG — SIGNIFICANT CHANGE UP (ref 42–55)
PCO2 BLDV: 52 MMHG — SIGNIFICANT CHANGE UP (ref 42–55)
PH BLDA: 7.34 — LOW (ref 7.35–7.45)
PH BLDA: 7.36 — SIGNIFICANT CHANGE UP (ref 7.35–7.45)
PH BLDV: 7.26 — LOW (ref 7.32–7.43)
PH BLDV: 7.27 — LOW (ref 7.32–7.43)
PH BLDV: 7.31 — LOW (ref 7.32–7.43)
PH BLDV: 7.33 — SIGNIFICANT CHANGE UP (ref 7.32–7.43)
PH BLDV: 7.38 — SIGNIFICANT CHANGE UP (ref 7.32–7.43)
PH BLDV: 7.39 — SIGNIFICANT CHANGE UP (ref 7.32–7.43)
PH BLDV: 7.46 — HIGH (ref 7.32–7.43)
PHOSPHATE SERPL-MCNC: 3.1 MG/DL — SIGNIFICANT CHANGE UP (ref 2.5–4.5)
PHOSPHATE SERPL-MCNC: 3.5 MG/DL — SIGNIFICANT CHANGE UP (ref 2.5–4.5)
PHOSPHATE SERPL-MCNC: 3.6 MG/DL — SIGNIFICANT CHANGE UP (ref 2.5–4.5)
PHOSPHATE SERPL-MCNC: 3.7 MG/DL — SIGNIFICANT CHANGE UP (ref 2.5–4.5)
PHOSPHATE SERPL-MCNC: 3.9 MG/DL — SIGNIFICANT CHANGE UP (ref 2.5–4.5)
PHOSPHATE SERPL-MCNC: 4 MG/DL — SIGNIFICANT CHANGE UP (ref 2.5–4.5)
PLATELET # BLD AUTO: 130 K/UL — LOW (ref 150–400)
PLATELET # BLD AUTO: 132 K/UL — LOW (ref 150–400)
PLATELET # BLD AUTO: 151 K/UL — SIGNIFICANT CHANGE UP (ref 150–400)
PLATELET # BLD AUTO: 179 K/UL — SIGNIFICANT CHANGE UP (ref 150–400)
PLATELET # BLD AUTO: 196 K/UL — SIGNIFICANT CHANGE UP (ref 150–400)
PLATELET # BLD AUTO: 207 K/UL — SIGNIFICANT CHANGE UP (ref 150–400)
PO2 BLDA: 180 MMHG — HIGH (ref 83–108)
PO2 BLDA: 225 MMHG — HIGH (ref 83–108)
PO2 BLDV: 37 MMHG — SIGNIFICANT CHANGE UP (ref 25–45)
PO2 BLDV: 38 MMHG — SIGNIFICANT CHANGE UP (ref 25–45)
PO2 BLDV: 39 MMHG — SIGNIFICANT CHANGE UP (ref 25–45)
PO2 BLDV: 40 MMHG — SIGNIFICANT CHANGE UP (ref 25–45)
PO2 BLDV: 48 MMHG — HIGH (ref 25–45)
PO2 BLDV: 49 MMHG — HIGH (ref 25–45)
PO2 BLDV: <33 MMHG — SIGNIFICANT CHANGE UP (ref 25–45)
POTASSIUM BLDA-SCNC: 3.8 MMOL/L — SIGNIFICANT CHANGE UP (ref 3.5–5.1)
POTASSIUM BLDA-SCNC: 4 MMOL/L — SIGNIFICANT CHANGE UP (ref 3.5–5.1)
POTASSIUM BLDV-SCNC: 3.5 MMOL/L — SIGNIFICANT CHANGE UP (ref 3.5–5.1)
POTASSIUM BLDV-SCNC: 3.7 MMOL/L — SIGNIFICANT CHANGE UP (ref 3.5–5.1)
POTASSIUM BLDV-SCNC: 3.9 MMOL/L — SIGNIFICANT CHANGE UP (ref 3.5–5.1)
POTASSIUM BLDV-SCNC: 4.1 MMOL/L — SIGNIFICANT CHANGE UP (ref 3.5–5.1)
POTASSIUM BLDV-SCNC: 4.2 MMOL/L — SIGNIFICANT CHANGE UP (ref 3.5–5.1)
POTASSIUM SERPL-MCNC: 3.8 MMOL/L — SIGNIFICANT CHANGE UP (ref 3.5–5.3)
POTASSIUM SERPL-MCNC: 4 MMOL/L — SIGNIFICANT CHANGE UP (ref 3.5–5.3)
POTASSIUM SERPL-MCNC: 4 MMOL/L — SIGNIFICANT CHANGE UP (ref 3.5–5.3)
POTASSIUM SERPL-MCNC: 4.1 MMOL/L — SIGNIFICANT CHANGE UP (ref 3.5–5.3)
POTASSIUM SERPL-MCNC: 4.2 MMOL/L — SIGNIFICANT CHANGE UP (ref 3.5–5.3)
POTASSIUM SERPL-MCNC: 4.2 MMOL/L — SIGNIFICANT CHANGE UP (ref 3.5–5.3)
POTASSIUM SERPL-SCNC: 3.8 MMOL/L — SIGNIFICANT CHANGE UP (ref 3.5–5.3)
POTASSIUM SERPL-SCNC: 4 MMOL/L — SIGNIFICANT CHANGE UP (ref 3.5–5.3)
POTASSIUM SERPL-SCNC: 4 MMOL/L — SIGNIFICANT CHANGE UP (ref 3.5–5.3)
POTASSIUM SERPL-SCNC: 4.1 MMOL/L — SIGNIFICANT CHANGE UP (ref 3.5–5.3)
POTASSIUM SERPL-SCNC: 4.2 MMOL/L — SIGNIFICANT CHANGE UP (ref 3.5–5.3)
POTASSIUM SERPL-SCNC: 4.2 MMOL/L — SIGNIFICANT CHANGE UP (ref 3.5–5.3)
PROT SERPL-MCNC: 3.7 G/DL — LOW (ref 6–8.3)
PROT SERPL-MCNC: 3.9 G/DL — LOW (ref 6–8.3)
PROT SERPL-MCNC: 3.9 G/DL — LOW (ref 6–8.3)
PROT SERPL-MCNC: 4.2 G/DL — LOW (ref 6–8.3)
PROT SERPL-MCNC: 4.3 G/DL — LOW (ref 6–8.3)
PROT SERPL-MCNC: 4.5 G/DL — LOW (ref 6–8.3)
PROT SERPL-MCNC: 4.6 G/DL — LOW (ref 6–8.3)
PROTHROM AB SERPL-ACNC: 14.1 SEC — HIGH (ref 9.5–13)
PROTHROM AB SERPL-ACNC: 14.2 SEC — HIGH (ref 9.5–13)
PROTHROM AB SERPL-ACNC: 14.6 SEC — HIGH (ref 9.5–13)
PROTHROM AB SERPL-ACNC: 15.4 SEC — HIGH (ref 9.5–13)
PROTHROM AB SERPL-ACNC: 15.5 SEC — HIGH (ref 9.5–13)
RBC # BLD: 2.92 M/UL — LOW (ref 4.2–5.8)
RBC # BLD: 2.93 M/UL — LOW (ref 4.2–5.8)
RBC # BLD: 3.01 M/UL — LOW (ref 4.2–5.8)
RBC # BLD: 3.19 M/UL — LOW (ref 4.2–5.8)
RBC # BLD: 3.33 M/UL — LOW (ref 4.2–5.8)
RBC # BLD: 3.53 M/UL — LOW (ref 4.2–5.8)
RBC # FLD: 14.1 % — SIGNIFICANT CHANGE UP (ref 10.3–14.5)
RBC # FLD: 14.5 % — SIGNIFICANT CHANGE UP (ref 10.3–14.5)
RBC # FLD: 14.6 % — HIGH (ref 10.3–14.5)
RBC # FLD: 14.6 % — HIGH (ref 10.3–14.5)
SAO2 % BLDA: 99.3 % — HIGH (ref 94–98)
SAO2 % BLDA: 99.5 % — HIGH (ref 94–98)
SAO2 % BLDV: 59.8 % — LOW (ref 67–88)
SAO2 % BLDV: 67.1 % — SIGNIFICANT CHANGE UP (ref 67–88)
SAO2 % BLDV: 69.7 % — SIGNIFICANT CHANGE UP (ref 67–88)
SAO2 % BLDV: 70 % — SIGNIFICANT CHANGE UP (ref 67–88)
SAO2 % BLDV: 72.6 % — SIGNIFICANT CHANGE UP (ref 67–88)
SAO2 % BLDV: 81.6 % — SIGNIFICANT CHANGE UP (ref 67–88)
SAO2 % BLDV: 85.9 % — SIGNIFICANT CHANGE UP (ref 67–88)
SODIUM BLDA-SCNC: 136 MMOL/L — SIGNIFICANT CHANGE UP (ref 136–145)
SODIUM BLDA-SCNC: 137 MMOL/L — SIGNIFICANT CHANGE UP (ref 136–145)
SODIUM SERPL-SCNC: 136 MMOL/L — SIGNIFICANT CHANGE UP (ref 135–145)
SODIUM SERPL-SCNC: 139 MMOL/L — SIGNIFICANT CHANGE UP (ref 135–145)
SODIUM SERPL-SCNC: 140 MMOL/L — SIGNIFICANT CHANGE UP (ref 135–145)
SODIUM SERPL-SCNC: 141 MMOL/L — SIGNIFICANT CHANGE UP (ref 135–145)
SODIUM SERPL-SCNC: 141 MMOL/L — SIGNIFICANT CHANGE UP (ref 135–145)
SODIUM SERPL-SCNC: 142 MMOL/L — SIGNIFICANT CHANGE UP (ref 135–145)
SPECIMEN SOURCE: SIGNIFICANT CHANGE UP
WBC # BLD: 12.05 K/UL — HIGH (ref 3.8–10.5)
WBC # BLD: 13.43 K/UL — HIGH (ref 3.8–10.5)
WBC # BLD: 13.52 K/UL — HIGH (ref 3.8–10.5)
WBC # BLD: 8.8 K/UL — SIGNIFICANT CHANGE UP (ref 3.8–10.5)
WBC # BLD: 8.92 K/UL — SIGNIFICANT CHANGE UP (ref 3.8–10.5)
WBC # BLD: 9.19 K/UL — SIGNIFICANT CHANGE UP (ref 3.8–10.5)
WBC # FLD AUTO: 12.05 K/UL — HIGH (ref 3.8–10.5)
WBC # FLD AUTO: 13.43 K/UL — HIGH (ref 3.8–10.5)
WBC # FLD AUTO: 13.52 K/UL — HIGH (ref 3.8–10.5)
WBC # FLD AUTO: 8.8 K/UL — SIGNIFICANT CHANGE UP (ref 3.8–10.5)
WBC # FLD AUTO: 8.92 K/UL — SIGNIFICANT CHANGE UP (ref 3.8–10.5)
WBC # FLD AUTO: 9.19 K/UL — SIGNIFICANT CHANGE UP (ref 3.8–10.5)

## 2024-04-18 PROCEDURE — 99291 CRITICAL CARE FIRST HOUR: CPT | Mod: 25

## 2024-04-18 PROCEDURE — 35820 EXPLORE CHEST VESSELS: CPT | Mod: 78

## 2024-04-18 PROCEDURE — 71045 X-RAY EXAM CHEST 1 VIEW: CPT | Mod: 26

## 2024-04-18 PROCEDURE — 99223 1ST HOSP IP/OBS HIGH 75: CPT

## 2024-04-18 PROCEDURE — 31645 BRNCHSC W/THER ASPIR 1ST: CPT

## 2024-04-18 PROCEDURE — 35820 EXPLORE CHEST VESSELS: CPT | Mod: AS,78

## 2024-04-18 PROCEDURE — 99292 CRITICAL CARE ADDL 30 MIN: CPT

## 2024-04-18 PROCEDURE — 99292 CRITICAL CARE ADDL 30 MIN: CPT | Mod: 25

## 2024-04-18 PROCEDURE — 93308 TTE F-UP OR LMTD: CPT | Mod: 26

## 2024-04-18 DEVICE — KIT CVC 3LUM SPECTRUM 9FR: Type: IMPLANTABLE DEVICE | Status: FUNCTIONAL

## 2024-04-18 RX ORDER — GABAPENTIN 400 MG/1
300 CAPSULE ORAL THREE TIMES A DAY
Refills: 0 | Status: DISCONTINUED | OUTPATIENT
Start: 2024-04-18 | End: 2024-04-19

## 2024-04-18 RX ORDER — CEFTRIAXONE 500 MG/1
1000 INJECTION, POWDER, FOR SOLUTION INTRAMUSCULAR; INTRAVENOUS EVERY 24 HOURS
Refills: 0 | Status: DISCONTINUED | OUTPATIENT
Start: 2024-04-18 | End: 2024-04-20

## 2024-04-18 RX ORDER — ASPIRIN/CALCIUM CARB/MAGNESIUM 324 MG
81 TABLET ORAL DAILY
Refills: 0 | Status: DISCONTINUED | OUTPATIENT
Start: 2024-04-18 | End: 2024-04-25

## 2024-04-18 RX ORDER — VASOPRESSIN 20 [USP'U]/ML
0.03 INJECTION INTRAVENOUS
Qty: 40 | Refills: 0 | Status: DISCONTINUED | OUTPATIENT
Start: 2024-04-18 | End: 2024-04-18

## 2024-04-18 RX ORDER — MODAFINIL 200 MG/1
100 TABLET ORAL DAILY
Refills: 0 | Status: DISCONTINUED | OUTPATIENT
Start: 2024-04-18 | End: 2024-04-19

## 2024-04-18 RX ORDER — HYDROCORTISONE 20 MG
100 TABLET ORAL EVERY 8 HOURS
Refills: 0 | Status: DISCONTINUED | OUTPATIENT
Start: 2024-04-18 | End: 2024-04-19

## 2024-04-18 RX ORDER — CISATRACURIUM BESYLATE 2 MG/ML
10 INJECTION INTRAVENOUS ONCE
Refills: 0 | Status: DISCONTINUED | OUTPATIENT
Start: 2024-04-18 | End: 2024-04-18

## 2024-04-18 RX ORDER — ALBUMIN HUMAN 25 %
250 VIAL (ML) INTRAVENOUS ONCE
Refills: 0 | Status: COMPLETED | OUTPATIENT
Start: 2024-04-18 | End: 2024-04-18

## 2024-04-18 RX ORDER — GABAPENTIN 400 MG/1
300 CAPSULE ORAL EVERY 8 HOURS
Refills: 0 | Status: DISCONTINUED | OUTPATIENT
Start: 2024-04-18 | End: 2024-04-18

## 2024-04-18 RX ORDER — BUDESONIDE, MICRONIZED 100 %
0.25 POWDER (GRAM) MISCELLANEOUS EVERY 12 HOURS
Refills: 0 | Status: DISCONTINUED | OUTPATIENT
Start: 2024-04-18 | End: 2024-04-25

## 2024-04-18 RX ORDER — FENTANYL CITRATE 50 UG/ML
50 INJECTION INTRAVENOUS ONCE
Refills: 0 | Status: DISCONTINUED | OUTPATIENT
Start: 2024-04-18 | End: 2024-04-18

## 2024-04-18 RX ORDER — MIDAZOLAM HYDROCHLORIDE 1 MG/ML
5 INJECTION, SOLUTION INTRAMUSCULAR; INTRAVENOUS ONCE
Refills: 0 | Status: DISCONTINUED | OUTPATIENT
Start: 2024-04-18 | End: 2024-04-18

## 2024-04-18 RX ORDER — FUROSEMIDE 40 MG
40 TABLET ORAL ONCE
Refills: 0 | Status: DISCONTINUED | OUTPATIENT
Start: 2024-04-18 | End: 2024-04-18

## 2024-04-18 RX ORDER — VASOPRESSIN 20 [USP'U]/ML
0.05 INJECTION INTRAVENOUS
Qty: 40 | Refills: 0 | Status: DISCONTINUED | OUTPATIENT
Start: 2024-04-18 | End: 2024-04-20

## 2024-04-18 RX ORDER — HYDROCORTISONE 20 MG
100 TABLET ORAL EVERY 8 HOURS
Refills: 0 | Status: DISCONTINUED | OUTPATIENT
Start: 2024-04-18 | End: 2024-04-18

## 2024-04-18 RX ORDER — DOBUTAMINE HCL 250MG/20ML
5 VIAL (ML) INTRAVENOUS
Qty: 500 | Refills: 0 | Status: DISCONTINUED | OUTPATIENT
Start: 2024-04-18 | End: 2024-04-21

## 2024-04-18 RX ORDER — PROPOFOL 10 MG/ML
5 INJECTION, EMULSION INTRAVENOUS
Qty: 1000 | Refills: 0 | Status: DISCONTINUED | OUTPATIENT
Start: 2024-04-18 | End: 2024-04-18

## 2024-04-18 RX ORDER — DEXMEDETOMIDINE HYDROCHLORIDE IN 0.9% SODIUM CHLORIDE 4 UG/ML
0.8 INJECTION INTRAVENOUS
Qty: 400 | Refills: 0 | Status: DISCONTINUED | OUTPATIENT
Start: 2024-04-18 | End: 2024-04-18

## 2024-04-18 RX ORDER — ALBUMIN HUMAN 25 %
50 VIAL (ML) INTRAVENOUS ONCE
Refills: 0 | Status: COMPLETED | OUTPATIENT
Start: 2024-04-18 | End: 2024-04-18

## 2024-04-18 RX ORDER — CEFAZOLIN SODIUM 1 G
2000 VIAL (EA) INJECTION EVERY 8 HOURS
Refills: 0 | Status: DISCONTINUED | OUTPATIENT
Start: 2024-04-18 | End: 2024-04-18

## 2024-04-18 RX ORDER — BUMETANIDE 0.25 MG/ML
1 INJECTION INTRAMUSCULAR; INTRAVENOUS
Qty: 20 | Refills: 0 | Status: DISCONTINUED | OUTPATIENT
Start: 2024-04-18 | End: 2024-04-19

## 2024-04-18 RX ORDER — ALBUMIN HUMAN 25 %
250 VIAL (ML) INTRAVENOUS
Refills: 0 | Status: DISCONTINUED | OUTPATIENT
Start: 2024-04-18 | End: 2024-04-18

## 2024-04-18 RX ORDER — MIDODRINE HYDROCHLORIDE 2.5 MG/1
10 TABLET ORAL EVERY 8 HOURS
Refills: 0 | Status: DISCONTINUED | OUTPATIENT
Start: 2024-04-18 | End: 2024-04-19

## 2024-04-18 RX ORDER — CEFTRIAXONE 500 MG/1
1000 INJECTION, POWDER, FOR SOLUTION INTRAMUSCULAR; INTRAVENOUS EVERY 24 HOURS
Refills: 0 | Status: DISCONTINUED | OUTPATIENT
Start: 2024-04-18 | End: 2024-04-18

## 2024-04-18 RX ORDER — ATORVASTATIN CALCIUM 80 MG/1
40 TABLET, FILM COATED ORAL AT BEDTIME
Refills: 0 | Status: DISCONTINUED | OUTPATIENT
Start: 2024-04-18 | End: 2024-04-19

## 2024-04-18 RX ORDER — AZITHROMYCIN 500 MG/1
500 TABLET, FILM COATED ORAL EVERY 24 HOURS
Refills: 0 | Status: DISCONTINUED | OUTPATIENT
Start: 2024-04-18 | End: 2024-04-20

## 2024-04-18 RX ORDER — AZITHROMYCIN 500 MG/1
500 TABLET, FILM COATED ORAL EVERY 24 HOURS
Refills: 0 | Status: DISCONTINUED | OUTPATIENT
Start: 2024-04-18 | End: 2024-04-18

## 2024-04-18 RX ORDER — BUMETANIDE 0.25 MG/ML
2 INJECTION INTRAMUSCULAR; INTRAVENOUS ONCE
Refills: 0 | Status: COMPLETED | OUTPATIENT
Start: 2024-04-18 | End: 2024-04-18

## 2024-04-18 RX ADMIN — CHLORHEXIDINE GLUCONATE 15 MILLILITER(S): 213 SOLUTION TOPICAL at 06:05

## 2024-04-18 RX ADMIN — Medication 400 MILLIGRAM(S): at 04:28

## 2024-04-18 RX ADMIN — Medication 500 MILLILITER(S): at 06:39

## 2024-04-18 RX ADMIN — Medication 500 MILLIGRAM(S): at 18:42

## 2024-04-18 RX ADMIN — MIDODRINE HYDROCHLORIDE 10 MILLIGRAM(S): 2.5 TABLET ORAL at 14:27

## 2024-04-18 RX ADMIN — MUPIROCIN 1 APPLICATION(S): 20 OINTMENT TOPICAL at 18:42

## 2024-04-18 RX ADMIN — Medication 400 MILLIGRAM(S): at 18:41

## 2024-04-18 RX ADMIN — BUMETANIDE 5 MG/HR: 0.25 INJECTION INTRAMUSCULAR; INTRAVENOUS at 22:22

## 2024-04-18 RX ADMIN — PANTOPRAZOLE SODIUM 40 MILLIGRAM(S): 20 TABLET, DELAYED RELEASE ORAL at 12:57

## 2024-04-18 RX ADMIN — HEPARIN SODIUM 7500 UNIT(S): 5000 INJECTION INTRAVENOUS; SUBCUTANEOUS at 21:33

## 2024-04-18 RX ADMIN — PROPOFOL 3.71 MICROGRAM(S)/KG/MIN: 10 INJECTION, EMULSION INTRAVENOUS at 03:21

## 2024-04-18 RX ADMIN — INSULIN HUMAN 2 UNIT(S)/HR: 100 INJECTION, SOLUTION SUBCUTANEOUS at 09:33

## 2024-04-18 RX ADMIN — SENNA PLUS 2 TABLET(S): 8.6 TABLET ORAL at 21:33

## 2024-04-18 RX ADMIN — CHLORHEXIDINE GLUCONATE 1 APPLICATION(S): 213 SOLUTION TOPICAL at 12:57

## 2024-04-18 RX ADMIN — Medication 18.6 MICROGRAM(S)/KG/MIN: at 09:33

## 2024-04-18 RX ADMIN — HEPARIN SODIUM 7500 UNIT(S): 5000 INJECTION INTRAVENOUS; SUBCUTANEOUS at 14:27

## 2024-04-18 RX ADMIN — MILRINONE LACTATE 13.9 MICROGRAM(S)/KG/MIN: 1 INJECTION, SOLUTION INTRAVENOUS at 03:21

## 2024-04-18 RX ADMIN — EPINEPHRINE 23.2 MICROGRAM(S)/KG/MIN: 0.3 INJECTION INTRAMUSCULAR; SUBCUTANEOUS at 09:33

## 2024-04-18 RX ADMIN — Medication 100 MILLIGRAM(S): at 17:41

## 2024-04-18 RX ADMIN — BUMETANIDE 10 MG/HR: 0.25 INJECTION INTRAMUSCULAR; INTRAVENOUS at 10:13

## 2024-04-18 RX ADMIN — CEFTRIAXONE 100 MILLIGRAM(S): 500 INJECTION, POWDER, FOR SOLUTION INTRAMUSCULAR; INTRAVENOUS at 22:48

## 2024-04-18 RX ADMIN — Medication 81 MILLIGRAM(S): at 14:27

## 2024-04-18 RX ADMIN — VASOPRESSIN 4.95 UNIT(S)/MIN: 20 INJECTION INTRAVENOUS at 06:04

## 2024-04-18 RX ADMIN — Medication 50 MILLILITER(S): at 09:35

## 2024-04-18 RX ADMIN — BUMETANIDE 2 MILLIGRAM(S): 0.25 INJECTION INTRAMUSCULAR; INTRAVENOUS at 09:44

## 2024-04-18 RX ADMIN — MIDODRINE HYDROCHLORIDE 10 MILLIGRAM(S): 2.5 TABLET ORAL at 09:43

## 2024-04-18 RX ADMIN — Medication 1000 MILLIGRAM(S): at 13:25

## 2024-04-18 RX ADMIN — Medication 100 MILLIEQUIVALENT(S): at 23:00

## 2024-04-18 RX ADMIN — Medication 9.29 MICROGRAM(S)/KG/MIN: at 09:34

## 2024-04-18 RX ADMIN — Medication 500 MILLILITER(S): at 06:00

## 2024-04-18 RX ADMIN — Medication 50 MILLILITER(S): at 09:11

## 2024-04-18 RX ADMIN — Medication 1000 MILLIGRAM(S): at 04:45

## 2024-04-18 RX ADMIN — FENTANYL CITRATE 50 MICROGRAM(S): 50 INJECTION INTRAVENOUS at 00:30

## 2024-04-18 RX ADMIN — Medication 100 MILLIGRAM(S): at 21:32

## 2024-04-18 RX ADMIN — Medication 100 MILLIGRAM(S): at 18:42

## 2024-04-18 RX ADMIN — ATORVASTATIN CALCIUM 40 MILLIGRAM(S): 80 TABLET, FILM COATED ORAL at 21:34

## 2024-04-18 RX ADMIN — MUPIROCIN 1 APPLICATION(S): 20 OINTMENT TOPICAL at 06:05

## 2024-04-18 RX ADMIN — Medication 125 MILLILITER(S): at 03:38

## 2024-04-18 RX ADMIN — VASOPRESSIN 4.95 UNIT(S)/MIN: 20 INJECTION INTRAVENOUS at 09:34

## 2024-04-18 RX ADMIN — PROPOFOL 3.71 MICROGRAM(S)/KG/MIN: 10 INJECTION, EMULSION INTRAVENOUS at 09:33

## 2024-04-18 RX ADMIN — Medication 400 MILLIGRAM(S): at 12:57

## 2024-04-18 RX ADMIN — INSULIN HUMAN 2 UNIT(S)/HR: 100 INJECTION, SOLUTION SUBCUTANEOUS at 03:20

## 2024-04-18 RX ADMIN — MODAFINIL 100 MILLIGRAM(S): 200 TABLET ORAL at 18:42

## 2024-04-18 RX ADMIN — Medication 18.6 MICROGRAM(S)/KG/MIN: at 22:22

## 2024-04-18 RX ADMIN — MIDODRINE HYDROCHLORIDE 10 MILLIGRAM(S): 2.5 TABLET ORAL at 21:33

## 2024-04-18 RX ADMIN — GABAPENTIN 300 MILLIGRAM(S): 400 CAPSULE ORAL at 14:59

## 2024-04-18 RX ADMIN — VASOPRESSIN 7.5 UNIT(S)/MIN: 20 INJECTION INTRAVENOUS at 22:22

## 2024-04-18 RX ADMIN — FENTANYL CITRATE 50 MICROGRAM(S): 50 INJECTION INTRAVENOUS at 00:40

## 2024-04-18 RX ADMIN — Medication 200 GRAM(S): at 00:00

## 2024-04-18 RX ADMIN — Medication 100 MILLIGRAM(S): at 09:35

## 2024-04-18 RX ADMIN — Medication 100 MILLIGRAM(S): at 14:28

## 2024-04-18 RX ADMIN — Medication 1000 MILLIGRAM(S): at 19:05

## 2024-04-18 NOTE — BRIEF OPERATIVE NOTE - NSICDXBRIEFPOSTOP_GEN_ALL_CORE_FT
POST-OP DIAGNOSIS:  Cardiac tamponade after operative procedure 18-Apr-2024 02:32:02 postop bleeding Shanda Merchant  
POST-OP DIAGNOSIS:  Mitral valve prolapse 17-Apr-2024 19:41:42  Shanda Merchant  AF (atrial fibrillation) 17-Apr-2024 19:42:07  Shanda Merchant  CAD (coronary artery disease) 17-Apr-2024 19:42:12  Shanda Merchant

## 2024-04-18 NOTE — PROGRESS NOTE ADULT - SUBJECTIVE AND OBJECTIVE BOX
INTERVAl COURSE  POD#1  Mvr/ CABGx 1/ Cryomaze and SWEETIE occlusion  EF 40%  RTOR for bleeding from sternal wires   Extubated to BiPAP  On  and on and off vaso   Mild TOBIAS -- Improving   Bronched for left base haziness      VITALS  Vital Signs Last 24 Hrs  T(C): 36.1 (2024 17:29), Max: 36.2 (2024 05:01)  T(F): 96.9 (2024 17:29), Max: 97.2 (2024 05:01)  HR: 80 (2024 20:00) (69 - 108)  BP: 157/70 (2024 01:44) (157/70 - 157/70)  BP(mean): 110 (2024 01:44) (110 - 110)  RR: 19 (2024 20:00) (12 - 23)  SpO2: 100% (2024 20:00) (93% - 100%)  Parameters below as of 2024 20:00  Patient On (Oxygen Delivery Method): BiPAP/CPAP    O2 Concentration (%): 40    I&O's Summary  2024 07:01  -  2024 20:57  --------------------------------------------------------  IN: 1795.3 mL / OUT: 2845 mL / NET: -1049.7 mL    PHYSICAL EXAM  General: A&Ox 3; NAD  Respiratory: left sided rhonchi anteriorly   Cardiovascular: Regular rhythm/rate  Gastrointestinal: Soft  Extremities: WWP; no edema   Neurological:  CNII-XII grossly intact; no obvious focal deficits      LABS/IMAGING/EKG/ETC  Reviewed.

## 2024-04-18 NOTE — PRE-ANESTHESIA EVALUATION ADULT - NSPROPOSEDPROCEDFT_GEN_ALL_CORE
Mediastinal Exploration, Control of Bleeding
CABG, MV Repair - Possible Replacement - CryoMaze Procedure, Occlusion of Left Atrial Appendage

## 2024-04-18 NOTE — PRE-ANESTHESIA EVALUATION ADULT - LAST CARDIAC ANGIOGRAM/IMAGING
4/16/24 s/p cath.  LM mild irregularitues.  LAD m20%, CX diffuse disease RCA , L->R collateraLs
4/16/24 s/p cath.  LM mild irregularitues.  LAD m20%, CX diffuse disease RCA , L->R collateraLs

## 2024-04-18 NOTE — PROGRESS NOTE ADULT - SUBJECTIVE AND OBJECTIVE BOX
Events of last night reviewed On ventilator this am with pressor support  VS stable Afeb In RR Good BS ant No edema Good pulses Good UO Labs ok

## 2024-04-18 NOTE — PRE-ANESTHESIA EVALUATION ADULT - NSANTHADDINFOFT_GEN_ALL_CORE
Pt known to me...  s/p MVR/CABGx1/CryoMaze/SWEETIE Occlusion post op course significant for mediastinal bleeding.      Progress Note Adult-Critical Care Attending [Charted Location: [place] [id number]] [Authored: 2024 23:35]- for Visit: [id number], Complete, Entered, Signed in Full, Available to Patient    Progress Note:   · Provider Specialty	Critical Care      · Subjective and Objective:   Critical care attending addendum,    significant and ongoing chest tube output -   1150 L from mediastinal bulbs (2);  260 from mediastinal chest tube to pleuravac since arrival to ICU at 8pm     improved hemodynamics after aggressive resuscitation -   2 U pRBC/1 FFP/5 Cryo and platelet, 1L Albumin     on levo/vaso//Epi - no longer as labile  Serial xray obtained  CVP 7    CTS made aware and decision made to RTOR            Electronic Signatures:  Mirtha Torrez)  (Signed 2024 23:40)  	Authored: Progress Note, Subjective and Objective      Last Updated: 2024 23:40 by Mirtha Torrez ()
H&P Adult [Charted Location: [facility] Cardiac Cath] [Authored: 10-Apr-2024 18:08]- for Visit: [id number], Incomplete, Revised, Signed w/additional Signatures Pending, Available to Patient       Patient Identity:  · Birth Sex	Male  · Patient's Preferred Pronoun	Him/He     History of Present Illness:  History of Present Illness:   Cardiologist: Dr. Ramirez  Pharmacy: [facility] in [place]  Escort: being admitted to [facility] for surgery 4/17    79 y/o M with PMHx of HFrEF (LVEF 35%), pAflutter (on Eliquis), unprovoked PE/DVT s/p IVC filter placement then retrieval (2015, on Eliquis), HTN,. HLD, ADAM (on CPAP), CKD stage 4 (cr 1.2-1.49 Jan-Feb 2024), obesity who presented to outpatient structural cardiologist Dr. Pate for consultation of severe MR. Patient denies CP, SOB, SAMUEL, palpitations, orthopnea, PND, dizziness, syncope, hematuria, hematochezia/melena, fatigue, fever, chills, N/V, abdominal pain.     TTE (2/5/24): mod reduced LV systolic function, LV global strain w/ apical sparing which may be indicative of amyloidosis, mildly reduced RV systolic function, mod AR, severe MR, LVEF 35%.    In light of patient's risk factors and abnormal TTE, patient now presents to [facility] for LHC via radial acces prior to surgery 4/17/24.        Allergies and Intolerances:        Allergies:  	No Known Allergies:     Home Medications:   * Patient Currently Takes Medications as of 16-Apr-2024 07:41 documented in Structured Notes  · 	Entresto 97 mg-103 mg oral tablet: Last Dose Taken: 13-Apr-2024 , 1 tab(s) orally 2 times a day  · 	spironolactone 25 mg oral tablet: Last Dose Taken: 12-Apr-2024 , 1 tab(s) orally 3 times a week  · 	atorvastatin 40 mg oral tablet: Last Dose Taken: 15-Apr-2024 PM, 1 tab(s) orally once a day (at bedtime)  · 	carvedilol 12.5 mg oral tablet: Last Dose Taken: 16-Apr-2024 AM, 1 tab(s) orally 2 times a day  · 	torsemide 20 mg oral tablet: Last Dose Taken: 16-Apr-2024 AM, 1 tab(s) orally once a day  · 	apixaban 5 mg oral tablet: Last Dose Taken: 13-Apr-2024 , 1 tab(s) orally every 12 hours  · 	amLODIPine 10 mg oral tablet: Last Dose Taken: 15-Apr-2024 AM, 1 tab(s) orally once a day  · 	metFORMIN 500 mg oral tablet: Last Dose Taken: 15-Apr-2024 AM, 1 tab(s) orally 2 times a day (with meals)  · 	Contrave 8 mg-90 mg oral tablet, extended release: Last Dose Taken: 13-Apr-2024 , 2 tab(s) orally 2 times a day  · 	allopurinol 100 mg oral tablet: Last Dose Taken: 16-Apr-2024 AM, 1 tab(s) orally once a day  · 	Ozempic (1 mg dose) 2 mg/1.5 mL subcutaneous solution: Last Dose Taken: 10-Apr-2024     Patient History:    Past Medical, Past Surgical, and Family History:  PAST MEDICAL HISTORY:  Aortic regurgitation     Chronic deep vein thrombosis (DVT) of non-extremity vein     Essential hypertension     Gout     History of aortic insufficiency     HTN (hypertension)     Obesity, unspecified classification, unspecified obesity type, unspecified whether serious comorbidity present     ADAM (obstructive sleep apnea)     Other chronic pulmonary embolism without acute cor pulmonale     Pericarditis.     PAST SURGICAL HISTORY:  Bilateral cataracts     History of hip replacement, unspecified laterality.     FAMILY HISTORY:  No pertinent family history in first degree relatives.     Social History:  · Substance use	No     Tobacco Screening:  · Core Measure Site	No    Risk Assessment:    Present on Admission:  Deep Venous Thrombosis	no  Pulmonary Embolus	no     HIV Screening:  · In accordance with [place] State law, we offer every patient who comes to our ED an HIV test. Would you like to be tested today?	Opt out    Physical Exam:    Height/Weight/BSA/BMI:  · Height (FEET)	6 Feet  · Height (INCHES)	1 Inch(s)  · Height (CENTIMETERS)	185.42 Centimeter(s)  · 	 used   · Dosing Weight (KILOGRAMS)	123.8 kg  · Dosing Weight  (POUNDS)	272.9 lb  · BSA (m2)	2.46 Meter Squared  · BMI (kG/m2)	36      T/HR/RR/BP:  · Temp (F)	97.7 Degrees F  · Temp (C)	36.5 Degrees C  · Heart Rate	61 /min  · Respiration Rate (breaths/min)	15 /min  · BP Systolic	155 mm Hg  · BP Diastolic	68 mm Hg  · Blood Pressure - Method	electronic  · BP Noninvasive Mean	97 mm Hg  · SpO2 (%)	92 %  · O2 Delivery/Oxygen Delivery Method	room air     Physical Exam:  · Constitutional	well-groomed; no distress  · Respiratory	clear to auscultation bilaterally; no wheezes; no rales; no rhonchi  · Cardiovascular	regular rate and rhythm; S1 S2 present; no gallops; no rub; no JVD; murmur; peripheral edema; vascular  	  · Murmur Timing	systolic  · Radial Pulse	2+ b/l  · Femoral Pulse	1+ b/l no bruit  · DP Pulse	1+ b/l  · PT Pulse	dopplerable b/l  · Vascular Details	LE edema unchanged from baseline per patient  · Gastrointestinal	soft; nontender; nondistended; normal active bowel sounds  · Neurological	cranial nerves II-XII intact; sensation intact  · Skin	warm and dry  · Musculoskeletal	normal; normal gait; ROM intact; strength 5/5 bilateral upper extremities; strength 5/5 bilateral lower extremities       Labs and Results:  Labs, Radiology, Cardiology, and Other Results: 16.1   8.18  )-----------( 281      ( 16 Apr 2024 06:44 )             49.4       04-16    143  |  107  |  24<H>  ----------------------------<  109<H>  4.0   |  26  |  1.39<H>    Ca    9.6      16 Apr 2024 06:44  Mg     2.0     04-16    TPro  6.3  /  Alb  4.0  /  TBili  1.1  /  DBili  x   /  AST  15  /  ALT  15  /  AlkPhos  100  04-16      PT/INR - ( 16 Apr 2024 06:44 )   PT: 11.5 sec;   INR: 1.01          PTT - ( 16 Apr 2024 06:44 )  PTT:43.7 sec    CARDIAC MARKERS ( 16 Apr 2024 06:44 )  x     / x     / 42 U/L / x     / 1.5 ng/mL        Urinalysis Basic - ( 16 Apr 2024 06:44 )    Color: x / Appearance: x / SG: x / pH: x  Gluc: 109 mg/dL / Ketone: x  / Bili: x / Urobili: x   Blood: x / Protein: x / Nitrite: x   Leuk Esterase: x / RBC: x / WBC x   Sq Epi: x / Non Sq Epi: x / Bacteria: x        EKG: AFib, rate controlled at 61, prominent S waves in precordial leads, II, III, aVF. T wave flattening in II, V1, aVF      Assessment and Plan:   · VTE Risk Assessment	VTE Assessment already completed for this visit  · Completed VTE Risk Assessment(s)	Refer to the Assessment tab to view/cancel completed assessment.     Assessment:  · Assessment	  79 y/o M with PMHx of HFrEF (LVEF 35%), pAflutter (on Eliquis), unprovoked PE/DVT s/p IVC filter placement then retrieval (2015, on Eliquis), HTN,. HLD, ADAM (on CPAP), CKD stage 4 (cr 1.2-1.49 Jan-Feb 2024), obesity who presented to outpatient structural cardiologist Dr. Pate for consultation of severe MR, now presents to [facility] for LHC via radial acces prior to surgery 4/17/24 in light of patient's risk factors and abnormal TTE.    -ASA III. Mallampati III  -VSS  -Patient is a candidate for conscious sedation  -IVF: no IVF to be given in setting of severe MR  -DAPT: Patient to be given , no Plavix as he is pre-op for surgery 4/17  -Eliquis last dose 4/13/24, patient bridged with Lovenox BID 4/14 and 4/15    Risks & benefits of procedure and alternative therapy have been explained to the patient including but not limited to: allergic reaction, bleeding w/possible need for blood transfusion, infection, renal and vascular compromise, limb damage, arrhythmia, stroke, vessel dissection/perforation, Myocardial infarction, emergent CABG. Informed consent obtained and in chart.           Electronic Signatures:  González Horvath)  (Signature Pending)  	Co-Signer: History of Present Illness, Allergies/Medications, Patient History  Yin Laurent)  (Signed 16-Apr-2024 08:00)  	Authored: History of Present Illness, Allergies/Medications, Patient History, Risk Assessment, Physical Exam, Labs and Results, Assessment and Plan      Last Updated: 16-Apr-2024 08:00 by Yin Laurent)

## 2024-04-18 NOTE — CONSULT NOTE ADULT - ATTENDING COMMENTS
I agree with the fellow's findings and plans as written above with the following additions/amendments:    Seen and examined at bedside. Intubated, sedated, history reviewed in detail as above in chart and with primary team. Since coming to floor very poor urine output. POCUS with distended bladder at least 150cc, replaced by nurse with good output afterwards and responding to diuretic. Would aim for gentle net negative. Further recs as above, discussed in detail with primary team

## 2024-04-18 NOTE — BRIEF OPERATIVE NOTE - NSICDXBRIEFPREOP_GEN_ALL_CORE_FT
PRE-OP DIAGNOSIS:  Hemorrhage from open wound of chest wall 18-Apr-2024 02:27:47  Shanda Merchant   PRE-OP DIAGNOSIS:  Cardiac tamponade after operative procedure 18-Apr-2024 02:31:41 postop bleeding Shanda Merchant

## 2024-04-18 NOTE — PRE-ANESTHESIA EVALUATION ADULT - NSANTHPROCED_GEN_ALL_CORE
Arterial Catheter/Central Venous Catheter/Transesophageal Echocardiogram
in situ/Arterial Catheter/Central Venous Catheter

## 2024-04-18 NOTE — PROGRESS NOTE ADULT - ASSESSMENT
78 M w/PMHx chronic HFrEF 35%, pAflutter (Eliquis), unprovoked PE/DVT - IVC filter & retrieval '15, HTN, HLD, ADAM (on CPAP), CKD stage 3 (Cr 1.4) and obesity who presented to outpatient structural cardiologist to Dr. Pate for consultation of severe MR.   TTE (24): mod reduced LV systolic function, mildly reduced RV systolic function, mod AR, severe MR, LVEF 35%.  S/p MVR/ CABGx1/ CryoMaze and SWEETIE occlusion  EF 35%    RTOR  for reexploration/sternotomy - 200 cc mediastinal blood/lot evacuated    A/p   Hemodynamically stable on  ~ 5 and low dose Levo on and off for MAP goal> 70 mmhg  Continue  for MR ventricle and cardiac unloading/ Flotrac with CI 3.6- SVV 5%  Continue bumex drip for Neg FB   Mild TOBIAS on CKD 3/ Cr slowly downtrending- good UOP/ Lytes and bicarb in normal range-- continue to monitor   HCT stable 27%/ PLT and coags in good range -- Drains output slowing ~ 300 for all day   Extubated to Bipap/ feels comfortabl with good GAS/ will alternate with HF / Continue nocturnal NIV as pt on chronic CPAP  Bronched for left base opacity- reported thick secretions/ started on CAP coverage pending sputum clx   Normal A1c/off insulin gtt- starting diet ssc as needed   Continue ASA/ Statin and ICU ppx - weight adjusted thromboprophylaxis   OOB and mobilizing in am     ATTENDING: I have personally and independently provided 45 min of critical care services. This excludes any time spent on separate procedures or teaching.     78 M w/PMHx chronic HFrEF 35%, pAflutter (Eliquis), unprovoked PE/DVT - IVC filter & retrieval '15, HTN, HLD, ADAM (on CPAP), CKD stage 3 (Cr 1.4) and obesity who presented to outpatient structural cardiologist to Dr. Pate for consultation of severe MR.   TTE (24): mod reduced LV systolic function, mildly reduced RV systolic function, mod AR, severe MR, LVEF 35%.  S/p MVR/ CABGx1/ CryoMaze and SWEETIE occlusion  EF 35%    RTOR  for reexploration/sternotomy - 200 cc mediastinal blood/lot evacuated    A/p   Hemodynamically stable on  ~ 5 and midodrine w/ low dose vaso on and off for MAP goal> 70 mmhg  Cortef taper for CIRCI  Continue  for MR ventricle and cardiac unloading/ Flotrac with CI 3.6- SVV 5%  Continue bumex drip for Neg FB   Mild TOBIAS on CKD 3/ Cr slowly downtrending- good UOP/ Lytes and bicarb in normal range-- continue to monitor   HCT stable 27%/ PLT and coags in good range -- Drains output slowing ~ 300 for all day   Extubated to Bipap/ feels comfortabl with good GAS/ will alternate with HF / Continue nocturnal NIV as pt on chronic CPAP  Bronched for left base opacity- reported thick secretions/ started on CAP coverage pending sputum clx   Normal A1c/off insulin gtt- starting diet ssc as needed   Continue ASA/ Statin and ICU ppx - weight adjusted thromboprophylaxis   OOB and mobilizing in am     ATTENDING: I have personally and independently provided 45 min of critical care services. This excludes any time spent on separate procedures or teaching.

## 2024-04-18 NOTE — BRIEF OPERATIVE NOTE - NSICDXBRIEFPROCEDURE_GEN_ALL_CORE_FT
PROCEDURES:  Re-exploration, mediastinum, sternotomy approach 18-Apr-2024 02:26:10 re-exploration for bleeding Shanda Merchant  
PROCEDURES:  CABG, with FAREED 17-Apr-2024 19:39:22 GSV to RCA Maintammy Shanda V  Occlusion, left atrial appendage 17-Apr-2024 19:39:45  Shanda Merchant  Replacement of mitral valve with maze procedure and cardiopulmonary bypass 17-Apr-2024 19:40:41 31 mm Mosaic Valve Mayur Shanda V

## 2024-04-18 NOTE — PROGRESS NOTE ADULT - SUBJECTIVE AND OBJECTIVE BOX
CTICU  CRITICAL  CARE  attending     Hand off received 					   Pertinent clinical, laboratory, radiographic, hemodynamic, echocardiographic, respiratory data, microbiologic data and chart were reviewed and analyzed frequently throughout the course of the day and night  Patient seen and examined with CTS/ SH attending at bedside  Pt is a 78y , Male, HEALTH ISSUES - PROBLEM Dx:      , FAMILY HISTORY:  No pertinent family history in first degree relatives    PAST MEDICAL & SURGICAL HISTORY:  HTN (hypertension)      Pericarditis      Obesity, unspecified classification, unspecified obesity type, unspecified whether serious comorbidity present      ADAM (obstructive sleep apnea)      Chronic deep vein thrombosis (DVT) of non-extremity vein      Other chronic pulmonary embolism without acute cor pulmonale      Essential hypertension      Gout      Aortic regurgitation      History of aortic insufficiency      History of hip replacement, unspecified laterality      Bilateral cataracts        Patient is a 78y old  Male who presents with a chief complaint of     14 system review was unremarkable    Vital signs, hemodynamic and respiratory parameters were reviewed from the bedside nursing flowsheet.  ICU Vital Signs Last 24 Hrs  T(C): 36.6 (18 Apr 2024 22:35), Max: 36.6 (18 Apr 2024 22:35)  T(F): 97.9 (18 Apr 2024 22:35), Max: 97.9 (18 Apr 2024 22:35)  HR: 79 (18 Apr 2024 23:00) (69 - 97)  BP: 157/70 (18 Apr 2024 01:44) (157/70 - 157/70)  BP(mean): 110 (18 Apr 2024 01:44) (110 - 110)  ABP: 139/47 (18 Apr 2024 23:00) (85/43 - 176/61)  ABP(mean): 75 (18 Apr 2024 23:00) (55 - 87)  RR: 18 (18 Apr 2024 23:00) (12 - 23)  SpO2: 100% (18 Apr 2024 23:00) (93% - 100%)    O2 Parameters below as of 18 Apr 2024 23:00  Patient On (Oxygen Delivery Method): nasal cannula, high flow  O2 Flow (L/min): 50  O2 Concentration (%): 50      Adult Advanced Hemodynamics Last 24 Hrs  CVP(mm Hg): 2 (18 Apr 2024 23:00) (-1 - 15)  CVP(cm H2O): --  CO: --  CI: --  PA: --  PA(mean): --  PCWP: --  SVR: --  SVRI: --  PVR: --  PVRI: --, ABG - ( 18 Apr 2024 21:18 )  pH, Arterial: 7.44  pH, Blood: x     /  pCO2: 34    /  pO2: 110   / HCO3: 23    / Base Excess: -0.5  /  SaO2: 98.8              Mode: AC/ CMV (Assist Control/ Continuous Mandatory Ventilation)  RR (machine): 700  TV (machine): 12  FiO2: 60  PEEP: 7  ITime: 2  MAP: 12  PIP: 19    Intake and output was reviewed and the fluid balance was calculated  Daily Height in cm: 185.42 (18 Apr 2024 01:44)    Daily   I&O's Summary    17 Apr 2024 07:01  -  18 Apr 2024 07:00  --------------------------------------------------------  IN: 4598.1 mL / OUT: 2840 mL / NET: 1758.1 mL    18 Apr 2024 07:01  -  18 Apr 2024 23:11  --------------------------------------------------------  IN: 2102.1 mL / OUT: 3415 mL / NET: -1312.9 mL        All lines and drain sites were assessed  [facility]      POCT Blood Glucose.: 93 mg/dL (18 Apr 2024 20:56)    No acute change in mental status  Auscultation of the chest reveals equal bs  Abdomen is soft  Extremities are warm and well perfused  Wounds appear clean and unremarkable  Antibiotics are periop    labs  CBC Full  -  ( 18 Apr 2024 21:20 )  WBC Count : 8.92 K/uL  RBC Count : 2.92 M/uL  Hemoglobin : 8.8 g/dL  Hematocrit : 25.2 %  Platelet Count - Automated : 132 K/uL  Mean Cell Volume : 86.3 fl  Mean Cell Hemoglobin : 30.1 pg  Mean Cell Hemoglobin Concentration : 34.9 gm/dL  Auto Neutrophil # : 7.65 K/uL  Auto Lymphocyte # : 0.46 K/uL  Auto Monocyte # : 0.77 K/uL  Auto Eosinophil # : 0.01 K/uL  Auto Basophil # : 0.00 K/uL  Auto Neutrophil % : 85.8 %  Auto Lymphocyte % : 5.2 %  Auto Monocyte % : 8.6 %  Auto Eosinophil % : 0.1 %  Auto Basophil % : 0.0 %    04-18    141  |  106  |  28<H>  ----------------------------<  112<H>  3.8   |  24  |  1.64<H>    Ca    8.3<L>      18 Apr 2024 21:20  Phos  4.0     04-18  Mg     2.4     04-18    TPro  4.2<L>  /  Alb  3.2<L>  /  TBili  0.8  /  DBili  x   /  AST  69<H>  /  ALT  6<L>  /  AlkPhos  35<L>  04-18    PT/INR - ( 18 Apr 2024 21:20 )   PT: 15.4 sec;   INR: 1.36          PTT - ( 18 Apr 2024 21:20 )  PTT:31.5 sec  The current medications were reviewed   MEDICATIONS  (STANDING):  ascorbic acid 500 milliGRAM(s) Oral two times a day  aspirin  chewable 81 milliGRAM(s) Oral daily  atorvastatin 40 milliGRAM(s) Oral at bedtime  azithromycin  IVPB 500 milliGRAM(s) IV Intermittent every 24 hours  buDESOnide    Inhalation Suspension 0.25 milliGRAM(s) Inhalation every 12 hours  buMETAnide Infusion 1 mG/Hr (5 mL/Hr) IV Continuous <Continuous>  cefTRIAXone   IVPB 1000 milliGRAM(s) IV Intermittent every 24 hours  chlorhexidine 2% Cloths 1 Application(s) Topical daily  dextrose 50% Injectable 50 milliLiter(s) IV Push every 15 minutes  dextrose 50% Injectable 25 milliLiter(s) IV Push every 15 minutes  DOBUTamine Infusion 5 MICROgram(s)/kG/Min (18.6 mL/Hr) IV Continuous <Continuous>  gabapentin Solution 300 milliGRAM(s) Oral three times a day  heparin   Injectable 7500 Unit(s) SubCutaneous every 8 hours  hydrocortisone sodium succinate Injectable 100 milliGRAM(s) IV Push every 8 hours  insulin regular Infusion 2 Unit(s)/Hr (2 mL/Hr) IV Continuous <Continuous>  midodrine 10 milliGRAM(s) Oral every 8 hours  modafinil 100 milliGRAM(s) Oral daily  mupirocin 2% Nasal 1 Application(s) Both Nostrils two times a day  pantoprazole  Injectable 40 milliGRAM(s) IV Push daily  polyethylene glycol 3350 17 Gram(s) Oral daily  senna 2 Tablet(s) Oral at bedtime  sodium chloride 0.9%. 1000 milliLiter(s) (10 mL/Hr) IV Continuous <Continuous>  testosterone 1% Gel 100 milliGRAM(s) Topical daily  vasopressin Infusion 0.05 Unit(s)/Min (7.5 mL/Hr) IV Continuous <Continuous>    MEDICATIONS  (PRN):       PROBLEM LIST/ ASSESSMENT:  HEALTH ISSUES - PROBLEM Dx:      ,   Patient is a 78y old  Male who presents with a chief complaint of    s/p cardiac surgery    Acute systolic and diastolic heart failure evidenced by SOB and parenchymal infiltrates; will treat with diuresis    Cardiogenic shock on ionotropy    Vasogenic shock due to hypotension in the cticu , will keep on pressors      Acute blood loss anemia with relative hypotension treated with > 1 unit PC    Acute post operative pulmonary insufficiency ruled in due to hypoxemia, O2 sats < 91% on RA treated with HFNC    Acidosis evidenced by anion gap and negative base excess    Acute kidney injury - creatinine > 0.3 due to combined prerenal and intrarenal factors can presume ATN        My plan includes :    dobut bumex  plan bronch   close hemodynamic, ventilatory and drain monitoring and management per post op routine    Monitor for arrhythmias and monitor parameters for organ perfusion  beta blockade not administered due to hemodynamic instability and bradycardia  monitor neurologic status  Head of the bed should remain elevated to 45 deg .   chest PT and IS will be encouraged  monitor adequacy of oxygenation and ventilation and attempt to wean oxygen  antibiotic regimen will be tailored to the clinical, laboratory and microbiologic data  Nutritional goals will be met using po eventually , ensure adequate caloric intake and montior the same  Stress ulcer and VTE prophylaxis will be achieved    Glycemic control is satisfactory  Electrolytes have been repleted as necessary and wound care has been carried out. Pain control has been achieved.   agressive physical therapy and early mobility and ambulation goals will be met   The family was updated about the course and plan  CRITICAL CARE TIME Upon my evaluation, this patient had a high probability of imminent or life-threatening deterioration due to the above problems which required my direct attention, intervention, and personal management.  I have personally provided 150 minutes of critical care time exclusive of time spent on separately billable procedures. Time included review of laboratory data, radiology results, discussion with consultants, and monitoring for potential decompensation. Interventions were performed as documented abovepersonally provided by me  in evaluation and management, reassessments, review and interpretation of labs and x-rays, ventilator and hemodynamic management, formulating a plan and coordinating care: ___150___ MIN.  Time does not include procedural time.    CTICU ATTENDING     					    Rufus Salas MD

## 2024-04-18 NOTE — CONSULT NOTE ADULT - ASSESSMENT
78-year-old male with HTN, CKD was admitted for severe MR s/p CABG and placement of mitral valve 4/17 c/b postop cardiogenic shock.  Nephrology consulted for Cr 1.65 and drop in UO    # Nonoliguric TOBIAS on CKD3  BCr 1.2-1.4  No creatinine elevation as above  UA 4/16 with proteinuria, no hematuria  Etiology likely prerenal/ischemic ATN i/s/o shock    Recommend:  Maintain SBP> 110 for adequate renal perfusion  C/w diuresis, aim for net negative 1.5-2L in the next 24 hours  Repeat UA, urine lytes, UPCR  Renal US  Strict I&Os  BMP per icu protocol 78-year-old male with HTN, CKD was admitted for severe MR s/p CABG and placement of mitral valve 4/17 c/b postop cardiogenic shock.  Nephrology consulted for Cr 1.65 and drop in UO    # Nonoliguric TOBIAS on CKD3  BCr 1.2-1.4  No creatinine elevation as above  UA 4/16 with proteinuria, no hematuria  Etiology likely prerenal/ischemic ATN i/s/o shock    Recommend:  Maintain SBP> 110 for adequate renal perfusion  Replace handley given misplacement on POCUS  C/w diuresis, aim for net negative 1.5-2L in the next 24 hours  Repeat UA, urine lytes, UPCR  Renal US  Strict I&Os  BMP per icu protocol

## 2024-04-18 NOTE — PRE-ANESTHESIA EVALUATION ADULT - LAST ECHOCARDIOGRAM
2/25/24 FAREED, report reviewed.  LVH, mildly dilated LV, moderate global LV dysfunction, EF 35%.  mild RV dysfunction.  no thrombus RA, LA, SWEETIE.  moderate AI.  Severe MR, bidirectional jet.  PML prolapse, PML tethered., MVA 9.2cm2.  trace TR
2/25/24 FAREED, report reviewed.  LVH, mildly dilated LV, moderate global LV dysfunction, EF 35%.  mild RV dysfunction.  no thrombus RA, LA, SWEETIE.  moderate AI.  Severe MR, bidirectional jet.  PML prolapse, PML tethered., MVA 9.2cm2.  trace TR

## 2024-04-18 NOTE — PROGRESS NOTE ADULT - SUBJECTIVE AND OBJECTIVE BOX
CTICU  CRITICAL  CARE  PROCEDURE  NOTE      				     Name of procedure – Flexible fiberoptic bronchoscopy      Flexible fiberoptic bronchoscopy was performed under propofol and fentanyl sedation while the patient was intubated for controlled mechanical ventilation  Pre-procedural assessment reveals no risk of Tb  Ventilator settings were adjusted to reduce airway pressures to reduce the risk of ptx  The scope was advanced past the ett which was noted to be 2 cm the sandro   The sandro was sharp  Right LL and RML air way were patent , mucopur thick secretions  Lavaged and sent for culture  Left LL air way  with copious purulent secretions, lavaged, and sent for culture  CXR confirmed no pneumothorax post bronch  There were no complications  The patient tolerated the procedure well

## 2024-04-19 LAB
ALBUMIN SERPL ELPH-MCNC: 3.4 G/DL — SIGNIFICANT CHANGE UP (ref 3.3–5)
ALBUMIN SERPL ELPH-MCNC: 3.5 G/DL — SIGNIFICANT CHANGE UP (ref 3.3–5)
ALBUMIN SERPL ELPH-MCNC: 3.5 G/DL — SIGNIFICANT CHANGE UP (ref 3.3–5)
ALBUMIN SERPL ELPH-MCNC: 3.6 G/DL — SIGNIFICANT CHANGE UP (ref 3.3–5)
ALP SERPL-CCNC: 42 U/L — SIGNIFICANT CHANGE UP (ref 40–120)
ALP SERPL-CCNC: 46 U/L — SIGNIFICANT CHANGE UP (ref 40–120)
ALP SERPL-CCNC: 49 U/L — SIGNIFICANT CHANGE UP (ref 40–120)
ALP SERPL-CCNC: 49 U/L — SIGNIFICANT CHANGE UP (ref 40–120)
ALT FLD-CCNC: 5 U/L — LOW (ref 10–45)
ALT FLD-CCNC: <5 U/L — LOW (ref 10–45)
ANION GAP SERPL CALC-SCNC: 11 MMOL/L — SIGNIFICANT CHANGE UP (ref 5–17)
ANION GAP SERPL CALC-SCNC: 11 MMOL/L — SIGNIFICANT CHANGE UP (ref 5–17)
ANION GAP SERPL CALC-SCNC: 12 MMOL/L — SIGNIFICANT CHANGE UP (ref 5–17)
ANION GAP SERPL CALC-SCNC: 7 MMOL/L — SIGNIFICANT CHANGE UP (ref 5–17)
APTT BLD: 28.7 SEC — SIGNIFICANT CHANGE UP (ref 24.5–35.6)
APTT BLD: 29.1 SEC — SIGNIFICANT CHANGE UP (ref 24.5–35.6)
APTT BLD: 30.2 SEC — SIGNIFICANT CHANGE UP (ref 24.5–35.6)
APTT BLD: 31.6 SEC — SIGNIFICANT CHANGE UP (ref 24.5–35.6)
AST SERPL-CCNC: 31 U/L — SIGNIFICANT CHANGE UP (ref 10–40)
AST SERPL-CCNC: 42 U/L — HIGH (ref 10–40)
AST SERPL-CCNC: 52 U/L — HIGH (ref 10–40)
AST SERPL-CCNC: 65 U/L — HIGH (ref 10–40)
BASE EXCESS BLDV CALC-SCNC: 2.8 MMOL/L — SIGNIFICANT CHANGE UP (ref -2–3)
BASOPHILS # BLD AUTO: 0.01 K/UL — SIGNIFICANT CHANGE UP (ref 0–0.2)
BASOPHILS # BLD AUTO: 0.01 K/UL — SIGNIFICANT CHANGE UP (ref 0–0.2)
BASOPHILS # BLD AUTO: 0.02 K/UL — SIGNIFICANT CHANGE UP (ref 0–0.2)
BASOPHILS # BLD AUTO: 0.02 K/UL — SIGNIFICANT CHANGE UP (ref 0–0.2)
BASOPHILS NFR BLD AUTO: 0.1 % — SIGNIFICANT CHANGE UP (ref 0–2)
BASOPHILS NFR BLD AUTO: 0.1 % — SIGNIFICANT CHANGE UP (ref 0–2)
BASOPHILS NFR BLD AUTO: 0.2 % — SIGNIFICANT CHANGE UP (ref 0–2)
BASOPHILS NFR BLD AUTO: 0.2 % — SIGNIFICANT CHANGE UP (ref 0–2)
BILIRUB SERPL-MCNC: 0.6 MG/DL — SIGNIFICANT CHANGE UP (ref 0.2–1.2)
BILIRUB SERPL-MCNC: 0.7 MG/DL — SIGNIFICANT CHANGE UP (ref 0.2–1.2)
BILIRUB SERPL-MCNC: 0.8 MG/DL — SIGNIFICANT CHANGE UP (ref 0.2–1.2)
BILIRUB SERPL-MCNC: 0.9 MG/DL — SIGNIFICANT CHANGE UP (ref 0.2–1.2)
BUN SERPL-MCNC: 28 MG/DL — HIGH (ref 7–23)
BUN SERPL-MCNC: 30 MG/DL — HIGH (ref 7–23)
BUN SERPL-MCNC: 33 MG/DL — HIGH (ref 7–23)
BUN SERPL-MCNC: 33 MG/DL — HIGH (ref 7–23)
CALCIUM SERPL-MCNC: 8.6 MG/DL — SIGNIFICANT CHANGE UP (ref 8.4–10.5)
CALCIUM SERPL-MCNC: 8.6 MG/DL — SIGNIFICANT CHANGE UP (ref 8.4–10.5)
CALCIUM SERPL-MCNC: 8.7 MG/DL — SIGNIFICANT CHANGE UP (ref 8.4–10.5)
CALCIUM SERPL-MCNC: 8.7 MG/DL — SIGNIFICANT CHANGE UP (ref 8.4–10.5)
CHLORIDE SERPL-SCNC: 101 MMOL/L — SIGNIFICANT CHANGE UP (ref 96–108)
CHLORIDE SERPL-SCNC: 102 MMOL/L — SIGNIFICANT CHANGE UP (ref 96–108)
CHLORIDE SERPL-SCNC: 103 MMOL/L — SIGNIFICANT CHANGE UP (ref 96–108)
CHLORIDE SERPL-SCNC: 106 MMOL/L — SIGNIFICANT CHANGE UP (ref 96–108)
CO2 BLDV-SCNC: 29.9 MMOL/L — HIGH (ref 22–26)
CO2 SERPL-SCNC: 24 MMOL/L — SIGNIFICANT CHANGE UP (ref 22–31)
CO2 SERPL-SCNC: 25 MMOL/L — SIGNIFICANT CHANGE UP (ref 22–31)
CO2 SERPL-SCNC: 25 MMOL/L — SIGNIFICANT CHANGE UP (ref 22–31)
CO2 SERPL-SCNC: 28 MMOL/L — SIGNIFICANT CHANGE UP (ref 22–31)
CREAT SERPL-MCNC: 1.55 MG/DL — HIGH (ref 0.5–1.3)
CREAT SERPL-MCNC: 1.66 MG/DL — HIGH (ref 0.5–1.3)
CREAT SERPL-MCNC: 1.68 MG/DL — HIGH (ref 0.5–1.3)
CREAT SERPL-MCNC: 1.73 MG/DL — HIGH (ref 0.5–1.3)
EGFR: 40 ML/MIN/1.73M2 — LOW
EGFR: 41 ML/MIN/1.73M2 — LOW
EGFR: 42 ML/MIN/1.73M2 — LOW
EGFR: 46 ML/MIN/1.73M2 — LOW
EOSINOPHIL # BLD AUTO: 0 K/UL — SIGNIFICANT CHANGE UP (ref 0–0.5)
EOSINOPHIL # BLD AUTO: 0.01 K/UL — SIGNIFICANT CHANGE UP (ref 0–0.5)
EOSINOPHIL # BLD AUTO: 0.02 K/UL — SIGNIFICANT CHANGE UP (ref 0–0.5)
EOSINOPHIL # BLD AUTO: 0.07 K/UL — SIGNIFICANT CHANGE UP (ref 0–0.5)
EOSINOPHIL NFR BLD AUTO: 0 % — SIGNIFICANT CHANGE UP (ref 0–6)
EOSINOPHIL NFR BLD AUTO: 0.1 % — SIGNIFICANT CHANGE UP (ref 0–6)
EOSINOPHIL NFR BLD AUTO: 0.2 % — SIGNIFICANT CHANGE UP (ref 0–6)
EOSINOPHIL NFR BLD AUTO: 0.7 % — SIGNIFICANT CHANGE UP (ref 0–6)
GAS PNL BLDA: SIGNIFICANT CHANGE UP
GAS PNL BLDV: SIGNIFICANT CHANGE UP
GLUCOSE BLDC GLUCOMTR-MCNC: 113 MG/DL — HIGH (ref 70–99)
GLUCOSE BLDC GLUCOMTR-MCNC: 140 MG/DL — HIGH (ref 70–99)
GLUCOSE BLDC GLUCOMTR-MCNC: 185 MG/DL — HIGH (ref 70–99)
GLUCOSE SERPL-MCNC: 103 MG/DL — HIGH (ref 70–99)
GLUCOSE SERPL-MCNC: 123 MG/DL — HIGH (ref 70–99)
GLUCOSE SERPL-MCNC: 133 MG/DL — HIGH (ref 70–99)
GLUCOSE SERPL-MCNC: 151 MG/DL — HIGH (ref 70–99)
HCO3 BLDV-SCNC: 28 MMOL/L — SIGNIFICANT CHANGE UP (ref 22–29)
HCT VFR BLD CALC: 24.5 % — LOW (ref 39–50)
HCT VFR BLD CALC: 25.2 % — LOW (ref 39–50)
HCT VFR BLD CALC: 25.3 % — LOW (ref 39–50)
HCT VFR BLD CALC: 26.7 % — LOW (ref 39–50)
HGB BLD-MCNC: 8.2 G/DL — LOW (ref 13–17)
HGB BLD-MCNC: 8.8 G/DL — LOW (ref 13–17)
HGB BLD-MCNC: 9.1 G/DL — LOW (ref 13–17)
HGB BLD-MCNC: 9.2 G/DL — LOW (ref 13–17)
IMM GRANULOCYTES NFR BLD AUTO: 0.5 % — SIGNIFICANT CHANGE UP (ref 0–0.9)
IMM GRANULOCYTES NFR BLD AUTO: 0.7 % — SIGNIFICANT CHANGE UP (ref 0–0.9)
INR BLD: 1.18 — SIGNIFICANT CHANGE UP (ref 0.85–1.18)
INR BLD: 1.24 — HIGH (ref 0.85–1.18)
INR BLD: 1.26 — HIGH (ref 0.85–1.18)
INR BLD: 1.33 — HIGH (ref 0.85–1.18)
LACTATE SERPL-SCNC: 0.6 MMOL/L — SIGNIFICANT CHANGE UP (ref 0.5–2)
LACTATE SERPL-SCNC: 1.1 MMOL/L — SIGNIFICANT CHANGE UP (ref 0.5–2)
LACTATE SERPL-SCNC: 1.2 MMOL/L — SIGNIFICANT CHANGE UP (ref 0.5–2)
LACTATE SERPL-SCNC: 1.8 MMOL/L — SIGNIFICANT CHANGE UP (ref 0.5–2)
LEGIONELLA AG UR QL: NEGATIVE — SIGNIFICANT CHANGE UP
LYMPHOCYTES # BLD AUTO: 0.44 K/UL — LOW (ref 1–3.3)
LYMPHOCYTES # BLD AUTO: 0.48 K/UL — LOW (ref 1–3.3)
LYMPHOCYTES # BLD AUTO: 0.68 K/UL — LOW (ref 1–3.3)
LYMPHOCYTES # BLD AUTO: 0.79 K/UL — LOW (ref 1–3.3)
LYMPHOCYTES # BLD AUTO: 4 % — LOW (ref 13–44)
LYMPHOCYTES # BLD AUTO: 4.2 % — LOW (ref 13–44)
LYMPHOCYTES # BLD AUTO: 6.1 % — LOW (ref 13–44)
LYMPHOCYTES # BLD AUTO: 7.7 % — LOW (ref 13–44)
MAGNESIUM SERPL-MCNC: 2.1 MG/DL — SIGNIFICANT CHANGE UP (ref 1.6–2.6)
MAGNESIUM SERPL-MCNC: 2.2 MG/DL — SIGNIFICANT CHANGE UP (ref 1.6–2.6)
MCHC RBC-ENTMCNC: 29.8 PG — SIGNIFICANT CHANGE UP (ref 27–34)
MCHC RBC-ENTMCNC: 30.4 PG — SIGNIFICANT CHANGE UP (ref 27–34)
MCHC RBC-ENTMCNC: 30.5 PG — SIGNIFICANT CHANGE UP (ref 27–34)
MCHC RBC-ENTMCNC: 30.5 PG — SIGNIFICANT CHANGE UP (ref 27–34)
MCHC RBC-ENTMCNC: 33.5 GM/DL — SIGNIFICANT CHANGE UP (ref 32–36)
MCHC RBC-ENTMCNC: 34.5 GM/DL — SIGNIFICANT CHANGE UP (ref 32–36)
MCHC RBC-ENTMCNC: 34.9 GM/DL — SIGNIFICANT CHANGE UP (ref 32–36)
MCHC RBC-ENTMCNC: 36 GM/DL — SIGNIFICANT CHANGE UP (ref 32–36)
MCV RBC AUTO: 84.9 FL — SIGNIFICANT CHANGE UP (ref 80–100)
MCV RBC AUTO: 87.2 FL — SIGNIFICANT CHANGE UP (ref 80–100)
MCV RBC AUTO: 88.4 FL — SIGNIFICANT CHANGE UP (ref 80–100)
MCV RBC AUTO: 89.1 FL — SIGNIFICANT CHANGE UP (ref 80–100)
MONOCYTES # BLD AUTO: 0.67 K/UL — SIGNIFICANT CHANGE UP (ref 0–0.9)
MONOCYTES # BLD AUTO: 0.7 K/UL — SIGNIFICANT CHANGE UP (ref 0–0.9)
MONOCYTES # BLD AUTO: 0.73 K/UL — SIGNIFICANT CHANGE UP (ref 0–0.9)
MONOCYTES # BLD AUTO: 0.91 K/UL — HIGH (ref 0–0.9)
MONOCYTES NFR BLD AUTO: 5.8 % — SIGNIFICANT CHANGE UP (ref 2–14)
MONOCYTES NFR BLD AUTO: 6.4 % — SIGNIFICANT CHANGE UP (ref 2–14)
MONOCYTES NFR BLD AUTO: 7.1 % — SIGNIFICANT CHANGE UP (ref 2–14)
MONOCYTES NFR BLD AUTO: 8.2 % — SIGNIFICANT CHANGE UP (ref 2–14)
NEUTROPHILS # BLD AUTO: 10.9 K/UL — HIGH (ref 1.8–7.4)
NEUTROPHILS # BLD AUTO: 8.55 K/UL — HIGH (ref 1.8–7.4)
NEUTROPHILS # BLD AUTO: 9.29 K/UL — HIGH (ref 1.8–7.4)
NEUTROPHILS # BLD AUTO: 9.45 K/UL — HIGH (ref 1.8–7.4)
NEUTROPHILS NFR BLD AUTO: 83.6 % — HIGH (ref 43–77)
NEUTROPHILS NFR BLD AUTO: 84.8 % — HIGH (ref 43–77)
NEUTROPHILS NFR BLD AUTO: 88.7 % — HIGH (ref 43–77)
NEUTROPHILS NFR BLD AUTO: 89.6 % — HIGH (ref 43–77)
NRBC # BLD: 0 /100 WBCS — SIGNIFICANT CHANGE UP (ref 0–0)
PCO2 BLDV: 47 MMHG — SIGNIFICANT CHANGE UP (ref 42–55)
PH BLDV: 7.39 — SIGNIFICANT CHANGE UP (ref 7.32–7.43)
PHOSPHATE SERPL-MCNC: 3.1 MG/DL — SIGNIFICANT CHANGE UP (ref 2.5–4.5)
PHOSPHATE SERPL-MCNC: 3.6 MG/DL — SIGNIFICANT CHANGE UP (ref 2.5–4.5)
PHOSPHATE SERPL-MCNC: 4 MG/DL — SIGNIFICANT CHANGE UP (ref 2.5–4.5)
PHOSPHATE SERPL-MCNC: 4.1 MG/DL — SIGNIFICANT CHANGE UP (ref 2.5–4.5)
PLATELET # BLD AUTO: 131 K/UL — LOW (ref 150–400)
PLATELET # BLD AUTO: 142 K/UL — LOW (ref 150–400)
PLATELET # BLD AUTO: 143 K/UL — LOW (ref 150–400)
PLATELET # BLD AUTO: 149 K/UL — LOW (ref 150–400)
PO2 BLDV: 35 MMHG — SIGNIFICANT CHANGE UP (ref 25–45)
POTASSIUM SERPL-MCNC: 3.6 MMOL/L — SIGNIFICANT CHANGE UP (ref 3.5–5.3)
POTASSIUM SERPL-MCNC: 3.8 MMOL/L — SIGNIFICANT CHANGE UP (ref 3.5–5.3)
POTASSIUM SERPL-MCNC: 3.8 MMOL/L — SIGNIFICANT CHANGE UP (ref 3.5–5.3)
POTASSIUM SERPL-MCNC: 4.1 MMOL/L — SIGNIFICANT CHANGE UP (ref 3.5–5.3)
POTASSIUM SERPL-SCNC: 3.6 MMOL/L — SIGNIFICANT CHANGE UP (ref 3.5–5.3)
POTASSIUM SERPL-SCNC: 3.8 MMOL/L — SIGNIFICANT CHANGE UP (ref 3.5–5.3)
POTASSIUM SERPL-SCNC: 3.8 MMOL/L — SIGNIFICANT CHANGE UP (ref 3.5–5.3)
POTASSIUM SERPL-SCNC: 4.1 MMOL/L — SIGNIFICANT CHANGE UP (ref 3.5–5.3)
PROT SERPL-MCNC: 4.5 G/DL — LOW (ref 6–8.3)
PROT SERPL-MCNC: 4.7 G/DL — LOW (ref 6–8.3)
PROT SERPL-MCNC: 5 G/DL — LOW (ref 6–8.3)
PROT SERPL-MCNC: 5.3 G/DL — LOW (ref 6–8.3)
PROTHROM AB SERPL-ACNC: 13.4 SEC — HIGH (ref 9.5–13)
PROTHROM AB SERPL-ACNC: 14 SEC — HIGH (ref 9.5–13)
PROTHROM AB SERPL-ACNC: 14.3 SEC — HIGH (ref 9.5–13)
PROTHROM AB SERPL-ACNC: 15 SEC — HIGH (ref 9.5–13)
RBC # BLD: 2.75 M/UL — LOW (ref 4.2–5.8)
RBC # BLD: 2.89 M/UL — LOW (ref 4.2–5.8)
RBC # BLD: 2.98 M/UL — LOW (ref 4.2–5.8)
RBC # BLD: 3.02 M/UL — LOW (ref 4.2–5.8)
RBC # FLD: 15 % — HIGH (ref 10.3–14.5)
RBC # FLD: 15 % — HIGH (ref 10.3–14.5)
RBC # FLD: 15.3 % — HIGH (ref 10.3–14.5)
RBC # FLD: 15.3 % — HIGH (ref 10.3–14.5)
S PNEUM AG UR QL: NEGATIVE — SIGNIFICANT CHANGE UP
SAO2 % BLDV: 63.8 % — LOW (ref 67–88)
SODIUM SERPL-SCNC: 137 MMOL/L — SIGNIFICANT CHANGE UP (ref 135–145)
SODIUM SERPL-SCNC: 137 MMOL/L — SIGNIFICANT CHANGE UP (ref 135–145)
SODIUM SERPL-SCNC: 139 MMOL/L — SIGNIFICANT CHANGE UP (ref 135–145)
SODIUM SERPL-SCNC: 142 MMOL/L — SIGNIFICANT CHANGE UP (ref 135–145)
WBC # BLD: 10.23 K/UL — SIGNIFICANT CHANGE UP (ref 3.8–10.5)
WBC # BLD: 10.47 K/UL — SIGNIFICANT CHANGE UP (ref 3.8–10.5)
WBC # BLD: 11.14 K/UL — HIGH (ref 3.8–10.5)
WBC # BLD: 12.15 K/UL — HIGH (ref 3.8–10.5)
WBC # FLD AUTO: 10.23 K/UL — SIGNIFICANT CHANGE UP (ref 3.8–10.5)
WBC # FLD AUTO: 10.47 K/UL — SIGNIFICANT CHANGE UP (ref 3.8–10.5)
WBC # FLD AUTO: 11.14 K/UL — HIGH (ref 3.8–10.5)
WBC # FLD AUTO: 12.15 K/UL — HIGH (ref 3.8–10.5)

## 2024-04-19 PROCEDURE — 71045 X-RAY EXAM CHEST 1 VIEW: CPT | Mod: 26

## 2024-04-19 PROCEDURE — 99291 CRITICAL CARE FIRST HOUR: CPT

## 2024-04-19 PROCEDURE — 99292 CRITICAL CARE ADDL 30 MIN: CPT

## 2024-04-19 PROCEDURE — 99232 SBSQ HOSP IP/OBS MODERATE 35: CPT

## 2024-04-19 RX ORDER — MIDODRINE HYDROCHLORIDE 2.5 MG/1
10 TABLET ORAL EVERY 8 HOURS
Refills: 0 | Status: DISCONTINUED | OUTPATIENT
Start: 2024-04-19 | End: 2024-04-19

## 2024-04-19 RX ORDER — INSULIN LISPRO 100/ML
VIAL (ML) SUBCUTANEOUS
Refills: 0 | Status: DISCONTINUED | OUTPATIENT
Start: 2024-04-19 | End: 2024-04-21

## 2024-04-19 RX ORDER — POTASSIUM CHLORIDE 20 MEQ
20 PACKET (EA) ORAL
Refills: 0 | Status: COMPLETED | OUTPATIENT
Start: 2024-04-19 | End: 2024-04-19

## 2024-04-19 RX ORDER — DEXTROSE 50 % IN WATER 50 %
25 SYRINGE (ML) INTRAVENOUS ONCE
Refills: 0 | Status: DISCONTINUED | OUTPATIENT
Start: 2024-04-19 | End: 2024-04-21

## 2024-04-19 RX ORDER — BUMETANIDE 0.25 MG/ML
2 INJECTION INTRAMUSCULAR; INTRAVENOUS EVERY 8 HOURS
Refills: 0 | Status: COMPLETED | OUTPATIENT
Start: 2024-04-19 | End: 2024-04-20

## 2024-04-19 RX ORDER — ATORVASTATIN CALCIUM 80 MG/1
40 TABLET, FILM COATED ORAL AT BEDTIME
Refills: 0 | Status: DISCONTINUED | OUTPATIENT
Start: 2024-04-19 | End: 2024-04-25

## 2024-04-19 RX ORDER — SODIUM CHLORIDE 9 MG/ML
1000 INJECTION, SOLUTION INTRAVENOUS
Refills: 0 | Status: DISCONTINUED | OUTPATIENT
Start: 2024-04-19 | End: 2024-04-21

## 2024-04-19 RX ORDER — SENNA PLUS 8.6 MG/1
2 TABLET ORAL AT BEDTIME
Refills: 0 | Status: DISCONTINUED | OUTPATIENT
Start: 2024-04-19 | End: 2024-04-25

## 2024-04-19 RX ORDER — DEXTROSE 50 % IN WATER 50 %
15 SYRINGE (ML) INTRAVENOUS ONCE
Refills: 0 | Status: DISCONTINUED | OUTPATIENT
Start: 2024-04-19 | End: 2024-04-21

## 2024-04-19 RX ORDER — ACETAMINOPHEN 500 MG
975 TABLET ORAL EVERY 6 HOURS
Refills: 0 | Status: DISCONTINUED | OUTPATIENT
Start: 2024-04-19 | End: 2024-04-25

## 2024-04-19 RX ORDER — POLYETHYLENE GLYCOL 3350 17 G/17G
17 POWDER, FOR SOLUTION ORAL DAILY
Refills: 0 | Status: DISCONTINUED | OUTPATIENT
Start: 2024-04-19 | End: 2024-04-25

## 2024-04-19 RX ORDER — CALCIUM GLUCONATE 100 MG/ML
2 VIAL (ML) INTRAVENOUS ONCE
Refills: 0 | Status: COMPLETED | OUTPATIENT
Start: 2024-04-19 | End: 2024-04-19

## 2024-04-19 RX ORDER — BUMETANIDE 0.25 MG/ML
2 INJECTION INTRAMUSCULAR; INTRAVENOUS ONCE
Refills: 0 | Status: COMPLETED | OUTPATIENT
Start: 2024-04-19 | End: 2024-04-19

## 2024-04-19 RX ORDER — POTASSIUM CHLORIDE 20 MEQ
20 PACKET (EA) ORAL ONCE
Refills: 0 | Status: DISCONTINUED | OUTPATIENT
Start: 2024-04-19 | End: 2024-04-19

## 2024-04-19 RX ORDER — GLUCAGON INJECTION, SOLUTION 0.5 MG/.1ML
1 INJECTION, SOLUTION SUBCUTANEOUS ONCE
Refills: 0 | Status: DISCONTINUED | OUTPATIENT
Start: 2024-04-19 | End: 2024-04-21

## 2024-04-19 RX ORDER — GABAPENTIN 400 MG/1
100 CAPSULE ORAL EVERY 8 HOURS
Refills: 0 | Status: COMPLETED | OUTPATIENT
Start: 2024-04-19 | End: 2024-04-24

## 2024-04-19 RX ORDER — ASCORBIC ACID 60 MG
500 TABLET,CHEWABLE ORAL
Refills: 0 | Status: COMPLETED | OUTPATIENT
Start: 2024-04-19 | End: 2024-04-22

## 2024-04-19 RX ORDER — MODAFINIL 200 MG/1
100 TABLET ORAL DAILY
Refills: 0 | Status: DISCONTINUED | OUTPATIENT
Start: 2024-04-19 | End: 2024-04-19

## 2024-04-19 RX ORDER — PANTOPRAZOLE SODIUM 20 MG/1
40 TABLET, DELAYED RELEASE ORAL
Refills: 0 | Status: DISCONTINUED | OUTPATIENT
Start: 2024-04-19 | End: 2024-04-25

## 2024-04-19 RX ORDER — DEXTROSE 50 % IN WATER 50 %
12.5 SYRINGE (ML) INTRAVENOUS ONCE
Refills: 0 | Status: DISCONTINUED | OUTPATIENT
Start: 2024-04-19 | End: 2024-04-21

## 2024-04-19 RX ORDER — CALCIUM GLUCONATE 100 MG/ML
2 VIAL (ML) INTRAVENOUS ONCE
Refills: 0 | Status: DISCONTINUED | OUTPATIENT
Start: 2024-04-19 | End: 2024-04-19

## 2024-04-19 RX ORDER — POTASSIUM CHLORIDE 20 MEQ
40 PACKET (EA) ORAL ONCE
Refills: 0 | Status: COMPLETED | OUTPATIENT
Start: 2024-04-19 | End: 2024-04-19

## 2024-04-19 RX ORDER — OXYCODONE HYDROCHLORIDE 5 MG/1
5 TABLET ORAL EVERY 6 HOURS
Refills: 0 | Status: DISCONTINUED | OUTPATIENT
Start: 2024-04-19 | End: 2024-04-25

## 2024-04-19 RX ORDER — DEXTROSE 10 % IN WATER 10 %
125 INTRAVENOUS SOLUTION INTRAVENOUS ONCE
Refills: 0 | Status: DISCONTINUED | OUTPATIENT
Start: 2024-04-19 | End: 2024-04-21

## 2024-04-19 RX ADMIN — SENNA PLUS 2 TABLET(S): 8.6 TABLET ORAL at 21:31

## 2024-04-19 RX ADMIN — Medication 975 MILLIGRAM(S): at 22:01

## 2024-04-19 RX ADMIN — Medication 100 MILLIEQUIVALENT(S): at 00:00

## 2024-04-19 RX ADMIN — BUMETANIDE 2 MILLIGRAM(S): 0.25 INJECTION INTRAMUSCULAR; INTRAVENOUS at 17:57

## 2024-04-19 RX ADMIN — ATORVASTATIN CALCIUM 40 MILLIGRAM(S): 80 TABLET, FILM COATED ORAL at 21:31

## 2024-04-19 RX ADMIN — MIDODRINE HYDROCHLORIDE 10 MILLIGRAM(S): 2.5 TABLET ORAL at 05:09

## 2024-04-19 RX ADMIN — HEPARIN SODIUM 7500 UNIT(S): 5000 INJECTION INTRAVENOUS; SUBCUTANEOUS at 21:30

## 2024-04-19 RX ADMIN — Medication 200 GRAM(S): at 06:46

## 2024-04-19 RX ADMIN — Medication 975 MILLIGRAM(S): at 14:08

## 2024-04-19 RX ADMIN — Medication 100 MILLIEQUIVALENT(S): at 02:42

## 2024-04-19 RX ADMIN — MUPIROCIN 1 APPLICATION(S): 20 OINTMENT TOPICAL at 17:57

## 2024-04-19 RX ADMIN — GABAPENTIN 300 MILLIGRAM(S): 400 CAPSULE ORAL at 06:46

## 2024-04-19 RX ADMIN — Medication 0.25 MILLIGRAM(S): at 18:21

## 2024-04-19 RX ADMIN — Medication 975 MILLIGRAM(S): at 19:26

## 2024-04-19 RX ADMIN — Medication 200 GRAM(S): at 17:57

## 2024-04-19 RX ADMIN — Medication 975 MILLIGRAM(S): at 21:31

## 2024-04-19 RX ADMIN — POLYETHYLENE GLYCOL 3350 17 GRAM(S): 17 POWDER, FOR SOLUTION ORAL at 11:29

## 2024-04-19 RX ADMIN — Medication 81 MILLIGRAM(S): at 11:29

## 2024-04-19 RX ADMIN — Medication 500 MILLIGRAM(S): at 05:08

## 2024-04-19 RX ADMIN — Medication 500 MILLIGRAM(S): at 17:58

## 2024-04-19 RX ADMIN — Medication 2: at 11:29

## 2024-04-19 RX ADMIN — Medication 18.6 MICROGRAM(S)/KG/MIN: at 14:09

## 2024-04-19 RX ADMIN — HEPARIN SODIUM 7500 UNIT(S): 5000 INJECTION INTRAVENOUS; SUBCUTANEOUS at 05:08

## 2024-04-19 RX ADMIN — MUPIROCIN 1 APPLICATION(S): 20 OINTMENT TOPICAL at 05:08

## 2024-04-19 RX ADMIN — Medication 100 MILLIGRAM(S): at 05:08

## 2024-04-19 RX ADMIN — Medication 40 MILLIEQUIVALENT(S): at 19:26

## 2024-04-19 RX ADMIN — GABAPENTIN 100 MILLIGRAM(S): 400 CAPSULE ORAL at 14:09

## 2024-04-19 RX ADMIN — Medication 0.25 MILLIGRAM(S): at 05:09

## 2024-04-19 RX ADMIN — BUMETANIDE 2 MILLIGRAM(S): 0.25 INJECTION INTRAMUSCULAR; INTRAVENOUS at 12:16

## 2024-04-19 RX ADMIN — Medication 40 MILLIEQUIVALENT(S): at 14:09

## 2024-04-19 RX ADMIN — HEPARIN SODIUM 7500 UNIT(S): 5000 INJECTION INTRAVENOUS; SUBCUTANEOUS at 14:09

## 2024-04-19 RX ADMIN — AZITHROMYCIN 255 MILLIGRAM(S): 500 TABLET, FILM COATED ORAL at 02:45

## 2024-04-19 RX ADMIN — GABAPENTIN 100 MILLIGRAM(S): 400 CAPSULE ORAL at 21:31

## 2024-04-19 RX ADMIN — CEFTRIAXONE 100 MILLIGRAM(S): 500 INJECTION, POWDER, FOR SOLUTION INTRAMUSCULAR; INTRAVENOUS at 21:31

## 2024-04-19 NOTE — DIETITIAN INITIAL EVALUATION ADULT - NSFNSGIIOFT_GEN_A_CORE
04-18-24 @ 07:01  -  04-19-24 @ 07:00  --------------------------------------------------------  OUT:    Chest Tube (mL): 120 mL  Total OUT: 120 mL    Total NET: -120 mL      04-19-24 @ 07:01  -  04-19-24 @ 12:50  --------------------------------------------------------  OUT:    Chest Tube (mL): 30 mL  Total OUT: 30 mL    Total NET: -30 mL

## 2024-04-19 NOTE — PROGRESS NOTE ADULT - SUBJECTIVE AND OBJECTIVE BOX
CTICU  CRITICAL  CARE  attending     Hand off received 					   Pertinent clinical, laboratory, radiographic, hemodynamic, echocardiographic, respiratory data, microbiologic data and chart were reviewed and analyzed frequently throughout the course of the day  Patient seen and examined with CTS/ SH attending at bedside  Pt is a 78yr old male with PMH HFrEF (35%), aflutter on eliquis, PE/DVT sp IVC filter placement sp retrieval , HTN, HLD, ADAM on CPAP, CKD4, admitted for surgical mgmt of severe MR. 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 Pate 24. Received 1 pRBC intraop. Arrived intubated on levo, epi, primacor. Increased CT output, given 1L albumin, 3 pRBC, FFP, cryo, plt,  started and ultimately returned to OR for bleeding. 200cc clot/blood evacuated from mediastinum. Returned on levo, epi, vaso, primacor. : Off primacor,  at 5. Bumex gtt started. Bronched. Ceftriaxone and azithromycin started. Extubated to BIPAP. Dobhoff placed. Midodrine and hydrocortisone started for CIRCI. Overnight bumex transitioned to intermittent pushes.     FAMILY HISTORY:  No pertinent family history in first degree relatives  PAST MEDICAL & SURGICAL HISTORY:  HTN (hypertension)  Pericarditis  Obesity, unspecified classification, unspecified obesity type, unspecified whether serious comorbidity present  ADAM (obstructive sleep apnea)  Chronic deep vein thrombosis (DVT) of non-extremity vein  Other chronic pulmonary embolism without acute cor pulmonale  Essential hypertension  Gout  Aortic regurgitation  History of aortic insufficiency  History of hip replacement, unspecified laterality  Bilateral cataracts        Patient is a 78y old  Male who presents with a chief complaint of     14 system review was unremarkable    Vital signs, hemodynamic and respiratory parameters were reviewed from the bedside nursing flowsheet.  ICU Vital Signs Last 24 Hrs  T(C): 36.5 (2024 04:46), Max: 36.7 (2024 00:34)  T(F): 97.7 (2024 04:46), Max: 98.1 (2024 00:34)  HR: 86 (2024 07:00) (69 - 91)  BP: --  BP(mean): --  ABP: 111/39 (2024 07:00) (104/43 - 176/61)  ABP(mean): 63 (2024 07:00) (57 - 87)  RR: 18 (2024 07:00) (13 - 23)  SpO2: 99% (2024 07:00) (95% - 100%)    O2 Parameters below as of 2024 07:00  Patient On (Oxygen Delivery Method): nasal cannula, high flow  O2 Flow (L/min): 50  O2 Concentration (%): 50      Adult Advanced Hemodynamics Last 24 Hrs  CVP(mm Hg): -3 (2024 07:00) (-3 - 15)  CVP(cm H2O): --  CO: --  CI: --  PA: --  PA(mean): --  PCWP: --  SVR: --  SVRI: --  PVR: --  PVRI: --, ABG - ( 2024 03:44 )  pH, Arterial: 7.45  pH, Blood: x     /  pCO2: 32    /  pO2: 107   / HCO3: 22    / Base Excess: -1.4  /  SaO2: 99.0              Mode: AC/ CMV (Assist Control/ Continuous Mandatory Ventilation)  RR (machine): 700  TV (machine): 12  FiO2: 60  PEEP: 7  ITime: 2  MAP: 12  PIP: 19    Intake and output was reviewed and the fluid balance was calculated  Daily     Daily   I&O's Summary    2024 07:01  -  2024 07:00  --------------------------------------------------------  IN: 3014 mL / OUT: 5920 mL / NET: -2906 mL        All lines and drain sites were assessed  Glycemic trend was reviewedCAPILLARY BLOOD GLUCOSE      POCT Blood Glucose.: 140 mg/dL (2024 07:17)      Neuro: sitting in chair nad  HEENT: mmm  Heart: s1 s2  Lungs: decreased bl  Abdomen: soft, nt nd  Extremities: wwp    Lines:  RIJ TLC /17  L radial arterial line     Tubes:  Meds    labs  CBC Full  -  ( 2024 02:55 )  WBC Count : 10.47 K/uL  RBC Count : 2.98 M/uL  Hemoglobin : 9.1 g/dL  Hematocrit : 25.3 %  Platelet Count - Automated : 131 K/uL  Mean Cell Volume : 84.9 fl  Mean Cell Hemoglobin : 30.5 pg  Mean Cell Hemoglobin Concentration : 36.0 gm/dL  Auto Neutrophil # : 9.29 K/uL  Auto Lymphocyte # : 0.44 K/uL  Auto Monocyte # : 0.67 K/uL  Auto Eosinophil # : 0.01 K/uL  Auto Basophil # : 0.01 K/uL  Auto Neutrophil % : 88.7 %  Auto Lymphocyte % : 4.2 %  Auto Monocyte % : 6.4 %  Auto Eosinophil % : 0.1 %  Auto Basophil % : 0.1 %        142  |  106  |  28<H>  ----------------------------<  133<H>  4.1   |  24  |  1.66<H>    Ca    8.6      2024 02:55  Phos  4.0       Mg     2.2         TPro  4.5<L>  /  Alb  3.5  /  TBili  0.8  /  DBili  x   /  AST  65<H>  /  ALT  5<L>  /  AlkPhos  42  -19    PT/INR - ( 2024 02:55 )   PT: 15.0 sec;   INR: 1.33          PTT - ( 2024 02:55 )  PTT:31.6 sec  The current medications were reviewed   MEDICATIONS  (STANDING):  ascorbic acid 500 milliGRAM(s) Oral two times a day  aspirin  chewable 81 milliGRAM(s) Oral daily  atorvastatin 40 milliGRAM(s) Oral at bedtime  azithromycin  IVPB 500 milliGRAM(s) IV Intermittent every 24 hours  bisacodyl Suppository 10 milliGRAM(s) Rectal once  buDESOnide    Inhalation Suspension 0.25 milliGRAM(s) Inhalation every 12 hours  buMETAnide Injectable 2 milliGRAM(s) IV Push every 8 hours  cefTRIAXone   IVPB 1000 milliGRAM(s) IV Intermittent every 24 hours  chlorhexidine 2% Cloths 1 Application(s) Topical daily  dextrose 50% Injectable 50 milliLiter(s) IV Push every 15 minutes  dextrose 50% Injectable 25 milliLiter(s) IV Push every 15 minutes  DOBUTamine Infusion 5 MICROgram(s)/kG/Min (18.6 mL/Hr) IV Continuous <Continuous>  gabapentin Solution 300 milliGRAM(s) Oral three times a day  heparin   Injectable 7500 Unit(s) SubCutaneous every 8 hours  hydrocortisone sodium succinate Injectable 100 milliGRAM(s) IV Push every 8 hours  insulin regular Infusion 2 Unit(s)/Hr (2 mL/Hr) IV Continuous <Continuous>  midodrine 10 milliGRAM(s) Oral every 8 hours  modafinil 100 milliGRAM(s) Oral daily  mupirocin 2% Nasal 1 Application(s) Both Nostrils two times a day  pantoprazole  Injectable 40 milliGRAM(s) IV Push daily  polyethylene glycol 3350 17 Gram(s) Oral daily  senna 2 Tablet(s) Oral at bedtime  sodium chloride 0.9%. 1000 milliLiter(s) (10 mL/Hr) IV Continuous <Continuous>  testosterone 1% Gel 100 milliGRAM(s) Topical daily  vasopressin Infusion 0.05 Unit(s)/Min (7.5 mL/Hr) IV Continuous <Continuous>    MEDICATIONS  (PRN):      Assessment/Plan:  78yr old male with PMH HFrEF (35%), aflutter on eliquis, PE/DVT sp IVC filter placement sp retrieval , HTN, HLD, ADAM on CPAP, CKD4, admitted for surgical mgmt of severe MR.    POD2 CABG x 1 (GSV-RCA), Left atrial appendage occlusion, replacement of mitral valve with MAZE (31mm Mosaic, EF 40%, Pate, 24)  Acute hypoxic respiratory failure requiring HFNC/BIPAP-titrate to SPO2 >95%  Cardiogenic shock on dobutamine-wean as tolerated  Trend end organ perfusion markers  Vasogenic shock on vasopressin-titrate to MAP goal 65  Acute post operative anemia due to acute blood loss-trend H/H  Thrombocytopenia-monitor  CKD-renal following  On abx sp Bronch 3/18-follow cultures  Send urine legionella, urine strep  Diet as tolerated  Replete lytes prn  Monitor CT output  GI/DVT PPX  Bowel Regimen  Pain control  OOB with PT  Close hemodynamic, ventilatory and drain monitoring and management per post op routine  Monitor for arrhythmias and monitor parameters for organ perfusion  Beta blockade not administered due to hemodynamic instability and bradycardia  Monitor neurologic status  Head of the bed should remain elevated to 45 deg   Chest PT and IS will be encouraged  Monitor adequacy of oxygenation and ventilation and attempt to wean oxygen  Antibiotic regimen will be tailored to the clinical, laboratory and microbiologic data  Nutritional goals will be met using po eventually, ensure adequate caloric intake and monitor the same  Stress ulcer and VTE prophylaxis will be achieved    Glycemic control is satisfactory  Electrolytes have been repleted as necessary and wound care has been carried out   Pain control has been achieved.   Aggressive physical therapy and early mobility and ambulation goals will be met   The family was updated about the course and plan.    CRITICAL CARE TIME personally provided by me  in evaluation and management, reassessments, review and interpretation of labs and x-rays, ventilator and hemodynamic management, formulating a plan and coordinating care: ___140____ MIN.  Time does not include procedural time.         CTICU ATTENDING     					  Hannah Lieberman MD

## 2024-04-19 NOTE — PROGRESS NOTE ADULT - ASSESSMENT
78-year-old male with HTN, CKD was admitted for severe MR s/p CABG and placement of mitral valve 4/17 c/b postop cardiogenic shock.  Nephrology consulted for Cr 1.65 4/18 and drop in UO    # Nonoliguric TOBIAS on CKD3  BCr 1.2-1.4  No creatinine elevation as above, now improving, 1.55 today  UA 4/16 with proteinuria, no hematuria  Etiology likely prerenal/ischemic ATN i/s/o shock    Fluid balance last 24 hours: - 2.9 L (UO 5.5L)    Recommend:  Vol status improving. Appears euvolemic this morning. Maintain mild net neg fluid balance  Maintain SBP> 110 for adequate renal perfusion  Repeat UA, urine lytes, UPCR  Renal US  Strict I&Os  BMP per icu protocol

## 2024-04-19 NOTE — PHYSICAL THERAPY INITIAL EVALUATION ADULT - GENERAL OBSERVATIONS, REHAB EVAL
patient received seated out of bed with no acute distress. +EKG +giovanna +central line +daily to wall suction x2 +chest tube to wall suction +handley +temporary pacemaker +NC 6L/min

## 2024-04-19 NOTE — PROGRESS NOTE ADULT - SUBJECTIVE AND OBJECTIVE BOX
Patient is a 78y Male seen and evaluated at bedside. Sitting up in chair.  Appears comfortable.  Extubated.  Has ambulated with assistance.  Good response to IV diuresis.    Meds:    acetaminophen     Tablet .. 975 every 6 hours PRN  ascorbic acid 500 two times a day  aspirin  chewable 81 daily  atorvastatin 40 at bedtime  azithromycin  IVPB 500 every 24 hours  bisacodyl Suppository 10 once  buDESOnide    Inhalation Suspension 0.25 every 12 hours  buMETAnide Injectable 2 every 8 hours  cefTRIAXone   IVPB 1000 every 24 hours  chlorhexidine 2% Cloths 1 daily  dextrose 10% Bolus 125 once  dextrose 5%. 1000 <Continuous>  dextrose 5%. 1000 <Continuous>  dextrose 50% Injectable 25 once  dextrose 50% Injectable 12.5 once  dextrose Oral Gel 15 once PRN  DOBUTamine Infusion 5 <Continuous>  gabapentin 100 every 8 hours  glucagon  Injectable 1 once  heparin   Injectable 7500 every 8 hours  insulin lispro (ADMELOG) corrective regimen sliding scale  Before meals and at bedtime  mupirocin 2% Nasal 1 two times a day  oxyCODONE    IR 5 every 6 hours PRN  pantoprazole    Tablet 40 before breakfast  polyethylene glycol 3350 17 daily  potassium chloride    Tablet ER 40 once  senna 2 at bedtime  sodium chloride 0.9%. 1000 <Continuous>  vasopressin Infusion 0.05 <Continuous>      T(C): , Max: 36.7 (04-19-24 @ 00:34)  T(F): , Max: 98.1 (04-19-24 @ 00:34)  HR: 85 (04-19-24 @ 13:00)  BP: 162/69 (04-19-24 @ 08:30)  BP(mean): 99 (04-19-24 @ 08:30)  RR: 18 (04-19-24 @ 13:00)  SpO2: 100% (04-19-24 @ 13:00)  Wt(kg): --    04-18 @ 07:01  -  04-19 @ 07:00  --------------------------------------------------------  IN: 3014 mL / OUT: 5920 mL / NET: -2906 mL    04-19 @ 07:01  -  04-19 @ 13:49  --------------------------------------------------------  IN: 371.2 mL / OUT: 670 mL / NET: -298.8 mL          Review of Systems:  ROS negative except as per HPI      PHYSICAL EXAM:  GENERAL: Alert, awake, NAD  CHEST/LUNG: Bilateral clear breath sounds, chest drain  HEART: S1, S2  ABDOMEN: Soft, nontender  : Siegel in place  EXTREMITIES: Trace LE edema, 1+ upper extremity edema  Neurology: AAOx3, no focal neurological deficit  ACCESS:  None      LABS:                        9.2    12.15 )-----------( 142      ( 19 Apr 2024 10:13 )             26.7     04-19    137  |  101  |  30<H>  ----------------------------<  151<H>  3.8   |  25  |  1.55<H>    Ca    8.7      19 Apr 2024 10:13  Phos  4.1     04-19  Mg     2.1     04-19    TPro  5.3<L>  /  Alb  3.6  /  TBili  0.9  /  DBili  x   /  AST  52<H>  /  ALT  <5<L>  /  AlkPhos  46  04-19      PT/INR - ( 19 Apr 2024 10:13 )   PT: 14.3 sec;   INR: 1.26          PTT - ( 19 Apr 2024 10:13 )  PTT:30.2 sec  Urinalysis Basic - ( 19 Apr 2024 10:13 )    Color: x / Appearance: x / SG: x / pH: x  Gluc: 151 mg/dL / Ketone: x  / Bili: x / Urobili: x   Blood: x / Protein: x / Nitrite: x   Leuk Esterase: x / RBC: x / WBC x   Sq Epi: x / Non Sq Epi: x / Bacteria: x            RADIOLOGY & ADDITIONAL STUDIES:

## 2024-04-19 NOTE — DIETITIAN INITIAL EVALUATION ADULT - PERTINENT MEDS FT
MEDICATIONS  (STANDING):  ascorbic acid 500 milliGRAM(s) Oral two times a day  aspirin  chewable 81 milliGRAM(s) Oral daily  atorvastatin 40 milliGRAM(s) Oral at bedtime  azithromycin  IVPB 500 milliGRAM(s) IV Intermittent every 24 hours  bisacodyl Suppository 10 milliGRAM(s) Rectal once  buDESOnide    Inhalation Suspension 0.25 milliGRAM(s) Inhalation every 12 hours  buMETAnide Injectable 2 milliGRAM(s) IV Push every 8 hours  cefTRIAXone   IVPB 1000 milliGRAM(s) IV Intermittent every 24 hours  chlorhexidine 2% Cloths 1 Application(s) Topical daily  dextrose 10% Bolus 125 milliLiter(s) IV Bolus once  dextrose 5%. 1000 milliLiter(s) (100 mL/Hr) IV Continuous <Continuous>  dextrose 5%. 1000 milliLiter(s) (50 mL/Hr) IV Continuous <Continuous>  dextrose 50% Injectable 25 Gram(s) IV Push once  dextrose 50% Injectable 12.5 Gram(s) IV Push once  DOBUTamine Infusion 5 MICROgram(s)/kG/Min (18.6 mL/Hr) IV Continuous <Continuous>  gabapentin 100 milliGRAM(s) Oral every 8 hours  glucagon  Injectable 1 milliGRAM(s) IntraMuscular once  heparin   Injectable 7500 Unit(s) SubCutaneous every 8 hours  insulin lispro (ADMELOG) corrective regimen sliding scale   SubCutaneous Before meals and at bedtime  mupirocin 2% Nasal 1 Application(s) Both Nostrils two times a day  pantoprazole    Tablet 40 milliGRAM(s) Oral before breakfast  polyethylene glycol 3350 17 Gram(s) Oral daily  potassium chloride    Tablet ER 40 milliEquivalent(s) Oral once  senna 2 Tablet(s) Oral at bedtime  sodium chloride 0.9%. 1000 milliLiter(s) (10 mL/Hr) IV Continuous <Continuous>  vasopressin Infusion 0.05 Unit(s)/Min (7.5 mL/Hr) IV Continuous <Continuous>    MEDICATIONS  (PRN):  acetaminophen     Tablet .. 975 milliGRAM(s) Oral every 6 hours PRN Temp greater or equal to 38C (100.4F), Mild Pain (1 - 3)  dextrose Oral Gel 15 Gram(s) Oral once PRN Blood Glucose LESS THAN 70 milliGRAM(s)/deciliter  oxyCODONE    IR 5 milliGRAM(s) Oral every 6 hours PRN Moderate Pain (4 - 6)

## 2024-04-19 NOTE — DIETITIAN INITIAL EVALUATION ADULT - OTHER CALCULATIONS
Based on Standards of Care pt above % IBW (185#, 148%) thus ideal body weight used for all calculations (184#). Needs adjusted for advanced age and post-op healing.  Based on Standards of Care pt above % IBW (184#, 148%) thus ideal body weight used for all calculations (184#). Needs adjusted for advanced age and post-op healing.

## 2024-04-19 NOTE — PHYSICAL THERAPY INITIAL EVALUATION ADULT - PERTINENT HX OF CURRENT PROBLEM, REHAB EVAL
78 year old male presented to outpatient structural cardiologist Dr. Pate for consultation of severe MR. He presents to [facility] for LHC via radial acces prior to surgery 4/17/24.

## 2024-04-19 NOTE — DIETITIAN INITIAL EVALUATION ADULT - OTHER INFO
78yr old male with PMH HFrEF (35%), aflutter on eliquis, PE/DVT sp IVC filter placement sp retrieval 2015, HTN, HLD, ADAM on CPAP, CKD4, admitted for surgical mgmt of severe MR. 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 Pate 4/17/24.    Patient seen at bedside. OOB in chair, on HFNC. MAP 70, /47, dobutamine gtt. Labs and medications reviewed. Lactate WDL, BUN 30<H>, Cr 1.55<H>, AST 52<H>, ALT<5<L>, glucose  x 24 hours, HgbA1c 4.8%. Ordered for abx, NS, insulin sliding scale, vitamin C, protonix, potassium chloride. Current diet order: DASH/TLC. Confirms NKFA. No difficulty chewing/swallowing reported. Reports fair appetite, however did not yet receive breakfast try at time of assessment. PTA, pt endorses good appetite and intake at baseline. Typically consumes ~3 meals/day. Dosing weight: 272#, BMI 36, reports UBW of 273# however endorses weight fluctuations due to fluid shifts. Will continue to monitor weight trends in-house. No pressure injuries documented, +1 bilateral foot/leg edema, surgical incision to MSI and R leg. Endorses constipation, last BM PTA- ordered for bowel regimen. Observed with no overt signs and symptoms of muscle or fat wasting. Based on ASPEN guidelines, pt does not meet criteria for malnutrition. Nutrition education provided. Discussed importance of adequate PO intake with emphasis on protein for post-op healing. Pt expressed understanding. See nutrition recommendations below.

## 2024-04-19 NOTE — PHYSICAL THERAPY INITIAL EVALUATION ADULT - ADDITIONAL COMMENTS
patient amb without an assistive device. His wife has Parkinson's dz and has a home health aid 3 days per week. Reported his son will stay with him upon DC to assist as needed. He may also hire some assist/ home health aid. Patient owns a dental practice.

## 2024-04-19 NOTE — PHYSICAL THERAPY INITIAL EVALUATION ADULT - DID THE PATIENT HAVE SURGERY?
4/18- Re-exploration, mediastinum, sternotomy approach, re-exploration for bleeding; 4/17-CABG, Occlusion, left atrial appendage, Replacement of mitral valve with maze procedure and cardiopulmonary bypass/yes

## 2024-04-19 NOTE — PHYSICAL THERAPY INITIAL EVALUATION ADULT - GAIT DEVIATIONS NOTED, PT EVAL
fairly steady, no loss of balance however with forward flexed and forward head posture, reported feeling fatigued, 1 seated rest break, >5 min seated rest, verbal cues for pacing, +SAMUEL, RPE 7/10

## 2024-04-19 NOTE — DIETITIAN INITIAL EVALUATION ADULT - ADD RECOMMEND
1. Continue current diet order  2. Encourage and monitor PO intake, honor preferences as able   3. Monitor labs, wt trends, GI function, and skin integrity   4. Pain and bowel regimen per team   5. RD to remain available for additional nutrition interventions and diet edu as needed

## 2024-04-20 LAB
ALBUMIN SERPL ELPH-MCNC: 3.1 G/DL — LOW (ref 3.3–5)
ALBUMIN SERPL ELPH-MCNC: 3.3 G/DL — SIGNIFICANT CHANGE UP (ref 3.3–5)
ALBUMIN SERPL ELPH-MCNC: 3.4 G/DL — SIGNIFICANT CHANGE UP (ref 3.3–5)
ALP SERPL-CCNC: 51 U/L — SIGNIFICANT CHANGE UP (ref 40–120)
ALP SERPL-CCNC: 53 U/L — SIGNIFICANT CHANGE UP (ref 40–120)
ALP SERPL-CCNC: 56 U/L — SIGNIFICANT CHANGE UP (ref 40–120)
ALT FLD-CCNC: 5 U/L — LOW (ref 10–45)
ALT FLD-CCNC: <5 U/L — LOW (ref 10–45)
ALT FLD-CCNC: <5 U/L — LOW (ref 10–45)
ANION GAP SERPL CALC-SCNC: 7 MMOL/L — SIGNIFICANT CHANGE UP (ref 5–17)
ANION GAP SERPL CALC-SCNC: 8 MMOL/L — SIGNIFICANT CHANGE UP (ref 5–17)
ANION GAP SERPL CALC-SCNC: 8 MMOL/L — SIGNIFICANT CHANGE UP (ref 5–17)
APTT BLD: 28.2 SEC — SIGNIFICANT CHANGE UP (ref 24.5–35.6)
APTT BLD: 29.2 SEC — SIGNIFICANT CHANGE UP (ref 24.5–35.6)
APTT BLD: 32 SEC — SIGNIFICANT CHANGE UP (ref 24.5–35.6)
AST SERPL-CCNC: 24 U/L — SIGNIFICANT CHANGE UP (ref 10–40)
AST SERPL-CCNC: 28 U/L — SIGNIFICANT CHANGE UP (ref 10–40)
AST SERPL-CCNC: 29 U/L — SIGNIFICANT CHANGE UP (ref 10–40)
BASE EXCESS BLDV CALC-SCNC: 3.8 MMOL/L — HIGH (ref -2–3)
BASE EXCESS BLDV CALC-SCNC: 3.8 MMOL/L — HIGH (ref -2–3)
BASOPHILS # BLD AUTO: 0.01 K/UL — SIGNIFICANT CHANGE UP (ref 0–0.2)
BASOPHILS # BLD AUTO: 0.02 K/UL — SIGNIFICANT CHANGE UP (ref 0–0.2)
BASOPHILS # BLD AUTO: 0.02 K/UL — SIGNIFICANT CHANGE UP (ref 0–0.2)
BASOPHILS NFR BLD AUTO: 0.1 % — SIGNIFICANT CHANGE UP (ref 0–2)
BASOPHILS NFR BLD AUTO: 0.2 % — SIGNIFICANT CHANGE UP (ref 0–2)
BASOPHILS NFR BLD AUTO: 0.2 % — SIGNIFICANT CHANGE UP (ref 0–2)
BILIRUB SERPL-MCNC: 0.7 MG/DL — SIGNIFICANT CHANGE UP (ref 0.2–1.2)
BILIRUB SERPL-MCNC: 0.7 MG/DL — SIGNIFICANT CHANGE UP (ref 0.2–1.2)
BILIRUB SERPL-MCNC: 0.8 MG/DL — SIGNIFICANT CHANGE UP (ref 0.2–1.2)
BLD GP AB SCN SERPL QL: NEGATIVE — SIGNIFICANT CHANGE UP
BUN SERPL-MCNC: 31 MG/DL — HIGH (ref 7–23)
BUN SERPL-MCNC: 31 MG/DL — HIGH (ref 7–23)
BUN SERPL-MCNC: 32 MG/DL — HIGH (ref 7–23)
CALCIUM SERPL-MCNC: 8.3 MG/DL — LOW (ref 8.4–10.5)
CALCIUM SERPL-MCNC: 8.3 MG/DL — LOW (ref 8.4–10.5)
CALCIUM SERPL-MCNC: 8.5 MG/DL — SIGNIFICANT CHANGE UP (ref 8.4–10.5)
CHLORIDE SERPL-SCNC: 101 MMOL/L — SIGNIFICANT CHANGE UP (ref 96–108)
CHLORIDE SERPL-SCNC: 102 MMOL/L — SIGNIFICANT CHANGE UP (ref 96–108)
CHLORIDE SERPL-SCNC: 102 MMOL/L — SIGNIFICANT CHANGE UP (ref 96–108)
CO2 BLDV-SCNC: 30.6 MMOL/L — HIGH (ref 22–26)
CO2 BLDV-SCNC: 30.6 MMOL/L — HIGH (ref 22–26)
CO2 SERPL-SCNC: 26 MMOL/L — SIGNIFICANT CHANGE UP (ref 22–31)
CO2 SERPL-SCNC: 27 MMOL/L — SIGNIFICANT CHANGE UP (ref 22–31)
CO2 SERPL-SCNC: 27 MMOL/L — SIGNIFICANT CHANGE UP (ref 22–31)
CREAT SERPL-MCNC: 1.45 MG/DL — HIGH (ref 0.5–1.3)
CREAT SERPL-MCNC: 1.48 MG/DL — HIGH (ref 0.5–1.3)
CREAT SERPL-MCNC: 1.5 MG/DL — HIGH (ref 0.5–1.3)
CULTURE RESULTS: ABNORMAL
EGFR: 47 ML/MIN/1.73M2 — LOW
EGFR: 48 ML/MIN/1.73M2 — LOW
EGFR: 49 ML/MIN/1.73M2 — LOW
EOSINOPHIL # BLD AUTO: 0.08 K/UL — SIGNIFICANT CHANGE UP (ref 0–0.5)
EOSINOPHIL # BLD AUTO: 0.1 K/UL — SIGNIFICANT CHANGE UP (ref 0–0.5)
EOSINOPHIL # BLD AUTO: 0.15 K/UL — SIGNIFICANT CHANGE UP (ref 0–0.5)
EOSINOPHIL NFR BLD AUTO: 0.8 % — SIGNIFICANT CHANGE UP (ref 0–6)
EOSINOPHIL NFR BLD AUTO: 1 % — SIGNIFICANT CHANGE UP (ref 0–6)
EOSINOPHIL NFR BLD AUTO: 1.5 % — SIGNIFICANT CHANGE UP (ref 0–6)
GAS PNL BLDA: SIGNIFICANT CHANGE UP
GAS PNL BLDV: SIGNIFICANT CHANGE UP
GLUCOSE BLDC GLUCOMTR-MCNC: 109 MG/DL — HIGH (ref 70–99)
GLUCOSE BLDC GLUCOMTR-MCNC: 111 MG/DL — HIGH (ref 70–99)
GLUCOSE BLDC GLUCOMTR-MCNC: 129 MG/DL — HIGH (ref 70–99)
GLUCOSE BLDC GLUCOMTR-MCNC: 138 MG/DL — HIGH (ref 70–99)
GLUCOSE SERPL-MCNC: 118 MG/DL — HIGH (ref 70–99)
GLUCOSE SERPL-MCNC: 150 MG/DL — HIGH (ref 70–99)
GLUCOSE SERPL-MCNC: 164 MG/DL — HIGH (ref 70–99)
HCO3 BLDV-SCNC: 29 MMOL/L — SIGNIFICANT CHANGE UP (ref 22–29)
HCO3 BLDV-SCNC: 29 MMOL/L — SIGNIFICANT CHANGE UP (ref 22–29)
HCT VFR BLD CALC: 24.4 % — LOW (ref 39–50)
HCT VFR BLD CALC: 24.6 % — LOW (ref 39–50)
HCT VFR BLD CALC: 26.3 % — LOW (ref 39–50)
HGB BLD-MCNC: 8.1 G/DL — LOW (ref 13–17)
HGB BLD-MCNC: 8.1 G/DL — LOW (ref 13–17)
HGB BLD-MCNC: 8.9 G/DL — LOW (ref 13–17)
IMM GRANULOCYTES NFR BLD AUTO: 0.7 % — SIGNIFICANT CHANGE UP (ref 0–0.9)
IMM GRANULOCYTES NFR BLD AUTO: 0.8 % — SIGNIFICANT CHANGE UP (ref 0–0.9)
IMM GRANULOCYTES NFR BLD AUTO: 1.1 % — HIGH (ref 0–0.9)
INR BLD: 1.08 — SIGNIFICANT CHANGE UP (ref 0.85–1.18)
INR BLD: 1.17 — SIGNIFICANT CHANGE UP (ref 0.85–1.18)
INR BLD: 1.19 — HIGH (ref 0.85–1.18)
LACTATE SERPL-SCNC: 0.9 MMOL/L — SIGNIFICANT CHANGE UP (ref 0.5–2)
LACTATE SERPL-SCNC: 1.5 MMOL/L — SIGNIFICANT CHANGE UP (ref 0.5–2)
LACTATE SERPL-SCNC: 1.7 MMOL/L — SIGNIFICANT CHANGE UP (ref 0.5–2)
LYMPHOCYTES # BLD AUTO: 0.54 K/UL — LOW (ref 1–3.3)
LYMPHOCYTES # BLD AUTO: 0.62 K/UL — LOW (ref 1–3.3)
LYMPHOCYTES # BLD AUTO: 0.77 K/UL — LOW (ref 1–3.3)
LYMPHOCYTES # BLD AUTO: 5.3 % — LOW (ref 13–44)
LYMPHOCYTES # BLD AUTO: 6.4 % — LOW (ref 13–44)
LYMPHOCYTES # BLD AUTO: 7.7 % — LOW (ref 13–44)
MAGNESIUM SERPL-MCNC: 1.9 MG/DL — SIGNIFICANT CHANGE UP (ref 1.6–2.6)
MAGNESIUM SERPL-MCNC: 2 MG/DL — SIGNIFICANT CHANGE UP (ref 1.6–2.6)
MAGNESIUM SERPL-MCNC: 2 MG/DL — SIGNIFICANT CHANGE UP (ref 1.6–2.6)
MCHC RBC-ENTMCNC: 29.9 PG — SIGNIFICANT CHANGE UP (ref 27–34)
MCHC RBC-ENTMCNC: 30.3 PG — SIGNIFICANT CHANGE UP (ref 27–34)
MCHC RBC-ENTMCNC: 30.5 PG — SIGNIFICANT CHANGE UP (ref 27–34)
MCHC RBC-ENTMCNC: 32.9 GM/DL — SIGNIFICANT CHANGE UP (ref 32–36)
MCHC RBC-ENTMCNC: 33.2 GM/DL — SIGNIFICANT CHANGE UP (ref 32–36)
MCHC RBC-ENTMCNC: 33.8 GM/DL — SIGNIFICANT CHANGE UP (ref 32–36)
MCV RBC AUTO: 89.5 FL — SIGNIFICANT CHANGE UP (ref 80–100)
MCV RBC AUTO: 90.8 FL — SIGNIFICANT CHANGE UP (ref 80–100)
MCV RBC AUTO: 91.7 FL — SIGNIFICANT CHANGE UP (ref 80–100)
MONOCYTES # BLD AUTO: 0.45 K/UL — SIGNIFICANT CHANGE UP (ref 0–0.9)
MONOCYTES # BLD AUTO: 0.57 K/UL — SIGNIFICANT CHANGE UP (ref 0–0.9)
MONOCYTES # BLD AUTO: 0.65 K/UL — SIGNIFICANT CHANGE UP (ref 0–0.9)
MONOCYTES NFR BLD AUTO: 4.4 % — SIGNIFICANT CHANGE UP (ref 2–14)
MONOCYTES NFR BLD AUTO: 5.7 % — SIGNIFICANT CHANGE UP (ref 2–14)
MONOCYTES NFR BLD AUTO: 6.7 % — SIGNIFICANT CHANGE UP (ref 2–14)
NEUTROPHILS # BLD AUTO: 8.25 K/UL — HIGH (ref 1.8–7.4)
NEUTROPHILS # BLD AUTO: 8.32 K/UL — HIGH (ref 1.8–7.4)
NEUTROPHILS # BLD AUTO: 8.95 K/UL — HIGH (ref 1.8–7.4)
NEUTROPHILS NFR BLD AUTO: 83.8 % — HIGH (ref 43–77)
NEUTROPHILS NFR BLD AUTO: 85.1 % — HIGH (ref 43–77)
NEUTROPHILS NFR BLD AUTO: 88.5 % — HIGH (ref 43–77)
NRBC # BLD: 0 /100 WBCS — SIGNIFICANT CHANGE UP (ref 0–0)
PCO2 BLDV: 46 MMHG — SIGNIFICANT CHANGE UP (ref 42–55)
PCO2 BLDV: 46 MMHG — SIGNIFICANT CHANGE UP (ref 42–55)
PH BLDV: 7.41 — SIGNIFICANT CHANGE UP (ref 7.32–7.43)
PH BLDV: 7.41 — SIGNIFICANT CHANGE UP (ref 7.32–7.43)
PHOSPHATE SERPL-MCNC: 2.3 MG/DL — LOW (ref 2.5–4.5)
PHOSPHATE SERPL-MCNC: 2.7 MG/DL — SIGNIFICANT CHANGE UP (ref 2.5–4.5)
PHOSPHATE SERPL-MCNC: 3.2 MG/DL — SIGNIFICANT CHANGE UP (ref 2.5–4.5)
PLATELET # BLD AUTO: 142 K/UL — LOW (ref 150–400)
PLATELET # BLD AUTO: 144 K/UL — LOW (ref 150–400)
PLATELET # BLD AUTO: 162 K/UL — SIGNIFICANT CHANGE UP (ref 150–400)
PO2 BLDV: 36 MMHG — SIGNIFICANT CHANGE UP (ref 25–45)
PO2 BLDV: 36 MMHG — SIGNIFICANT CHANGE UP (ref 25–45)
POTASSIUM SERPL-MCNC: 3.7 MMOL/L — SIGNIFICANT CHANGE UP (ref 3.5–5.3)
POTASSIUM SERPL-MCNC: 3.8 MMOL/L — SIGNIFICANT CHANGE UP (ref 3.5–5.3)
POTASSIUM SERPL-MCNC: 4.2 MMOL/L — SIGNIFICANT CHANGE UP (ref 3.5–5.3)
POTASSIUM SERPL-SCNC: 3.7 MMOL/L — SIGNIFICANT CHANGE UP (ref 3.5–5.3)
POTASSIUM SERPL-SCNC: 3.8 MMOL/L — SIGNIFICANT CHANGE UP (ref 3.5–5.3)
POTASSIUM SERPL-SCNC: 4.2 MMOL/L — SIGNIFICANT CHANGE UP (ref 3.5–5.3)
PROT SERPL-MCNC: 4.7 G/DL — LOW (ref 6–8.3)
PROT SERPL-MCNC: 5 G/DL — LOW (ref 6–8.3)
PROT SERPL-MCNC: 5.1 G/DL — LOW (ref 6–8.3)
PROTHROM AB SERPL-ACNC: 12.3 SEC — SIGNIFICANT CHANGE UP (ref 9.5–13)
PROTHROM AB SERPL-ACNC: 13.3 SEC — HIGH (ref 9.5–13)
PROTHROM AB SERPL-ACNC: 13.5 SEC — HIGH (ref 9.5–13)
RBC # BLD: 2.66 M/UL — LOW (ref 4.2–5.8)
RBC # BLD: 2.71 M/UL — LOW (ref 4.2–5.8)
RBC # BLD: 2.94 M/UL — LOW (ref 4.2–5.8)
RBC # FLD: 15.1 % — HIGH (ref 10.3–14.5)
RBC # FLD: 15.1 % — HIGH (ref 10.3–14.5)
RBC # FLD: 15.3 % — HIGH (ref 10.3–14.5)
RH IG SCN BLD-IMP: POSITIVE — SIGNIFICANT CHANGE UP
SAO2 % BLDV: 59.5 % — LOW (ref 67–88)
SAO2 % BLDV: 60.1 % — LOW (ref 67–88)
SODIUM SERPL-SCNC: 135 MMOL/L — SIGNIFICANT CHANGE UP (ref 135–145)
SODIUM SERPL-SCNC: 136 MMOL/L — SIGNIFICANT CHANGE UP (ref 135–145)
SODIUM SERPL-SCNC: 137 MMOL/L — SIGNIFICANT CHANGE UP (ref 135–145)
SPECIMEN SOURCE: SIGNIFICANT CHANGE UP
WBC # BLD: 10.12 K/UL — SIGNIFICANT CHANGE UP (ref 3.8–10.5)
WBC # BLD: 9.7 K/UL — SIGNIFICANT CHANGE UP (ref 3.8–10.5)
WBC # BLD: 9.94 K/UL — SIGNIFICANT CHANGE UP (ref 3.8–10.5)
WBC # FLD AUTO: 10.12 K/UL — SIGNIFICANT CHANGE UP (ref 3.8–10.5)
WBC # FLD AUTO: 9.7 K/UL — SIGNIFICANT CHANGE UP (ref 3.8–10.5)
WBC # FLD AUTO: 9.94 K/UL — SIGNIFICANT CHANGE UP (ref 3.8–10.5)

## 2024-04-20 PROCEDURE — 99292 CRITICAL CARE ADDL 30 MIN: CPT

## 2024-04-20 PROCEDURE — 71045 X-RAY EXAM CHEST 1 VIEW: CPT | Mod: 26,76

## 2024-04-20 PROCEDURE — 99291 CRITICAL CARE FIRST HOUR: CPT

## 2024-04-20 PROCEDURE — 99232 SBSQ HOSP IP/OBS MODERATE 35: CPT

## 2024-04-20 PROCEDURE — 76775 US EXAM ABDO BACK WALL LIM: CPT | Mod: 26

## 2024-04-20 RX ORDER — POTASSIUM CHLORIDE 20 MEQ
20 PACKET (EA) ORAL ONCE
Refills: 0 | Status: COMPLETED | OUTPATIENT
Start: 2024-04-20 | End: 2024-04-20

## 2024-04-20 RX ORDER — MAGNESIUM OXIDE 400 MG ORAL TABLET 241.3 MG
400 TABLET ORAL ONCE
Refills: 0 | Status: COMPLETED | OUTPATIENT
Start: 2024-04-20 | End: 2024-04-20

## 2024-04-20 RX ORDER — POTASSIUM PHOSPHATE, MONOBASIC POTASSIUM PHOSPHATE, DIBASIC 236; 224 MG/ML; MG/ML
30 INJECTION, SOLUTION INTRAVENOUS ONCE
Refills: 0 | Status: COMPLETED | OUTPATIENT
Start: 2024-04-20 | End: 2024-04-20

## 2024-04-20 RX ORDER — POTASSIUM CHLORIDE 20 MEQ
20 PACKET (EA) ORAL
Refills: 0 | Status: COMPLETED | OUTPATIENT
Start: 2024-04-20 | End: 2024-04-20

## 2024-04-20 RX ORDER — BUMETANIDE 0.25 MG/ML
2 INJECTION INTRAMUSCULAR; INTRAVENOUS EVERY 12 HOURS
Refills: 0 | Status: DISCONTINUED | OUTPATIENT
Start: 2024-04-20 | End: 2024-04-21

## 2024-04-20 RX ADMIN — POTASSIUM PHOSPHATE, MONOBASIC POTASSIUM PHOSPHATE, DIBASIC 83.33 MILLIMOLE(S): 236; 224 INJECTION, SOLUTION INTRAVENOUS at 13:22

## 2024-04-20 RX ADMIN — Medication 100 MILLIEQUIVALENT(S): at 03:00

## 2024-04-20 RX ADMIN — MUPIROCIN 1 APPLICATION(S): 20 OINTMENT TOPICAL at 18:44

## 2024-04-20 RX ADMIN — HEPARIN SODIUM 7500 UNIT(S): 5000 INJECTION INTRAVENOUS; SUBCUTANEOUS at 13:31

## 2024-04-20 RX ADMIN — PANTOPRAZOLE SODIUM 40 MILLIGRAM(S): 20 TABLET, DELAYED RELEASE ORAL at 06:55

## 2024-04-20 RX ADMIN — CHLORHEXIDINE GLUCONATE 1 APPLICATION(S): 213 SOLUTION TOPICAL at 06:00

## 2024-04-20 RX ADMIN — BUMETANIDE 2 MILLIGRAM(S): 0.25 INJECTION INTRAMUSCULAR; INTRAVENOUS at 17:45

## 2024-04-20 RX ADMIN — Medication 0.25 MILLIGRAM(S): at 18:00

## 2024-04-20 RX ADMIN — Medication 100 MILLIEQUIVALENT(S): at 11:19

## 2024-04-20 RX ADMIN — ATORVASTATIN CALCIUM 40 MILLIGRAM(S): 80 TABLET, FILM COATED ORAL at 22:42

## 2024-04-20 RX ADMIN — HEPARIN SODIUM 7500 UNIT(S): 5000 INJECTION INTRAVENOUS; SUBCUTANEOUS at 06:54

## 2024-04-20 RX ADMIN — Medication 81 MILLIGRAM(S): at 11:18

## 2024-04-20 RX ADMIN — MUPIROCIN 1 APPLICATION(S): 20 OINTMENT TOPICAL at 06:55

## 2024-04-20 RX ADMIN — Medication 0.25 MILLIGRAM(S): at 05:02

## 2024-04-20 RX ADMIN — GABAPENTIN 100 MILLIGRAM(S): 400 CAPSULE ORAL at 13:32

## 2024-04-20 RX ADMIN — GABAPENTIN 100 MILLIGRAM(S): 400 CAPSULE ORAL at 22:42

## 2024-04-20 RX ADMIN — HEPARIN SODIUM 7500 UNIT(S): 5000 INJECTION INTRAVENOUS; SUBCUTANEOUS at 22:42

## 2024-04-20 RX ADMIN — SENNA PLUS 2 TABLET(S): 8.6 TABLET ORAL at 22:42

## 2024-04-20 RX ADMIN — Medication 500 MILLIGRAM(S): at 06:55

## 2024-04-20 RX ADMIN — POLYETHYLENE GLYCOL 3350 17 GRAM(S): 17 POWDER, FOR SOLUTION ORAL at 11:18

## 2024-04-20 RX ADMIN — Medication 500 MILLIGRAM(S): at 17:46

## 2024-04-20 RX ADMIN — MUPIROCIN 1 APPLICATION(S): 20 OINTMENT TOPICAL at 17:46

## 2024-04-20 RX ADMIN — MAGNESIUM OXIDE 400 MG ORAL TABLET 400 MILLIGRAM(S): 241.3 TABLET ORAL at 11:57

## 2024-04-20 RX ADMIN — BUMETANIDE 2 MILLIGRAM(S): 0.25 INJECTION INTRAMUSCULAR; INTRAVENOUS at 00:56

## 2024-04-20 RX ADMIN — GABAPENTIN 100 MILLIGRAM(S): 400 CAPSULE ORAL at 06:54

## 2024-04-20 RX ADMIN — Medication 100 MILLIEQUIVALENT(S): at 00:25

## 2024-04-20 RX ADMIN — AZITHROMYCIN 255 MILLIGRAM(S): 500 TABLET, FILM COATED ORAL at 02:00

## 2024-04-20 NOTE — CHART NOTE - NSCHARTNOTEFT_GEN_A_CORE
U/S right chest showing at least moderate pleural effusion.  However, in the window of the U/S I'm seeing either compressed lung ? or adhesions at the area I would be placing the chest tube.  At this time do not see a safe window for placement.  Will hold off for now.  Discussed case and reviewed U/S with Dr. Lieberman who agrees.  Pt remains on room air all day, ambulating and doing well.  Possibly have IR consult for tube placement.

## 2024-04-20 NOTE — OCCUPATIONAL THERAPY INITIAL EVALUATION ADULT - MODALITIES TREATMENT COMMENTS
Pt able to perform sit<->stand with Min Ax1, and able to ambulate in hallway with Min Ax1 (portable tele monitor for BUE support), requiring +1 person assist for line management and +1 person assist for chair follow. Pt able to ambulate ~75ft prior to requiring 2 min seated rest break before ambulating back to room. VSS throughout and no LOB observed.

## 2024-04-20 NOTE — OCCUPATIONAL THERAPY INITIAL EVALUATION ADULT - PERTINENT HX OF CURRENT PROBLEM, REHAB EVAL
78 year old male presented to outpatient structural cardiologist Dr. Pate for consultation of severe MR. He presents to [facility] for LHC via radial acces prior to surgery 4/17/24. Pt now s/p Re-exploration, mediastinum, sternotomy approach, re-exploration for bleeding; 4/17-CABG, Occlusion, left atrial appendage, Replacement of mitral valve with maze procedure and cardiopulmonary bypass, performed on 4/18.

## 2024-04-20 NOTE — PROGRESS NOTE ADULT - ASSESSMENT
********incomplete********      78-year-old male with HTN, CKD was admitted for severe MR s/p CABG and placement of mitral valve 4/17 c/b postop cardiogenic shock.  Nephrology consulted for Cr 1.65 4/18 and drop in UO, now improving with better perfusion and resolution of shock    # Nonoliguric TOBIAS on CKD3  BCr 1.2-1.4  Cr elevation as above, now improved, stable at 1.50 today  UA 4/16 with proteinuria, no hematuria  Etiology likely prerenal/ischemic ATN i/s/o shock    Fluid balance last 24 hours:  - 1.3L (UO 3.4L)  Fluid balance last 24 hours: - 4.2 L     Recommend:  Vol status improving. Appears euvolemic this morning. Maintain mild net neg fluid balance  Maintain SBP> 110 for adequate renal perfusion  Repeat UA, urine lytes, UPCR  Renal US  Strict I&Os  BMP per icu protocol 78-year-old male with HTN, CKD was admitted for severe MR s/p CABG and placement of mitral valve 4/17 c/b postop cardiogenic shock.  Nephrology consulted for Cr 1.65 4/18 and drop in UO, now improving with better perfusion and resolution of shock    # Nonoliguric TOBIAS on CKD3  BCr 1.2-1.4  Cr elevation as above, now improved, stable at 1.50 today  UA 4/16 with proteinuria, no hematuria  Etiology likely prerenal/ischemic ATN i/s/o shock    Fluid balance last 24 hours:  - 1.3L (UO 3.4L)  Fluid balance last 24 hours: - 4.2 L     Recommend:  Vol status improving. Appears euvolemic this morning. Maintain mild net neg fluid balance  Maintain SBP> 110 for adequate renal perfusion  Repeat UA, urine lytes, UPCR  Renal US  Strict I&Os  BMP per icu protocol

## 2024-04-20 NOTE — OCCUPATIONAL THERAPY INITIAL EVALUATION ADULT - GENERAL OBSERVATIONS, REHAB EVAL
OT IE completed. Orders received, chart reviewed, pt cleared for OT by VIKKI Archibald. Pt received sitting in recliner, +chest tube to suction, +Jairo x2, +central line, +tele, +handley. Pt A&Ox4, agreeable to OT, and tolerated session well.

## 2024-04-20 NOTE — PROGRESS NOTE ADULT - SUBJECTIVE AND OBJECTIVE BOX
CTICU  CRITICAL  CARE  attending     Hand off received 					   Pertinent clinical, laboratory, radiographic, hemodynamic, echocardiographic, respiratory data, microbiologic data and chart were reviewed and analyzed frequently throughout the course of the day  Patient seen and examined with CTS/ SH attending at bedside  Pt is a 78yr old male with PMH HFrEF (35%), aflutter on eliquis, PE/DVT sp IVC filter placement sp retrieval , HTN, HLD, ADAM on CPAP, CKD4, admitted for surgical mgmt of severe MR. 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 Pate 24. Received 1 pRBC intraop. Arrived intubated on levo, epi, primacor. Increased CT output, given 1L albumin, 3 pRBC, FFP, cryo, plt,  started and ultimately returned to OR for bleeding. 200cc clot/blood evacuated from mediastinum. Returned on levo, epi, vaso, primacor. : Off primacor,  at 5. Bumex gtt started. Bronched. Ceftriaxone and azithromycin started. Extubated to BIPAP. Dobhoff placed. Midodrine and hydrocortisone started for CIRCI. Overnight bumex transitioned to intermittent pushes. :  decreased to 4. Off vasopressin. Overnight  decreased to 2.5.       FAMILY HISTORY:  No pertinent family history in first degree relatives  PAST MEDICAL & SURGICAL HISTORY:  HTN (hypertension)  Pericarditis  Obesity, unspecified classification, unspecified obesity type, unspecified whether serious comorbidity present  ADAM (obstructive sleep apnea)  Chronic deep vein thrombosis (DVT) of non-extremity vein  Other chronic pulmonary embolism without acute cor pulmonale  Essential hypertension  Gout  Aortic regurgitation  History of aortic insufficiency  History of hip replacement, unspecified laterality  Bilateral cataracts        Patient is a 78y old  Male who presents with a chief complaint of I25.10        14 system review was unremarkable    Vital signs, hemodynamic and respiratory parameters were reviewed from the bedside nursing flowsheet.  ICU Vital Signs Last 24 Hrs  T(C): 36.3 (2024 05:15), Max: 37.1 (2024 23:08)  T(F): 97.4 (2024 05:15), Max: 98.7 (2024 23:08)  HR: 82 (2024 08:00) (81 - 90)  BP: 124/58 (2024 05:00) (111/59 - 125/58)  BP(mean): 84 (2024 05:00) (78 - 84)  ABP: 113/43 (2024 08:00) (94/41 - 143/47)  ABP(mean): 64 (2024 08:00) (59 - 84)  RR: 16 (:00) (16 - 18)  SpO2: 95% (2024 08:00) (92% - 100%)    O2 Parameters below as of 2024 08:00  Patient On (Oxygen Delivery Method): room air          Adult Advanced Hemodynamics Last 24 Hrs  CVP(mm Hg): 7 (2024 21:00) (3 - 40)  CVP(cm H2O): --  CO: --  CI: --  PA: --  PA(mean): --  PCWP: --  SVR: --  SVRI: --  PVR: --  PVRI: --, ABG - ( 2024 06:04 )  pH, Arterial: 7.48  pH, Blood: x     /  pCO2: 37    /  pO2: 68    / HCO3: 28    / Base Excess: 4.0   /  SaO2: 96.0                Intake and output was reviewed and the fluid balance was calculated  Daily     Daily   I&O's Summary    2024 07:01  -  2024 07:00  --------------------------------------------------------  IN: 2352 mL / OUT: 3730 mL / NET: -1378 mL    2024 07:01  -  2024 09:10  --------------------------------------------------------  IN: 119.3 mL / OUT: 150 mL / NET: -30.7 mL        All lines and drain sites were assessed  Glycemic trend was reviewedCAPILLARY BLOOD GLUCOSE      POCT Blood Glucose.: 111 mg/dL (2024 07:22)    Neuro: sitting in chair nad  HEENT: mmm  Heart: s1 s2  Lungs: decreased bl  Abdomen: soft, nt nd  Extremities: wwp, edematous    Lines:  RIJ TLC   L radial arterial line     Tubes:  Meds x3      labs  CBC Full  -  ( 2024 06:04 )  WBC Count : 9.70 K/uL  RBC Count : 2.94 M/uL  Hemoglobin : 8.9 g/dL  Hematocrit : 26.3 %  Platelet Count - Automated : 144 K/uL  Mean Cell Volume : 89.5 fl  Mean Cell Hemoglobin : 30.3 pg  Mean Cell Hemoglobin Concentration : 33.8 gm/dL  Auto Neutrophil # : 8.25 K/uL  Auto Lymphocyte # : 0.62 K/uL  Auto Monocyte # : 0.65 K/uL  Auto Eosinophil # : 0.10 K/uL  Auto Basophil # : 0.01 K/uL  Auto Neutrophil % : 85.1 %  Auto Lymphocyte % : 6.4 %  Auto Monocyte % : 6.7 %  Auto Eosinophil % : 1.0 %  Auto Basophil % : 0.1 %    04-20    137  |  102  |  32<H>  ----------------------------<  118<H>  3.7   |  27  |  1.50<H>    Ca    8.5      2024 06:04  Phos  2.7     04-20  Mg     2.0     04-20    TPro  5.1<L>  /  Alb  3.4  /  TBili  0.8  /  DBili  x   /  AST  28  /  ALT  <5<L>  /  AlkPhos  53  04-20    PT/INR - ( 2024 06:04 )   PT: 12.3 sec;   INR: 1.08          PTT - ( 2024 06:04 )  PTT:32.0 sec  The current medications were reviewed   MEDICATIONS  (STANDING):  ascorbic acid 500 milliGRAM(s) Oral two times a day  aspirin  chewable 81 milliGRAM(s) Oral daily  atorvastatin 40 milliGRAM(s) Oral at bedtime  azithromycin  IVPB 500 milliGRAM(s) IV Intermittent every 24 hours  bisacodyl Suppository 10 milliGRAM(s) Rectal once  buDESOnide    Inhalation Suspension 0.25 milliGRAM(s) Inhalation every 12 hours  cefTRIAXone   IVPB 1000 milliGRAM(s) IV Intermittent every 24 hours  chlorhexidine 2% Cloths 1 Application(s) Topical daily  dextrose 10% Bolus 125 milliLiter(s) IV Bolus once  dextrose 5%. 1000 milliLiter(s) (50 mL/Hr) IV Continuous <Continuous>  dextrose 5%. 1000 milliLiter(s) (100 mL/Hr) IV Continuous <Continuous>  dextrose 50% Injectable 25 Gram(s) IV Push once  dextrose 50% Injectable 12.5 Gram(s) IV Push once  DOBUTamine Infusion 5 MICROgram(s)/kG/Min (18.6 mL/Hr) IV Continuous <Continuous>  gabapentin 100 milliGRAM(s) Oral every 8 hours  glucagon  Injectable 1 milliGRAM(s) IntraMuscular once  heparin   Injectable 7500 Unit(s) SubCutaneous every 8 hours  insulin lispro (ADMELOG) corrective regimen sliding scale   SubCutaneous Before meals and at bedtime  mupirocin 2% Nasal 1 Application(s) Both Nostrils two times a day  pantoprazole    Tablet 40 milliGRAM(s) Oral before breakfast  polyethylene glycol 3350 17 Gram(s) Oral daily  senna 2 Tablet(s) Oral at bedtime  sodium chloride 0.9%. 1000 milliLiter(s) (10 mL/Hr) IV Continuous <Continuous>  vasopressin Infusion 0.05 Unit(s)/Min (7.5 mL/Hr) IV Continuous <Continuous>    MEDICATIONS  (PRN):  acetaminophen     Tablet .. 975 milliGRAM(s) Oral every 6 hours PRN Temp greater or equal to 38C (100.4F), Mild Pain (1 - 3)  dextrose Oral Gel 15 Gram(s) Oral once PRN Blood Glucose LESS THAN 70 milliGRAM(s)/deciliter  oxyCODONE    IR 5 milliGRAM(s) Oral every 6 hours PRN Moderate Pain (4 - 6)      Assessment/Plan:  78yr old male with PMH HFrEF (35%), aflutter on eliquis, PE/DVT sp IVC filter placement sp retrieval , HTN, HLD, ADAM on CPAP, CKD4, admitted for surgical mgmt of severe MR.    POD3 CABG x 1 (GSV-RCA), Left atrial appendage occlusion, replacement of mitral valve with MAZE (31mm Mosaic, EF 40%, Pate, 4/17/24)  Cardiogenic shock on dobutamine-wean as tolerated  Trend end organ perfusion markers  Acute post operative anemia due to acute blood loss-trend H/H  Thrombocytopenia-monitor  CKD-renal following, ua, urine lytes, renal us ordered  On abx sp Bronch 3/18-cx neg, normal sheree so will dc abx  Diet as tolerated  Replete lytes prn  Monitor CT output  GI/DVT PPX  Bowel Regimen  Pain control  OOB with PT  Close hemodynamic, ventilatory and drain monitoring and management per post op routine  Monitor for arrhythmias and monitor parameters for organ perfusion  Beta blockade not administered due to hemodynamic instability and bradycardia  Monitor neurologic status  Head of the bed should remain elevated to 45 deg   Chest PT and IS will be encouraged  Monitor adequacy of oxygenation and ventilation and attempt to wean oxygen  Antibiotic regimen will be tailored to the clinical, laboratory and microbiologic data  Nutritional goals will be met using po eventually, ensure adequate caloric intake and monitor the same  Stress ulcer and VTE prophylaxis will be achieved    Glycemic control is satisfactory  Electrolytes have been repleted as necessary and wound care has been carried out   Pain control has been achieved.   Aggressive physical therapy and early mobility and ambulation goals will be met   The family was updated about the course and plan.    CRITICAL CARE TIME personally provided by me  in evaluation and management, reassessments, review and interpretation of labs and x-rays, ventilator and hemodynamic management, formulating a plan and coordinating care: ___140____ MIN.  Time does not include procedural time.      CTICU ATTENDING     					  Hannah Lieberman MD

## 2024-04-20 NOTE — OCCUPATIONAL THERAPY INITIAL EVALUATION ADULT - DIAGNOSIS, OT EVAL
Pt p/w impaired strength, impaired balance, and decreased functional endurance/activity tolerance, impacting ability to perform functional mobility/ADLs.

## 2024-04-20 NOTE — CHART NOTE - NSCHARTNOTEFT_GEN_A_CORE
Large mediastinal tube removed.  Drainage minimal, no air leak.  Tie down and occlusive dressing in place. Pt tolerated well. CXR ordered

## 2024-04-20 NOTE — OCCUPATIONAL THERAPY INITIAL EVALUATION ADULT - NAME OF CLINICIAN
[address]   Progress Note - Dariusz Palomino 1984, 39 y o  male MRN: [id number]  Unit/Bed#: S -[facility] Encounter: [id number]  Primary Care Provider: Keyana Storey MD   Date and time admitted to hospital: 3/17/2021 11:24 AM    * Acute respiratory failure with hypoxia [facility])  Assessment & Plan  Secondary to COVID-19 pneumonia  Elevated d-dimer, on therapeutic a/c tx with heparin gtt  Elevation likely secondary to hypercoag state associated with COVID pna  Cont oxygen supplementation and wean as clinically improves  Remains on mid flow down to 12L  Pulmonary following    COVID-19  Assessment & Plan  COVID-19 pneumonia with superimposed bacterial infection given elevated procalcitonin   COVID positive on March 9  Pulmonary following, appreciate input  Cont remdesivir day 3/5, high-dose dexamethasone day 4/10  Received Actemra x 1 (3/18)  Antibiotic treatment with ceftriaxone and doxycycline as per pulm recommending 4/7 days  Therapeutic lovenox, cont to trend d-dimer  Continue to trend inflammatory markers  Continue mid flow and wean as tolerated        Sepsis ([facility] [address] )  Assessment & Plan  Severe sepsis secondary to COVID-19 pneumonia and possible superimposed bacterial infection   Continue pathway for COVID-19 treatment  Continue antibiotics  Blood neg x 3 days  Resolved lactic acidosis    Type 2 diabetes mellitus without complication, with long-term current use of insulin [facility])  Assessment & Plan  Lab Results   Component Value Date    HGBA1C 7 8 (H) 10/27/2020       Recent Labs     03/20/21  0029 03/20/21  0749 03/20/21  1110 03/20/21  1633   POCGLU 243* 243* 320* 271*       Blood Sugar Average: Last 72 hrs:  (P) 433 [id number]   On high-dose dexamethasone   Titrate up lantus and lispro  Cont ISS   Close monitor    Blood per rectum  Assessment & Plan  Blood mixed with stool, has a chronic hx and has been considered hemorrhoidal  Had seen GI recently and plan was to schedule for EGD/colonoscopy in the future  Obviously not a candidate for endoscopy eval at this moment given covid positive and resp status  Check h/h now  Close monitor as on therapeutic lovenox    Bradycardic baseline fetal heart rate  Assessment & Plan  Asymptomatic   Holding parameters placed on metoprolol  Hx of hypothyroidism, check tsh    Transaminitis  Assessment & Plan  Trending down  Close monitor as on remdesivir  Daily CMP    ENRIKE (obstructive sleep apnea)  Assessment & Plan  Cont cpap use    PTSD (post-traumatic stress disorder)  Assessment & Plan  Continue psych medications    VTE Pharmacologic Prophylaxis:   Pharmacologic: Enoxaparin (Lovenox)  Mechanical VTE Prophylaxis in Place: Yes    Patient Centered Rounds: I have performed bedside rounds with nursing staff today  Discussions with Specialists or Other Care Team Provider:     Education and Discussions with Family / Patient: Patient, called his wife and updated at length    Time Spent for Care: 30 minutes  More than 50% of total time spent on counseling and coordination of care as described above  Current Length of Stay: 3 day(s)    Current Patient Status: Inpatient   Certification Statement: The patient will continue to require additional inpatient hospital stay due to Acute illness    Discharge Plan:     Code Status: Level 1 - Full Code      Subjective:   Has seen pt earlier reports of improving dyspnea  Remains on mid flow but down to 12L  Just notified per rn pt had small amt of blood mixed with stool during BM     Objective:     Vitals:   Temp (24hrs), Av 6 °F (36 4 °C), Min:97 5 °F (36 4 °C), Max:97 8 °F (36 6 °C)    Temp:  [97 5 °F (36 4 °C)-97 8 °F (36 6 °C)] 97 6 °F (36 4 °C)  HR:  [50-80] 80  Resp:  [16-24] 16  BP: (112-137)/(59-78) 137/78  SpO2:  [87 %-96 %] 94 %  Body mass index is 26 96 kg/m²  Input and Output Summary (last 24 hours):        Intake/Output Summary (Last 24 hours) at 3/20/2021 1952  Last data filed at 3/20/2021 VIKKI Archibald 1700  Gross per 24 hour   Intake 200 ml   Output --   Net 200 ml       Physical Exam:     Physical Exam  Constitutional:       Appearance: Normal appearance  Neck:      Musculoskeletal: Normal range of motion and neck supple  Cardiovascular:      Rate and Rhythm: Normal rate and regular rhythm  Pulses: Normal pulses  Heart sounds: Normal heart sounds  No murmur  No gallop  Pulmonary:      Effort: Pulmonary effort is normal       Comments: Diminished bs  Abdominal:      General: Abdomen is flat  Bowel sounds are normal  There is no distension  Palpations: Abdomen is soft  Tenderness: There is no abdominal tenderness  Musculoskeletal: Normal range of motion  Skin:     General: Skin is warm and dry  Neurological:      General: No focal deficit present  Mental Status: He is alert and oriented to person, place, and time  Mental status is at baseline  Cranial Nerves: No cranial nerve deficit  Motor: No weakness  Additional Data:     Labs:    Results from last 7 days   Lab Units 03/20/21  0535  03/18/21  0537   WBC Thousand/uL 8 43   < > 5 80   HEMOGLOBIN g/dL 12 6   < > 12 4   HEMATOCRIT % 38 8   < > 37 1   PLATELETS Thousands/uL 322   < > 237   BANDS PCT %  --   --  9*   LYMPHO PCT %  --   --  9*   MONO PCT %  --   --  6   EOS PCT %  --   --  0    < > = values in this interval not displayed       Results from last 7 days   Lab Units 03/20/21  0535   SODIUM mmol/L 141   POTASSIUM mmol/L 4 5   CHLORIDE mmol/L 104   CO2 mmol/L 24   BUN mg/dL 29*   CREATININE mg/dL 0 94   ANION GAP mmol/L 13   CALCIUM mg/dL 8 9   ALBUMIN g/dL 2 3*   TOTAL BILIRUBIN mg/dL 0 49   ALK PHOS U/L 150*   ALT U/L 465*   AST U/L 189*   GLUCOSE RANDOM mg/dL 295*     Results from last 7 days   Lab Units 03/19/21  1624   INR  1 13     Results from last 7 days   Lab Units 03/20/21  1633 03/20/21  1110 03/20/21  0749 03/20/21  0029 03/19/21  2051 03/19/21  1619 03/19/21  1255 03/19/21  1030 03/19/21  0811 03/19/21  0002 03/18/21  2152 03/18/21  1636   POC GLUCOSE mg/dl 271* 320* 243* 243* 240* 333* 296* 252* 274* 269* 280* 299*         Results from last 7 days   Lab Units 03/20/21  0535 03/19/21  0602 03/18/21  0537 03/17/21  1420 03/17/21  1134   LACTIC ACID mmol/L  --   --   --  1 0 2 4*   PROCALCITONIN ng/ml 0 46* 0 99* 1 75*  --  3 17*           * I Have Reviewed All Lab Data Listed Above  * Additional Pertinent Lab Tests Reviewed: All Labs Within Last 24 Hours Reviewed    Imaging:    Imaging Reports Reviewed Today Include:   Imaging Personally Reviewed by Myself Includes:      Recent Cultures (last 7 days):     Results from last 7 days   Lab Units 03/17/21  1145 03/17/21  1134   BLOOD CULTURE  No Growth at 72 hrs  No Growth at 72 hrs         Last 24 Hours Medication List:   Current Facility-Administered Medications   Medication Dose Route Frequency Provider Last Rate    acetaminophen  650 mg Oral Q6H PRN Marilyn Bhagat MD      ALPRAZolam  0 5 mg Oral TID PRN Marilyn Bhagat MD      ascorbic acid  1,000 mg Oral Q12H [facility] Marilyn Bhagat MD      atorvastatin  40 mg Oral HS Marilyn Bhagat MD      cefTRIAXone  1,000 mg Intravenous Q24H Marilyn Bhagat MD 1,000 mg (03/20/21 0910)    cholecalciferol  2,000 Units Oral Daily Marilyn Bhagat MD      cloNIDine  0 1 mg Oral Daily Marilyn Bhagat MD      dexamethasone  0 1 mg/kg [facility]venous Q12H [facility]      dicyclomine  20 mg Oral Q6H PRN Marilyn Bhagat MD      divalproex sodium  500 mg Oral Q12H [facility] Marilyn Bhagat MD      doxycycline hyclate  100 mg Oral Q12H Marilyn Bhagat MD      enoxaparin  1 mg/kg Subcutaneous Q12H [facility] IGNACIO Michael      famotidine  20 mg Oral BID Marilyn Bhagat MD      gabapentin  300 mg Oral TID Marilyn Bhagat MD      insulin glargine  37 Units Subcutaneous HS Leny Cueva DO      insulin lispro  1-5 Units Subcutaneous HS Leny Cueva DO      [START ON 3/21/2021] insulin lispro  12 Units Subcutaneous TID With Meals Tammie Landau, DO      insulin lispro  2-12 Units Subcutaneous TID AC Tammie Landau, DO      levothyroxine  25 mcg Oral Early Morning Juancarlos Moyer MD      loratadine  10 mg Oral Daily Juancarlos Moyer MD      metoprolol succinate  50 mg Oral Daily Juancarlos Moyer MD      zinc sulfate  220 mg Oral Daily Juancarlos Moyer MD      Followed by   Jerral Kocher ON 3/25/2021] multivitamin-minerals  1 tablet Oral Daily Juancarlos Moyer MD      pantoprazole  40 mg Oral Early Morning Juancarlos Moyer MD      polyethylene glycol  17 g Oral Daily PRN Tammie Landau, DO      remdesivir  100 mg Intravenous Q24H IGNACIO Mcnally      traZODone  100 mg Oral HS Juancarlos Moyer MD      venlafaxine  37 5 mg Oral Daily Juancarlos Moyer MD          Today, Patient Was Seen By: Tammie Landau, DO    ** Please Note: Dictation voice to text software may have been used in the creation of this document   **

## 2024-04-20 NOTE — PROGRESS NOTE ADULT - SUBJECTIVE AND OBJECTIVE BOX
Patient is a 78y Male seen and evaluated at bedside. Sitting up in chair.  Appears comfortable.  No acute events noted overnight.  Coming off of pressors.  Dobutamine being weaned off.  Continues to have good response to diuresis.  Creatinine trending down.      Meds:    acetaminophen     Tablet .. 975 every 6 hours PRN  ascorbic acid 500 two times a day  aspirin  chewable 81 daily  atorvastatin 40 at bedtime  bisacodyl Suppository 10 once  buDESOnide    Inhalation Suspension 0.25 every 12 hours  chlorhexidine 2% Cloths 1 daily  dextrose 10% Bolus 125 once  dextrose 5%. 1000 <Continuous>  dextrose 5%. 1000 <Continuous>  dextrose 50% Injectable 25 once  dextrose 50% Injectable 12.5 once  dextrose Oral Gel 15 once PRN  DOBUTamine Infusion 5 <Continuous>  gabapentin 100 every 8 hours  glucagon  Injectable 1 once  heparin   Injectable 7500 every 8 hours  insulin lispro (ADMELOG) corrective regimen sliding scale  Before meals and at bedtime  mupirocin 2% Nasal 1 two times a day  oxyCODONE    IR 5 every 6 hours PRN  pantoprazole    Tablet 40 before breakfast  polyethylene glycol 3350 17 daily  senna 2 at bedtime  sodium chloride 0.9%. 1000 <Continuous>      T(C): , Max: 37.1 (04-19-24 @ 23:08)  T(F): , Max: 98.7 (04-19-24 @ 23:08)  HR: 82 (04-20-24 @ 08:00)  BP: 124/58 (04-20-24 @ 05:00)  BP(mean): 84 (04-20-24 @ 05:00)  RR: 16 (04-20-24 @ 08:00)  SpO2: 95% (04-20-24 @ 08:00)  Wt(kg): --    04-19 @ 07:01  -  04-20 @ 07:00  --------------------------------------------------------  IN: 2352 mL / OUT: 3730 mL / NET: -1378 mL    04-20 @ 07:01  -  04-20 @ 09:21  --------------------------------------------------------  IN: 119.3 mL / OUT: 150 mL / NET: -30.7 mL          Review of Systems:  ROS negative except as per HPI      PHYSICAL EXAM:  GENERAL: Alert, awake, NAD  CHEST/LUNG: Bilateral clear breath sounds  HEART: S1, S2  ABDOMEN: Soft, nontender, non distended  : No flank or supra pubic tenderness.  EXTREMITIES: no edema  Neurology: AAOx3, no focal neurological deficit  ACCESS: good thrill and bruit appreciated      LABS:                        8.9    9.70  )-----------( 144      ( 20 Apr 2024 06:04 )             26.3     04-20    137  |  102  |  32<H>  ----------------------------<  118<H>  3.7   |  27  |  1.50<H>    Ca    8.5      20 Apr 2024 06:04  Phos  2.7     04-20  Mg     2.0     04-20    TPro  5.1<L>  /  Alb  3.4  /  TBili  0.8  /  DBili  x   /  AST  28  /  ALT  <5<L>  /  AlkPhos  53  04-20      PT/INR - ( 20 Apr 2024 06:04 )   PT: 12.3 sec;   INR: 1.08          PTT - ( 20 Apr 2024 06:04 )  PTT:32.0 sec  Urinalysis Basic - ( 20 Apr 2024 06:04 )    Color: x / Appearance: x / SG: x / pH: x  Gluc: 118 mg/dL / Ketone: x  / Bili: x / Urobili: x   Blood: x / Protein: x / Nitrite: x   Leuk Esterase: x / RBC: x / WBC x   Sq Epi: x / Non Sq Epi: x / Bacteria: x            RADIOLOGY & ADDITIONAL STUDIES:           Patient is a 78y Male seen and evaluated at bedside. Sitting up in chair.  Appears comfortable.  No acute events noted overnight.  Coming off of pressors.  Dobutamine being weaned off.  Continues to have good response to diuresis.  Creatinine trending down.      Meds:    acetaminophen     Tablet .. 975 every 6 hours PRN  ascorbic acid 500 two times a day  aspirin  chewable 81 daily  atorvastatin 40 at bedtime  bisacodyl Suppository 10 once  buDESOnide    Inhalation Suspension 0.25 every 12 hours  chlorhexidine 2% Cloths 1 daily  dextrose 10% Bolus 125 once  dextrose 5%. 1000 <Continuous>  dextrose 5%. 1000 <Continuous>  dextrose 50% Injectable 25 once  dextrose 50% Injectable 12.5 once  dextrose Oral Gel 15 once PRN  DOBUTamine Infusion 5 <Continuous>  gabapentin 100 every 8 hours  glucagon  Injectable 1 once  heparin   Injectable 7500 every 8 hours  insulin lispro (ADMELOG) corrective regimen sliding scale  Before meals and at bedtime  mupirocin 2% Nasal 1 two times a day  oxyCODONE    IR 5 every 6 hours PRN  pantoprazole    Tablet 40 before breakfast  polyethylene glycol 3350 17 daily  senna 2 at bedtime  sodium chloride 0.9%. 1000 <Continuous>      T(C): , Max: 37.1 (04-19-24 @ 23:08)  T(F): , Max: 98.7 (04-19-24 @ 23:08)  HR: 82 (04-20-24 @ 08:00)  BP: 124/58 (04-20-24 @ 05:00)  BP(mean): 84 (04-20-24 @ 05:00)  RR: 16 (04-20-24 @ 08:00)  SpO2: 95% (04-20-24 @ 08:00)  Wt(kg): --    04-19 @ 07:01  -  04-20 @ 07:00  --------------------------------------------------------  IN: 2352 mL / OUT: 3730 mL / NET: -1378 mL    04-20 @ 07:01  -  04-20 @ 09:21  --------------------------------------------------------  IN: 119.3 mL / OUT: 150 mL / NET: -30.7 mL          Review of Systems:  ROS negative except as per HPI      PHYSICAL EXAM:  GENERAL: Alert, awake, NAD  CHEST/LUNG: Bilateral clear breath sounds  HEART: S1, S2  ABDOMEN: Soft, nontender, non distended  : No flank or supra pubic tenderness.  EXTREMITIES: trace upper and lower extremity edema  Neurology: AAOx3, no focal neurological deficit  ACCESS: good thrill and bruit appreciated      LABS:                        8.9    9.70  )-----------( 144      ( 20 Apr 2024 06:04 )             26.3     04-20    137  |  102  |  32<H>  ----------------------------<  118<H>  3.7   |  27  |  1.50<H>    Ca    8.5      20 Apr 2024 06:04  Phos  2.7     04-20  Mg     2.0     04-20    TPro  5.1<L>  /  Alb  3.4  /  TBili  0.8  /  DBili  x   /  AST  28  /  ALT  <5<L>  /  AlkPhos  53  04-20      PT/INR - ( 20 Apr 2024 06:04 )   PT: 12.3 sec;   INR: 1.08          PTT - ( 20 Apr 2024 06:04 )  PTT:32.0 sec  Urinalysis Basic - ( 20 Apr 2024 06:04 )    Color: x / Appearance: x / SG: x / pH: x  Gluc: 118 mg/dL / Ketone: x  / Bili: x / Urobili: x   Blood: x / Protein: x / Nitrite: x   Leuk Esterase: x / RBC: x / WBC x   Sq Epi: x / Non Sq Epi: x / Bacteria: x            RADIOLOGY & ADDITIONAL STUDIES:           Patient is a 78y Male seen and evaluated at bedside. Sitting up in chair.  Appears comfortable.  No acute events noted overnight.  Coming off of pressors.  Dobutamine being weaned off.  Continues to have good response to diuresis.  Creatinine trending down.      Meds:    acetaminophen     Tablet .. 975 every 6 hours PRN  ascorbic acid 500 two times a day  aspirin  chewable 81 daily  atorvastatin 40 at bedtime  bisacodyl Suppository 10 once  buDESOnide    Inhalation Suspension 0.25 every 12 hours  chlorhexidine 2% Cloths 1 daily  dextrose 10% Bolus 125 once  dextrose 5%. 1000 <Continuous>  dextrose 5%. 1000 <Continuous>  dextrose 50% Injectable 25 once  dextrose 50% Injectable 12.5 once  dextrose Oral Gel 15 once PRN  DOBUTamine Infusion 5 <Continuous>  gabapentin 100 every 8 hours  glucagon  Injectable 1 once  heparin   Injectable 7500 every 8 hours  insulin lispro (ADMELOG) corrective regimen sliding scale  Before meals and at bedtime  mupirocin 2% Nasal 1 two times a day  oxyCODONE    IR 5 every 6 hours PRN  pantoprazole    Tablet 40 before breakfast  polyethylene glycol 3350 17 daily  senna 2 at bedtime  sodium chloride 0.9%. 1000 <Continuous>      T(C): , Max: 37.1 (04-19-24 @ 23:08)  T(F): , Max: 98.7 (04-19-24 @ 23:08)  HR: 82 (04-20-24 @ 08:00)  BP: 124/58 (04-20-24 @ 05:00)  BP(mean): 84 (04-20-24 @ 05:00)  RR: 16 (04-20-24 @ 08:00)  SpO2: 95% (04-20-24 @ 08:00)  Wt(kg): --    04-19 @ 07:01  -  04-20 @ 07:00  --------------------------------------------------------  IN: 2352 mL / OUT: 3730 mL / NET: -1378 mL    04-20 @ 07:01  -  04-20 @ 09:21  --------------------------------------------------------  IN: 119.3 mL / OUT: 150 mL / NET: -30.7 mL          Review of Systems:  ROS negative except as per HPI      PHYSICAL EXAM:  GENERAL: Alert, awake, NAD  CHEST/LUNG: Bilateral clear breath sounds  HEART: S1, S2  ABDOMEN: Soft, nontender, non distended  : No flank or supra pubic tenderness.  EXTREMITIES: trace upper and lower extremity edema  Neurology: AAOx3, no focal neurological deficit  ACCESS: good thrill and bruit appreciated      LABS:                        8.9    9.70  )-----------( 144      ( 20 Apr 2024 06:04 )             26.3     04-20    137  |  102  |  32<H>  ----------------------------<  118<H>  3.7   |  27  |  1.50<H>    Ca    8.5      20 Apr 2024 06:04  Phos  2.7     04-20  Mg     2.0     04-20    TPro  5.1<L>  /  Alb  3.4  /  TBili  0.8  /  DBili  x   /  AST  28  /  ALT  <5<L>  /  AlkPhos  53  04-20      PT/INR - ( 20 Apr 2024 06:04 )   PT: 12.3 sec;   INR: 1.08          PTT - ( 20 Apr 2024 06:04 )  PTT:32.0 sec  Urinalysis Basic - ( 20 Apr 2024 06:04 )    Color: x / Appearance: x / SG: x / pH: x  Gluc: 118 mg/dL / Ketone: x  / Bili: x / Urobili: x   Blood: x / Protein: x / Nitrite: x   Leuk Esterase: x / RBC: x / WBC x   Sq Epi: x / Non Sq Epi: x / Bacteria: x            RADIOLOGY & ADDITIONAL STUDIES:        Creatinine: 1.45 mg/dL (04-20-24 @ 10:14)  Creatinine: 1.50 mg/dL (04-20-24 @ 06:04)  Creatinine: 1.68 mg/dL (04-19-24 @ 22:52)  Creatinine: 1.73 mg/dL (04-19-24 @ 17:36)  Creatinine: 1.55 mg/dL (04-19-24 @ 10:13)  Creatinine: 1.66 mg/dL (04-19-24 @ 02:55)  Creatinine: 1.64 mg/dL (04-18-24 @ 21:20)  Creatinine: 1.60 mg/dL (04-18-24 @ 16:18)  Creatinine: 1.65 mg/dL (04-18-24 @ 12:49)  Creatinine: 1.56 mg/dL (04-18-24 @ 09:07)     Patient is a 78y Male seen and evaluated at bedside. Sitting up in chair.  Appears comfortable.  No acute events noted overnight.  Coming off of pressors.  Dobutamine being weaned off.  Continues to have good response to diuresis.  Creatinine trending down.      Meds:    acetaminophen     Tablet .. 975 every 6 hours PRN  ascorbic acid 500 two times a day  aspirin  chewable 81 daily  atorvastatin 40 at bedtime  bisacodyl Suppository 10 once  buDESOnide    Inhalation Suspension 0.25 every 12 hours  chlorhexidine 2% Cloths 1 daily  dextrose 10% Bolus 125 once  dextrose 5%. 1000 <Continuous>  dextrose 5%. 1000 <Continuous>  dextrose 50% Injectable 25 once  dextrose 50% Injectable 12.5 once  dextrose Oral Gel 15 once PRN  DOBUTamine Infusion 5 <Continuous>  gabapentin 100 every 8 hours  glucagon  Injectable 1 once  heparin   Injectable 7500 every 8 hours  insulin lispro (ADMELOG) corrective regimen sliding scale  Before meals and at bedtime  mupirocin 2% Nasal 1 two times a day  oxyCODONE    IR 5 every 6 hours PRN  pantoprazole    Tablet 40 before breakfast  polyethylene glycol 3350 17 daily  senna 2 at bedtime  sodium chloride 0.9%. 1000 <Continuous>      T(C): , Max: 37.1 (04-19-24 @ 23:08)  T(F): , Max: 98.7 (04-19-24 @ 23:08)  HR: 82 (04-20-24 @ 08:00)  BP: 124/58 (04-20-24 @ 05:00)  BP(mean): 84 (04-20-24 @ 05:00)  RR: 16 (04-20-24 @ 08:00)  SpO2: 95% (04-20-24 @ 08:00)  Wt(kg): --    04-19 @ 07:01  -  04-20 @ 07:00  --------------------------------------------------------  IN: 2352 mL / OUT: 3730 mL / NET: -1378 mL    04-20 @ 07:01  -  04-20 @ 09:21  --------------------------------------------------------  IN: 119.3 mL / OUT: 150 mL / NET: -30.7 mL          Review of Systems:  ROS negative except as per HPI      PHYSICAL EXAM:  GENERAL: Alert, awake, NAD  CHEST/LUNG: Bilateral clear breath sounds, chest drain  HEART: S1, S2  ABDOMEN: Soft, nontender  : Siegel in place  EXTREMITIES: Trace upper and lower extremity edema  Neurology: AAOx3, no focal neurological deficit  ACCESS:  None      LABS:                        8.9    9.70  )-----------( 144      ( 20 Apr 2024 06:04 )             26.3     04-20    137  |  102  |  32<H>  ----------------------------<  118<H>  3.7   |  27  |  1.50<H>    Ca    8.5      20 Apr 2024 06:04  Phos  2.7     04-20  Mg     2.0     04-20    TPro  5.1<L>  /  Alb  3.4  /  TBili  0.8  /  DBili  x   /  AST  28  /  ALT  <5<L>  /  AlkPhos  53  04-20      PT/INR - ( 20 Apr 2024 06:04 )   PT: 12.3 sec;   INR: 1.08          PTT - ( 20 Apr 2024 06:04 )  PTT:32.0 sec  Urinalysis Basic - ( 20 Apr 2024 06:04 )    Color: x / Appearance: x / SG: x / pH: x  Gluc: 118 mg/dL / Ketone: x  / Bili: x / Urobili: x   Blood: x / Protein: x / Nitrite: x   Leuk Esterase: x / RBC: x / WBC x   Sq Epi: x / Non Sq Epi: x / Bacteria: x            RADIOLOGY & ADDITIONAL STUDIES:        Creatinine: 1.45 mg/dL (04-20-24 @ 10:14)  Creatinine: 1.50 mg/dL (04-20-24 @ 06:04)  Creatinine: 1.68 mg/dL (04-19-24 @ 22:52)  Creatinine: 1.73 mg/dL (04-19-24 @ 17:36)  Creatinine: 1.55 mg/dL (04-19-24 @ 10:13)  Creatinine: 1.66 mg/dL (04-19-24 @ 02:55)  Creatinine: 1.64 mg/dL (04-18-24 @ 21:20)  Creatinine: 1.60 mg/dL (04-18-24 @ 16:18)  Creatinine: 1.65 mg/dL (04-18-24 @ 12:49)  Creatinine: 1.56 mg/dL (04-18-24 @ 09:07)

## 2024-04-20 NOTE — OCCUPATIONAL THERAPY INITIAL EVALUATION ADULT - ADDITIONAL COMMENTS
PTA, pt performing all ADLs/mobility independently and w/o use of AD/AE. Pt has both walk-in shower and tub-shower. Pt has build in shower chair inside walk-in shower.

## 2024-04-20 NOTE — OCCUPATIONAL THERAPY INITIAL EVALUATION ADULT - LEVEL OF INDEPENDENCE: DRESS LOWER BODY, OT EVAL
doff/don B socks while sitting in chair in figure 4 position, requiring Min Ax1 2/2 impaired dynamic sitting balance and decreased functional reach./minimum assist (75% patients effort)

## 2024-04-20 NOTE — PROGRESS NOTE ADULT - SUBJECTIVE AND OBJECTIVE BOX
CTICU  CRITICAL  CARE  attending     Hand off received 					   Pertinent clinical, laboratory, radiographic, hemodynamic, echocardiographic, respiratory data, microbiologic data and chart were reviewed and analyzed frequently throughout the course of the day and night      78 year old male with HTN, HLD,  HFrEF (LVEF 35%), ADAM (on CPAP), CKD stage 4 (cr 1.2-1.49 -2024), morbid obesity.   He has history of paroxysmal Atrial flutter (on Eliquis), unprovoked PE/DVT s/p IVC filter placement then retrieval (, on Eliquis).  He was evaluated for worsening dyspnea on exertion.   TTE (24): mod reduced LV systolic function, LV global strain w/ apical sparing which may be indicative of amyloidosis, mildly reduced RV systolic function, mod AR, severe MR, LVEF 35%.  LHC: Non obstructive CAD.     S/P MVR  S/P CABG x 1   S/P SWEETIE occlusion  S/P Cryomaze      FAMILY HISTORY:  No pertinent family history in first degree relatives    PAST MEDICAL & SURGICAL HISTORY:  HTN (hypertension)  Pericarditis  Morbid Obesity  ADAM (obstructive sleep apnea)  Chronic deep vein thrombosis (DVT) of non-extremity vein  chronic pulmonary embolism without acute cor pulmonale  Essential hypertension  Gout  Aortic regurgitation  History of aortic insufficiency  History of hip replacement, unspecified laterality  Bilateral cataracts  Pleural effusion          14 system review was unremarkable    Vital signs, hemodynamic and respiratory parameters were reviewed from the bedside nursing flow sheet.  ICU Vital Signs Last 24 Hrs  T(C): 36.6 (2024 16:58), Max: 37.1 (2024 23:08)  T(F): 97.8 (2024 16:58), Max: 98.7 (2024 23:08)  HR: 89 (2024 21:00) (81 - 91)  BP: 117/58 (2024 12:47) (117/58 - 125/58)  BP(mean): 84 (2024 12:47) (81 - 84)  ABP: 116/43 (2024 21:00) (94/41 - 138/47)  ABP(mean): 64 (2024 21:00) (52 - 77)  RR: 17 (2024 21:00) (16 - 18)  SpO2: 93% (2024 21:00) (92% - 99%)    O2 Parameters below as of 2024 22:00  Patient On (Oxygen Delivery Method): room air          Adult Advanced Hemodynamics Last 24 Hrs  CVP(mm Hg): 4 (2024 21:00) (-2 - 336)  CVP(cm H2O): --  CO: --  CI: --  PA: --  PA(mean): --  PCWP: --  SVR: --  SVRI: --  PVR: --  PVRI: --, ABG - ( 2024 17:59 )  pH, Arterial: 7.46  pH, Blood: x     /  pCO2: 36    /  pO2: 77    / HCO3: 26    / Base Excess: 2.0   /  SaO2: 97.1                Intake and output was reviewed and the fluid balance was calculated  Daily     Daily   I&O's Summary    2024 07:01  -  2024 07:00  --------------------------------------------------------  IN: 2352 mL / OUT: 3730 mL / NET: -1378 mL    2024 07:01  -  2024 21:18  --------------------------------------------------------  IN: 1709.4 mL / OUT: 2088 mL / NET: -378.6 mL        All lines and drain sites were assessed    Neuro: No change in the Neuro status from the baseline.   Neck: No JVD.  CVS: S1, S2, No S3.  Lungs: Diminished air entry bilaterally.  Abd: Soft. No tenderness. + Bowel sounds.  Vascular: + DP/PT.  Extremities: Lower extremity edema.  Lymphatic: Normal.  Skin: No abnormalities.      labs  CBC Full  -  ( 2024 17:28 )  WBC Count : 9.94 K/uL  RBC Count : 2.66 M/uL  Hemoglobin : 8.1 g/dL  Hematocrit : 24.4 %  Platelet Count - Automated : 162 K/uL  Mean Cell Volume : 91.7 fl  Mean Cell Hemoglobin : 30.5 pg  Mean Cell Hemoglobin Concentration : 33.2 gm/dL  Auto Neutrophil # : 8.32 K/uL  Auto Lymphocyte # : 0.77 K/uL  Auto Monocyte # : 0.57 K/uL  Auto Eosinophil # : 0.15 K/uL  Auto Basophil # : 0.02 K/uL  Auto Neutrophil % : 83.8 %  Auto Lymphocyte % : 7.7 %  Auto Monocyte % : 5.7 %  Auto Eosinophil % : 1.5 %  Auto Basophil % : 0.2 %    04-20    136  |  102  |  31<H>  ----------------------------<  150<H>  4.2   |  26  |  1.48<H>    Ca    8.3<L>      2024 17:28  Phos  3.2     04-20  Mg     2.0     -20    TPro  5.0<L>  /  Alb  3.3  /  TBili  0.7  /  DBili  x   /  AST  29  /  ALT  5<L>  /  AlkPhos  56  04-20    PT/INR - ( 2024 17:28 )   PT: 13.3 sec;   INR: 1.17          PTT - ( 2024 17:28 )  PTT:28.2 sec  The current medications were reviewed   MEDICATIONS  (STANDING):  ascorbic acid 500 milliGRAM(s) Oral two times a day  aspirin  chewable 81 milliGRAM(s) Oral daily  atorvastatin 40 milliGRAM(s) Oral at bedtime  bisacodyl Suppository 10 milliGRAM(s) Rectal once  buDESOnide    Inhalation Suspension 0.25 milliGRAM(s) Inhalation every 12 hours  buMETAnide Injectable 2 milliGRAM(s) IV Push every 12 hours  chlorhexidine 2% Cloths 1 Application(s) Topical daily  dextrose 10% Bolus 125 milliLiter(s) IV Bolus once  dextrose 5%. 1000 milliLiter(s) (50 mL/Hr) IV Continuous <Continuous>  dextrose 5%. 1000 milliLiter(s) (100 mL/Hr) IV Continuous <Continuous>  dextrose 50% Injectable 25 Gram(s) IV Push once  dextrose 50% Injectable 12.5 Gram(s) IV Push once  DOBUTamine Infusion 5 MICROgram(s)/kG/Min (18.6 mL/Hr) IV Continuous <Continuous>  gabapentin 100 milliGRAM(s) Oral every 8 hours  glucagon  Injectable 1 milliGRAM(s) IntraMuscular once  heparin   Injectable 7500 Unit(s) SubCutaneous every 8 hours  insulin lispro (ADMELOG) corrective regimen sliding scale   SubCutaneous Before meals and at bedtime  mupirocin 2% Nasal 1 Application(s) Both Nostrils two times a day  pantoprazole    Tablet 40 milliGRAM(s) Oral before breakfast  polyethylene glycol 3350 17 Gram(s) Oral daily  senna 2 Tablet(s) Oral at bedtime  sodium chloride 0.9%. 1000 milliLiter(s) (10 mL/Hr) IV Continuous <Continuous>    MEDICATIONS  (PRN):  acetaminophen     Tablet .. 975 milliGRAM(s) Oral every 6 hours PRN Temp greater or equal to 38C (100.4F), Mild Pain (1 - 3)  dextrose Oral Gel 15 Gram(s) Oral once PRN Blood Glucose LESS THAN 70 milliGRAM(s)/deciliter  oxyCODONE    IR 5 milliGRAM(s) Oral every 6 hours PRN Moderate Pain (4 - 6)        78 year old  Male admitted with CHF due to severe MR.  S/P MVR  S/P CABG x 1   S/P SWEETIE occlusion  S/P Cryomaze  Received 1 pRBC intraop. Arrived intubated on levo, epi, primacor.  Immediate Postoperative course complicated by mediastinal bleeding, TOBIAS, cardiogenic shock, vaso plegia.  Increased CT output, given 1L albumin, 3 pRBC, FFP, cryo, plt,  started.  He was explored in the operating room. 200cc clot/blood evacuated from mediastinum.   Returned to CTICU on epinephrine, norepinephrine, vasopressin, primacor.  He was weaned Off primacor, Bumex infusion was started.   Bronchoscopy was performed. Copious secretions removed.  Ceftriaxone and azithromycin started.   Extubated to BIPAP 24 in the evening.  Midodrine and hydrocortisone started for CIRCI.  Attempted drainage of left pleural effusion was not successful due to complex adhesions.  Hemodynamically stable.  Good oxygenation.  Diuresing well.  BUN/Cr trending down.          My plan includes :  D/C Dobutamine  Continue IV antibiotic Rx.  Bronchodilator Rx.   Statin Rx.  Gentle diuresis  Afterload reduction.  IR drainage of left pleural effusion.  Close hemodynamic monitoring   Monitor for arrhythmias and monitor parameters for organ perfusion  Monitor neurologic status  Monitor renal function.  Head of the bed should remain elevated to 45 deg .   Chest PT and IS will be encouraged  Monitor adequacy of oxygenation   Nutritional goals will be met using po eventually , ensure adequate caloric intake and monitor the same  Stress ulcer and VTE prophylaxis will be achieved    Glycemic control is satisfactory  Electrolytes have been repleted as necessary and wound care has been carried out. Pain control has been achieved.   Aggressive physical therapy and early mobility and ambulation goals will be met   The family was updated about the course and plan  CRITICAL CARE TIME SPENT in evaluation and management, reassessments, review and interpretation of labs and x-rays,  hemodynamic management, formulating a plan and coordinating care: ___30____ MIN.  Time does not include procedural time.  CTICU ATTENDING     					    Mckay Jacobo MD

## 2024-04-21 LAB
ALBUMIN SERPL ELPH-MCNC: 2.9 G/DL — LOW (ref 3.3–5)
ALP SERPL-CCNC: 55 U/L — SIGNIFICANT CHANGE UP (ref 40–120)
ALT FLD-CCNC: 6 U/L — LOW (ref 10–45)
ANION GAP SERPL CALC-SCNC: 7 MMOL/L — SIGNIFICANT CHANGE UP (ref 5–17)
APTT BLD: 29.3 SEC — SIGNIFICANT CHANGE UP (ref 24.5–35.6)
AST SERPL-CCNC: 22 U/L — SIGNIFICANT CHANGE UP (ref 10–40)
BASOPHILS # BLD AUTO: 0.02 K/UL — SIGNIFICANT CHANGE UP (ref 0–0.2)
BASOPHILS NFR BLD AUTO: 0.2 % — SIGNIFICANT CHANGE UP (ref 0–2)
BILIRUB SERPL-MCNC: 0.7 MG/DL — SIGNIFICANT CHANGE UP (ref 0.2–1.2)
BUN SERPL-MCNC: 27 MG/DL — HIGH (ref 7–23)
CALCIUM SERPL-MCNC: 8.3 MG/DL — LOW (ref 8.4–10.5)
CHLORIDE SERPL-SCNC: 102 MMOL/L — SIGNIFICANT CHANGE UP (ref 96–108)
CO2 SERPL-SCNC: 28 MMOL/L — SIGNIFICANT CHANGE UP (ref 22–31)
CREAT SERPL-MCNC: 1.35 MG/DL — HIGH (ref 0.5–1.3)
EGFR: 54 ML/MIN/1.73M2 — LOW
EOSINOPHIL # BLD AUTO: 0.27 K/UL — SIGNIFICANT CHANGE UP (ref 0–0.5)
EOSINOPHIL NFR BLD AUTO: 2.8 % — SIGNIFICANT CHANGE UP (ref 0–6)
GAS PNL BLDA: SIGNIFICANT CHANGE UP
GLUCOSE BLDC GLUCOMTR-MCNC: 104 MG/DL — HIGH (ref 70–99)
GLUCOSE BLDC GLUCOMTR-MCNC: 121 MG/DL — HIGH (ref 70–99)
GLUCOSE BLDC GLUCOMTR-MCNC: 125 MG/DL — HIGH (ref 70–99)
GLUCOSE SERPL-MCNC: 110 MG/DL — HIGH (ref 70–99)
HCT VFR BLD CALC: 23.5 % — LOW (ref 39–50)
HGB BLD-MCNC: 8 G/DL — LOW (ref 13–17)
IMM GRANULOCYTES NFR BLD AUTO: 1.9 % — HIGH (ref 0–0.9)
INR BLD: 1.16 — SIGNIFICANT CHANGE UP (ref 0.85–1.18)
LACTATE SERPL-SCNC: 0.6 MMOL/L — SIGNIFICANT CHANGE UP (ref 0.5–2)
LYMPHOCYTES # BLD AUTO: 0.83 K/UL — LOW (ref 1–3.3)
LYMPHOCYTES # BLD AUTO: 8.6 % — LOW (ref 13–44)
MAGNESIUM SERPL-MCNC: 1.9 MG/DL — SIGNIFICANT CHANGE UP (ref 1.6–2.6)
MCHC RBC-ENTMCNC: 30.7 PG — SIGNIFICANT CHANGE UP (ref 27–34)
MCHC RBC-ENTMCNC: 34 GM/DL — SIGNIFICANT CHANGE UP (ref 32–36)
MCV RBC AUTO: 90 FL — SIGNIFICANT CHANGE UP (ref 80–100)
MONOCYTES # BLD AUTO: 0.74 K/UL — SIGNIFICANT CHANGE UP (ref 0–0.9)
MONOCYTES NFR BLD AUTO: 7.6 % — SIGNIFICANT CHANGE UP (ref 2–14)
NEUTROPHILS # BLD AUTO: 7.64 K/UL — HIGH (ref 1.8–7.4)
NEUTROPHILS NFR BLD AUTO: 78.9 % — HIGH (ref 43–77)
NRBC # BLD: 0 /100 WBCS — SIGNIFICANT CHANGE UP (ref 0–0)
PHOSPHATE SERPL-MCNC: 3.3 MG/DL — SIGNIFICANT CHANGE UP (ref 2.5–4.5)
PLATELET # BLD AUTO: 171 K/UL — SIGNIFICANT CHANGE UP (ref 150–400)
POTASSIUM SERPL-MCNC: 3.9 MMOL/L — SIGNIFICANT CHANGE UP (ref 3.5–5.3)
POTASSIUM SERPL-SCNC: 3.9 MMOL/L — SIGNIFICANT CHANGE UP (ref 3.5–5.3)
PROT SERPL-MCNC: 4.5 G/DL — LOW (ref 6–8.3)
PROTHROM AB SERPL-ACNC: 13.2 SEC — HIGH (ref 9.5–13)
RBC # BLD: 2.61 M/UL — LOW (ref 4.2–5.8)
RBC # FLD: 15.4 % — HIGH (ref 10.3–14.5)
SODIUM SERPL-SCNC: 137 MMOL/L — SIGNIFICANT CHANGE UP (ref 135–145)
WBC # BLD: 9.68 K/UL — SIGNIFICANT CHANGE UP (ref 3.8–10.5)
WBC # FLD AUTO: 9.68 K/UL — SIGNIFICANT CHANGE UP (ref 3.8–10.5)

## 2024-04-21 PROCEDURE — 99291 CRITICAL CARE FIRST HOUR: CPT

## 2024-04-21 PROCEDURE — 71045 X-RAY EXAM CHEST 1 VIEW: CPT | Mod: 26

## 2024-04-21 PROCEDURE — 71045 X-RAY EXAM CHEST 1 VIEW: CPT | Mod: 26,77

## 2024-04-21 PROCEDURE — 99231 SBSQ HOSP IP/OBS SF/LOW 25: CPT

## 2024-04-21 RX ORDER — SPIRONOLACTONE 25 MG/1
25 TABLET, FILM COATED ORAL DAILY
Refills: 0 | Status: DISCONTINUED | OUTPATIENT
Start: 2024-04-21 | End: 2024-04-25

## 2024-04-21 RX ORDER — MAGNESIUM OXIDE 400 MG ORAL TABLET 241.3 MG
400 TABLET ORAL ONCE
Refills: 0 | Status: COMPLETED | OUTPATIENT
Start: 2024-04-21 | End: 2024-04-21

## 2024-04-21 RX ORDER — CARVEDILOL PHOSPHATE 80 MG/1
6.25 CAPSULE, EXTENDED RELEASE ORAL EVERY 12 HOURS
Refills: 0 | Status: DISCONTINUED | OUTPATIENT
Start: 2024-04-21 | End: 2024-04-25

## 2024-04-21 RX ORDER — BUMETANIDE 0.25 MG/ML
2 INJECTION INTRAMUSCULAR; INTRAVENOUS ONCE
Refills: 0 | Status: COMPLETED | OUTPATIENT
Start: 2024-04-21 | End: 2024-04-22

## 2024-04-21 RX ORDER — POTASSIUM CHLORIDE 20 MEQ
20 PACKET (EA) ORAL ONCE
Refills: 0 | Status: COMPLETED | OUTPATIENT
Start: 2024-04-21 | End: 2024-04-21

## 2024-04-21 RX ADMIN — BUMETANIDE 2 MILLIGRAM(S): 0.25 INJECTION INTRAMUSCULAR; INTRAVENOUS at 06:07

## 2024-04-21 RX ADMIN — GABAPENTIN 100 MILLIGRAM(S): 400 CAPSULE ORAL at 15:05

## 2024-04-21 RX ADMIN — GABAPENTIN 100 MILLIGRAM(S): 400 CAPSULE ORAL at 21:18

## 2024-04-21 RX ADMIN — Medication 500 MILLIGRAM(S): at 18:17

## 2024-04-21 RX ADMIN — MUPIROCIN 1 APPLICATION(S): 20 OINTMENT TOPICAL at 06:07

## 2024-04-21 RX ADMIN — Medication 500 MILLIGRAM(S): at 06:08

## 2024-04-21 RX ADMIN — ATORVASTATIN CALCIUM 40 MILLIGRAM(S): 80 TABLET, FILM COATED ORAL at 21:17

## 2024-04-21 RX ADMIN — GABAPENTIN 100 MILLIGRAM(S): 400 CAPSULE ORAL at 06:07

## 2024-04-21 RX ADMIN — Medication 81 MILLIGRAM(S): at 12:57

## 2024-04-21 RX ADMIN — PANTOPRAZOLE SODIUM 40 MILLIGRAM(S): 20 TABLET, DELAYED RELEASE ORAL at 06:08

## 2024-04-21 RX ADMIN — Medication 0.25 MILLIGRAM(S): at 17:55

## 2024-04-21 RX ADMIN — HEPARIN SODIUM 7500 UNIT(S): 5000 INJECTION INTRAVENOUS; SUBCUTANEOUS at 15:05

## 2024-04-21 RX ADMIN — CARVEDILOL PHOSPHATE 6.25 MILLIGRAM(S): 80 CAPSULE, EXTENDED RELEASE ORAL at 18:17

## 2024-04-21 RX ADMIN — POLYETHYLENE GLYCOL 3350 17 GRAM(S): 17 POWDER, FOR SOLUTION ORAL at 12:57

## 2024-04-21 RX ADMIN — Medication 0.25 MILLIGRAM(S): at 06:06

## 2024-04-21 RX ADMIN — MAGNESIUM OXIDE 400 MG ORAL TABLET 400 MILLIGRAM(S): 241.3 TABLET ORAL at 06:07

## 2024-04-21 RX ADMIN — CHLORHEXIDINE GLUCONATE 1 APPLICATION(S): 213 SOLUTION TOPICAL at 06:19

## 2024-04-21 RX ADMIN — HEPARIN SODIUM 7500 UNIT(S): 5000 INJECTION INTRAVENOUS; SUBCUTANEOUS at 21:18

## 2024-04-21 RX ADMIN — Medication 20 MILLIGRAM(S): at 18:22

## 2024-04-21 RX ADMIN — HEPARIN SODIUM 7500 UNIT(S): 5000 INJECTION INTRAVENOUS; SUBCUTANEOUS at 06:07

## 2024-04-21 RX ADMIN — Medication 20 MILLIEQUIVALENT(S): at 06:07

## 2024-04-21 RX ADMIN — MUPIROCIN 1 APPLICATION(S): 20 OINTMENT TOPICAL at 18:17

## 2024-04-21 NOTE — PROGRESS NOTE ADULT - ASSESSMENT
78 M w/PMHx chronic HFrEF 35%, pAflutter (Eliquis), unprovoked PE/DVT - IVC filter & retrieval '15, HTN, HLD, ADAM (on CPAP), CKD stage 3 (Cr 1.4) and obesity who presented to outpatient structural cardiologist to Dr. Pate for consultation of severe MR.   TTE (2/5/24): mod reduced LV systolic function, mildly reduced RV systolic function, mod AR, severe MR, LVEF 35%.  S/p MVR/ CABGx1/ CryoMaze and SWEETIE occlusion  EF 35%  4/18  RTOR  for reexploration/sternotomy - 200 cc mediastinal blood/lot evacuated  4/18  A/p  Hemodynamics stable, off inotrops, In sinus   Resuming home heart failure regimen with lower dose coreg 6.25 BID/ aldactone and torsemide   Mildly overloaded, overall well diuresed with improving anasarca--  transition IV bumex to home Torsemide  Lytes repleted and optimized  TOBIAS improved, Cr back to baseline 1.3-1.4/ continue to monitor   Left sided effusion-- re pocus today/pigtail TBD  Respiratory status pretty good satting in mid 90s on RA/ Continue nocturnal NIV   ASA/Statin/ weight adjusted DVT ppx and PO PPI  OOB and mobilizing as tolerated with IS   Deline  Floor eligible/To resume home eliquis on discharge     ATTENDING: I have personally and independently provided 105 min of critical care services. This excludes any time spent on separate procedures or teaching.

## 2024-04-21 NOTE — PROGRESS NOTE ADULT - ASSESSMENT
78-year-old male with HTN, CKD was admitted for severe MR s/p CABG and placement of mitral valve 4/17 c/b postop cardiogenic shock.  Nephrology consulted for Cr 1.65 4/18 and drop in UO, now improving with better perfusion and resolution of shock    # Nonoliguric TOBIAS on CKD3  BCr 1.2-1.4  Cr elevation as above, now improved, stable at 1.50 today  UA 4/16 with proteinuria, no hematuria  Etiology likely prerenal/ischemic ATN i/s/o shock    Fluid balance last 24 hours:  - 1.3L (UO 3.4L)  Fluid balance last 24 hours: - 4.2 L     Recommend:  Vol status improving. Appears euvolemic this morning. Maintain mild net neg fluid balance  Maintain SBP> 110 for adequate renal perfusion  Repeat UA, urine lytes, UPCR  Renal US  Strict I&Os  BMP per icu protocol 78-year-old male with HTN, CKD was admitted for severe MR s/p CABG and placement of mitral valve 4/17 c/b postop cardiogenic shock.  Nephrology consulted for Cr 1.65 4/18 and drop in UO, now improving with better perfusion and resolution of shock. S/p IV diuresis, now transitioned to PO 4/21    # Nonoliguric TOBIAS on CKD3  BCr 1.2-1.4  Cr elevation as above, now improving, at 1.35 today  UA 4/16 with proteinuria, no hematuria  US 4/20; Rt 12.4cm, Lt 11.4cm, no hydro  Etiology likely prerenal/ischemic ATN i/s/o shock    Fluid balance last 24 hours:  - 1.9L (UO 3.6L)  Fluid balance last 48 hours: - 3.2 L     Recommend:  Vol status improving. Appears euvolemic this morning. Maintain mild net neg fluid balance  Transitioned to PO diuretics today 4/21  Maintain SBP> 110 for adequate renal perfusion  Strict I&Os  BMP per icu protocol

## 2024-04-21 NOTE — PROGRESS NOTE ADULT - SUBJECTIVE AND OBJECTIVE BOX
CTICU  CRITICAL  CARE  attending     Hand off received 					   Pertinent clinical, laboratory, radiographic, hemodynamic, echocardiographic, respiratory data, microbiologic data and chart were reviewed and analyzed frequently throughout the course of the day and night      78 year old male with HTN, HLD,  HFrEF (LVEF 35%), ADAM (on CPAP), CKD stage 4 (cr 1.2-1.49 -2024), morbid obesity.   He has history of paroxysmal Atrial flutter (on Eliquis), unprovoked PE/DVT s/p IVC filter placement then retrieval (, on Eliquis).  He was evaluated for worsening dyspnea on exertion.   TTE (24): mod reduced LV systolic function, LV global strain w/ apical sparing which may be indicative of amyloidosis, mildly reduced RV systolic function, mod AR, severe MR, LVEF 35%.  LHC: Non obstructive CAD.     S/P MVR  S/P CABG x 1   S/P SWEETIE occlusion  S/P Cryomaze      FAMILY HISTORY:  No pertinent family history in first degree relatives      PAST MEDICAL & SURGICAL HISTORY:  HTN (hypertension)  Pericarditis  Morbid Obesity  ADAM (obstructive sleep apnea)  Chronic deep vein thrombosis (DVT) of non-extremity vein  Chronic pulmonary embolism without acute cor pulmonale  Essential hypertension  Gout  Aortic regurgitation  History of aortic insufficiency  History of hip replacement, unspecified laterality  Bilateral cataracts        14 system review was unremarkable    Vital signs, hemodynamic and respiratory parameters were reviewed from the bedside nursing flow sheet.  ICU Vital Signs Last 24 Hrs  T(C): 37.6 (2024 21:15), Max: 37.6 (2024 21:15)  T(F): 99.7 (2024 21:15), Max: 99.7 (2024 21:15)  HR: 84 (2024 21:00) (78 - 90)  BP: 124/59 (2024 21:00) (117/58 - 141/63)  BP(mean): 85 (2024 21:00) (82 - 91)  ABP: 139/49 (2024 07:00) (103/43 - 139/49)  ABP(mean): 82 (2024 07:00) (64 - 82)  RR: 17 (2024 22:00) (16 - 22)  SpO2: 92% (2024 21:00) (90% - 100%)    O2 Parameters below as of 2024 22:00  Patient On (Oxygen Delivery Method): room air          Adult Advanced Hemodynamics Last 24 Hrs  CVP(mm Hg): 5 (2024 08:00) (2 - 12)  CVP(cm H2O): --  CO: --  CI: --  PA: --  PA(mean): --  PCWP: --  SVR: --  SVRI: --  PVR: --  PVRI: --, ABG - ( 2024 04:21 )  pH, Arterial: 7.42  pH, Blood: x     /  pCO2: 45    /  pO2: 115   / HCO3: 29    / Base Excess: 4.0   /  SaO2: 98.7                Intake and output was reviewed and the fluid balance was calculated  Daily     Daily Weight in k.5 (2024 07:17)  I&O's Summary    2024 07:01  -  2024 07:00  --------------------------------------------------------  IN: 1826.8 mL / OUT: 3758 mL / NET: -1931.2 mL    2024 07:01  -  2024 21:42  --------------------------------------------------------  IN: 325 mL / OUT: 550 mL / NET: -225 mL            Neuro: No change in the Neuro status from the baseline.   Neck: No JVD.  CVS: S1, S2, No S3.  Lungs: Good air entry bilaterally.  Abd: Soft. No tenderness. + Bowel sounds.  Vascular: + DP/PT.  Extremities: Lower Extremity Edema.  Lymphatic: Normal.  Skin: No abnormalities.      labs  CBC Full  -  ( 2024 03:56 )  WBC Count : 9.68 K/uL  RBC Count : 2.61 M/uL  Hemoglobin : 8.0 g/dL  Hematocrit : 23.5 %  Platelet Count - Automated : 171 K/uL  Mean Cell Volume : 90.0 fl  Mean Cell Hemoglobin : 30.7 pg  Mean Cell Hemoglobin Concentration : 34.0 gm/dL  Auto Neutrophil # : 7.64 K/uL  Auto Lymphocyte # : 0.83 K/uL  Auto Monocyte # : 0.74 K/uL  Auto Eosinophil # : 0.27 K/uL  Auto Basophil # : 0.02 K/uL  Auto Neutrophil % : 78.9 %  Auto Lymphocyte % : 8.6 %  Auto Monocyte % : 7.6 %  Auto Eosinophil % : 2.8 %  Auto Basophil % : 0.2 %        137  |  102  |  27<H>  ----------------------------<  110<H>  3.9   |  28  |  1.35<H>    Ca    8.3<L>      2024 03:56  Phos  3.3       Mg     1.9         TPro  4.5<L>  /  Alb  2.9<L>  /  TBili  0.7  /  DBili  x   /  AST  22  /  ALT  6<L>  /  AlkPhos  55  04-21    PT/INR - ( 2024 03:56 )   PT: 13.2 sec;   INR: 1.16          PTT - ( 2024 03:56 )  PTT:29.3 sec  The current medications were reviewed   MEDICATIONS  (STANDING):  ascorbic acid 500 milliGRAM(s) Oral two times a day  aspirin  chewable 81 milliGRAM(s) Oral daily  atorvastatin 40 milliGRAM(s) Oral at bedtime  bisacodyl Suppository 10 milliGRAM(s) Rectal once  buDESOnide    Inhalation Suspension 0.25 milliGRAM(s) Inhalation every 12 hours  buMETAnide Injectable 2 milliGRAM(s) IV Push once  carvedilol 6.25 milliGRAM(s) Oral every 12 hours  chlorhexidine 2% Cloths 1 Application(s) Topical daily  gabapentin 100 milliGRAM(s) Oral every 8 hours  heparin   Injectable 7500 Unit(s) SubCutaneous every 8 hours  mupirocin 2% Nasal 1 Application(s) Both Nostrils two times a day  pantoprazole    Tablet 40 milliGRAM(s) Oral before breakfast  polyethylene glycol 3350 17 Gram(s) Oral daily  senna 2 Tablet(s) Oral at bedtime  sodium chloride 0.9%. 1000 milliLiter(s) (10 mL/Hr) IV Continuous <Continuous>  spironolactone 25 milliGRAM(s) Oral daily  torsemide 20 milliGRAM(s) Oral daily    MEDICATIONS  (PRN):  acetaminophen     Tablet .. 975 milliGRAM(s) Oral every 6 hours PRN Temp greater or equal to 38C (100.4F), Mild Pain (1 - 3)  oxyCODONE    IR 5 milliGRAM(s) Oral every 6 hours PRN Moderate Pain (4 - 6)                78 year old  Male admitted with CHF due to severe MR.  S/P MVR  S/P CABG x 1   S/P SWEETIE occlusion  S/P Cryomaze  Received 1 pRBC intraop. Arrived intubated on levo, epi, primacor.  Immediate Postoperative course complicated by mediastinal bleeding, TOBIAS, cardiogenic shock, vaso plegia.  Increased CT output, given 1L albumin, 3 pRBC, FFP, cryo, plt,  started.  He was explored in the operating room. 200cc clot/blood evacuated from mediastinum.   Returned to CTICU on epinephrine, norepinephrine, vasopressin, primacor.  He was weaned Off primacor, Bumex infusion was started.   Bronchoscopy was performed. Copious secretions removed.  Ceftriaxone and azithromycin started.   Extubated to BIPAP 24 in the evening.  Midodrine and hydrocortisone started for CIRCI.  Hemodynamically stable.  Good oxygenation.  Fair urine out put.        My plan includes :  Statin Rx.  PO Coreg  PO aldactone.  Resume Entresto  Resume Torsemide   IR drainage of left pleural effusion.  Close hemodynamic monitoring   Monitor for arrhythmias and monitor parameters for organ perfusion  Monitor neurologic status  Monitor renal function.  Head of the bed should remain elevated to 45 deg .   Chest PT and IS will be encouraged  Monitor adequacy of oxygenation   Nutritional goals will be met using po eventually , ensure adequate caloric intake and monitor the same  Stress ulcer and VTE prophylaxis will be achieved    Glycemic control is satisfactory  Electrolytes have been repleted as necessary and wound care has been carried out. Pain control has been achieved.   Aggressive physical therapy and early mobility and ambulation goals will be met   The family was updated about the course and plan  CRITICAL CARE TIME SPENT in evaluation and management, reassessments, review and interpretation of labs and x-rays, hemodynamic management, formulating a plan and coordinating care: ___30____ MIN.  Time does not include procedural time.  CTICU ATTENDING     					    Mckay Jacobo MD

## 2024-04-21 NOTE — PROGRESS NOTE ADULT - SUBJECTIVE AND OBJECTIVE BOX
INTERVAL COURSE  POD#4  MVR/ CABGx1/ CryoMaze  Ef 40%  RTOR for bleeding  Off  since yesterday   Doing well, no issues over night/ Perfusion labs normal, Cr back to baseline 1.3     VITALS  Vital Signs Last 24 Hrs  T(C): 36.4 (2024 09:02), Max: 36.6 (2024 16:58)  T(F): 97.6 (2024 09:02), Max: 97.8 (2024 16:58)  HR: 81 (2024 08:00) (78 - 91)  BP: 125/59 (2024 08:00) (117/58 - 125/59)  BP(mean): 85 (2024 08:00) (84 - 85)  RR: 19 (2024 08:00) (16 - 22)  SpO2: 97% (2024 08:00) (90% - 100%)    Parameters below as of 2024 08:00  Patient On (Oxygen Delivery Method): room air        I&O's Summary    2024 07:01  -  2024 07:00  --------------------------------------------------------  IN: 1826.8 mL / OUT: 3758 mL / NET: -1931.2 mL      PHYSICAL EXAM  General: A&Ox 3; NAD  Respiratory: CTA B/L; no wheezes  Cardiovascular: Regular rhythm/rate  Gastrointestinal: Soft  Extremities: WWP; mild anasarca mostly upper extremities   Neurological:  CNII-XII grossly intact; no obvious focal deficits      IMAGING/EKG/ETC  Reviewed. Left sided effusion/ stable

## 2024-04-21 NOTE — PROGRESS NOTE ADULT - SUBJECTIVE AND OBJECTIVE BOX
Patient is a 78y Male seen and evaluated at bedside. Sitting up comfortably in chair. States feels better. No acute events overnight. Edema improving.      Meds:    acetaminophen     Tablet .. 975 every 6 hours PRN  ascorbic acid 500 two times a day  aspirin  chewable 81 daily  atorvastatin 40 at bedtime  bisacodyl Suppository 10 once  buDESOnide    Inhalation Suspension 0.25 every 12 hours  carvedilol 6.25 every 12 hours  chlorhexidine 2% Cloths 1 daily  dextrose 10% Bolus 125 once  dextrose 5%. 1000 <Continuous>  dextrose 5%. 1000 <Continuous>  dextrose 50% Injectable 25 once  dextrose 50% Injectable 12.5 once  dextrose Oral Gel 15 once PRN  gabapentin 100 every 8 hours  glucagon  Injectable 1 once  heparin   Injectable 7500 every 8 hours  insulin lispro (ADMELOG) corrective regimen sliding scale  Before meals and at bedtime  mupirocin 2% Nasal 1 two times a day  oxyCODONE    IR 5 every 6 hours PRN  pantoprazole    Tablet 40 before breakfast  polyethylene glycol 3350 17 daily  senna 2 at bedtime  sodium chloride 0.9%. 1000 <Continuous>  spironolactone 25 daily  torsemide 20 daily      T(C): , Max: 36.6 (04-20-24 @ 16:58)  T(F): , Max: 97.8 (04-20-24 @ 16:58)  HR: 82 (04-21-24 @ 09:05)  BP: 125/59 (04-21-24 @ 08:00)  BP(mean): 85 (04-21-24 @ 08:00)  RR: 18 (04-21-24 @ 09:05)  SpO2: 93% (04-21-24 @ 09:05)  Wt(kg): --    04-20 @ 07:01  -  04-21 @ 07:00  --------------------------------------------------------  IN: 1826.8 mL / OUT: 3758 mL / NET: -1931.2 mL    04-21 @ 07:01  -  04-21 @ 11:33  --------------------------------------------------------  IN: 0 mL / OUT: 0 mL / NET: 0 mL          Review of Systems:  ROS negative except as per HPI      PHYSICAL EXAM:  GENERAL: Alert, awake, NAD  CHEST/LUNG: Bilateral clear breath sounds, chest drain  HEART: S1, S2  ABDOMEN: Soft, nontender  : Siegel in place  EXTREMITIES: Trace upper and lower extremity edema  Neurology: AAOx3, no focal neurological deficit  ACCESS:  None        LABS:                        8.0    9.68  )-----------( 171      ( 21 Apr 2024 03:56 )             23.5     04-21    137  |  102  |  27<H>  ----------------------------<  110<H>  3.9   |  28  |  1.35<H>    Ca    8.3<L>      21 Apr 2024 03:56  Phos  3.3     04-21  Mg     1.9     04-21    TPro  4.5<L>  /  Alb  2.9<L>  /  TBili  0.7  /  DBili  x   /  AST  22  /  ALT  6<L>  /  AlkPhos  55  04-21      PT/INR - ( 21 Apr 2024 03:56 )   PT: 13.2 sec;   INR: 1.16          PTT - ( 21 Apr 2024 03:56 )  PTT:29.3 sec  Urinalysis Basic - ( 21 Apr 2024 03:56 )    Color: x / Appearance: x / SG: x / pH: x  Gluc: 110 mg/dL / Ketone: x  / Bili: x / Urobili: x   Blood: x / Protein: x / Nitrite: x   Leuk Esterase: x / RBC: x / WBC x   Sq Epi: x / Non Sq Epi: x / Bacteria: x            RADIOLOGY & ADDITIONAL STUDIES:           Patient is a 78y Male seen and evaluated at bedside. Sitting up comfortably in chair. States feels better. No acute events overnight. Edema improving.      Meds:    acetaminophen     Tablet .. 975 every 6 hours PRN  ascorbic acid 500 two times a day  aspirin  chewable 81 daily  atorvastatin 40 at bedtime  bisacodyl Suppository 10 once  buDESOnide    Inhalation Suspension 0.25 every 12 hours  carvedilol 6.25 every 12 hours  chlorhexidine 2% Cloths 1 daily  dextrose 10% Bolus 125 once  dextrose 5%. 1000 <Continuous>  dextrose 5%. 1000 <Continuous>  dextrose 50% Injectable 25 once  dextrose 50% Injectable 12.5 once  dextrose Oral Gel 15 once PRN  gabapentin 100 every 8 hours  glucagon  Injectable 1 once  heparin   Injectable 7500 every 8 hours  insulin lispro (ADMELOG) corrective regimen sliding scale  Before meals and at bedtime  mupirocin 2% Nasal 1 two times a day  oxyCODONE    IR 5 every 6 hours PRN  pantoprazole    Tablet 40 before breakfast  polyethylene glycol 3350 17 daily  senna 2 at bedtime  sodium chloride 0.9%. 1000 <Continuous>  spironolactone 25 daily  torsemide 20 daily      T(C): , Max: 36.6 (04-20-24 @ 16:58)  T(F): , Max: 97.8 (04-20-24 @ 16:58)  HR: 82 (04-21-24 @ 09:05)  BP: 125/59 (04-21-24 @ 08:00)  BP(mean): 85 (04-21-24 @ 08:00)  RR: 18 (04-21-24 @ 09:05)  SpO2: 93% (04-21-24 @ 09:05)  Wt(kg): --    04-20 @ 07:01  -  04-21 @ 07:00  --------------------------------------------------------  IN: 1826.8 mL / OUT: 3758 mL / NET: -1931.2 mL    04-21 @ 07:01  -  04-21 @ 11:33  --------------------------------------------------------  IN: 0 mL / OUT: 0 mL / NET: 0 mL          Review of Systems:  ROS negative except as per HPI      PHYSICAL EXAM:  GENERAL: Alert, awake, NAD  CHEST/LUNG: Bilateral clear breath sounds, chest drain  HEART: S1, S2  ABDOMEN: Soft, nontender  : Siegel removed  EXTREMITIES: Trace upper and lower extremity edema  Neurology: AAOx3, no focal neurological deficit  ACCESS:  None        LABS:                        8.0    9.68  )-----------( 171      ( 21 Apr 2024 03:56 )             23.5     04-21    137  |  102  |  27<H>  ----------------------------<  110<H>  3.9   |  28  |  1.35<H>    Ca    8.3<L>      21 Apr 2024 03:56  Phos  3.3     04-21  Mg     1.9     04-21    TPro  4.5<L>  /  Alb  2.9<L>  /  TBili  0.7  /  DBili  x   /  AST  22  /  ALT  6<L>  /  AlkPhos  55  04-21      PT/INR - ( 21 Apr 2024 03:56 )   PT: 13.2 sec;   INR: 1.16          PTT - ( 21 Apr 2024 03:56 )  PTT:29.3 sec  Urinalysis Basic - ( 21 Apr 2024 03:56 )    Color: x / Appearance: x / SG: x / pH: x  Gluc: 110 mg/dL / Ketone: x  / Bili: x / Urobili: x   Blood: x / Protein: x / Nitrite: x   Leuk Esterase: x / RBC: x / WBC x   Sq Epi: x / Non Sq Epi: x / Bacteria: x            RADIOLOGY & ADDITIONAL STUDIES:      Creatinine: 1.35 mg/dL (04-21-24 @ 03:56)  Creatinine: 1.48 mg/dL (04-20-24 @ 17:28)  Creatinine: 1.45 mg/dL (04-20-24 @ 10:14)  Creatinine: 1.50 mg/dL (04-20-24 @ 06:04)  Creatinine: 1.68 mg/dL (04-19-24 @ 22:52)  Creatinine: 1.73 mg/dL (04-19-24 @ 17:36)  Creatinine: 1.55 mg/dL (04-19-24 @ 10:13)  Creatinine: 1.66 mg/dL (04-19-24 @ 02:55)  Creatinine: 1.64 mg/dL (04-18-24 @ 21:20)  Creatinine: 1.60 mg/dL (04-18-24 @ 16:18)

## 2024-04-22 ENCOUNTER — RESULT REVIEW (OUTPATIENT)
Age: 79
End: 2024-04-22

## 2024-04-22 LAB
ALBUMIN SERPL ELPH-MCNC: 3.1 G/DL — LOW (ref 3.3–5)
ALP SERPL-CCNC: 59 U/L — SIGNIFICANT CHANGE UP (ref 40–120)
ALT FLD-CCNC: 10 U/L — SIGNIFICANT CHANGE UP (ref 10–45)
ANION GAP SERPL CALC-SCNC: 8 MMOL/L — SIGNIFICANT CHANGE UP (ref 5–17)
ANION GAP SERPL CALC-SCNC: 9 MMOL/L — SIGNIFICANT CHANGE UP (ref 5–17)
APTT BLD: 27.5 SEC — SIGNIFICANT CHANGE UP (ref 24.5–35.6)
AST SERPL-CCNC: 20 U/L — SIGNIFICANT CHANGE UP (ref 10–40)
BASOPHILS # BLD AUTO: 0.03 K/UL — SIGNIFICANT CHANGE UP (ref 0–0.2)
BASOPHILS NFR BLD AUTO: 0.3 % — SIGNIFICANT CHANGE UP (ref 0–2)
BILIRUB SERPL-MCNC: 0.8 MG/DL — SIGNIFICANT CHANGE UP (ref 0.2–1.2)
BUN SERPL-MCNC: 26 MG/DL — HIGH (ref 7–23)
BUN SERPL-MCNC: 28 MG/DL — HIGH (ref 7–23)
CALCIUM SERPL-MCNC: 8.4 MG/DL — SIGNIFICANT CHANGE UP (ref 8.4–10.5)
CALCIUM SERPL-MCNC: 8.5 MG/DL — SIGNIFICANT CHANGE UP (ref 8.4–10.5)
CHLORIDE SERPL-SCNC: 100 MMOL/L — SIGNIFICANT CHANGE UP (ref 96–108)
CHLORIDE SERPL-SCNC: 98 MMOL/L — SIGNIFICANT CHANGE UP (ref 96–108)
CO2 SERPL-SCNC: 29 MMOL/L — SIGNIFICANT CHANGE UP (ref 22–31)
CO2 SERPL-SCNC: 31 MMOL/L — SIGNIFICANT CHANGE UP (ref 22–31)
CREAT SERPL-MCNC: 1.29 MG/DL — SIGNIFICANT CHANGE UP (ref 0.5–1.3)
CREAT SERPL-MCNC: 1.37 MG/DL — HIGH (ref 0.5–1.3)
EGFR: 53 ML/MIN/1.73M2 — LOW
EGFR: 57 ML/MIN/1.73M2 — LOW
EOSINOPHIL # BLD AUTO: 0.39 K/UL — SIGNIFICANT CHANGE UP (ref 0–0.5)
EOSINOPHIL NFR BLD AUTO: 4 % — SIGNIFICANT CHANGE UP (ref 0–6)
GLUCOSE BLDC GLUCOMTR-MCNC: 108 MG/DL — HIGH (ref 70–99)
GLUCOSE SERPL-MCNC: 108 MG/DL — HIGH (ref 70–99)
GLUCOSE SERPL-MCNC: 108 MG/DL — HIGH (ref 70–99)
HCT VFR BLD CALC: 26.6 % — LOW (ref 39–50)
HCT VFR BLD CALC: 27 % — LOW (ref 39–50)
HGB BLD-MCNC: 8.6 G/DL — LOW (ref 13–17)
HGB BLD-MCNC: 8.8 G/DL — LOW (ref 13–17)
IMM GRANULOCYTES NFR BLD AUTO: 5.1 % — HIGH (ref 0–0.9)
INR BLD: 1.09 — SIGNIFICANT CHANGE UP (ref 0.85–1.18)
LYMPHOCYTES # BLD AUTO: 0.91 K/UL — LOW (ref 1–3.3)
LYMPHOCYTES # BLD AUTO: 9.2 % — LOW (ref 13–44)
MAGNESIUM SERPL-MCNC: 2 MG/DL — SIGNIFICANT CHANGE UP (ref 1.6–2.6)
MAGNESIUM SERPL-MCNC: 2 MG/DL — SIGNIFICANT CHANGE UP (ref 1.6–2.6)
MCHC RBC-ENTMCNC: 30.1 PG — SIGNIFICANT CHANGE UP (ref 27–34)
MCHC RBC-ENTMCNC: 30.2 PG — SIGNIFICANT CHANGE UP (ref 27–34)
MCHC RBC-ENTMCNC: 32.3 GM/DL — SIGNIFICANT CHANGE UP (ref 32–36)
MCHC RBC-ENTMCNC: 32.6 GM/DL — SIGNIFICANT CHANGE UP (ref 32–36)
MCV RBC AUTO: 92.8 FL — SIGNIFICANT CHANGE UP (ref 80–100)
MCV RBC AUTO: 93 FL — SIGNIFICANT CHANGE UP (ref 80–100)
MONOCYTES # BLD AUTO: 0.93 K/UL — HIGH (ref 0–0.9)
MONOCYTES NFR BLD AUTO: 9.5 % — SIGNIFICANT CHANGE UP (ref 2–14)
NEUTROPHILS # BLD AUTO: 7.08 K/UL — SIGNIFICANT CHANGE UP (ref 1.8–7.4)
NEUTROPHILS NFR BLD AUTO: 71.9 % — SIGNIFICANT CHANGE UP (ref 43–77)
NRBC # BLD: 0 /100 WBCS — SIGNIFICANT CHANGE UP (ref 0–0)
NRBC # BLD: 0 /100 WBCS — SIGNIFICANT CHANGE UP (ref 0–0)
PHOSPHATE SERPL-MCNC: 3 MG/DL — SIGNIFICANT CHANGE UP (ref 2.5–4.5)
PHOSPHATE SERPL-MCNC: 3.1 MG/DL — SIGNIFICANT CHANGE UP (ref 2.5–4.5)
PLATELET # BLD AUTO: 209 K/UL — SIGNIFICANT CHANGE UP (ref 150–400)
PLATELET # BLD AUTO: 240 K/UL — SIGNIFICANT CHANGE UP (ref 150–400)
POTASSIUM SERPL-MCNC: 3.9 MMOL/L — SIGNIFICANT CHANGE UP (ref 3.5–5.3)
POTASSIUM SERPL-MCNC: 3.9 MMOL/L — SIGNIFICANT CHANGE UP (ref 3.5–5.3)
POTASSIUM SERPL-SCNC: 3.9 MMOL/L — SIGNIFICANT CHANGE UP (ref 3.5–5.3)
POTASSIUM SERPL-SCNC: 3.9 MMOL/L — SIGNIFICANT CHANGE UP (ref 3.5–5.3)
PROT SERPL-MCNC: 4.8 G/DL — LOW (ref 6–8.3)
PROTHROM AB SERPL-ACNC: 12.4 SEC — SIGNIFICANT CHANGE UP (ref 9.5–13)
RBC # BLD: 2.86 M/UL — LOW (ref 4.2–5.8)
RBC # BLD: 2.91 M/UL — LOW (ref 4.2–5.8)
RBC # FLD: 14.9 % — HIGH (ref 10.3–14.5)
RBC # FLD: 15 % — HIGH (ref 10.3–14.5)
SODIUM SERPL-SCNC: 137 MMOL/L — SIGNIFICANT CHANGE UP (ref 135–145)
SODIUM SERPL-SCNC: 138 MMOL/L — SIGNIFICANT CHANGE UP (ref 135–145)
WBC # BLD: 10.36 K/UL — SIGNIFICANT CHANGE UP (ref 3.8–10.5)
WBC # BLD: 9.84 K/UL — SIGNIFICANT CHANGE UP (ref 3.8–10.5)
WBC # FLD AUTO: 10.36 K/UL — SIGNIFICANT CHANGE UP (ref 3.8–10.5)
WBC # FLD AUTO: 9.84 K/UL — SIGNIFICANT CHANGE UP (ref 3.8–10.5)

## 2024-04-22 PROCEDURE — 99233 SBSQ HOSP IP/OBS HIGH 50: CPT

## 2024-04-22 PROCEDURE — 71045 X-RAY EXAM CHEST 1 VIEW: CPT | Mod: 26

## 2024-04-22 PROCEDURE — 99231 SBSQ HOSP IP/OBS SF/LOW 25: CPT

## 2024-04-22 PROCEDURE — 93308 TTE F-UP OR LMTD: CPT | Mod: 26

## 2024-04-22 RX ORDER — POTASSIUM CHLORIDE 20 MEQ
40 PACKET (EA) ORAL ONCE
Refills: 0 | Status: COMPLETED | OUTPATIENT
Start: 2024-04-22 | End: 2024-04-22

## 2024-04-22 RX ORDER — SODIUM CHLORIDE 9 MG/ML
3 INJECTION INTRAMUSCULAR; INTRAVENOUS; SUBCUTANEOUS EVERY 8 HOURS
Refills: 0 | Status: DISCONTINUED | OUTPATIENT
Start: 2024-04-22 | End: 2024-04-25

## 2024-04-22 RX ORDER — POTASSIUM CHLORIDE 20 MEQ
20 PACKET (EA) ORAL ONCE
Refills: 0 | Status: COMPLETED | OUTPATIENT
Start: 2024-04-22 | End: 2024-04-22

## 2024-04-22 RX ADMIN — PANTOPRAZOLE SODIUM 40 MILLIGRAM(S): 20 TABLET, DELAYED RELEASE ORAL at 06:24

## 2024-04-22 RX ADMIN — SODIUM CHLORIDE 3 MILLILITER(S): 9 INJECTION INTRAMUSCULAR; INTRAVENOUS; SUBCUTANEOUS at 13:17

## 2024-04-22 RX ADMIN — CARVEDILOL PHOSPHATE 6.25 MILLIGRAM(S): 80 CAPSULE, EXTENDED RELEASE ORAL at 05:07

## 2024-04-22 RX ADMIN — CARVEDILOL PHOSPHATE 6.25 MILLIGRAM(S): 80 CAPSULE, EXTENDED RELEASE ORAL at 17:27

## 2024-04-22 RX ADMIN — SODIUM CHLORIDE 3 MILLILITER(S): 9 INJECTION INTRAMUSCULAR; INTRAVENOUS; SUBCUTANEOUS at 21:20

## 2024-04-22 RX ADMIN — GABAPENTIN 100 MILLIGRAM(S): 400 CAPSULE ORAL at 05:07

## 2024-04-22 RX ADMIN — SPIRONOLACTONE 25 MILLIGRAM(S): 25 TABLET, FILM COATED ORAL at 05:07

## 2024-04-22 RX ADMIN — Medication 20 MILLIGRAM(S): at 10:05

## 2024-04-22 RX ADMIN — CHLORHEXIDINE GLUCONATE 1 APPLICATION(S): 213 SOLUTION TOPICAL at 05:07

## 2024-04-22 RX ADMIN — Medication 0.25 MILLIGRAM(S): at 05:06

## 2024-04-22 RX ADMIN — GABAPENTIN 100 MILLIGRAM(S): 400 CAPSULE ORAL at 21:26

## 2024-04-22 RX ADMIN — HEPARIN SODIUM 7500 UNIT(S): 5000 INJECTION INTRAVENOUS; SUBCUTANEOUS at 05:07

## 2024-04-22 RX ADMIN — ATORVASTATIN CALCIUM 40 MILLIGRAM(S): 80 TABLET, FILM COATED ORAL at 21:26

## 2024-04-22 RX ADMIN — Medication 500 MILLIGRAM(S): at 05:06

## 2024-04-22 RX ADMIN — HEPARIN SODIUM 7500 UNIT(S): 5000 INJECTION INTRAVENOUS; SUBCUTANEOUS at 13:42

## 2024-04-22 RX ADMIN — BUMETANIDE 2 MILLIGRAM(S): 0.25 INJECTION INTRAMUSCULAR; INTRAVENOUS at 04:36

## 2024-04-22 RX ADMIN — GABAPENTIN 100 MILLIGRAM(S): 400 CAPSULE ORAL at 13:43

## 2024-04-22 RX ADMIN — HEPARIN SODIUM 7500 UNIT(S): 5000 INJECTION INTRAVENOUS; SUBCUTANEOUS at 21:26

## 2024-04-22 RX ADMIN — Medication 0.25 MILLIGRAM(S): at 17:27

## 2024-04-22 RX ADMIN — Medication 81 MILLIGRAM(S): at 11:53

## 2024-04-22 RX ADMIN — MUPIROCIN 1 APPLICATION(S): 20 OINTMENT TOPICAL at 05:06

## 2024-04-22 RX ADMIN — Medication 40 MILLIEQUIVALENT(S): at 04:36

## 2024-04-22 RX ADMIN — Medication 20 MILLIEQUIVALENT(S): at 20:05

## 2024-04-22 NOTE — PROGRESS NOTE ADULT - SUBJECTIVE AND OBJECTIVE BOX
Patient is a 78y Male seen and evaluated at bedside. Sitting up in chair comfortably. No acute events overnight noted. Denies any new symptoms. Ambulating with assistance.       Meds:    acetaminophen     Tablet .. 975 every 6 hours PRN  aspirin  chewable 81 daily  atorvastatin 40 at bedtime  bisacodyl Suppository 10 once  buDESOnide    Inhalation Suspension 0.25 every 12 hours  carvedilol 6.25 every 12 hours  gabapentin 100 every 8 hours  heparin   Injectable 7500 every 8 hours  oxyCODONE    IR 5 every 6 hours PRN  pantoprazole    Tablet 40 before breakfast  polyethylene glycol 3350 17 daily  senna 2 at bedtime  sodium chloride 0.9% lock flush 3 every 8 hours  spironolactone 25 daily  torsemide 20 daily      T(C): , Max: 37.6 (04-21-24 @ 21:15)  T(F): , Max: 99.7 (04-21-24 @ 21:15)  HR: 79 (04-22-24 @ 11:00)  BP: 125/58 (04-22-24 @ 09:50)  BP(mean): 84 (04-22-24 @ 09:50)  RR: 13 (04-22-24 @ 11:00)  SpO2: 93% (04-22-24 @ 11:00)  Wt(kg): --    04-21 @ 07:01  -  04-22 @ 07:00  --------------------------------------------------------  IN: 325 mL / OUT: 1400 mL / NET: -1075 mL    04-22 @ 07:01  -  04-22 @ 12:43  --------------------------------------------------------  IN: 0 mL / OUT: 500 mL / NET: -500 mL          Review of Systems:  ROS negative except as per HPI      PHYSICAL EXAM:  GENERAL: Alert, awake, NAD  CHEST/LUNG: Bilateral clear breath sounds  HEART: S1, S2  ABDOMEN: Soft, nontender  : Siegel removed  EXTREMITIES: Trace upper and lower extremity edema  Neurology: AAOx3, no focal neurological deficit  ACCESS:  None      LABS:                        8.6    9.84  )-----------( 209      ( 22 Apr 2024 03:50 )             26.6     04-22    137  |  100  |  26<H>  ----------------------------<  108<H>  3.9   |  29  |  1.29    Ca    8.4      22 Apr 2024 03:50  Phos  3.1     04-22  Mg     2.0     04-22    TPro  4.8<L>  /  Alb  3.1<L>  /  TBili  0.8  /  DBili  x   /  AST  20  /  ALT  10  /  AlkPhos  59  04-22      PT/INR - ( 22 Apr 2024 03:50 )   PT: 12.4 sec;   INR: 1.09          PTT - ( 22 Apr 2024 03:50 )  PTT:27.5 sec  Urinalysis Basic - ( 22 Apr 2024 03:50 )    Color: x / Appearance: x / SG: x / pH: x  Gluc: 108 mg/dL / Ketone: x  / Bili: x / Urobili: x   Blood: x / Protein: x / Nitrite: x   Leuk Esterase: x / RBC: x / WBC x   Sq Epi: x / Non Sq Epi: x / Bacteria: x            RADIOLOGY & ADDITIONAL STUDIES:           Patient is a 78y Male seen and evaluated at bedside. Sitting up in chair comfortably. No acute events overnight noted. Denies any new symptoms. Ambulating with assistance.       Meds:    acetaminophen     Tablet .. 975 every 6 hours PRN  aspirin  chewable 81 daily  atorvastatin 40 at bedtime  bisacodyl Suppository 10 once  buDESOnide    Inhalation Suspension 0.25 every 12 hours  carvedilol 6.25 every 12 hours  gabapentin 100 every 8 hours  heparin   Injectable 7500 every 8 hours  oxyCODONE    IR 5 every 6 hours PRN  pantoprazole    Tablet 40 before breakfast  polyethylene glycol 3350 17 daily  senna 2 at bedtime  sodium chloride 0.9% lock flush 3 every 8 hours  spironolactone 25 daily  torsemide 20 daily      T(C): , Max: 37.6 (04-21-24 @ 21:15)  T(F): , Max: 99.7 (04-21-24 @ 21:15)  HR: 79 (04-22-24 @ 11:00)  BP: 125/58 (04-22-24 @ 09:50)  BP(mean): 84 (04-22-24 @ 09:50)  RR: 13 (04-22-24 @ 11:00)  SpO2: 93% (04-22-24 @ 11:00)  Wt(kg): --    04-21 @ 07:01  -  04-22 @ 07:00  --------------------------------------------------------  IN: 325 mL / OUT: 1400 mL / NET: -1075 mL    04-22 @ 07:01  -  04-22 @ 12:43  --------------------------------------------------------  IN: 0 mL / OUT: 500 mL / NET: -500 mL          Review of Systems:  ROS negative except as per HPI      PHYSICAL EXAM:  GENERAL: Alert, awake, NAD  CHEST/LUNG: Bilateral clear breath sounds  HEART: S1, S2  ABDOMEN: Soft, nontender  : Siegel removed  EXTREMITIES: Trace upper and lower extremity edema  Neurology: AAOx3, no focal neurological deficit  ACCESS:  None      LABS:                        8.6    9.84  )-----------( 209      ( 22 Apr 2024 03:50 )             26.6     04-22    137  |  100  |  26<H>  ----------------------------<  108<H>  3.9   |  29  |  1.29    Ca    8.4      22 Apr 2024 03:50  Phos  3.1     04-22  Mg     2.0     04-22    TPro  4.8<L>  /  Alb  3.1<L>  /  TBili  0.8  /  DBili  x   /  AST  20  /  ALT  10  /  AlkPhos  59  04-22      PT/INR - ( 22 Apr 2024 03:50 )   PT: 12.4 sec;   INR: 1.09          PTT - ( 22 Apr 2024 03:50 )  PTT:27.5 sec  Urinalysis Basic - ( 22 Apr 2024 03:50 )    Color: x / Appearance: x / SG: x / pH: x  Gluc: 108 mg/dL / Ketone: x  / Bili: x / Urobili: x   Blood: x / Protein: x / Nitrite: x   Leuk Esterase: x / RBC: x / WBC x   Sq Epi: x / Non Sq Epi: x / Bacteria: x            RADIOLOGY & ADDITIONAL STUDIES:    Creatinine: 1.37 mg/dL (04-22-24 @ 18:00)  Creatinine: 1.29 mg/dL (04-22-24 @ 03:50)  Creatinine: 1.35 mg/dL (04-21-24 @ 03:56)  Creatinine: 1.48 mg/dL (04-20-24 @ 17:28)  Creatinine: 1.45 mg/dL (04-20-24 @ 10:14)  Creatinine: 1.50 mg/dL (04-20-24 @ 06:04)  Creatinine: 1.68 mg/dL (04-19-24 @ 22:52)  Creatinine: 1.73 mg/dL (04-19-24 @ 17:36)  Creatinine: 1.55 mg/dL (04-19-24 @ 10:13)  Creatinine: 1.66 mg/dL (04-19-24 @ 02:55)

## 2024-04-22 NOTE — PROGRESS NOTE ADULT - SUBJECTIVE AND OBJECTIVE BOX
CTICU  CRITICAL  CARE  attending     Hand off received 					   Pertinent clinical, laboratory, radiographic, hemodynamic, echocardiographic, respiratory data, microbiologic data and chart were reviewed and analyzed frequently throughout the course of the day and night      78 year old male with HTN, HLD,  HFrEF (LVEF 35%), ADAM (on CPAP), CKD stage 4 (cr 1.2-1.49 -2024), morbid obesity.   He has history of paroxysmal Atrial flutter (on Eliquis), unprovoked PE/DVT s/p IVC filter placement then retrieval (, on Eliquis).  He was evaluated for worsening dyspnea on exertion.   TTE (24): mod reduced LV systolic function, LV global strain w/ apical sparing which may be indicative of amyloidosis, mildly reduced RV systolic function, mod AR, severe MR, LVEF 35%.  LHC: Non obstructive CAD.     S/P MVR  S/P CABG x 1   S/P SWEETIE occlusion  S/P Cryomaze        FAMILY HISTORY:  No pertinent family history in first degree relatives    PAST MEDICAL & SURGICAL HISTORY:  HTN (hypertension)  Pericarditis  Morbid Obesity  ADAM (obstructive sleep apnea)  Chronic deep vein thrombosis (DVT) of non-extremity vein  Chronic pulmonary embolism without acute cor pulmonale  Essential hypertension  Gout  Aortic regurgitation  History of aortic insufficiency  History of hip replacement, unspecified laterality  Bilateral cataracts        14 system review was unremarkable    Vital signs, hemodynamic and respiratory parameters were reviewed from the bedside nursing flow sheet.  ICU Vital Signs Last 24 Hrs   T(C): 36.8 (2024 22:14), Max: 36.9 (2024 08:39)  T(F): 98.2 (2024 22:14), Max: 98.5 (2024 08:39)  HR: 82 (2024 21:00) (76 - 86)  BP: 130/60 (2024 21:00) (110/53 - 150/65)  BP(mean): 86 (2024 21:00) (77 - 93)  ABP: --  ABP(mean): --  RR: 15 (2024 21:00) (11 - 20)  SpO2: 94% (2024 21:00) (92% - 100%)    O2 Parameters below as of 2024 21:00  Patient On (Oxygen Delivery Method): room air          Adult Advanced Hemodynamics Last 24 Hrs  CVP(mm Hg): --  CVP(cm H2O): --  CO: --  CI: --  PA: --  PA(mean): --  PCWP: --  SVR: --  SVRI: --  PVR: --  PVRI: --, ABG - ( 2024 04:21 )  pH, Arterial: 7.42  pH, Blood: x     /  pCO2: 45    /  pO2: 115   / HCO3: 29    / Base Excess: 4.0   /  SaO2: 98.7                Intake and output was reviewed and the fluid balance was calculated  Daily     Daily Weight in k.8 (2024 06:29)  I&O's Summary    2024 07:01  -  2024 07:00  --------------------------------------------------------  IN: 325 mL / OUT: 1400 mL / NET: -1075 mL    2024 07:01  -  2024 00:25  --------------------------------------------------------  IN: 300 mL / OUT: 1400 mL / NET: -1100 mL            Neuro: No change in the mental status from the baseline.   Neck: No JVD.  CVS: S1, S2, No S3.  Lungs: Good air entry bilaterally.  Abd: Soft. No tenderness. + Bowel sounds.  Vascular: + DP/PT.  Extremities: No edema.  Lymphatic: Normal.  Skin: No abnormalities.      labs  CBC Full  -  ( 2024 18:00 )  WBC Count : 10.36 K/uL  RBC Count : 2.91 M/uL  Hemoglobin : 8.8 g/dL  Hematocrit : 27.0 %  Platelet Count - Automated : 240 K/uL  Mean Cell Volume : 92.8 fl  Mean Cell Hemoglobin : 30.2 pg  Mean Cell Hemoglobin Concentration : 32.6 gm/dL  Auto Neutrophil # : x  Auto Lymphocyte # : x  Auto Monocyte # : x  Auto Eosinophil # : x  Auto Basophil # : x  Auto Neutrophil % : x  Auto Lymphocyte % : x  Auto Monocyte % : x  Auto Eosinophil % : x  Auto Basophil % : x        138  |  98  |  28<H>  ----------------------------<  108<H>  3.9   |  31  |  1.37<H>    Ca    8.5      2024 18:00  Phos  3.0       Mg     2.0         TPro  4.8<L>  /  Alb  3.1<L>  /  TBili  0.8  /  DBili  x   /  AST  20  /  ALT  10  /  AlkPhos  59  -    PT/INR - ( 2024 03:50 )   PT: 12.4 sec;   INR: 1.09          PTT - ( 2024 03:50 )  PTT:27.5 sec  The current medications were reviewed   MEDICATIONS  (STANDING):  aspirin  chewable 81 milliGRAM(s) Oral daily  atorvastatin 40 milliGRAM(s) Oral at bedtime  bisacodyl Suppository 10 milliGRAM(s) Rectal once  buDESOnide    Inhalation Suspension 0.25 milliGRAM(s) Inhalation every 12 hours  carvedilol 6.25 milliGRAM(s) Oral every 12 hours  gabapentin 100 milliGRAM(s) Oral every 8 hours  heparin   Injectable 7500 Unit(s) SubCutaneous every 8 hours  pantoprazole    Tablet 40 milliGRAM(s) Oral before breakfast  polyethylene glycol 3350 17 Gram(s) Oral daily  senna 2 Tablet(s) Oral at bedtime  sodium chloride 0.9% lock flush 3 milliLiter(s) IV Push every 8 hours  spironolactone 25 milliGRAM(s) Oral daily  torsemide 20 milliGRAM(s) Oral daily    MEDICATIONS  (PRN):  acetaminophen     Tablet .. 975 milliGRAM(s) Oral every 6 hours PRN Temp greater or equal to 38C (100.4F), Mild Pain (1 - 3)  oxyCODONE    IR 5 milliGRAM(s) Oral every 6 hours PRN Moderate Pain (4 - 6)            78 year old  Male admitted with CHF due to severe MR.  S/P MVR  S/P CABG x 1   S/P SWEETIE occlusion  S/P Cryomaze  S/P Mediastinal washout out for bleeding and posthemorrhagic anemia.  Hemodynamically stable.  Good oxygenation.  Fair urine out put.        My plan includes :  Aldactone and torsemide.  Statin and Betablocker.  Bronchodilator Rx  Close hemodynamic monitoring.  Monitor for arrhythmias and monitor parameters for organ perfusion  Monitor neurologic status  Monitor renal function.  Head of the bed should remain elevated to 45 deg .   Chest PT and IS will be encouraged  Monitor adequacy of oxygenation and ventilation and attempt to wean oxygen  Nutritional goals will be met using po eventually , ensure adequate caloric intake and monitor the same  Stress ulcer and VTE prophylaxis will be achieved    Glycemic control is satisfactory  Electrolytes have been repleted as necessary and wound care has been carried out. Pain control has been achieved.   Aggressive physical therapy and early mobility and ambulation goals will be met   The family was updated about the course and plan  CRITICAL CARE TIME SPENT in evaluation and management, reassessments, review and interpretation of labs and x-rays, ventilator and hemodynamic management, formulating a plan and coordinating care: ___30____ MIN.  Time does not include procedural time.  CTICU ATTENDING     					    Mckay Jacobo MD

## 2024-04-22 NOTE — PROGRESS NOTE ADULT - SUBJECTIVE AND OBJECTIVE BOX
CTICU  CRITICAL  CARE  attending     Hand off received 					   Pertinent clinical, laboratory, radiographic, hemodynamic, echocardiographic, respiratory data, microbiologic data and chart were reviewed and analyzed frequently throughout the course of the day and night  Patient seen and examined with CTS/ SH attending at bedside  Pt is a 78y , Male, HEALTH ISSUES - PROBLEM Dx:      , FAMILY HISTORY:  No pertinent family history in first degree relatives    PAST MEDICAL & SURGICAL HISTORY:  HTN (hypertension)      Pericarditis      Obesity, unspecified classification, unspecified obesity type, unspecified whether serious comorbidity present      ADAM (obstructive sleep apnea)      Chronic deep vein thrombosis (DVT) of non-extremity vein      Other chronic pulmonary embolism without acute cor pulmonale      Essential hypertension      Gout      Aortic regurgitation      History of aortic insufficiency      History of hip replacement, unspecified laterality      Bilateral cataracts        Patient is a 78y old  Male who presents with a chief complaint of MVR (2024 21:41)      14 system review was unremarkable    Vital signs, hemodynamic and respiratory parameters were reviewed from the bedside nursing flowsheet.  ICU Vital Signs Last 24 Hrs  T(C): 36.7 (2024 17:24), Max: 37.6 (2024 21:15)  T(F): 98.1 (2024 17:24), Max: 99.7 (2024 21:15)  HR: 81 (2024 20:00) (76 - 88)  BP: 141/63 (2024 20:00) (110/53 - 150/65)  BP(mean): 90 (2024 20:00) (77 - 93)  ABP: --  ABP(mean): --  RR: 16 (2024 20:00) (11 - 20)  SpO2: 98% (2024 20:00) (92% - 100%)    O2 Parameters below as of 2024 19:00  Patient On (Oxygen Delivery Method): room air          Adult Advanced Hemodynamics Last 24 Hrs  CVP(mm Hg): --  CVP(cm H2O): --  CO: --  CI: --  PA: --  PA(mean): --  PCWP: --  SVR: --  SVRI: --  PVR: --  PVRI: --, ABG - ( 2024 04:21 )  pH, Arterial: 7.42  pH, Blood: x     /  pCO2: 45    /  pO2: 115   / HCO3: 29    / Base Excess: 4.0   /  SaO2: 98.7                Intake and output was reviewed and the fluid balance was calculated  Daily     Daily Weight in k.8 (2024 06:29)  I&O's Summary    2024 07:01  -  2024 07:00  --------------------------------------------------------  IN: 325 mL / OUT: 1400 mL / NET: -1075 mL    2024 07:01  -  2024 20:51  --------------------------------------------------------  IN: 300 mL / OUT: 1400 mL / NET: -1100 mL        All lines and drain sites were assessed  [facility]      POCT Blood Glucose.: 104 mg/dL (2024 16:43)    No acute change in mental status  Auscultation of the chest reveals equal bs  Abdomen is soft  Extremities are warm and well perfused  Wounds appear clean and unremarkable  Antibiotics are periop    labs  CBC Full  -  ( 2024 18:00 )  WBC Count : 10.36 K/uL  RBC Count : 2.91 M/uL  Hemoglobin : 8.8 g/dL  Hematocrit : 27.0 %  Platelet Count - Automated : 240 K/uL  Mean Cell Volume : 92.8 fl  Mean Cell Hemoglobin : 30.2 pg  Mean Cell Hemoglobin Concentration : 32.6 gm/dL  Auto Neutrophil # : x  Auto Lymphocyte # : x  Auto Monocyte # : x  Auto Eosinophil # : x  Auto Basophil # : x  Auto Neutrophil % : x  Auto Lymphocyte % : x  Auto Monocyte % : x  Auto Eosinophil % : x  Auto Basophil % : x        138  |  98  |  28<H>  ----------------------------<  108<H>  3.9   |  31  |  1.37<H>    Ca    8.5      2024 18:00  Phos  3.0     -  Mg     2.0         TPro  4.8<L>  /  Alb  3.1<L>  /  TBili  0.8  /  DBili  x   /  AST  20  /  ALT  10  /  AlkPhos  59  -    PT/INR - ( 2024 03:50 )   PT: 12.4 sec;   INR: 1.09          PTT - ( 2024 03:50 )  PTT:27.5 sec  The current medications were reviewed   MEDICATIONS  (STANDING):  aspirin  chewable 81 milliGRAM(s) Oral daily  atorvastatin 40 milliGRAM(s) Oral at bedtime  bisacodyl Suppository 10 milliGRAM(s) Rectal once  buDESOnide    Inhalation Suspension 0.25 milliGRAM(s) Inhalation every 12 hours  carvedilol 6.25 milliGRAM(s) Oral every 12 hours  gabapentin 100 milliGRAM(s) Oral every 8 hours  heparin   Injectable 7500 Unit(s) SubCutaneous every 8 hours  pantoprazole    Tablet 40 milliGRAM(s) Oral before breakfast  polyethylene glycol 3350 17 Gram(s) Oral daily  senna 2 Tablet(s) Oral at bedtime  sodium chloride 0.9% lock flush 3 milliLiter(s) IV Push every 8 hours  spironolactone 25 milliGRAM(s) Oral daily  torsemide 20 milliGRAM(s) Oral daily    MEDICATIONS  (PRN):  acetaminophen     Tablet .. 975 milliGRAM(s) Oral every 6 hours PRN Temp greater or equal to 38C (100.4F), Mild Pain (1 - 3)  oxyCODONE    IR 5 milliGRAM(s) Oral every 6 hours PRN Moderate Pain (4 - 6)       PROBLEM LIST/ ASSESSMENT:  HEALTH ISSUES - PROBLEM Dx:      ,   Patient is a 78y old  Male who presents with a chief complaint of MVR (2024 21:41)     s/p cardiac surgery    Acute systolic and diastolic heart failure evidenced by SOB and parenchymal infiltrates; will treat with diuresis      Acute post operative pulmonary insufficiency ruled in due to hypoxemia, O2 sats < 91% on RA treated with HFNC      Acute kidney injury - creatinine > 0.3 due to combined prerenal and intrarenal factors can presume ATN      My plan includes :  close hemodynamic, ventilatory and drain monitoring and management per post op routine    Monitor for arrhythmias and monitor parameters for organ perfusion  beta blockade not administered due to hemodynamic instability and bradycardia  monitor neurologic status  Head of the bed should remain elevated to 45 deg .   chest PT and IS will be encouraged  monitor adequacy of oxygenation and ventilation and attempt to wean oxygen  antibiotic regimen will be tailored to the clinical, laboratory and microbiologic data  Nutritional goals will be met using po eventually , ensure adequate caloric intake and montior the same  Stress ulcer and VTE prophylaxis will be achieved    Glycemic control is satisfactory  Electrolytes have been repleted as necessary and wound care has been carried out. Pain control has been achieved.   agressive physical therapy and early mobility and ambulation goals will be met   The family was updated about the course and plan  CRITICAL CARE TIME Upon my evaluation, this patient had a high probability of imminent or life-threatening deterioration due to the above problems which required my direct attention, intervention, and personal management.  I have personally provided 150 minutes of critical care time exclusive of time spent on separately billable procedures. Time included review of laboratory data, radiology results, discussion with consultants, and monitoring for potential decompensation. Interventions were performed as documented abovepersonally provided by me  in evaluation and management, reassessments, review and interpretation of labs and x-rays, ventilator and hemodynamic management, formulating a plan and coordinating care: ___150___ MIN.  Time does not include procedural time.    CTICU ATTENDING     					    Rufus Salas MD

## 2024-04-22 NOTE — PROGRESS NOTE ADULT - ATTENDING COMMENTS
I agree with the fellow's findings and plans as written above with the following additions/amendments:    Seen and examined at bedside, feels well, no complaints. sCr continues to improve, encourage ambulation and incentive spirometer. Further recs as above
I agree with the fellow's findings and plans as written above with the following additions/amendments:    Seen and examined at bedside, improving fluid status, extubated, continue goal mildly net negative, ambulate as possible. Further recs as above, discussed with primary team
I agree with the fellow's findings and plans as written above with the following additions/amendments:    Seen and examined at bedside, feels well, breathing well, walking well with PT, awaiting further exercise and rehab. Dia gayle. Further recs as above, discussed with primary team
I agree with the fellow's findings and plans as written above with the following additions/amendments:    Seen and examined at bedside. Renal US images reviewed personally, stable. sCr stable, Would continue net negative, discussed mobilization and use of inhaled spirometer. Continue to trend labs, Further recs as above, discussed with primary team

## 2024-04-22 NOTE — PROGRESS NOTE ADULT - ASSESSMENT
78-year-old male with HTN, CKD was admitted for severe MR s/p CABG and placement of mitral valve 4/17 c/b postop cardiogenic shock.  Nephrology consulted for Cr 1.65 4/18 and drop in UO, now improving with better perfusion and resolution of shock. S/p IV diuresis, now transitioned to PO 4/21    # Nonoliguric ATN on CKD3-resolved  BCr 1.2-1.4  Cr elevation as above, now improved and back to baseline, 1.29 today  UA 4/16 with proteinuria, no hematuria  US 4/20; Rt 12.4cm, Lt 11.4cm, no hydro  Etiology likely iATN i/s/o shock    Fluid balance last 24 hours:  -1L (UO 1.4L)  Fluid balance last 48 hours: - 2.9 L     Recommend:  Vol status improving. Appears near euvolemic. Maintain mild net neg fluid balance  Transitioned to PO diuretics 4/21. C/w torsemide 20mg daily  Maintain SBP> 110 for adequate renal perfusion  Strict I&Os  BMP per icu protocol

## 2024-04-23 ENCOUNTER — RESULT REVIEW (OUTPATIENT)
Age: 79
End: 2024-04-23

## 2024-04-23 LAB
ALBUMIN SERPL ELPH-MCNC: 3 G/DL — LOW (ref 3.3–5)
ALP SERPL-CCNC: 61 U/L — SIGNIFICANT CHANGE UP (ref 40–120)
ALT FLD-CCNC: 14 U/L — SIGNIFICANT CHANGE UP (ref 10–45)
ANION GAP SERPL CALC-SCNC: 7 MMOL/L — SIGNIFICANT CHANGE UP (ref 5–17)
APTT BLD: 28.9 SEC — SIGNIFICANT CHANGE UP (ref 24.5–35.6)
AST SERPL-CCNC: 23 U/L — SIGNIFICANT CHANGE UP (ref 10–40)
BILIRUB SERPL-MCNC: 0.7 MG/DL — SIGNIFICANT CHANGE UP (ref 0.2–1.2)
BUN SERPL-MCNC: 29 MG/DL — HIGH (ref 7–23)
CALCIUM SERPL-MCNC: 8.5 MG/DL — SIGNIFICANT CHANGE UP (ref 8.4–10.5)
CHLORIDE SERPL-SCNC: 100 MMOL/L — SIGNIFICANT CHANGE UP (ref 96–108)
CO2 SERPL-SCNC: 31 MMOL/L — SIGNIFICANT CHANGE UP (ref 22–31)
CREAT SERPL-MCNC: 1.34 MG/DL — HIGH (ref 0.5–1.3)
EGFR: 54 ML/MIN/1.73M2 — LOW
GLUCOSE SERPL-MCNC: 111 MG/DL — HIGH (ref 70–99)
HCT VFR BLD CALC: 25.5 % — LOW (ref 39–50)
HGB BLD-MCNC: 8.5 G/DL — LOW (ref 13–17)
INR BLD: 1.1 — SIGNIFICANT CHANGE UP (ref 0.85–1.18)
MAGNESIUM SERPL-MCNC: 2 MG/DL — SIGNIFICANT CHANGE UP (ref 1.6–2.6)
MCHC RBC-ENTMCNC: 30.6 PG — SIGNIFICANT CHANGE UP (ref 27–34)
MCHC RBC-ENTMCNC: 33.3 GM/DL — SIGNIFICANT CHANGE UP (ref 32–36)
MCV RBC AUTO: 91.7 FL — SIGNIFICANT CHANGE UP (ref 80–100)
NRBC # BLD: 0 /100 WBCS — SIGNIFICANT CHANGE UP (ref 0–0)
PHOSPHATE SERPL-MCNC: 3.5 MG/DL — SIGNIFICANT CHANGE UP (ref 2.5–4.5)
PLATELET # BLD AUTO: 238 K/UL — SIGNIFICANT CHANGE UP (ref 150–400)
POTASSIUM SERPL-MCNC: 4.2 MMOL/L — SIGNIFICANT CHANGE UP (ref 3.5–5.3)
POTASSIUM SERPL-SCNC: 4.2 MMOL/L — SIGNIFICANT CHANGE UP (ref 3.5–5.3)
PROT SERPL-MCNC: 5 G/DL — LOW (ref 6–8.3)
PROTHROM AB SERPL-ACNC: 12.5 SEC — SIGNIFICANT CHANGE UP (ref 9.5–13)
RBC # BLD: 2.78 M/UL — LOW (ref 4.2–5.8)
RBC # FLD: 15.1 % — HIGH (ref 10.3–14.5)
SODIUM SERPL-SCNC: 138 MMOL/L — SIGNIFICANT CHANGE UP (ref 135–145)
WBC # BLD: 10.69 K/UL — HIGH (ref 3.8–10.5)
WBC # FLD AUTO: 10.69 K/UL — HIGH (ref 3.8–10.5)

## 2024-04-23 PROCEDURE — 71046 X-RAY EXAM CHEST 2 VIEWS: CPT | Mod: 26

## 2024-04-23 PROCEDURE — 93306 TTE W/DOPPLER COMPLETE: CPT | Mod: 26

## 2024-04-23 RX ORDER — APIXABAN 2.5 MG/1
5 TABLET, FILM COATED ORAL EVERY 12 HOURS
Refills: 0 | Status: DISCONTINUED | OUTPATIENT
Start: 2024-04-23 | End: 2024-04-25

## 2024-04-23 RX ADMIN — Medication 0.25 MILLIGRAM(S): at 06:05

## 2024-04-23 RX ADMIN — APIXABAN 5 MILLIGRAM(S): 2.5 TABLET, FILM COATED ORAL at 17:28

## 2024-04-23 RX ADMIN — GABAPENTIN 100 MILLIGRAM(S): 400 CAPSULE ORAL at 14:34

## 2024-04-23 RX ADMIN — POLYETHYLENE GLYCOL 3350 17 GRAM(S): 17 POWDER, FOR SOLUTION ORAL at 13:04

## 2024-04-23 RX ADMIN — Medication 81 MILLIGRAM(S): at 13:06

## 2024-04-23 RX ADMIN — SODIUM CHLORIDE 3 MILLILITER(S): 9 INJECTION INTRAMUSCULAR; INTRAVENOUS; SUBCUTANEOUS at 05:03

## 2024-04-23 RX ADMIN — SODIUM CHLORIDE 3 MILLILITER(S): 9 INJECTION INTRAMUSCULAR; INTRAVENOUS; SUBCUTANEOUS at 13:57

## 2024-04-23 RX ADMIN — CARVEDILOL PHOSPHATE 6.25 MILLIGRAM(S): 80 CAPSULE, EXTENDED RELEASE ORAL at 17:28

## 2024-04-23 RX ADMIN — GABAPENTIN 100 MILLIGRAM(S): 400 CAPSULE ORAL at 22:24

## 2024-04-23 RX ADMIN — ATORVASTATIN CALCIUM 40 MILLIGRAM(S): 80 TABLET, FILM COATED ORAL at 22:24

## 2024-04-23 RX ADMIN — SPIRONOLACTONE 25 MILLIGRAM(S): 25 TABLET, FILM COATED ORAL at 06:05

## 2024-04-23 RX ADMIN — SODIUM CHLORIDE 3 MILLILITER(S): 9 INJECTION INTRAMUSCULAR; INTRAVENOUS; SUBCUTANEOUS at 22:20

## 2024-04-23 RX ADMIN — Medication 0.25 MILLIGRAM(S): at 17:28

## 2024-04-23 RX ADMIN — Medication 20 MILLIGRAM(S): at 06:05

## 2024-04-23 RX ADMIN — PANTOPRAZOLE SODIUM 40 MILLIGRAM(S): 20 TABLET, DELAYED RELEASE ORAL at 06:06

## 2024-04-23 RX ADMIN — SENNA PLUS 2 TABLET(S): 8.6 TABLET ORAL at 22:24

## 2024-04-23 RX ADMIN — GABAPENTIN 100 MILLIGRAM(S): 400 CAPSULE ORAL at 06:05

## 2024-04-23 RX ADMIN — HEPARIN SODIUM 7500 UNIT(S): 5000 INJECTION INTRAVENOUS; SUBCUTANEOUS at 06:05

## 2024-04-23 RX ADMIN — CARVEDILOL PHOSPHATE 6.25 MILLIGRAM(S): 80 CAPSULE, EXTENDED RELEASE ORAL at 06:05

## 2024-04-23 NOTE — PROGRESS NOTE ADULT - ASSESSMENT
77 YO Male w/ PMHx of HFrEF (LVEF 35%), pAflutter (on Eliquis), unprovoked PE/DVT s/p IVC filter placement then retrieval (, on Eliquis), HTN,. HLD, ADAM (on CPAP), CKD IV (cr 1.2-1.49 -2024), who was referred to Dr. Pate for evaluation of severe MR. TTE (24): mod reduced LV systolic function, LV global strain w/ apical sparing which may be indicative of amyloidosis, mildly reduced RV systolic function, mod AR, severe MR, LVEF 35%. On 24 patient underwent MV replacement (31mm Mosaic), cryomaze, SWEETIE occlusion, CABGx1 (GSV to RCA). Arrived to CTICU on levo, epi and primacor. Increased CT output, received 3PRBC, 1FFP, 5 Cryo, 1 plt, became anuric on levo, vaso, epi, . RTOR for bleeding, received 1u PRBC intraop. POD 1 Remained on , diuresed with bumex gtt, bronch with tan secretions, started on ceftriaxone/azithromycin. He was extubated in afternoon and midodrine started. POD 2 Transitioned to Bumex IVP,  at 2.5. POD 3 Weaned off . POD 4 BB started. POD 5 wires removed. POD 6 transferred to floor, NOAC resumed.     Plan:    Neurovascular:   -Hx of unprovoked PE/DVT s/p IVC filter placement then retrieval ()  -Cont Eliquis  -Pain well controlled with current regimen. PRN's: Tylenol and oxycodone  -Gabapentin per ERP protocol    Cardiovascular:   -Severe MR s/p MV replacement (31mm Mosaic), cryomaze, SWEETIE occlusion, CABGx1 (GSV to RCA) on 24  -Cont ASA  -Hx of HFrEF (LVef 35%)  -Cont Coreg, Spironolactone and Torsemide  -Hx of pAFlutter  -Eliquis re-started  -Hx of HTN  -Cont BB  -Hx of HLD  -Cont statin  -Hemodynamically stable.   -Monitor: BP, HR, tele    Respiratory:   -Hx of ADAM (on CPAP)  -Oxygenating well on room air  -Encourage continued use of IS 10x/hr and frequent ambulation  -CXR:    GI:  -GI PPX: Protonix  -PO Diet  -Bowel Regimen: Miralax and senna     Renal / :  -Hx of CKD IV  -Baseline Cr 1.2-1.4  -Nephrology following, recs appreciated  -Cont diuresis  -Continue to monitor renal function: BUN/Cr: 29/1.34  -Monitor I/O's daily     Endocrine:    -No hx of DM or thyroid dx  -A1c: 4.7  -TSH: 2.38    Hematologic:  -CBC: H/H- 8.5/25  -Coagulation Panel.    ID:  -Temperature: Afebrile  -CBC: WBC- 10.69    Prophylaxis:  -Cont Eliquis  -Continue with SCD's b/l while patient is at rest     Disposition:  -Discharge home once patient is medically ready

## 2024-04-23 NOTE — PROGRESS NOTE ADULT - SUBJECTIVE AND OBJECTIVE BOX
Patient discussed on morning rounds with      Operation / Date:     SUBJECTIVE ASSESSMENT:    Vital Signs Last 24 Hrs  T(C): 36.5 (23 Apr 2024 05:09), Max: 36.8 (22 Apr 2024 22:14)  T(F): 97.7 (23 Apr 2024 05:09), Max: 98.2 (22 Apr 2024 22:14)  HR: 80 (23 Apr 2024 08:20) (76 - 86)  BP: 132/60 (23 Apr 2024 08:20) (110/53 - 150/65)  BP(mean): 87 (23 Apr 2024 08:20) (77 - 93)  RR: 18 (23 Apr 2024 08:20) (11 - 18)  SpO2: 95% (23 Apr 2024 08:20) (93% - 100%)    Parameters below as of 23 Apr 2024 08:20  Patient On (Oxygen Delivery Method): room air    I&O's Detail    22 Apr 2024 07:01  -  23 Apr 2024 07:00  --------------------------------------------------------  IN:    Oral Fluid: 300 mL  Total IN: 300 mL    OUT:    Voided (mL): 2600 mL  Total OUT: 2600 mL    Total NET: -2300 mL    23 Apr 2024 07:01  -  23 Apr 2024 09:46  --------------------------------------------------------  IN:    Oral Fluid: 240 mL  Total IN: 240 mL    OUT:    Voided (mL): 400 mL  Total OUT: 400 mL    Total NET: -160 mL    CHEST TUBE:    MELINDA DRAIN:    EPICARDIAL WIRES:   TIE HENRY:   CHER:     PHYSICAL EXAM:  *****    LABS:                        8.5    10.69 )-----------( 238      ( 23 Apr 2024 03:34 )             25.5     PT/INR - ( 23 Apr 2024 03:34 )   PT: 12.5 sec;   INR: 1.10        PTT - ( 23 Apr 2024 03:34 )  PTT:28.9 sec    04-23    138  |  100  |  29<H>  ----------------------------<  111<H>  4.2   |  31  |  1.34<H>    Ca    8.5      23 Apr 2024 03:34  Phos  3.5     04-23  Mg     2.0     04-23    TPro  5.0<L>  /  Alb  3.0<L>  /  TBili  0.7  /  DBili  x   /  AST  23  /  ALT  14  /  AlkPhos  61  04-23    Urinalysis Basic - ( 23 Apr 2024 03:34 )    Color: x / Appearance: x / SG: x / pH: x  Gluc: 111 mg/dL / Ketone: x  / Bili: x / Urobili: x   Blood: x / Protein: x / Nitrite: x   Leuk Esterase: x / RBC: x / WBC x   Sq Epi: x / Non Sq Epi: x / Bacteria: x    MEDICATIONS  (STANDING):  apixaban 5 milliGRAM(s) Oral every 12 hours  aspirin  chewable 81 milliGRAM(s) Oral daily  atorvastatin 40 milliGRAM(s) Oral at bedtime  bisacodyl Suppository 10 milliGRAM(s) Rectal once  buDESOnide    Inhalation Suspension 0.25 milliGRAM(s) Inhalation every 12 hours  carvedilol 6.25 milliGRAM(s) Oral every 12 hours  gabapentin 100 milliGRAM(s) Oral every 8 hours  pantoprazole    Tablet 40 milliGRAM(s) Oral before breakfast  polyethylene glycol 3350 17 Gram(s) Oral daily  senna 2 Tablet(s) Oral at bedtime  sodium chloride 0.9% lock flush 3 milliLiter(s) IV Push every 8 hours  spironolactone 25 milliGRAM(s) Oral daily  torsemide 20 milliGRAM(s) Oral daily    MEDICATIONS  (PRN):  acetaminophen     Tablet .. 975 milliGRAM(s) Oral every 6 hours PRN Temp greater or equal to 38C (100.4F), Mild Pain (1 - 3)  oxyCODONE    IR 5 milliGRAM(s) Oral every 6 hours PRN Moderate Pain (4 - 6)        RADIOLOGY & ADDITIONAL TESTS:     Patient discussed on morning rounds with Dr. Pate    Operation / Date: s/p MV replacement (31mm Mosaic), cryomaze, SWEETIE occlusion, CABGx1 (GSV to RCA) on 4/17/24  RTOR for Re-exploration for bleeding on 4/17    SUBJECTIVE ASSESSMENT: Patient seen and examined at bedside.     Vital Signs Last 24 Hrs  T(C): 36.5 (23 Apr 2024 05:09), Max: 36.8 (22 Apr 2024 22:14)  T(F): 97.7 (23 Apr 2024 05:09), Max: 98.2 (22 Apr 2024 22:14)  HR: 80 (23 Apr 2024 08:20) (76 - 86)  BP: 132/60 (23 Apr 2024 08:20) (110/53 - 150/65)  BP(mean): 87 (23 Apr 2024 08:20) (77 - 93)  RR: 18 (23 Apr 2024 08:20) (11 - 18)  SpO2: 95% (23 Apr 2024 08:20) (93% - 100%)    Parameters below as of 23 Apr 2024 08:20  Patient On (Oxygen Delivery Method): room air    I&O's Detail    22 Apr 2024 07:01  -  23 Apr 2024 07:00  --------------------------------------------------------  IN:    Oral Fluid: 300 mL  Total IN: 300 mL    OUT:    Voided (mL): 2600 mL  Total OUT: 2600 mL    Total NET: -2300 mL    23 Apr 2024 07:01  -  23 Apr 2024 09:46  --------------------------------------------------------  IN:    Oral Fluid: 240 mL  Total IN: 240 mL    OUT:    Voided (mL): 400 mL  Total OUT: 400 mL    Total NET: -160 mL    CHEST TUBE:    MELINDA DRAIN:    EPICARDIAL WIRES:   TIE HENRY:   CHER:     PHYSICAL EXAM:  GENERAL: NAD, lying in bed comfortably  HEAD:  Atraumatic, Normocephalic  EYES: EOMI, PERRLA, conjunctiva and sclera clear  ENT: Moist mucous membranes  NECK: Supple, No JVD  CHEST/LUNG: Clear to auscultation bilaterally; No rales, rhonchi, wheezing, or rubs. Unlabored respirations  HEART: Regular rate and rhythm; No murmurs, rubs, or gallops  ABDOMEN: Bowel sounds present; Soft, Nontender, Nondistended. No hepatomegally  EXTREMITIES:  2+ Peripheral Pulses, brisk capillary refill. No clubbing, cyanosis, or edema  NERVOUS SYSTEM:  Alert & Oriented X3, speech clear. No deficits     LABS:                        8.5    10.69 )-----------( 238      ( 23 Apr 2024 03:34 )             25.5     PT/INR - ( 23 Apr 2024 03:34 )   PT: 12.5 sec;   INR: 1.10        PTT - ( 23 Apr 2024 03:34 )  PTT:28.9 sec    04-23    138  |  100  |  29<H>  ----------------------------<  111<H>  4.2   |  31  |  1.34<H>    Ca    8.5      23 Apr 2024 03:34  Phos  3.5     04-23  Mg     2.0     04-23    TPro  5.0<L>  /  Alb  3.0<L>  /  TBili  0.7  /  DBili  x   /  AST  23  /  ALT  14  /  AlkPhos  61  04-23    Urinalysis Basic - ( 23 Apr 2024 03:34 )    Color: x / Appearance: x / SG: x / pH: x  Gluc: 111 mg/dL / Ketone: x  / Bili: x / Urobili: x   Blood: x / Protein: x / Nitrite: x   Leuk Esterase: x / RBC: x / WBC x   Sq Epi: x / Non Sq Epi: x / Bacteria: x    MEDICATIONS  (STANDING):  apixaban 5 milliGRAM(s) Oral every 12 hours  aspirin  chewable 81 milliGRAM(s) Oral daily  atorvastatin 40 milliGRAM(s) Oral at bedtime  bisacodyl Suppository 10 milliGRAM(s) Rectal once  buDESOnide    Inhalation Suspension 0.25 milliGRAM(s) Inhalation every 12 hours  carvedilol 6.25 milliGRAM(s) Oral every 12 hours  gabapentin 100 milliGRAM(s) Oral every 8 hours  pantoprazole    Tablet 40 milliGRAM(s) Oral before breakfast  polyethylene glycol 3350 17 Gram(s) Oral daily  senna 2 Tablet(s) Oral at bedtime  sodium chloride 0.9% lock flush 3 milliLiter(s) IV Push every 8 hours  spironolactone 25 milliGRAM(s) Oral daily  torsemide 20 milliGRAM(s) Oral daily    MEDICATIONS  (PRN):  acetaminophen     Tablet .. 975 milliGRAM(s) Oral every 6 hours PRN Temp greater or equal to 38C (100.4F), Mild Pain (1 - 3)  oxyCODONE    IR 5 milliGRAM(s) Oral every 6 hours PRN Moderate Pain (4 - 6)    RADIOLOGY & ADDITIONAL TESTS:  < from: Xray Chest 1 View- PORTABLE-Routine (Xray Chest 1 View- PORTABLE-Routine in AM.) (04.22.24 @ 05:13) >  Findings/  impression: Right basilar focal atelectasis and left basilar   opacity/pleural effusion, unchanged. Heart size within normal limits,   status post median sternotomy.         Patient discussed on morning rounds with Dr. Pate    Operation / Date: s/p MV replacement (31mm Mosaic), cryomaze, SWEETIE occlusion, CABGx1 (GSV to RCA) on 4/17/24  RTOR for Re-exploration for bleeding on 4/17    SUBJECTIVE ASSESSMENT: Patient seen and examined at bedside. Patient states that he feels fine, ambulated from ICU. Denies chills, chest pain, SOB, palpitations.     Vital Signs Last 24 Hrs  T(C): 36.5 (23 Apr 2024 05:09), Max: 36.8 (22 Apr 2024 22:14)  T(F): 97.7 (23 Apr 2024 05:09), Max: 98.2 (22 Apr 2024 22:14)  HR: 80 (23 Apr 2024 08:20) (76 - 86)  BP: 132/60 (23 Apr 2024 08:20) (110/53 - 150/65)  BP(mean): 87 (23 Apr 2024 08:20) (77 - 93)  RR: 18 (23 Apr 2024 08:20) (11 - 18)  SpO2: 95% (23 Apr 2024 08:20) (93% - 100%)    Parameters below as of 23 Apr 2024 08:20  Patient On (Oxygen Delivery Method): room air    I&O's Detail    22 Apr 2024 07:01  -  23 Apr 2024 07:00  --------------------------------------------------------  IN:    Oral Fluid: 300 mL  Total IN: 300 mL    OUT:    Voided (mL): 2600 mL  Total OUT: 2600 mL    Total NET: -2300 mL    23 Apr 2024 07:01  -  23 Apr 2024 09:46  --------------------------------------------------------  IN:    Oral Fluid: 240 mL  Total IN: 240 mL    OUT:    Voided (mL): 400 mL  Total OUT: 400 mL    Total NET: -160 mL    CHEST TUBE:  None  MELINDA DRAIN:  None  EPICARDIAL WIRES: None  TIE DOWNS: Yes x2  KWON: None    PHYSICAL EXAM:  GENERAL: NAD, lying in bed comfortably  HEAD:  Atraumatic, Normocephalic  EYES: EOMI, PERRLA, conjunctiva and sclera clear  ENT: Moist mucous membranes  NECK: Supple, No JVD  CHEST/LUNG:  CTAB; MSI well approximated, tie downs x 2 in place  HEART: RRR  ABDOMEN: Soft, Nontender, Nondistended. No hepatomegally  EXTREMITIES:  2+ Peripheral Pulses, brisk capillary refill. No clubbing, cyanosis, or edema  NERVOUS SYSTEM:  Alert & Oriented X3, speech clear. No deficits     LABS:                        8.5    10.69 )-----------( 238      ( 23 Apr 2024 03:34 )             25.5     PT/INR - ( 23 Apr 2024 03:34 )   PT: 12.5 sec;   INR: 1.10        PTT - ( 23 Apr 2024 03:34 )  PTT:28.9 sec    04-23    138  |  100  |  29<H>  ----------------------------<  111<H>  4.2   |  31  |  1.34<H>    Ca    8.5      23 Apr 2024 03:34  Phos  3.5     04-23  Mg     2.0     04-23    TPro  5.0<L>  /  Alb  3.0<L>  /  TBili  0.7  /  DBili  x   /  AST  23  /  ALT  14  /  AlkPhos  61  04-23    Urinalysis Basic - ( 23 Apr 2024 03:34 )    Color: x / Appearance: x / SG: x / pH: x  Gluc: 111 mg/dL / Ketone: x  / Bili: x / Urobili: x   Blood: x / Protein: x / Nitrite: x   Leuk Esterase: x / RBC: x / WBC x   Sq Epi: x / Non Sq Epi: x / Bacteria: x    MEDICATIONS  (STANDING):  apixaban 5 milliGRAM(s) Oral every 12 hours  aspirin  chewable 81 milliGRAM(s) Oral daily  atorvastatin 40 milliGRAM(s) Oral at bedtime  bisacodyl Suppository 10 milliGRAM(s) Rectal once  buDESOnide    Inhalation Suspension 0.25 milliGRAM(s) Inhalation every 12 hours  carvedilol 6.25 milliGRAM(s) Oral every 12 hours  gabapentin 100 milliGRAM(s) Oral every 8 hours  pantoprazole    Tablet 40 milliGRAM(s) Oral before breakfast  polyethylene glycol 3350 17 Gram(s) Oral daily  senna 2 Tablet(s) Oral at bedtime  sodium chloride 0.9% lock flush 3 milliLiter(s) IV Push every 8 hours  spironolactone 25 milliGRAM(s) Oral daily  torsemide 20 milliGRAM(s) Oral daily    MEDICATIONS  (PRN):  acetaminophen     Tablet .. 975 milliGRAM(s) Oral every 6 hours PRN Temp greater or equal to 38C (100.4F), Mild Pain (1 - 3)  oxyCODONE    IR 5 milliGRAM(s) Oral every 6 hours PRN Moderate Pain (4 - 6)    RADIOLOGY & ADDITIONAL TESTS:  < from: Xray Chest 1 View- PORTABLE-Routine (Xray Chest 1 View- PORTABLE-Routine in AM.) (04.22.24 @ 05:13) >  Findings/  impression: Right basilar focal atelectasis and left basilar   opacity/pleural effusion, unchanged. Heart size within normal limits,   status post median sternotomy.

## 2024-04-24 ENCOUNTER — TRANSCRIPTION ENCOUNTER (OUTPATIENT)
Age: 79
End: 2024-04-24

## 2024-04-24 LAB
ANION GAP SERPL CALC-SCNC: 12 MMOL/L — SIGNIFICANT CHANGE UP (ref 5–17)
BUN SERPL-MCNC: 27 MG/DL — HIGH (ref 7–23)
CALCIUM SERPL-MCNC: 8.9 MG/DL — SIGNIFICANT CHANGE UP (ref 8.4–10.5)
CHLORIDE SERPL-SCNC: 101 MMOL/L — SIGNIFICANT CHANGE UP (ref 96–108)
CO2 SERPL-SCNC: 26 MMOL/L — SIGNIFICANT CHANGE UP (ref 22–31)
CREAT SERPL-MCNC: 1.32 MG/DL — HIGH (ref 0.5–1.3)
EGFR: 55 ML/MIN/1.73M2 — LOW
GLUCOSE BLDC GLUCOMTR-MCNC: 107 MG/DL — HIGH (ref 70–99)
GLUCOSE SERPL-MCNC: 108 MG/DL — HIGH (ref 70–99)
HCT VFR BLD CALC: 27.1 % — LOW (ref 39–50)
HGB BLD-MCNC: 8.9 G/DL — LOW (ref 13–17)
MAGNESIUM SERPL-MCNC: 2 MG/DL — SIGNIFICANT CHANGE UP (ref 1.6–2.6)
MCHC RBC-ENTMCNC: 30.7 PG — SIGNIFICANT CHANGE UP (ref 27–34)
MCHC RBC-ENTMCNC: 32.8 GM/DL — SIGNIFICANT CHANGE UP (ref 32–36)
MCV RBC AUTO: 93.4 FL — SIGNIFICANT CHANGE UP (ref 80–100)
NRBC # BLD: 0 /100 WBCS — SIGNIFICANT CHANGE UP (ref 0–0)
PHOSPHATE SERPL-MCNC: 3.3 MG/DL — SIGNIFICANT CHANGE UP (ref 2.5–4.5)
PLATELET # BLD AUTO: 278 K/UL — SIGNIFICANT CHANGE UP (ref 150–400)
POTASSIUM SERPL-MCNC: 4.3 MMOL/L — SIGNIFICANT CHANGE UP (ref 3.5–5.3)
POTASSIUM SERPL-SCNC: 4.3 MMOL/L — SIGNIFICANT CHANGE UP (ref 3.5–5.3)
RBC # BLD: 2.9 M/UL — LOW (ref 4.2–5.8)
RBC # FLD: 15.4 % — HIGH (ref 10.3–14.5)
SODIUM SERPL-SCNC: 139 MMOL/L — SIGNIFICANT CHANGE UP (ref 135–145)
WBC # BLD: 11.94 K/UL — HIGH (ref 3.8–10.5)
WBC # FLD AUTO: 11.94 K/UL — HIGH (ref 3.8–10.5)

## 2024-04-24 RX ADMIN — SODIUM CHLORIDE 3 MILLILITER(S): 9 INJECTION INTRAMUSCULAR; INTRAVENOUS; SUBCUTANEOUS at 22:16

## 2024-04-24 RX ADMIN — PANTOPRAZOLE SODIUM 40 MILLIGRAM(S): 20 TABLET, DELAYED RELEASE ORAL at 07:34

## 2024-04-24 RX ADMIN — POLYETHYLENE GLYCOL 3350 17 GRAM(S): 17 POWDER, FOR SOLUTION ORAL at 11:03

## 2024-04-24 RX ADMIN — GABAPENTIN 100 MILLIGRAM(S): 400 CAPSULE ORAL at 05:50

## 2024-04-24 RX ADMIN — CARVEDILOL PHOSPHATE 6.25 MILLIGRAM(S): 80 CAPSULE, EXTENDED RELEASE ORAL at 17:29

## 2024-04-24 RX ADMIN — Medication 20 MILLIGRAM(S): at 05:50

## 2024-04-24 RX ADMIN — ATORVASTATIN CALCIUM 40 MILLIGRAM(S): 80 TABLET, FILM COATED ORAL at 21:20

## 2024-04-24 RX ADMIN — APIXABAN 5 MILLIGRAM(S): 2.5 TABLET, FILM COATED ORAL at 17:29

## 2024-04-24 RX ADMIN — Medication 81 MILLIGRAM(S): at 11:03

## 2024-04-24 RX ADMIN — SODIUM CHLORIDE 3 MILLILITER(S): 9 INJECTION INTRAMUSCULAR; INTRAVENOUS; SUBCUTANEOUS at 13:51

## 2024-04-24 RX ADMIN — SENNA PLUS 2 TABLET(S): 8.6 TABLET ORAL at 21:20

## 2024-04-24 RX ADMIN — Medication 0.25 MILLIGRAM(S): at 05:50

## 2024-04-24 RX ADMIN — Medication 0.25 MILLIGRAM(S): at 17:29

## 2024-04-24 RX ADMIN — APIXABAN 5 MILLIGRAM(S): 2.5 TABLET, FILM COATED ORAL at 05:49

## 2024-04-24 RX ADMIN — SODIUM CHLORIDE 3 MILLILITER(S): 9 INJECTION INTRAMUSCULAR; INTRAVENOUS; SUBCUTANEOUS at 07:02

## 2024-04-24 RX ADMIN — SPIRONOLACTONE 25 MILLIGRAM(S): 25 TABLET, FILM COATED ORAL at 05:49

## 2024-04-24 RX ADMIN — CARVEDILOL PHOSPHATE 6.25 MILLIGRAM(S): 80 CAPSULE, EXTENDED RELEASE ORAL at 05:49

## 2024-04-24 NOTE — DISCHARGE NOTE PROVIDER - CARE PROVIDERS DIRECT ADDRESSES
[email or web address]

## 2024-04-24 NOTE — DISCHARGE NOTE NURSING/CASE MANAGEMENT/SOCIAL WORK - NSDCFUADDAPPT_GEN_ALL_CORE_FT
Regarding your follow up appointments, our office will call you with the scheduled appointments, if you do not receive a call by 4/26, please call [phone].

## 2024-04-24 NOTE — CHART NOTE - NSCHARTNOTEFT_GEN_A_CORE
Admitting Diagnosis:   Patient is a 78y old  Male who presents with a chief complaint of severe MR (24 Apr 2024 11:53)      PAST MEDICAL & SURGICAL HISTORY:  HTN (hypertension)      Pericarditis      Obesity, unspecified classification, unspecified obesity type, unspecified whether serious comorbidity present      ADAM (obstructive sleep apnea)      Chronic deep vein thrombosis (DVT) of non-extremity vein      Other chronic pulmonary embolism without acute cor pulmonale      Essential hypertension      Gout      Aortic regurgitation      History of aortic insufficiency      History of hip replacement, unspecified laterality      Bilateral cataracts          Current Nutrition Order: DASH/TLC       PO Intake: Good (%) [ x ]  Fair (50-75%) [   ] Poor (<25%) [   ]    GI Issues: noted with abdominal distention, pt endorses constipation, last BM 4/22 per flowsheets; on bowel regimen    Skin Integrity: surgical incisions to MSI and R leg, +1 generalized edema     Labs:   04-24    139  |  101  |  27<H>  ----------------------------<  108<H>  4.3   |  26  |  1.32<H>    Ca    8.9      24 Apr 2024 05:30  Phos  3.3     04-24  Mg     2.0     04-24    TPro  5.0<L>  /  Alb  3.0<L>  /  TBili  0.7  /  DBili  x   /  AST  23  /  ALT  14  /  AlkPhos  61  04-23    CAPILLARY BLOOD GLUCOSE        Medications:  MEDICATIONS  (STANDING):  apixaban 5 milliGRAM(s) Oral every 12 hours  aspirin  chewable 81 milliGRAM(s) Oral daily  atorvastatin 40 milliGRAM(s) Oral at bedtime  bisacodyl Suppository 10 milliGRAM(s) Rectal once  buDESOnide    Inhalation Suspension 0.25 milliGRAM(s) Inhalation every 12 hours  carvedilol 6.25 milliGRAM(s) Oral every 12 hours  pantoprazole    Tablet 40 milliGRAM(s) Oral before breakfast  polyethylene glycol 3350 17 Gram(s) Oral daily  senna 2 Tablet(s) Oral at bedtime  sodium chloride 0.9% lock flush 3 milliLiter(s) IV Push every 8 hours  spironolactone 25 milliGRAM(s) Oral daily  torsemide 20 milliGRAM(s) Oral daily    MEDICATIONS  (PRN):  acetaminophen     Tablet .. 975 milliGRAM(s) Oral every 6 hours PRN Temp greater or equal to 38C (100.4F), Mild Pain (1 - 3)  oxyCODONE    IR 5 milliGRAM(s) Oral every 6 hours PRN Moderate Pain (4 - 6)      Anthropometrics:  Dosing height: 73in  Dosing weight: 123.8kg/272.9 pounds  BMI: 36  IBW: 184 pounds          %IBW: 148%    Weight Change:   130.5kg/287.7 pounds on 4/21  129.8kg/286.1 pounds on 4/22    Nutrition Focused Physical Exam: Completed [   ]  Not Pertinent [   ]  >>no overt signs of muscle or fating wasting     Estimated energy needs:   Weight used for calculations	IBW  Estimated Energy Needs Weight (lbs)	184 lb  Estimated Energy Needs Weight (kg)	83.4 kg  Estimated Energy Needs From (shobha/kg)	25  Estimated Energy Needs To (shobha/kg)	30  Estimated Energy Needs Calculated From (shobha/kg)	2085  Estimated Energy Needs Calculated To (shobha/kg)	2502  Weight used for calculations	IBW  Estimated Protein Needs Weight (lbs)	184 lb  Estimated Protein Needs Weight (kg)	83.4 kg  Estimated Protein Needs From (g/kg)	1.2  Estimated Protein Needs To (g/kg)	1.4  Estimated Protein Needs Calculated From (g/kg)	100.08  Estimated Protein Needs Calculated To (g/kg)	116.76  Estimated Fluid Needs Weight (lbs)	184 lb  Estimated Fluid Needs Weight (kg)	83.4 kg  Estimated Fluid Needs From (ml/kg)	30  Estimated Fluid Needs To (ml/kg)	35  Estimated Fluid Needs Calculated From (ml/kg)	2502  Estimated Fluid Needs Calculated To (ml/kg)	2919  Other Calculations	Based on Standards of Care pt above % IBW (184#, 148%) thus ideal body weight used for all calculations (184#). Needs adjusted for advanced age and post-op healing.    Subjective:   78yr old male with PMH HFrEF (35%), aflutter on eliquis, PE/DVT sp IVC filter placement sp retrieval 2015, HTN, HLD, ADAM on CPAP, CKD4, admitted for surgical mgmt of severe MR. 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 Pate 4/17/24. 4/17-18 reexploration for bleeding 4/18 extubated to bipap 4/23 transferred to floor    Pt seen for follow up assessment, OOB in chair on room air. Labs and medications reviewed. Electrolytes WDL, BUN 27<H>, Cr 1.32<H>, glucose 108-111 x 24 hours. Ordered for NS, torsemide, protonix. Pt continues on DASH/TLC diet with good appetite and reflective PO intake. Consumed 100% of breakfast which consisted of turkey varghese, pancakes, and eggs. Encouraged continued PO intake and asked for food preferences, however pt denies any at this time. Noted with constipation, on bowel regimen. RD to remain available for additional nutrition interventions as needed.     Previous Nutrition Diagnosis: Increased Nutrient Needs related to increased physiological demand for nutrients as evidenced by post-op healing    Active [ x ]  Resolved [   ]    Goal: Pt to meet at least 75% of nutritional needs consistently     Recommendations:  1. Continue DASH/TLC diet   2. Encourage and monitor PO intake, honor preferences as able   3. Monitor labs, wt trends, GI function, and skin integrity   4. Pain and bowel regimen per team   5. 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 acute cor pulmonale      Essential hypertension      Gout      Aortic regurgitation      History of aortic insufficiency      History of hip replacement, unspecified laterality      Bilateral cataracts          Current Nutrition Order: DASH/TLC       PO Intake: Good (%) [ x ]  Fair (50-75%) [   ] Poor (<25%) [   ]    GI Issues: noted with abdominal distention, pt endorses constipation, last BM 4/22 per flowsheets; on bowel regimen    Skin Integrity: surgical incisions to MSI and R leg, +1 generalized edema     Labs:   04-24    139  |  101  |  27<H>  ----------------------------<  108<H>  4.3   |  26  |  1.32<H>    Ca    8.9      24 Apr 2024 05:30  Phos  3.3     04-24  Mg     2.0     04-24    TPro  5.0<L>  /  Alb  3.0<L>  /  TBili  0.7  /  DBili  x   /  AST  23  /  ALT  14  /  AlkPhos  61  04-23    CAPILLARY BLOOD GLUCOSE        Medications:  MEDICATIONS  (STANDING):  apixaban 5 milliGRAM(s) Oral every 12 hours  aspirin  chewable 81 milliGRAM(s) Oral daily  atorvastatin 40 milliGRAM(s) Oral at bedtime  bisacodyl Suppository 10 milliGRAM(s) Rectal once  buDESOnide    Inhalation Suspension 0.25 milliGRAM(s) Inhalation every 12 hours  carvedilol 6.25 milliGRAM(s) Oral every 12 hours  pantoprazole    Tablet 40 milliGRAM(s) Oral before breakfast  polyethylene glycol 3350 17 Gram(s) Oral daily  senna 2 Tablet(s) Oral at bedtime  sodium chloride 0.9% lock flush 3 milliLiter(s) IV Push every 8 hours  spironolactone 25 milliGRAM(s) Oral daily  torsemide 20 milliGRAM(s) Oral daily    MEDICATIONS  (PRN):  acetaminophen     Tablet .. 975 milliGRAM(s) Oral every 6 hours PRN Temp greater or equal to 38C (100.4F), Mild Pain (1 - 3)  oxyCODONE    IR 5 milliGRAM(s) Oral every 6 hours PRN Moderate Pain (4 - 6)      Anthropometrics:  Dosing height: 73in  Dosing weight: 123.8kg/272.9 pounds  BMI: 36  IBW: 184 pounds          %IBW: 148%    Weight Change:   130.5kg/287.7 pounds on 4/21  129.8kg/286.1 pounds on 4/22  *weight change may be in setting of fluid shifts. will continue to monitor trends.     Nutrition Focused Physical Exam: Completed [   ]  Not Pertinent [   ]  >>no overt signs of muscle or fating wasting     Estimated energy needs:   Weight used for calculations	IBW  Estimated Energy Needs Weight (lbs)	184 lb  Estimated Energy Needs Weight (kg)	83.4 kg  Estimated Energy Needs From (shobha/kg)	25  Estimated Energy Needs To (shobha/kg)	30  Estimated Energy Needs Calculated From (shobha/kg)	2085  Estimated Energy Needs Calculated To (shobha/kg)	2502  Weight used for calculations	IBW  Estimated Protein Needs Weight (lbs)	184 lb  Estimated Protein Needs Weight (kg)	83.4 kg  Estimated Protein Needs From (g/kg)	1.2  Estimated Protein Needs To (g/kg)	1.4  Estimated Protein Needs Calculated From (g/kg)	100.08  Estimated Protein Needs Calculated To (g/kg)	116.76  Estimated Fluid Needs Weight (lbs)	184 lb  Estimated Fluid Needs Weight (kg)	83.4 kg  Estimated Fluid Needs From (ml/kg)	30  Estimated Fluid Needs To (ml/kg)	35  Estimated Fluid Needs Calculated From (ml/kg)	2502  Estimated Fluid Needs Calculated To (ml/kg)	2919  Other Calculations	Based on Standards of Care pt above % IBW (184#, 148%) thus ideal body weight used for all calculations (184#). Needs adjusted for advanced age and post-op healing.    Subjective:   78yr old male with PMH HFrEF (35%), aflutter on eliquis, PE/DVT sp IVC filter placement sp retrieval 2015, HTN, HLD, ADAM on CPAP, CKD4, admitted for surgical mgmt of severe MR. 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 Pate 4/17/24. 4/17-18 reexploration for bleeding 4/18 extubated to bipap 4/23 transferred to floor    Pt seen for follow up assessment, OOB in chair on room air. Labs and medications reviewed. Electrolytes WDL, BUN 27<H>, Cr 1.32<H>, glucose 108-111 x 24 hours. Ordered for NS, torsemide, protonix. Pt continues on DASH/TLC diet with good appetite and reflective PO intake. Consumed 100% of breakfast which consisted of turkey varghese, pancakes, and eggs. Encouraged continued PO intake and asked for food preferences, however pt denies any at this time. Noted with constipation, on bowel regimen. RD to remain available for additional nutrition interventions as needed.     Previous Nutrition Diagnosis: Increased Nutrient Needs related to increased physiological demand for nutrients as evidenced by post-op healing    Active [ x ]  Resolved [   ]    Goal: Pt to meet at least 75% of nutritional needs consistently     Recommendations:  1. Continue DASH/TLC diet   2. Encourage and monitor PO intake, honor preferences as able   3. Monitor labs, wt trends, GI function, and skin integrity   4. Pain and bowel regimen per team   5. RD to remain available for additional nutrition interventions and diet edu as needed       Risk Level: High [   ] Moderate [ x ] Low [   ]

## 2024-04-24 NOTE — DISCHARGE NOTE PROVIDER - NSDCCPTREATMENT_GEN_ALL_CORE_FT
PRINCIPAL PROCEDURE  Procedure: Replacement, mitral valve, with FAREED, adult  Findings and Treatment: 31 mm Mosaic valve

## 2024-04-24 NOTE — DISCHARGE NOTE NURSING/CASE MANAGEMENT/SOCIAL WORK - NSDCPEFALRISK_GEN_ALL_CORE
For information on Fall & Injury Prevention, visit: [email or web address] OR  [email or web address] OR  https://www.cdc.gov/steadi/patient.html

## 2024-04-24 NOTE — DISCHARGE NOTE PROVIDER - NSDCFUADDAPPT_GEN_ALL_CORE_FT
Regarding your follow up appointments, our office will call you with the scheduled appointments, if you do not receive a call by 4/26, please call [phone].

## 2024-04-24 NOTE — DISCHARGE NOTE PROVIDER - CARE PROVIDER_API CALL
Aiden Pate  Thoracic and Cardiac Surgery  [address], [address]  [place] [address]  Phone: [phone]  Fax: [phone]  Follow Up Time: 1 week    Jay Ramirez  Cardiovascular Disease  [address] [place] [address], [address] [place] [address]  Phone: [phone]  Fax: [phone]  Follow Up Time: 2 weeks

## 2024-04-24 NOTE — DISCHARGE NOTE PROVIDER - HOSPITAL COURSE
Patient discussed on morning rounds with  _____    Operation Date: : MV replacement (31mm Mosaic), cryomaze, SWEETIE occlusion, CABGx1 (GSV to RCA)  : Re-exploration for bleeding    Primary Surgeon/Attending MD: Dr. Pate    Referring Physician: Dr. Jay Ramirez   _ _ _ _ _ _ _ _ _ _ _ _  HOSPITAL COURSE:  77 YO Male w/ PMHx of HFrEF (LVEF 35%), pAflutter (on Eliquis), unprovoked PE/DVT s/p IVC filter placement then retrieval (, on Eliquis), HTN,. HLD, ADAM (on CPAP), CKD IV (cr 1.2-1.49 -2024), who was referred to Dr. Pate for evaluation of severe MR. TTE (24): mod reduced LV systolic function, LV global strain w/ apical sparing which may be indicative of amyloidosis, mildly reduced RV systolic function, mod AR, severe MR, LVEF 35%. On 24 patient underwent MV replacement (31mm Mosaic), cryomaze, SWEETIE occlusion, CABGx1 (GSV to RCA). Arrived to CTICU on levo, epi and primacor. Increased CT output, received 3PRBC, 1FFP, 5 Cryo, 1 plt, became anuric on levo, vaso, epi, . RTOR for bleeding, received 1u PRBC intraop. POD 1 Remained on , diuresed with bumex gtt, bronch with tan secretions, started on ceftriaxone/azithromycin. He was extubated in afternoon and midodrine started. POD 2 Transitioned to Bumex IVP,  at 2.5. POD 3 Weaned off . POD 4 BB started. POD 5 wires removed. POD 6 transferred to floor, NOAC resumed. POD7: patient given script for rolling walker, ambulating the halls well. Plan for DC tomorrow POD 8:     _ _ _ _ _ _ _ _ _ _ _ _  DISCHARGE PHYSICAL EXAM:  General:  Neuro:  Cardio:  Pulm:  GI/:  Vascular:  Extremities:  Incisions:  _ _ _ _ _ _ _ _ _ _ _ _  REMOVAL CHECKLIST:        [x ] Epicardial wires          [ ] Stitches/tie downs,   If no, why? ______          [ ] PICC/Midline,   If no, why? ________  _ _ _ _ _ _ _ _ _ _ _ _   MEDICATION DISCHARGE CHECKLIST    CABG        [ x] Aspirin, [  ] Contraindicated, Reason_______________________________        [ ] Plavix, [ x ] Contraindicated, Reason on eliquis         [ x] Statin, [  ] Contraindicated, Reason_______________________________        [ x] Lasix , [  ] Contraindicated, Reason_______________________________              Duration: _____        [x ] Beta-Blocker, [  ] Contraindicated, Reason_______________________________       Surgical Valve        [ x] Aspirin, [  ] Contraindicated, Reason_______________________________        [x ] Lasix, [  ] Contraindicated, Reason_______________________________             Duration: _____        [x ] Beta-Blocker, [  ] Contraindicated, Reason_______________________________        Anticoagulation        [ x] NOAC, [ ] Reason aflutter              Cost/Insurance barriers addressed: YES/NO        _ _ _ _ _ _ _ _ _ _ _ _  RELEVANT LABS/IMAGING:    _ _ _ _ _ _ _ _ _ _ _ _  CLINICAL FOLLOW UP NEEDS:     [ ] Labwork           Labs needed:           When/Timing:           Outpatient team aware: YES/NO         [ ] Imaging            Type:           When/Timing:           Outpatient team aware: YES/NO       [ ] Home equipment           Type: (i.e. wound vac, pneumostat, prevena, wet/dry dressings, picc/midlines, MCOT, handley etc)           Specific needs:           Outpatient team aware: YES/NO  _ _ _ _ _ _ _ _ _ _ _ _  Over 35 minutes was spent with the patient reviewing the discharge material including medications, follow up appointments, recovery, concerning symptoms, and how to contact their health care providers if they have questions   Patient discussed on morning rounds with  _____    Operation Date: : MV replacement (31mm Mosaic), cryomaze, SWEETIE occlusion, CABGx1 (GSV to RCA)  : Re-exploration for bleeding    Primary Surgeon/Attending MD: Dr. Pate    Referring Physician: Dr. Jay Ramirez   _ _ _ _ _ _ _ _ _ _ _ _  HOSPITAL COURSE:  77 YO Male w/ PMHx of HFrEF (LVEF 35%), pAflutter (on Eliquis), unprovoked PE/DVT s/p IVC filter placement then retrieval (, on Eliquis), HTN,. HLD, ADAM (on CPAP), CKD IV (cr 1.2-1.49 -2024), who was referred to Dr. Pate for evaluation of severe MR. TTE (24): mod reduced LV systolic function, LV global strain w/ apical sparing which may be indicative of amyloidosis, mildly reduced RV systolic function, mod AR, severe MR, LVEF 35%. On 24 patient underwent MV replacement (31mm Mosaic), cryomaze, SWEETIE occlusion, CABGx1 (GSV to RCA). Arrived to CTICU on levo, epi and primacor. Increased CT output, received 3PRBC, 1FFP, 5 Cryo, 1 plt, became anuric on levo, vaso, epi, . RTOR for bleeding, received 1u PRBC intraop. POD 1 Remained on , diuresed with bumex gtt, bronch with tan secretions, started on ceftriaxone/azithromycin. He was extubated in afternoon and midodrine started. POD 2 Transitioned to Bumex IVP,  at 2.5. POD 3 Weaned off . POD 4 BB started. POD 5 wires removed. POD 6 transferred to floor, NOAC resumed. POD7: patient given script for rolling walker, ambulating the halls well. On POD 8 pt was deemed medically stable for discharge by Dr. Pate. Pt was in his baseline aflutter rate controlled on Eliquis. At time of discharge pt tolerated a po diet, ambulated without assistance and had a post op bm. Pt denies dizziness, vision changes, chest pain, palpitations, shortness of breath, cough, n/v/d, extremity swelling, calf tenderness.   _ _ _ _ _ _ _ _ _ _ _ _  DISCHARGE PHYSICAL EXAM:  GEN: NAD, looks comfortable  Psych: Mood appropriate  Neuro: A&Ox3.  No focal deficits.  Moving all extremities.   HEENT: No obvious abnormalities  CV: S1S2, regular, no murmurs appreciated.  No carotid bruits.  No JVD  Lungs: Clear B/L.  No wheezing, rales or rhonchi  ABD: Soft, non-tender, non-distended.  +Bowel sounds  EXT: Warm and well perfused.  1+ pitting edema bilateral lower extremities.   Musculoskeletal: Moving all extremities with normal ROM, no joint swelling  Incisions: MSI clean dry and intact without drainage or bleeding, no sternal click. Drain sites are clean dry and intact, groins clean dry and intact, Vein harvest site clean dry and intact no hematoma.   _ _ _ _ _ _ _ _ _ _ _ _  REMOVAL CHECKLIST:        [x ] Epicardial wires  _ _ _ _ _ _ _ _ _ _ _ _   MEDICATION DISCHARGE CHECKLIST    CABG        [ x] Aspirin, [  ] Contraindicated, Reason_______________________________        [ ] Plavix, [ x ] Contraindicated, Reason on eliquis         [ x] Statin, [  ] Contraindicated, Reason_______________________________        [ x] Diuretic , [  ] Contraindicated, Reason_______________________________         [x ] Beta-Blocker, [  ] Contraindicated, Reason_______________________________       Surgical Valve        [ x] Aspirin, [  ] Contraindicated, Reason_______________________________        [x ] Lasix, [  ] Contraindicated, Reason_______________________________             Duration: _____        [x ] Beta-Blocker, [  ] Contraindicated, Reason_______________________________        Anticoagulation        [ x] NOAC, [ ] Reason aflutter              Cost/Insurance barriers addressed: YES/NO        _ _ _ _ _ _ _ _ _ _ _ _  RELEVANT LABS/IMAGING:  < from: Xray Chest 2 Views PA/Lat (24 @ 10:23) >    IMPRESSION:    Improved inspiratory effort compared to prior exam 2024. Otherwise   similar appearance. Small pleural effusions. Minimal left lung base focal   atelectasis. Postop change. No pneumothorax. No acute infiltrates   appearing    --- End of Report ---    < end of copied text >  _ _ _ _ _ _ _ _ _ _ _ _  Over 35 minutes was spent with the patient reviewing the discharge material including medications, follow up appointments, recovery, concerning symptoms, and how to contact their health care providers if they have questions   Patient discussed on morning rounds with Dr. Pate     Operation Date: : MV replacement (31mm Mosaic), cryomaze, SWEETIE occlusion, CABGx1 (GSV to RCA)  : Re-exploration for bleeding    Primary Surgeon/Attending MD: Dr. Pate    Referring Physician: Dr. Jay Ramirez   _ _ _ _ _ _ _ _ _ _ _ _  HOSPITAL COURSE:  77 YO Male w/ PMHx of HFrEF (LVEF 35%), pAflutter (on Eliquis), unprovoked PE/DVT s/p IVC filter placement then retrieval (, on Eliquis), HTN,. HLD, ADAM (on CPAP), CKD IV (cr 1.2-1.49 -2024), who was referred to Dr. Pate for evaluation of severe MR. TTE (24): mod reduced LV systolic function, LV global strain w/ apical sparing which may be indicative of amyloidosis, mildly reduced RV systolic function, mod AR, severe MR, LVEF 35%. On 24 patient underwent MV replacement (31mm Mosaic), cryomaze, SWEETIE occlusion, CABGx1 (GSV to RCA). Arrived to CTICU on levo, epi and primacor. Increased CT output, received 3PRBC, 1FFP, 5 Cryo, 1 plt, became anuric on levo, vaso, epi, . RTOR for bleeding, received 1u PRBC intraop. POD 1 Remained on , diuresed with bumex gtt, bronch with tan secretions, started on ceftriaxone/azithromycin. He was extubated in afternoon and midodrine started. POD 2 Transitioned to Bumex IVP,  at 2.5. POD 3 Weaned off . POD 4 BB started. POD 5 wires removed. POD 6 transferred to floor, NOAC resumed. POD7: patient given script for rolling walker, ambulating the halls well. On POD 8 pt was deemed medically stable for discharge by Dr. Pate. Pt was in his baseline aflutter rate controlled on Eliquis. At time of discharge pt tolerated a po diet, ambulated without assistance and had a post op bm. Pt denies dizziness, vision changes, chest pain, palpitations, shortness of breath, cough, n/v/d, extremity swelling, calf tenderness.   _ _ _ _ _ _ _ _ _ _ _ _  DISCHARGE PHYSICAL EXAM:  GEN: NAD, looks comfortable  Psych: Mood appropriate  Neuro: A&Ox3.  No focal deficits.  Moving all extremities.   HEENT: No obvious abnormalities  CV: S1S2, regular, no murmurs appreciated.  No carotid bruits.  No JVD  Lungs: Clear B/L.  No wheezing, rales or rhonchi  ABD: Soft, non-tender, non-distended.  +Bowel sounds  EXT: Warm and well perfused.  1+ pitting edema bilateral lower extremities.   Musculoskeletal: Moving all extremities with normal ROM, no joint swelling  Incisions: MSI clean dry and intact without drainage or bleeding, no sternal click. Drain sites are clean dry and intact, groins clean dry and intact, Vein harvest site clean dry and intact no hematoma.   _ _ _ _ _ _ _ _ _ _ _ _  REMOVAL CHECKLIST:        [x ] Epicardial wires  _ _ _ _ _ _ _ _ _ _ _ _   MEDICATION DISCHARGE CHECKLIST    CABG        [ x] Aspirin, [  ] Contraindicated, Reason_______________________________        [ ] Plavix, [ x ] Contraindicated, Reason on eliquis         [ x] Statin, [  ] Contraindicated, Reason_______________________________        [ x] Diuretic , [  ] Contraindicated, Reason_______________________________         [x ] Beta-Blocker, [  ] Contraindicated, Reason_______________________________       Surgical Valve        [ x] Aspirin, [  ] Contraindicated, Reason_______________________________        [x ] Lasix, [  ] Contraindicated, Reason_______________________________             Duration: _____        [x ] Beta-Blocker, [  ] Contraindicated, Reason_______________________________        Anticoagulation        [ x] NOAC, [ ] Reason aflutter              Cost/Insurance barriers addressed: YES/NO        _ _ _ _ _ _ _ _ _ _ _ _  RELEVANT LABS/IMAGING:  < from: Xray Chest 2 Views PA/Lat (24 @ 10:23) >    IMPRESSION:    Improved inspiratory effort compared to prior exam 2024. Otherwise   similar appearance. Small pleural effusions. Minimal left lung base focal   atelectasis. Postop change. No pneumothorax. No acute infiltrates   appearing    --- End of Report ---    < end of copied text >  _ _ _ _ _ _ _ _ _ _ _ _  Over 35 minutes was spent with the patient reviewing the discharge material including medications, follow up appointments, recovery, concerning symptoms, and how to contact their health care providers if they have questions

## 2024-04-24 NOTE — DISCHARGE NOTE NURSING/CASE MANAGEMENT/SOCIAL WORK - NSSCNAMETXT_GEN_ALL_CORE
[facility]anPro[facility] WirelessLifeCare [facility]/[employer] [place] ([phone]

## 2024-04-24 NOTE — DISCHARGE NOTE NURSING/CASE MANAGEMENT/SOCIAL WORK - PATIENT PORTAL LINK FT
You can access the FollowMyHealth Patient Portal offered by [facility] by registering at the following website: [email or web address]. By joining [facility]’s FollowMyHealth portal, you will also be able to view your health information using other applications (apps) compatible with our system.

## 2024-04-24 NOTE — PROGRESS NOTE ADULT - SUBJECTIVE AND OBJECTIVE BOX
Patient discussed on morning rounds with Dr. Pate     Operation / Date: 4/17: MV replacement (31mm Mosaic), cryomaze, SWEETIE occlusion, CABGx1 (GSV to RCA)  4/17: Re-exploration for bleeding      SUBJECTIVE ASSESSMENT:  78y Male seen and assessed at bedside this morning while sitting in the chair. Patient has no acute complaints at this time.         Vital Signs Last 24 Hrs  T(C): 36.9 (24 Apr 2024 09:49), Max: 37.1 (23 Apr 2024 12:17)  T(F): 98.4 (24 Apr 2024 09:49), Max: 98.8 (23 Apr 2024 12:17)  HR: 80 (24 Apr 2024 10:50) (76 - 86)  BP: 131/60 (24 Apr 2024 08:50) (130/61 - 146/64)  BP(mean): 87 (24 Apr 2024 08:50) (87 - 94)  RR: 18 (24 Apr 2024 08:50) (18 - 18)  SpO2: 99% (24 Apr 2024 10:50) (91% - 99%)    Parameters below as of 24 Apr 2024 08:50  Patient On (Oxygen Delivery Method): room air      I&O's Detail    23 Apr 2024 07:01  -  24 Apr 2024 07:00  --------------------------------------------------------  IN:    Oral Fluid: 740 mL  Total IN: 740 mL    OUT:    Voided (mL): 1790 mL  Total OUT: 1790 mL    Total NET: -1050 mL      24 Apr 2024 07:01  -  24 Apr 2024 11:54  --------------------------------------------------------  IN:  Total IN: 0 mL    OUT:    Voided (mL): 300 mL  Total OUT: 300 mL    Total NET: -300 mL          CHEST TUBE:  no  MELINDA DRAIN:  no  EPICARDIAL WIRES: no  TIE DOWNS: yes  KWON: no    PHYSICAL EXAM:  General: NAD  Neurological: AOx3. Motor skills grossly intact  Cardiovascular: Normal S1/S2. Regular rate/rhythm. No murmurs  Respiratory: Lungs CTA bilaterally. No wheezing or rales  Gastrointestinal: +BS in all 4 quadrants. Non-distended. Soft. Non-tender  Extremities: Strength 5/5 b/l upper/lower extremities. Sensation grossly intact upper/lower extremities. +1 pitting edema No calf tenderness.  Vascular: Radial 2+bilaterally, DP 2+ b/l  Incision Sites: MSI c/d/i, chest tube incisions c/d/i with tie downs in place, EVH site c/d/i       LABS:                        8.9    11.94 )-----------( 278      ( 24 Apr 2024 05:30 )             27.1       PT/INR - ( 23 Apr 2024 03:34 )   PT: 12.5 sec;   INR: 1.10          PTT - ( 23 Apr 2024 03:34 )  PTT:28.9 sec    04-24    139  |  101  |  27<H>  ----------------------------<  108<H>  4.3   |  26  |  1.32<H>    Ca    8.9      24 Apr 2024 05:30  Phos  3.3     04-24  Mg     2.0     04-24    TPro  5.0<L>  /  Alb  3.0<L>  /  TBili  0.7  /  DBili  x   /  AST  23  /  ALT  14  /  AlkPhos  61  04-23      Urinalysis Basic - ( 24 Apr 2024 05:30 )    Color: x / Appearance: x / SG: x / pH: x  Gluc: 108 mg/dL / Ketone: x  / Bili: x / Urobili: x   Blood: x / Protein: x / Nitrite: x   Leuk Esterase: x / RBC: x / WBC x   Sq Epi: x / Non Sq Epi: x / Bacteria: x        MEDICATIONS  (STANDING):  apixaban 5 milliGRAM(s) Oral every 12 hours  aspirin  chewable 81 milliGRAM(s) Oral daily  atorvastatin 40 milliGRAM(s) Oral at bedtime  bisacodyl Suppository 10 milliGRAM(s) Rectal once  buDESOnide    Inhalation Suspension 0.25 milliGRAM(s) Inhalation every 12 hours  carvedilol 6.25 milliGRAM(s) Oral every 12 hours  pantoprazole    Tablet 40 milliGRAM(s) Oral before breakfast  polyethylene glycol 3350 17 Gram(s) Oral daily  senna 2 Tablet(s) Oral at bedtime  sodium chloride 0.9% lock flush 3 milliLiter(s) IV Push every 8 hours  spironolactone 25 milliGRAM(s) Oral daily  torsemide 20 milliGRAM(s) Oral daily    MEDICATIONS  (PRN):  acetaminophen     Tablet .. 975 milliGRAM(s) Oral every 6 hours PRN Temp greater or equal to 38C (100.4F), Mild Pain (1 - 3)  oxyCODONE    IR 5 milliGRAM(s) Oral every 6 hours PRN Moderate Pain (4 - 6)        RADIOLOGY & ADDITIONAL TESTS:

## 2024-04-24 NOTE — PROGRESS NOTE ADULT - ASSESSMENT
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 HLD, ADAM (on CPAP), CKD IV (cr 1.2-1.49 -2024), who was referred to Dr. Pate for evaluation of severe MR. TTE (24): mod reduced LV systolic function, LV global strain w/ apical sparing which may be indicative of amyloidosis, mildly reduced RV systolic function, mod AR, severe MR, LVEF 35%. On 24 patient underwent MV replacement (31mm Mosaic), cryomaze, SWEETIE occlusion, CABGx1 (GSV to RCA). Arrived to CTICU on levo, epi and primacor. Increased CT output, received 3PRBC, 1FFP, 5 Cryo, 1 plt, became anuric on levo, vaso, epi, . RTOR for bleeding, received 1u PRBC intraop. POD 1 Remained on , diuresed with bumex gtt, bronch with tan secretions, started on ceftriaxone/azithromycin. He was extubated in afternoon and midodrine started. POD 2 Transitioned to Bumex IVP,  at 2.5. POD 3 Weaned off . POD 4 BB started. POD 5 wires removed. POD 6 transferred to floor, NOAC resumed. POD7: patient given script for rolling walker, ambulating the halls well. 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 PRN's: Tylenol and oxycodone  -Gabapentin per ERP protocol    Cardiovascular:   -Severe MR s/p MV replacement (31mm Mosaic), cryomaze, SWEETIE occlusion, CABGx1 (GSV to RCA) on 24       -Cont ASA  -Hx of HFrEF (LVef 35%)       -Cont Coreg, Spironolactone and Torsemide  -Hx of pAFlutter      -Eliquis re-started  -Hx of HTN     -Cont BB  -Hx of HLD     -Cont statin  -Hemodynamically stable.   -Monitor: BP, HR, tele    Respiratory:   -Hx of ADAM (on CPAP)  - c/w pulmicort   -Oxygenating well on room air  -Encourage continued use of IS 10x/hr and frequent ambulation  -CXR: stable    GI:  -GI PPX: Protonix  -PO Diet  -Bowel Regimen: Miralax and senna     Renal / :  -Hx of CKD IV  -Baseline Cr 1.2-1.4  -Nephrology following, recs appreciated  -Cont diuresis with yanira and torsemide   -Continue to monitor renal function: BUN/Cr: 27/1.32  -Monitor I/O's daily     Endocrine:    -No hx of DM or thyroid dx  -A1c: 4.7  -TSH: 2.38    Hematologic:  -CBC: H/H- 8.5/25  -Coagulation Panel.    ID:  -Temperature: Afebrile  -CBC: WBC- 11.94    Prophylaxis:  -Cont Eliquis  -Continue with SCD's b/l while patient is at rest     Disposition:  -Discharge home tomorrow

## 2024-04-24 NOTE — DISCHARGE NOTE PROVIDER - NSDCMRMEDTOKEN_GEN_ALL_CORE_FT
allopurinol 100 mg oral tablet: 1 tab(s) orally once a day  amLODIPine 10 mg oral tablet: 1 tab(s) orally once a day  apixaban 5 mg oral tablet: 1 tab(s) orally every 12 hours  atorvastatin 40 mg oral tablet: 1 tab(s) orally once a day (at bedtime)  carvedilol 12.5 mg oral tablet: 1 tab(s) orally 2 times a day  Contrave 8 mg-90 mg oral tablet, extended release: 2 tab(s) orally 2 times a day  Entresto 97 mg-103 mg oral tablet: 1 tab(s) orally 2 times a day  metFORMIN 500 mg oral tablet: 1 tab(s) orally 2 times a day (with meals)  Ozempic (1 mg dose) 2 mg/1.5 mL subcutaneous solution:   Rolling walker: weight: 123kg height:185cm ICD10: I25.10  spironolactone 25 mg oral tablet: 1 tab(s) orally 3 times a week  torsemide 20 mg oral tablet: 1 tab(s) orally once a day   acetaminophen 325 mg oral tablet: 3 tab(s) orally every 6 hours As needed Temp greater or equal to 38C (100.4F), Mild Pain (1 - 3)  allopurinol 100 mg oral tablet: 1 tab(s) orally once a day  amLODIPine 5 mg oral tablet: 1 tab(s) orally once a day  apixaban 5 mg oral tablet: 1 tab(s) orally every 12 hours  aspirin 81 mg oral tablet, chewable: 1 tab(s) orally once a day  atorvastatin 40 mg oral tablet: 1 tab(s) orally once a day (at bedtime)  carvedilol 6.25 mg oral tablet: 1 tab(s) orally every 12 hours  Contrave 8 mg-90 mg oral tablet, extended release: 2 tab(s) orally 2 times a day  metFORMIN 500 mg oral tablet: 1 tab(s) orally 2 times a day (with meals)  oxyCODONE 5 mg oral tablet: 1 tab(s) orally every 8 hours as needed for Moderate Pain (4 - 6) MDD: 3  Ozempic (1 mg dose) 2 mg/1.5 mL subcutaneous solution:   pantoprazole 40 mg oral delayed release tablet: 1 tab(s) orally once a day (before a meal)  polyethylene glycol 3350 oral powder for reconstitution: 17 gram(s) orally once a day  Rolling walker: weight: 123kg height:185cm ICD10: I25.10  senna leaf extract oral tablet: 2 tab(s) orally once a day (at bedtime)  spironolactone 25 mg oral tablet: 1 tab(s) orally once a day  torsemide 20 mg oral tablet: 1 tab(s) orally once a day

## 2024-04-24 NOTE — DISCHARGE NOTE PROVIDER - PROVIDER TOKENS
PROVIDER:[TOKEN:[[id number]],FOLLOWUP:[1 week]],PROVIDER:[TOKEN:[[id number]],FOLLOWUP:[2 weeks]]

## 2024-04-24 NOTE — CHART NOTE - NSCHARTNOTEFT_GEN_A_CORE
patient will require rolling walker for discharge due to ambulation needs. will provide script. patient will require rolling walker for home use due to their dx of severe mitral regurgitation to help complete MRADLs

## 2024-04-25 ENCOUNTER — NON-APPOINTMENT (OUTPATIENT)
Age: 79
End: 2024-04-25

## 2024-04-25 VITALS
DIASTOLIC BLOOD PRESSURE: 56 MMHG | HEART RATE: 66 BPM | RESPIRATION RATE: 16 BRPM | SYSTOLIC BLOOD PRESSURE: 114 MMHG | OXYGEN SATURATION: 94 %

## 2024-04-25 LAB
ANION GAP SERPL CALC-SCNC: 9 MMOL/L — SIGNIFICANT CHANGE UP (ref 5–17)
BUN SERPL-MCNC: 28 MG/DL — HIGH (ref 7–23)
CALCIUM SERPL-MCNC: 8.7 MG/DL — SIGNIFICANT CHANGE UP (ref 8.4–10.5)
CHLORIDE SERPL-SCNC: 100 MMOL/L — SIGNIFICANT CHANGE UP (ref 96–108)
CO2 SERPL-SCNC: 30 MMOL/L — SIGNIFICANT CHANGE UP (ref 22–31)
CREAT SERPL-MCNC: 1.32 MG/DL — HIGH (ref 0.5–1.3)
EGFR: 55 ML/MIN/1.73M2 — LOW
GLUCOSE SERPL-MCNC: 100 MG/DL — HIGH (ref 70–99)
HCT VFR BLD CALC: 26.5 % — LOW (ref 39–50)
HGB BLD-MCNC: 8.6 G/DL — LOW (ref 13–17)
MAGNESIUM SERPL-MCNC: 2.3 MG/DL — SIGNIFICANT CHANGE UP (ref 1.6–2.6)
MCHC RBC-ENTMCNC: 30.3 PG — SIGNIFICANT CHANGE UP (ref 27–34)
MCHC RBC-ENTMCNC: 32.5 GM/DL — SIGNIFICANT CHANGE UP (ref 32–36)
MCV RBC AUTO: 93.3 FL — SIGNIFICANT CHANGE UP (ref 80–100)
NRBC # BLD: 0 /100 WBCS — SIGNIFICANT CHANGE UP (ref 0–0)
PLATELET # BLD AUTO: 319 K/UL — SIGNIFICANT CHANGE UP (ref 150–400)
POTASSIUM SERPL-MCNC: 4.3 MMOL/L — SIGNIFICANT CHANGE UP (ref 3.5–5.3)
POTASSIUM SERPL-SCNC: 4.3 MMOL/L — SIGNIFICANT CHANGE UP (ref 3.5–5.3)
RBC # BLD: 2.84 M/UL — LOW (ref 4.2–5.8)
RBC # FLD: 15.6 % — HIGH (ref 10.3–14.5)
SODIUM SERPL-SCNC: 139 MMOL/L — SIGNIFICANT CHANGE UP (ref 135–145)
WBC # BLD: 10.96 K/UL — HIGH (ref 3.8–10.5)
WBC # FLD AUTO: 10.96 K/UL — HIGH (ref 3.8–10.5)

## 2024-04-25 PROCEDURE — 80053 COMPREHEN METABOLIC PANEL: CPT

## 2024-04-25 PROCEDURE — 93005 ELECTROCARDIOGRAM TRACING: CPT

## 2024-04-25 PROCEDURE — C1760: CPT

## 2024-04-25 PROCEDURE — 82553 CREATINE MB FRACTION: CPT

## 2024-04-25 PROCEDURE — C1769: CPT

## 2024-04-25 PROCEDURE — 84295 ASSAY OF SERUM SODIUM: CPT

## 2024-04-25 PROCEDURE — 82947 ASSAY GLUCOSE BLOOD QUANT: CPT

## 2024-04-25 PROCEDURE — C2618: CPT

## 2024-04-25 PROCEDURE — 85025 COMPLETE CBC W/AUTO DIFF WBC: CPT

## 2024-04-25 PROCEDURE — 71045 X-RAY EXAM CHEST 1 VIEW: CPT

## 2024-04-25 PROCEDURE — 86965 POOLING BLOOD PLATELETS: CPT

## 2024-04-25 PROCEDURE — 84132 ASSAY OF SERUM POTASSIUM: CPT

## 2024-04-25 PROCEDURE — 36430 TRANSFUSION BLD/BLD COMPNT: CPT

## 2024-04-25 PROCEDURE — 86923 COMPATIBILITY TEST ELECTRIC: CPT

## 2024-04-25 PROCEDURE — 80048 BASIC METABOLIC PNL TOTAL CA: CPT

## 2024-04-25 PROCEDURE — 88305 TISSUE EXAM BY PATHOLOGIST: CPT

## 2024-04-25 PROCEDURE — 85730 THROMBOPLASTIN TIME PARTIAL: CPT

## 2024-04-25 PROCEDURE — 93308 TTE F-UP OR LMTD: CPT

## 2024-04-25 PROCEDURE — C1887: CPT

## 2024-04-25 PROCEDURE — 94150 VITAL CAPACITY TEST: CPT

## 2024-04-25 PROCEDURE — 85014 HEMATOCRIT: CPT

## 2024-04-25 PROCEDURE — 83735 ASSAY OF MAGNESIUM: CPT

## 2024-04-25 PROCEDURE — 85610 PROTHROMBIN TIME: CPT

## 2024-04-25 PROCEDURE — C1894: CPT

## 2024-04-25 PROCEDURE — 83605 ASSAY OF LACTIC ACID: CPT

## 2024-04-25 PROCEDURE — 94660 CPAP INITIATION&MGMT: CPT

## 2024-04-25 PROCEDURE — 84443 ASSAY THYROID STIM HORMONE: CPT

## 2024-04-25 PROCEDURE — 74183 MRI ABD W/O CNTR FLWD CNTR: CPT | Mod: MH

## 2024-04-25 PROCEDURE — 36415 COLL VENOUS BLD VENIPUNCTURE: CPT

## 2024-04-25 PROCEDURE — 83036 HEMOGLOBIN GLYCOSYLATED A1C: CPT

## 2024-04-25 PROCEDURE — C1889: CPT

## 2024-04-25 PROCEDURE — 97116 GAIT TRAINING THERAPY: CPT

## 2024-04-25 PROCEDURE — 82962 GLUCOSE BLOOD TEST: CPT

## 2024-04-25 PROCEDURE — 87070 CULTURE OTHR SPECIMN AEROBIC: CPT

## 2024-04-25 PROCEDURE — 86900 BLOOD TYPING SEROLOGIC ABO: CPT

## 2024-04-25 PROCEDURE — 86891 AUTOLOGOUS BLOOD OP SALVAGE: CPT

## 2024-04-25 PROCEDURE — 97165 OT EVAL LOW COMPLEX 30 MIN: CPT

## 2024-04-25 PROCEDURE — 71046 X-RAY EXAM CHEST 2 VIEWS: CPT

## 2024-04-25 PROCEDURE — 93880 EXTRACRANIAL BILAT STUDY: CPT

## 2024-04-25 PROCEDURE — 82330 ASSAY OF CALCIUM: CPT

## 2024-04-25 PROCEDURE — 94640 AIRWAY INHALATION TREATMENT: CPT

## 2024-04-25 PROCEDURE — 87899 AGENT NOS ASSAY W/OPTIC: CPT

## 2024-04-25 PROCEDURE — 81001 URINALYSIS AUTO W/SCOPE: CPT

## 2024-04-25 PROCEDURE — 82533 TOTAL CORTISOL: CPT

## 2024-04-25 PROCEDURE — 87449 NOS EACH ORGANISM AG IA: CPT

## 2024-04-25 PROCEDURE — 86901 BLOOD TYPING SEROLOGIC RH(D): CPT

## 2024-04-25 PROCEDURE — P9037: CPT

## 2024-04-25 PROCEDURE — P9012: CPT

## 2024-04-25 PROCEDURE — 80061 LIPID PANEL: CPT

## 2024-04-25 PROCEDURE — A9585: CPT

## 2024-04-25 PROCEDURE — 86850 RBC ANTIBODY SCREEN: CPT

## 2024-04-25 PROCEDURE — P9100: CPT

## 2024-04-25 PROCEDURE — 97110 THERAPEUTIC EXERCISES: CPT

## 2024-04-25 PROCEDURE — 97535 SELF CARE MNGMENT TRAINING: CPT

## 2024-04-25 PROCEDURE — C8929: CPT

## 2024-04-25 PROCEDURE — 85027 COMPLETE CBC AUTOMATED: CPT

## 2024-04-25 PROCEDURE — C1751: CPT

## 2024-04-25 PROCEDURE — 76775 US EXAM ABDO BACK WALL LIM: CPT

## 2024-04-25 PROCEDURE — P9047: CPT

## 2024-04-25 PROCEDURE — P9016: CPT

## 2024-04-25 PROCEDURE — 84100 ASSAY OF PHOSPHORUS: CPT

## 2024-04-25 PROCEDURE — 94002 VENT MGMT INPAT INIT DAY: CPT

## 2024-04-25 PROCEDURE — P9045: CPT

## 2024-04-25 PROCEDURE — 82803 BLOOD GASES ANY COMBINATION: CPT

## 2024-04-25 PROCEDURE — 82550 ASSAY OF CK (CPK): CPT

## 2024-04-25 PROCEDURE — 97161 PT EVAL LOW COMPLEX 20 MIN: CPT

## 2024-04-25 PROCEDURE — P9059: CPT

## 2024-04-25 RX ORDER — ATORVASTATIN CALCIUM 80 MG/1
1 TABLET, FILM COATED ORAL
Qty: 30 | Refills: 0
Start: 2024-04-25 | End: 2024-05-24

## 2024-04-25 RX ORDER — AMLODIPINE BESYLATE 2.5 MG/1
1 TABLET ORAL
Qty: 30 | Refills: 0
Start: 2024-04-25 | End: 2024-05-24

## 2024-04-25 RX ORDER — PANTOPRAZOLE SODIUM 20 MG/1
1 TABLET, DELAYED RELEASE ORAL
Qty: 30 | Refills: 0
Start: 2024-04-25 | End: 2024-05-24

## 2024-04-25 RX ORDER — OXYCODONE HYDROCHLORIDE 5 MG/1
1 TABLET ORAL
Qty: 15 | Refills: 0
Start: 2024-04-25 | End: 2024-04-29

## 2024-04-25 RX ORDER — SENNA PLUS 8.6 MG/1
2 TABLET ORAL
Qty: 60 | Refills: 0
Start: 2024-04-25 | End: 2024-05-24

## 2024-04-25 RX ORDER — ASPIRIN/CALCIUM CARB/MAGNESIUM 324 MG
1 TABLET ORAL
Qty: 30 | Refills: 0
Start: 2024-04-25 | End: 2024-05-24

## 2024-04-25 RX ORDER — CARVEDILOL PHOSPHATE 80 MG/1
1 CAPSULE, EXTENDED RELEASE ORAL
Qty: 60 | Refills: 0
Start: 2024-04-25 | End: 2024-05-24

## 2024-04-25 RX ORDER — SPIRONOLACTONE 25 MG/1
1 TABLET, FILM COATED ORAL
Qty: 30 | Refills: 0
Start: 2024-04-25 | End: 2024-05-24

## 2024-04-25 RX ORDER — ACETAMINOPHEN 500 MG
3 TABLET ORAL
Qty: 0 | Refills: 0 | DISCHARGE
Start: 2024-04-25

## 2024-04-25 RX ORDER — APIXABAN 2.5 MG/1
1 TABLET, FILM COATED ORAL
Qty: 60 | Refills: 0
Start: 2024-04-25 | End: 2024-05-24

## 2024-04-25 RX ORDER — POLYETHYLENE GLYCOL 3350 17 G/17G
17 POWDER, FOR SOLUTION ORAL
Qty: 510 | Refills: 0
Start: 2024-04-25 | End: 2024-05-24

## 2024-04-25 RX ADMIN — CARVEDILOL PHOSPHATE 6.25 MILLIGRAM(S): 80 CAPSULE, EXTENDED RELEASE ORAL at 06:48

## 2024-04-25 RX ADMIN — APIXABAN 5 MILLIGRAM(S): 2.5 TABLET, FILM COATED ORAL at 06:48

## 2024-04-25 RX ADMIN — Medication 81 MILLIGRAM(S): at 11:08

## 2024-04-25 RX ADMIN — Medication 0.25 MILLIGRAM(S): at 06:47

## 2024-04-25 RX ADMIN — SODIUM CHLORIDE 3 MILLILITER(S): 9 INJECTION INTRAMUSCULAR; INTRAVENOUS; SUBCUTANEOUS at 06:00

## 2024-04-25 RX ADMIN — SPIRONOLACTONE 25 MILLIGRAM(S): 25 TABLET, FILM COATED ORAL at 06:48

## 2024-04-25 RX ADMIN — PANTOPRAZOLE SODIUM 40 MILLIGRAM(S): 20 TABLET, DELAYED RELEASE ORAL at 06:48

## 2024-04-25 RX ADMIN — Medication 20 MILLIGRAM(S): at 06:48

## 2024-04-25 NOTE — PROGRESS NOTE ADULT - PROVIDER SPECIALTY LIST ADULT
Critical Care
Internal Medicine
CT Surgery
Critical Care
Internal Medicine
Nephrology
Internal Medicine

## 2024-04-26 ENCOUNTER — NON-APPOINTMENT (OUTPATIENT)
Age: 79
End: 2024-04-26

## 2024-04-26 DIAGNOSIS — I50.23 ACUTE ON CHRONIC SYSTOLIC (CONGESTIVE) HEART FAILURE: ICD-10-CM

## 2024-04-26 RX ORDER — SACUBITRIL AND VALSARTAN 97; 103 MG/1; MG/1
97-103 TABLET, FILM COATED ORAL
Qty: 180 | Refills: 2 | Status: COMPLETED | COMMUNITY
End: 2024-04-26

## 2024-04-26 RX ORDER — ENOXAPARIN SODIUM 120 MG/.8ML
120 INJECTION, SOLUTION SUBCUTANEOUS
Qty: 4 | Refills: 0 | Status: COMPLETED | COMMUNITY
End: 2024-04-26

## 2024-04-26 RX ORDER — SPIRONOLACTONE 25 MG/1
25 TABLET ORAL DAILY
Qty: 30 | Refills: 3 | Status: ACTIVE | COMMUNITY

## 2024-04-26 RX ORDER — ASPIRIN 81 MG
81 TABLET, DELAYED RELEASE (ENTERIC COATED) ORAL DAILY
Qty: 30 | Refills: 6 | Status: ACTIVE | COMMUNITY

## 2024-04-26 RX ORDER — SPIRONOLACTONE 25 MG/1
25 TABLET ORAL
Refills: 0 | Status: COMPLETED | COMMUNITY
End: 2024-04-26

## 2024-04-26 RX ORDER — CARVEDILOL 12.5 MG/1
12.5 TABLET, FILM COATED ORAL TWICE DAILY
Refills: 0 | Status: COMPLETED | COMMUNITY
End: 2024-04-26

## 2024-04-26 RX ORDER — CARVEDILOL 6.25 MG/1
6.25 TABLET, FILM COATED ORAL TWICE DAILY
Refills: 0 | Status: ACTIVE | COMMUNITY

## 2024-04-26 RX ORDER — TORSEMIDE 20 MG/1
20 TABLET ORAL DAILY
Qty: 30 | Refills: 0 | Status: ACTIVE | COMMUNITY

## 2024-04-26 RX ORDER — AMLODIPINE BESYLATE 10 MG/1
10 TABLET ORAL DAILY
Refills: 0 | Status: COMPLETED | COMMUNITY
End: 2024-04-26

## 2024-04-26 RX ORDER — AMLODIPINE BESYLATE 5 MG/1
5 TABLET ORAL DAILY
Qty: 30 | Refills: 2 | Status: ACTIVE | COMMUNITY

## 2024-04-30 ENCOUNTER — OUTPATIENT (OUTPATIENT)
Dept: OUTPATIENT SERVICES | Facility: HOSPITAL | Age: 79
LOS: 1 days | End: 2024-04-30
Payer: MEDICARE

## 2024-04-30 ENCOUNTER — APPOINTMENT (OUTPATIENT)
Dept: CARDIOTHORACIC SURGERY | Facility: CLINIC | Age: 79
End: 2024-04-30
Payer: MEDICARE

## 2024-04-30 VITALS
DIASTOLIC BLOOD PRESSURE: 72 MMHG | OXYGEN SATURATION: 98 % | BODY MASS INDEX: 38.7 KG/M2 | TEMPERATURE: 97.5 F | WEIGHT: 292 LBS | SYSTOLIC BLOOD PRESSURE: 161 MMHG | HEIGHT: 73 IN | HEART RATE: 86 BPM

## 2024-04-30 DIAGNOSIS — T81.19XA OTHER POSTPROCEDURAL SHOCK, INITIAL ENCOUNTER: ICD-10-CM

## 2024-04-30 DIAGNOSIS — I31.4 CARDIAC TAMPONADE: ICD-10-CM

## 2024-04-30 DIAGNOSIS — I25.10 ATHEROSCLEROTIC HEART DISEASE OF NATIVE CORONARY ARTERY WITHOUT ANGINA PECTORIS: ICD-10-CM

## 2024-04-30 DIAGNOSIS — I48.92 UNSPECIFIED ATRIAL FLUTTER: ICD-10-CM

## 2024-04-30 DIAGNOSIS — I77.1 STRICTURE OF ARTERY: ICD-10-CM

## 2024-04-30 DIAGNOSIS — D62 ACUTE POSTHEMORRHAGIC ANEMIA: ICD-10-CM
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
## 2024-04-30 DIAGNOSIS — R60.1 GENERALIZED EDEMA: ICD-10-CM

## 2024-04-30 DIAGNOSIS — N18.4 CHRONIC KIDNEY DISEASE, STAGE 4 (SEVERE): ICD-10-CM

## 2024-04-30 DIAGNOSIS — R73.9 HYPERGLYCEMIA, UNSPECIFIED: ICD-10-CM

## 2024-04-30 DIAGNOSIS — Z96.649 PRESENCE OF UNSPECIFIED ARTIFICIAL HIP JOINT: ICD-10-CM

## 2024-04-30 DIAGNOSIS — I13.0 HYPERTENSIVE HEART AND CHRONIC KIDNEY DISEASE WITH HEART FAILURE AND STAGE 1 THROUGH STAGE 4 CHRONIC KIDNEY DISEASE, OR UNSPECIFIED CHRONIC KIDNEY DISEASE: ICD-10-CM

## 2024-04-30 DIAGNOSIS — Z96.649 PRESENCE OF UNSPECIFIED ARTIFICIAL HIP JOINT: Chronic | ICD-10-CM

## 2024-04-30 DIAGNOSIS — T81.11XA POSTPROCEDURAL CARDIOGENIC SHOCK, INITIAL ENCOUNTER: ICD-10-CM

## 2024-04-30 DIAGNOSIS — M10.9 GOUT, UNSPECIFIED: ICD-10-CM

## 2024-04-30 DIAGNOSIS — H26.9 UNSPECIFIED CATARACT: Chronic | ICD-10-CM

## 2024-04-30 DIAGNOSIS — N17.0 ACUTE KIDNEY FAILURE WITH TUBULAR NECROSIS: ICD-10-CM

## 2024-04-30 DIAGNOSIS — E78.5 HYPERLIPIDEMIA, UNSPECIFIED: ICD-10-CM

## 2024-04-30 DIAGNOSIS — I25.82 CHRONIC TOTAL OCCLUSION OF CORONARY ARTERY: ICD-10-CM
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
## 2024-04-30 DIAGNOSIS — Z79.84 LONG TERM (CURRENT) USE OF ORAL HYPOGLYCEMIC DRUGS: ICD-10-CM

## 2024-04-30 DIAGNOSIS — R04.89 HEMORRHAGE FROM OTHER SITES IN RESPIRATORY PASSAGES: ICD-10-CM

## 2024-04-30 DIAGNOSIS — E66.9 OBESITY, UNSPECIFIED: ICD-10-CM

## 2024-04-30 DIAGNOSIS — G47.33 OBSTRUCTIVE SLEEP APNEA (ADULT) (PEDIATRIC): ICD-10-CM

## 2024-04-30 DIAGNOSIS — Z86.711 PERSONAL HISTORY OF PULMONARY EMBOLISM: ICD-10-CM

## 2024-04-30 DIAGNOSIS — I08.0 RHEUMATIC DISORDERS OF BOTH MITRAL AND AORTIC VALVES: ICD-10-CM

## 2024-04-30 DIAGNOSIS — Z79.82 LONG TERM (CURRENT) USE OF ASPIRIN: ICD-10-CM

## 2024-04-30 DIAGNOSIS — D69.6 THROMBOCYTOPENIA, UNSPECIFIED: ICD-10-CM

## 2024-04-30 DIAGNOSIS — I50.22 CHRONIC SYSTOLIC (CONGESTIVE) HEART FAILURE: ICD-10-CM

## 2024-04-30 DIAGNOSIS — Z79.85 LONG-TERM (CURRENT) USE OF INJECTABLE NON-INSULIN ANTIDIABETIC DRUGS: ICD-10-CM

## 2024-04-30 DIAGNOSIS — Z99.89 DEPENDENCE ON OTHER ENABLING MACHINES AND DEVICES: ICD-10-CM

## 2024-04-30 DIAGNOSIS — E87.20 ACIDOSIS, UNSPECIFIED: ICD-10-CM

## 2024-04-30 DIAGNOSIS — I48.11 LONGSTANDING PERSISTENT ATRIAL FIBRILLATION: ICD-10-CM

## 2024-04-30 DIAGNOSIS — J95.1 ACUTE PULMONARY INSUFFICIENCY FOLLOWING THORACIC SURGERY: ICD-10-CM

## 2024-04-30 DIAGNOSIS — Z79.01 LONG TERM (CURRENT) USE OF ANTICOAGULANTS: ICD-10-CM

## 2024-04-30 DIAGNOSIS — Z95.820 PERIPHERAL VASCULAR ANGIOPLASTY STATUS WITH IMPLANTS AND GRAFTS: ICD-10-CM

## 2024-04-30 PROCEDURE — 71046 X-RAY EXAM CHEST 2 VIEWS: CPT

## 2024-04-30 PROCEDURE — 71046 X-RAY EXAM CHEST 2 VIEWS: CPT | Mod: 26

## 2024-04-30 PROCEDURE — 99024 POSTOP FOLLOW-UP VISIT: CPT

## 2024-04-30 NOTE — REASON FOR VISIT
[de-identified] : MVR w/Mosaic, CABG x 1 (SVG->RCA), cryomaze ablation, SWEETIE occlusion [de-identified] : 4/17/24 [de-identified] : Intraop uncomplicated. Arrived to CTICU intubated on lev/epi/primacor. Overnight had increased CT output and decreased UOP. POD 1, diuresed with bumex gtt, bronch with tan secretions, started on ceftriaxone/azithromycin. Extubated in afternoon. POD 2 Transitioned to Bumex IVP,  at 2.5. POD 3 Weaned off . POD 6 transferred to floor, NOAC resumed. Patient discharged home on .  Patient presents for postop visit. He is unaccompanied today, travelled via car service. He appears generally well, NAD. He reports incisional pain and prn productive cough.  Chest xray today w/left base atelectasis and small left effusion

## 2024-04-30 NOTE — PHYSICAL EXAM
[] : no respiratory distress [Auscultation Breath Sounds / Voice Sounds] : lungs were clear to auscultation bilaterally [Heart Rate And Rhythm] : heart rate was normal and rhythm regular [Heart Sounds Gallop] : no gallops [Heart Sounds] : normal S1 and S2 [Murmurs] : no murmurs [Heart Sounds Pericardial Friction Rub] : no pericardial rub [Clean] : clean [FreeTextEntry1] : sternum stable [Dry] : dry [Healing Well] : healing well [FreeTextEntry5] : right leg EVH incision [___ +] : bilateral pretibial [unfilled]U+ pitting edema

## 2024-04-30 NOTE — DISCUSSION/SUMMARY
[Doing Well] : is doing well [1] : 1 [FreeTextEntry1] : Plan:  Continue current medication regimen. Continue to increase activity and walk daily as tolerated. Continue to use incentive spirometer.  No driving or strenuous activity for 4 weeks after surgery. Avoid lifting >10 to 15 lbs. Call MD if you experience fever, fatigue, dizziness, confusion, syncope, shortness of breath, chest pain not relieved with analgesics, increased redness/drainage from incision. Follow up with Dr. Ramirez Follow up in CTS clinic in 2 weeks w/repeat chest xray

## 2024-05-14 ENCOUNTER — APPOINTMENT (OUTPATIENT)
Dept: CARDIOTHORACIC SURGERY | Facility: CLINIC | Age: 79
End: 2024-05-14
Payer: MEDICARE

## 2024-05-14 ENCOUNTER — NON-APPOINTMENT (OUTPATIENT)
Age: 79
End: 2024-05-14

## 2024-05-14 ENCOUNTER — OUTPATIENT (OUTPATIENT)
Dept: OUTPATIENT SERVICES | Facility: HOSPITAL | Age: 79
LOS: 1 days | End: 2024-05-14
Payer: MEDICARE

## 2024-05-14 VITALS
SYSTOLIC BLOOD PRESSURE: 132 MMHG | HEART RATE: 78 BPM | DIASTOLIC BLOOD PRESSURE: 63 MMHG | OXYGEN SATURATION: 97 % | WEIGHT: 280 LBS | BODY MASS INDEX: 37.11 KG/M2 | TEMPERATURE: 96.9 F | HEIGHT: 73 IN

## 2024-05-14 DIAGNOSIS — Z98.890 OTHER SPECIFIED POSTPROCEDURAL STATES: ICD-10-CM

## 2024-05-14 DIAGNOSIS — H26.9 UNSPECIFIED CATARACT: Chronic | ICD-10-CM

## 2024-05-14 DIAGNOSIS — Z95.3 PRESENCE OF XENOGENIC HEART VALVE: ICD-10-CM

## 2024-05-14 DIAGNOSIS — Z09 ENCOUNTER FOR FOLLOW-UP EXAMINATION AFTER COMPLETED TREATMENT FOR CONDITIONS OTHER THAN MALIGNANT NEOPLASM: ICD-10-CM

## 2024-05-14 DIAGNOSIS — Z95.1 PRESENCE OF AORTOCORONARY BYPASS GRAFT: ICD-10-CM

## 2024-05-14 DIAGNOSIS — Z96.649 PRESENCE OF UNSPECIFIED ARTIFICIAL HIP JOINT: Chronic | ICD-10-CM

## 2024-05-14 PROCEDURE — 99024 POSTOP FOLLOW-UP VISIT: CPT

## 2024-05-14 PROCEDURE — 71046 X-RAY EXAM CHEST 2 VIEWS: CPT | Mod: 26

## 2024-05-14 PROCEDURE — 71046 X-RAY EXAM CHEST 2 VIEWS: CPT

## 2024-05-14 RX ORDER — OXYCODONE 5 MG/1
5 TABLET ORAL
Refills: 0 | Status: DISCONTINUED | COMMUNITY
End: 2024-05-14

## 2024-05-14 RX ORDER — SACUBITRIL AND VALSARTAN 24; 26 MG/1; MG/1
24-26 TABLET, FILM COATED ORAL
Qty: 60 | Refills: 2 | Status: ACTIVE | COMMUNITY

## 2024-05-16 NOTE — REASON FOR VISIT
[de-identified] : 4/17/24 [de-identified] : MVR w/Mosaic, CABG x 1 (SVG->RCA), cryomaze ablation, SWEETIE occlusion [de-identified] : Patient presents for 2nd postop visit. He appears well, NAD. He is recovering well from surgery. He reports increased exercise tolerance, walking for longer periods.  He has been seen by cardiologist, Dr. Ramirez and re started on entresto. Chest xray today revealed improved left base atelectasis and trace pleural effusion

## 2024-05-16 NOTE — PHYSICAL EXAM
[] : no respiratory distress [Auscultation Breath Sounds / Voice Sounds] : lungs were clear to auscultation bilaterally [Heart Rate And Rhythm] : heart rate was normal and rhythm regular [Heart Sounds] : normal S1 and S2 [Heart Sounds Gallop] : no gallops [Murmurs] : no murmurs [Heart Sounds Pericardial Friction Rub] : no pericardial rub [Site: ___] : Site: [unfilled] [Clean] : clean [Dry] : dry [Healing Well] : healing well [___ +] : bilateral pretibial [unfilled]U+ pitting edema [FreeTextEntry1] : sternum stable

## 2024-05-16 NOTE — END OF VISIT
[FreeTextEntry3] : I, Dr. DAVIS [facility], personally performed the evaluation and management (E/M) services for this established patient who presents today with (a) new problem(s)/exacerbation of (an) existing condition(s).  That E/M includes conducting the clinically appropriate interval history &/or exam, assessing all new/exacerbated conditions, and establishing a new plan of care.  Today, my NICKIE, was here to observe &/or participate in the visit & follow plan of care established by me.

## 2024-05-16 NOTE — DISCUSSION/SUMMARY
[FreeTextEntry1] : Plan:  Continue current medication regimen. Continue to increase activity and walk daily as tolerated. Continue to use incentive spirometer.  No driving or strenuous activity for 4 weeks after surgery. Avoid lifting >10 to 15 lbs. Call MD if you experience fever, fatigue, dizziness, confusion, syncope, shortness of breath, chest pain not relieved with analgesics, increased redness/drainage from incision. Follow up with Dr. Ramirez continue GDMT counseled re need for antibiotics prior to dental and surgical procedures DC from CTS service

## 2025-05-06 ENCOUNTER — NON-APPOINTMENT (OUTPATIENT)
Age: 80
End: 2025-05-06

## 2025-05-07 ENCOUNTER — NON-APPOINTMENT (OUTPATIENT)
Age: 80
End: 2025-05-07

## 2025-05-07 ENCOUNTER — APPOINTMENT (OUTPATIENT)
Dept: UROLOGY | Facility: CLINIC | Age: 80
End: 2025-05-07
Payer: MEDICARE

## 2025-05-07 VITALS
TEMPERATURE: 96.4 F | BODY MASS INDEX: 37.11 KG/M2 | WEIGHT: 280 LBS | DIASTOLIC BLOOD PRESSURE: 76 MMHG | HEART RATE: 101 BPM | SYSTOLIC BLOOD PRESSURE: 151 MMHG | HEIGHT: 73 IN

## 2025-05-07 DIAGNOSIS — R97.20 ELEVATED PROSTATE, SPECIFIC ANTIGEN [PSA]: ICD-10-CM

## 2025-05-07 PROCEDURE — 99204 OFFICE O/P NEW MOD 45 MIN: CPT

## (undated) DEVICE — PACK PROC CV DRAPE

## (undated) DEVICE — PACK PROCEDURE HARVEST SMARTPREP APC-60

## (undated) DEVICE — DRSG MASTISOL

## (undated) DEVICE — VENODYNE/SCD SLEEVE CALF MEDIUM

## (undated) DEVICE — SUT STAINLESS STEEL 6 4-18" CCS

## (undated) DEVICE — WARMING BLANKET LOWER ADULT

## (undated) DEVICE — SUT VICRYL 2-0 27" CT-1

## (undated) DEVICE — ELCTR STRYKER NEPTUNE SMOKE EVACUATION PENCIL (GREEN)

## (undated) DEVICE — CANISTER W/O GEL INFOVAC 500ML

## (undated) DEVICE — DRSG VAC GRANUFOAM LARGE (BLACK)

## (undated) DEVICE — PREP BETADINE KIT

## (undated) DEVICE — IRR SYS BONE CLNNG INTERPULSE

## (undated) DEVICE — CATH TRIOX OXIMETRY 8F 3 LUMENS

## (undated) DEVICE — BLADE SURGICAL #15 CARBON

## (undated) DEVICE — SUT NUROLON 1 18" OS-8 (POP-OFF)

## (undated) DEVICE — SUT VICRYL 0 27" CT

## (undated) DEVICE — TUBING SMOKE EVAC 3/8" X 10FT FOR NEPTUNE

## (undated) DEVICE — CHEST DRAIN PLEUR-EVAC DRY/WET ADULT-PEDS SINGLE (QUICK)

## (undated) DEVICE — PACK OPEN HEART LNX

## (undated) DEVICE — DRSG PREVENA PEEL & PLACE KIT 20CM

## (undated) DEVICE — SUT PROLENE 3-0 36" SH

## (undated) DEVICE — POSITIONER FOAM EGG CRATE ULNAR 2PCS (PINK)

## (undated) DEVICE — ACROBAT I-POSITIONER

## (undated) DEVICE — KIT APPLICATOR SPRAY FOR HARVEST SYSTEM

## (undated) DEVICE — TUBING SUCTION NONCONDUCTIVE 6MM X 12FT

## (undated) DEVICE — ELCTR STRYKER EXTENSION SUCTION TIP 125MM

## (undated) DEVICE — SOL IRR BAG NS 0.9% 3000ML

## (undated) DEVICE — SUT MONOCRYL 4-0 27" PS-2 UNDYED

## (undated) DEVICE — SUT VICRYL 1 36" CTX UNDYED

## (undated) DEVICE — TOURNIQUET ESMARK 6"